# Patient Record
Sex: MALE | Race: WHITE | Employment: FULL TIME | URBAN - METROPOLITAN AREA
[De-identification: names, ages, dates, MRNs, and addresses within clinical notes are randomized per-mention and may not be internally consistent; named-entity substitution may affect disease eponyms.]

---

## 2017-05-12 PROBLEM — E11.628 DIABETIC FOOT INFECTION (HCC): Status: ACTIVE | Noted: 2017-05-12

## 2017-05-12 PROBLEM — L08.9 DIABETIC FOOT INFECTION (HCC): Status: ACTIVE | Noted: 2017-05-12

## 2018-08-21 DIAGNOSIS — N18.30 CKD (CHRONIC KIDNEY DISEASE) STAGE 3, GFR 30-59 ML/MIN (HCC): ICD-10-CM

## 2018-08-21 LAB
ALBUMIN SERPL-MCNC: 4 G/DL (ref 3.4–5)
ANION GAP SERPL CALCULATED.3IONS-SCNC: 18 MMOL/L (ref 3–16)
BUN BLDV-MCNC: 37 MG/DL (ref 7–20)
CALCIUM SERPL-MCNC: 9 MG/DL (ref 8.3–10.6)
CHLORIDE BLD-SCNC: 99 MMOL/L (ref 99–110)
CO2: 23 MMOL/L (ref 21–32)
CREAT SERPL-MCNC: 2.3 MG/DL (ref 0.9–1.3)
GFR AFRICAN AMERICAN: 36
GFR NON-AFRICAN AMERICAN: 29
GLUCOSE BLD-MCNC: 95 MG/DL (ref 70–99)
PHOSPHORUS: 3.8 MG/DL (ref 2.5–4.9)
POTASSIUM SERPL-SCNC: 4.6 MMOL/L (ref 3.5–5.1)
SODIUM BLD-SCNC: 140 MMOL/L (ref 136–145)

## 2018-08-22 LAB
CREATININE URINE: 76.8 MG/DL (ref 39–259)
MICROALBUMIN UR-MCNC: 2.3 MG/DL
MICROALBUMIN/CREAT UR-RTO: 29.9 MG/G (ref 0–30)

## 2018-11-19 ENCOUNTER — HOSPITAL ENCOUNTER (INPATIENT)
Age: 58
LOS: 2 days | Discharge: HOME | DRG: 854 | End: 2018-11-21
Attending: EMERGENCY MEDICINE | Admitting: INTERNAL MEDICINE
Payer: COMMERCIAL

## 2018-11-19 ENCOUNTER — APPOINTMENT (OUTPATIENT)
Dept: GENERAL RADIOLOGY | Age: 58
DRG: 854 | End: 2018-11-19
Payer: COMMERCIAL

## 2018-11-19 DIAGNOSIS — A41.9 SEPTICEMIA (HCC): Primary | ICD-10-CM

## 2018-11-19 PROBLEM — N18.30 CKD (CHRONIC KIDNEY DISEASE) STAGE 3, GFR 30-59 ML/MIN (HCC): Status: ACTIVE | Noted: 2018-11-19

## 2018-11-19 LAB
ALBUMIN SERPL-MCNC: 3.9 G/DL (ref 3.4–5)
ALP BLD-CCNC: 113 U/L (ref 40–129)
ALT SERPL-CCNC: 16 U/L (ref 10–40)
ANION GAP SERPL CALCULATED.3IONS-SCNC: 16 MMOL/L (ref 3–16)
AST SERPL-CCNC: 20 U/L (ref 15–37)
BASE EXCESS VENOUS: -3 (ref -3–3)
BASOPHILS ABSOLUTE: 0.1 K/UL (ref 0–0.2)
BASOPHILS RELATIVE PERCENT: 0.5 %
BILIRUB SERPL-MCNC: 0.4 MG/DL (ref 0–1)
BILIRUBIN DIRECT: <0.2 MG/DL (ref 0–0.3)
BILIRUBIN, INDIRECT: NORMAL MG/DL (ref 0–1)
BUN BLDV-MCNC: 43 MG/DL (ref 7–20)
CALCIUM SERPL-MCNC: 9.3 MG/DL (ref 8.3–10.6)
CHLORIDE BLD-SCNC: 102 MMOL/L (ref 99–110)
CO2: 21 MMOL/L (ref 21–32)
CREAT SERPL-MCNC: 2.1 MG/DL (ref 0.9–1.3)
EOSINOPHILS ABSOLUTE: 0.1 K/UL (ref 0–0.6)
EOSINOPHILS RELATIVE PERCENT: 0.4 %
GFR AFRICAN AMERICAN: 39
GFR NON-AFRICAN AMERICAN: 33
GLUCOSE BLD-MCNC: 214 MG/DL (ref 70–99)
HCO3 VENOUS: 20.7 MMOL/L (ref 23–29)
HCT VFR BLD CALC: 31.1 % (ref 40.5–52.5)
HEMOGLOBIN: 10.1 G/DL (ref 13.5–17.5)
LACTIC ACID, SEPSIS: 1.3 MMOL/L (ref 0.4–1.9)
LIPASE: 9 U/L (ref 13–60)
LYMPHOCYTES ABSOLUTE: 0.3 K/UL (ref 1–5.1)
LYMPHOCYTES RELATIVE PERCENT: 1.7 %
MCH RBC QN AUTO: 28.9 PG (ref 26–34)
MCHC RBC AUTO-ENTMCNC: 32.4 G/DL (ref 31–36)
MCV RBC AUTO: 89 FL (ref 80–100)
MONOCYTES ABSOLUTE: 0.7 K/UL (ref 0–1.3)
MONOCYTES RELATIVE PERCENT: 4.7 %
NEUTROPHILS ABSOLUTE: 14 K/UL (ref 1.7–7.7)
NEUTROPHILS RELATIVE PERCENT: 92.7 %
O2 SAT, VEN: 96 %
PCO2, VEN: 28.1 MM HG (ref 40–50)
PDW BLD-RTO: 14.3 % (ref 12.4–15.4)
PERFORMED ON: ABNORMAL
PH VENOUS: 7.48 (ref 7.35–7.45)
PLATELET # BLD: 225 K/UL (ref 135–450)
PMV BLD AUTO: 7.2 FL (ref 5–10.5)
PO2, VEN: 72 MM HG
POC SAMPLE TYPE: ABNORMAL
POTASSIUM SERPL-SCNC: 4.4 MMOL/L (ref 3.5–5.1)
RAPID INFLUENZA  B AGN: NEGATIVE
RAPID INFLUENZA A AGN: NEGATIVE
RBC # BLD: 3.49 M/UL (ref 4.2–5.9)
SODIUM BLD-SCNC: 139 MMOL/L (ref 136–145)
TCO2 CALC VENOUS: 22 MMOL/L
TOTAL PROTEIN: 7.7 G/DL (ref 6.4–8.2)
WBC # BLD: 15.1 K/UL (ref 4–11)

## 2018-11-19 PROCEDURE — 6360000002 HC RX W HCPCS: Performed by: EMERGENCY MEDICINE

## 2018-11-19 PROCEDURE — 2580000003 HC RX 258: Performed by: EMERGENCY MEDICINE

## 2018-11-19 PROCEDURE — 83690 ASSAY OF LIPASE: CPT

## 2018-11-19 PROCEDURE — 80048 BASIC METABOLIC PNL TOTAL CA: CPT

## 2018-11-19 PROCEDURE — 6370000000 HC RX 637 (ALT 250 FOR IP): Performed by: EMERGENCY MEDICINE

## 2018-11-19 PROCEDURE — 99285 EMERGENCY DEPT VISIT HI MDM: CPT

## 2018-11-19 PROCEDURE — 96365 THER/PROPH/DIAG IV INF INIT: CPT

## 2018-11-19 PROCEDURE — 36415 COLL VENOUS BLD VENIPUNCTURE: CPT

## 2018-11-19 PROCEDURE — 1200000000 HC SEMI PRIVATE

## 2018-11-19 PROCEDURE — 71046 X-RAY EXAM CHEST 2 VIEWS: CPT

## 2018-11-19 PROCEDURE — 96361 HYDRATE IV INFUSION ADD-ON: CPT

## 2018-11-19 PROCEDURE — 87804 INFLUENZA ASSAY W/OPTIC: CPT

## 2018-11-19 PROCEDURE — 80076 HEPATIC FUNCTION PANEL: CPT

## 2018-11-19 PROCEDURE — 83605 ASSAY OF LACTIC ACID: CPT

## 2018-11-19 PROCEDURE — 82803 BLOOD GASES ANY COMBINATION: CPT

## 2018-11-19 PROCEDURE — 87801 DETECT AGNT MULT DNA AMPLI: CPT

## 2018-11-19 PROCEDURE — 87040 BLOOD CULTURE FOR BACTERIA: CPT

## 2018-11-19 PROCEDURE — 85025 COMPLETE CBC W/AUTO DIFF WBC: CPT

## 2018-11-19 PROCEDURE — 96375 TX/PRO/DX INJ NEW DRUG ADDON: CPT

## 2018-11-19 RX ORDER — ACETAMINOPHEN 500 MG
1000 TABLET ORAL ONCE
Status: COMPLETED | OUTPATIENT
Start: 2018-11-19 | End: 2018-11-19

## 2018-11-19 RX ORDER — HYDROCODONE BITARTRATE AND ACETAMINOPHEN 5; 325 MG/1; MG/1
1 TABLET ORAL EVERY 6 HOURS PRN
Status: DISCONTINUED | OUTPATIENT
Start: 2018-11-19 | End: 2018-11-21 | Stop reason: HOSPADM

## 2018-11-19 RX ORDER — SODIUM CHLORIDE 0.9 % (FLUSH) 0.9 %
10 SYRINGE (ML) INJECTION PRN
Status: DISCONTINUED | OUTPATIENT
Start: 2018-11-19 | End: 2018-11-21 | Stop reason: HOSPADM

## 2018-11-19 RX ORDER — SODIUM CHLORIDE 9 MG/ML
INJECTION, SOLUTION INTRAVENOUS CONTINUOUS
Status: DISCONTINUED | OUTPATIENT
Start: 2018-11-19 | End: 2018-11-21 | Stop reason: HOSPADM

## 2018-11-19 RX ORDER — SODIUM CHLORIDE 0.9 % (FLUSH) 0.9 %
10 SYRINGE (ML) INJECTION EVERY 12 HOURS SCHEDULED
Status: DISCONTINUED | OUTPATIENT
Start: 2018-11-19 | End: 2018-11-21 | Stop reason: HOSPADM

## 2018-11-19 RX ORDER — PANTOPRAZOLE SODIUM 40 MG/1
40 TABLET, DELAYED RELEASE ORAL
Status: DISCONTINUED | OUTPATIENT
Start: 2018-11-20 | End: 2018-11-21 | Stop reason: HOSPADM

## 2018-11-19 RX ORDER — SIMVASTATIN 40 MG
40 TABLET ORAL NIGHTLY
Status: DISCONTINUED | OUTPATIENT
Start: 2018-11-19 | End: 2018-11-21 | Stop reason: HOSPADM

## 2018-11-19 RX ORDER — ONDANSETRON 2 MG/ML
4 INJECTION INTRAMUSCULAR; INTRAVENOUS ONCE
Status: COMPLETED | OUTPATIENT
Start: 2018-11-19 | End: 2018-11-19

## 2018-11-19 RX ORDER — AMITRIPTYLINE HYDROCHLORIDE 25 MG/1
25 TABLET, FILM COATED ORAL NIGHTLY
Status: DISCONTINUED | OUTPATIENT
Start: 2018-11-19 | End: 2018-11-21 | Stop reason: HOSPADM

## 2018-11-19 RX ORDER — LOSARTAN POTASSIUM AND HYDROCHLOROTHIAZIDE 12.5; 5 MG/1; MG/1
1 TABLET ORAL DAILY
Status: DISCONTINUED | OUTPATIENT
Start: 2018-11-20 | End: 2018-11-21 | Stop reason: HOSPADM

## 2018-11-19 RX ORDER — ACETAMINOPHEN 325 MG/1
650 TABLET ORAL EVERY 4 HOURS PRN
Status: DISCONTINUED | OUTPATIENT
Start: 2018-11-19 | End: 2018-11-21 | Stop reason: HOSPADM

## 2018-11-19 RX ORDER — ASPIRIN 81 MG/1
81 TABLET ORAL DAILY
Status: DISCONTINUED | OUTPATIENT
Start: 2018-11-20 | End: 2018-11-21 | Stop reason: HOSPADM

## 2018-11-19 RX ORDER — 0.9 % SODIUM CHLORIDE 0.9 %
1000 INTRAVENOUS SOLUTION INTRAVENOUS ONCE
Status: COMPLETED | OUTPATIENT
Start: 2018-11-19 | End: 2018-11-19

## 2018-11-19 RX ORDER — MORPHINE SULFATE 10 MG/ML
8 INJECTION, SOLUTION INTRAMUSCULAR; INTRAVENOUS ONCE
Status: COMPLETED | OUTPATIENT
Start: 2018-11-19 | End: 2018-11-19

## 2018-11-19 RX ADMIN — SODIUM CHLORIDE 1000 ML: 9 INJECTION, SOLUTION INTRAVENOUS at 19:51

## 2018-11-19 RX ADMIN — ACETAMINOPHEN 1000 MG: 500 TABLET ORAL at 19:51

## 2018-11-19 RX ADMIN — MORPHINE SULFATE 8 MG: 10 INJECTION, SOLUTION INTRAMUSCULAR; INTRAVENOUS at 19:53

## 2018-11-19 RX ADMIN — ONDANSETRON 4 MG: 2 INJECTION INTRAMUSCULAR; INTRAVENOUS at 19:53

## 2018-11-19 RX ADMIN — CEFEPIME HYDROCHLORIDE 1 G: 1 INJECTION, POWDER, FOR SOLUTION INTRAMUSCULAR; INTRAVENOUS at 22:42

## 2018-11-19 RX ADMIN — VANCOMYCIN HYDROCHLORIDE 2000 MG: 10 INJECTION, POWDER, LYOPHILIZED, FOR SOLUTION INTRAVENOUS at 20:35

## 2018-11-19 RX ADMIN — SODIUM CHLORIDE 1000 ML: 9 INJECTION, SOLUTION INTRAVENOUS at 22:06

## 2018-11-19 ASSESSMENT — ENCOUNTER SYMPTOMS
CONSTIPATION: 1
BACK PAIN: 1
RHINORRHEA: 0
CHOKING: 0
EYE DISCHARGE: 0
SHORTNESS OF BREATH: 0
EYE PAIN: 0
VOMITING: 0
NAUSEA: 1
CHEST TIGHTNESS: 0
FACIAL SWELLING: 0
ABDOMINAL PAIN: 1
DIARRHEA: 0

## 2018-11-19 ASSESSMENT — PAIN SCALES - GENERAL
PAINLEVEL_OUTOF10: 10
PAINLEVEL_OUTOF10: 10
PAINLEVEL_OUTOF10: 0

## 2018-11-19 ASSESSMENT — PAIN DESCRIPTION - PAIN TYPE: TYPE: ACUTE PAIN;CHRONIC PAIN

## 2018-11-20 ENCOUNTER — APPOINTMENT (OUTPATIENT)
Dept: VASCULAR LAB | Age: 58
DRG: 854 | End: 2018-11-20
Payer: COMMERCIAL

## 2018-11-20 PROBLEM — B95.0 BACTERIAL INFECTION DUE TO STREPTOCOCCUS, GROUP A: Status: ACTIVE | Noted: 2018-11-20

## 2018-11-20 PROBLEM — R50.9 FEVER: Status: ACTIVE | Noted: 2018-11-20

## 2018-11-20 PROBLEM — D72.829 LEUKOCYTOSIS: Status: ACTIVE | Noted: 2018-11-20

## 2018-11-20 LAB
ANION GAP SERPL CALCULATED.3IONS-SCNC: 13 MMOL/L (ref 3–16)
BASOPHILS ABSOLUTE: 0 K/UL (ref 0–0.2)
BASOPHILS RELATIVE PERCENT: 0.1 %
BUN BLDV-MCNC: 47 MG/DL (ref 7–20)
CALCIUM SERPL-MCNC: 8.6 MG/DL (ref 8.3–10.6)
CHLORIDE BLD-SCNC: 100 MMOL/L (ref 99–110)
CO2: 23 MMOL/L (ref 21–32)
CREAT SERPL-MCNC: 2.3 MG/DL (ref 0.9–1.3)
EOSINOPHILS ABSOLUTE: 0 K/UL (ref 0–0.6)
EOSINOPHILS RELATIVE PERCENT: 0 %
GFR AFRICAN AMERICAN: 36
GFR NON-AFRICAN AMERICAN: 29
GLUCOSE BLD-MCNC: 204 MG/DL (ref 70–99)
GLUCOSE BLD-MCNC: 205 MG/DL (ref 70–99)
GLUCOSE BLD-MCNC: 227 MG/DL (ref 70–99)
GLUCOSE BLD-MCNC: 259 MG/DL (ref 70–99)
GLUCOSE BLD-MCNC: 390 MG/DL (ref 70–99)
GLUCOSE BLD-MCNC: 440 MG/DL (ref 70–99)
HCT VFR BLD CALC: 30.1 % (ref 40.5–52.5)
HEMOGLOBIN: 9.7 G/DL (ref 13.5–17.5)
LACTIC ACID, SEPSIS: 1.1 MMOL/L (ref 0.4–1.9)
LACTIC ACID, SEPSIS: 1.4 MMOL/L (ref 0.4–1.9)
LYMPHOCYTES ABSOLUTE: 0.4 K/UL (ref 1–5.1)
LYMPHOCYTES RELATIVE PERCENT: 1.6 %
MCH RBC QN AUTO: 29 PG (ref 26–34)
MCHC RBC AUTO-ENTMCNC: 32.3 G/DL (ref 31–36)
MCV RBC AUTO: 89.7 FL (ref 80–100)
MONOCYTES ABSOLUTE: 0.7 K/UL (ref 0–1.3)
MONOCYTES RELATIVE PERCENT: 3.3 %
NEUTROPHILS ABSOLUTE: 21.4 K/UL (ref 1.7–7.7)
NEUTROPHILS RELATIVE PERCENT: 95 %
PDW BLD-RTO: 14.8 % (ref 12.4–15.4)
PERFORMED ON: ABNORMAL
PLATELET # BLD: 222 K/UL (ref 135–450)
PMV BLD AUTO: 7.7 FL (ref 5–10.5)
POTASSIUM REFLEX MAGNESIUM: 4.9 MMOL/L (ref 3.5–5.1)
RBC # BLD: 3.36 M/UL (ref 4.2–5.9)
REPORT: NORMAL
SODIUM BLD-SCNC: 136 MMOL/L (ref 136–145)
WBC # BLD: 22.5 K/UL (ref 4–11)

## 2018-11-20 PROCEDURE — 85025 COMPLETE CBC W/AUTO DIFF WBC: CPT

## 2018-11-20 PROCEDURE — 87040 BLOOD CULTURE FOR BACTERIA: CPT

## 2018-11-20 PROCEDURE — 6370000000 HC RX 637 (ALT 250 FOR IP): Performed by: FAMILY MEDICINE

## 2018-11-20 PROCEDURE — 36415 COLL VENOUS BLD VENIPUNCTURE: CPT

## 2018-11-20 PROCEDURE — 2580000003 HC RX 258: Performed by: FAMILY MEDICINE

## 2018-11-20 PROCEDURE — 93971 EXTREMITY STUDY: CPT

## 2018-11-20 PROCEDURE — 6360000002 HC RX W HCPCS: Performed by: FAMILY MEDICINE

## 2018-11-20 PROCEDURE — 80048 BASIC METABOLIC PNL TOTAL CA: CPT

## 2018-11-20 PROCEDURE — 99255 IP/OBS CONSLTJ NEW/EST HI 80: CPT | Performed by: INTERNAL MEDICINE

## 2018-11-20 PROCEDURE — 1200000000 HC SEMI PRIVATE

## 2018-11-20 PROCEDURE — 0JBQ0ZZ EXCISION OF RIGHT FOOT SUBCUTANEOUS TISSUE AND FASCIA, OPEN APPROACH: ICD-10-PCS | Performed by: PODIATRIST

## 2018-11-20 PROCEDURE — 83605 ASSAY OF LACTIC ACID: CPT

## 2018-11-20 RX ORDER — DEXTROSE MONOHYDRATE 50 MG/ML
100 INJECTION, SOLUTION INTRAVENOUS PRN
Status: DISCONTINUED | OUTPATIENT
Start: 2018-11-20 | End: 2018-11-21 | Stop reason: HOSPADM

## 2018-11-20 RX ORDER — NICOTINE POLACRILEX 4 MG
15 LOZENGE BUCCAL PRN
Status: DISCONTINUED | OUTPATIENT
Start: 2018-11-20 | End: 2018-11-21 | Stop reason: HOSPADM

## 2018-11-20 RX ORDER — DEXTROSE MONOHYDRATE 25 G/50ML
12.5 INJECTION, SOLUTION INTRAVENOUS PRN
Status: DISCONTINUED | OUTPATIENT
Start: 2018-11-20 | End: 2018-11-21 | Stop reason: HOSPADM

## 2018-11-20 RX ADMIN — INSULIN LISPRO 4 UNITS: 100 INJECTION, SOLUTION INTRAVENOUS; SUBCUTANEOUS at 08:50

## 2018-11-20 RX ADMIN — CEFEPIME HYDROCHLORIDE 2 G: 2 INJECTION, POWDER, FOR SOLUTION INTRAVENOUS at 02:28

## 2018-11-20 RX ADMIN — ASPIRIN 81 MG: 81 TABLET ORAL at 08:45

## 2018-11-20 RX ADMIN — SODIUM CHLORIDE: 9 INJECTION, SOLUTION INTRAVENOUS at 00:33

## 2018-11-20 RX ADMIN — INSULIN LISPRO 5 UNITS: 100 INJECTION, SOLUTION INTRAVENOUS; SUBCUTANEOUS at 08:46

## 2018-11-20 RX ADMIN — INSULIN LISPRO 5 UNITS: 100 INJECTION, SOLUTION INTRAVENOUS; SUBCUTANEOUS at 21:00

## 2018-11-20 RX ADMIN — AMITRIPTYLINE HYDROCHLORIDE 25 MG: 25 TABLET, FILM COATED ORAL at 00:33

## 2018-11-20 RX ADMIN — CEFEPIME HYDROCHLORIDE 2 G: 2 INJECTION, POWDER, FOR SOLUTION INTRAVENOUS at 15:26

## 2018-11-20 RX ADMIN — LOSARTAN POTASSIUM AND HYDROCHLOROTHIAZIDE 1 TABLET: 12.5; 5 TABLET ORAL at 08:45

## 2018-11-20 RX ADMIN — VANCOMYCIN HYDROCHLORIDE 1500 MG: 10 INJECTION, POWDER, LYOPHILIZED, FOR SOLUTION INTRAVENOUS at 08:46

## 2018-11-20 RX ADMIN — AMITRIPTYLINE HYDROCHLORIDE 25 MG: 25 TABLET, FILM COATED ORAL at 21:00

## 2018-11-20 RX ADMIN — ENOXAPARIN SODIUM 30 MG: 30 INJECTION SUBCUTANEOUS at 08:45

## 2018-11-20 RX ADMIN — HYDROCODONE BITARTRATE AND ACETAMINOPHEN 1 TABLET: 5; 325 TABLET ORAL at 21:00

## 2018-11-20 RX ADMIN — INSULIN LISPRO 6 UNITS: 100 INJECTION, SOLUTION INTRAVENOUS; SUBCUTANEOUS at 12:30

## 2018-11-20 RX ADMIN — HYDROCODONE BITARTRATE AND ACETAMINOPHEN 1 TABLET: 5; 325 TABLET ORAL at 01:13

## 2018-11-20 RX ADMIN — HYDROCODONE BITARTRATE AND ACETAMINOPHEN 1 TABLET: 5; 325 TABLET ORAL at 12:36

## 2018-11-20 RX ADMIN — INSULIN LISPRO 2 UNITS: 100 INJECTION, SOLUTION INTRAVENOUS; SUBCUTANEOUS at 00:49

## 2018-11-20 RX ADMIN — ACETAMINOPHEN 650 MG: 325 TABLET ORAL at 12:57

## 2018-11-20 RX ADMIN — SODIUM CHLORIDE: 9 INJECTION, SOLUTION INTRAVENOUS at 12:57

## 2018-11-20 RX ADMIN — INSULIN LISPRO 12 UNITS: 100 INJECTION, SOLUTION INTRAVENOUS; SUBCUTANEOUS at 16:52

## 2018-11-20 RX ADMIN — INSULIN GLARGINE 40 UNITS: 100 INJECTION, SOLUTION SUBCUTANEOUS at 00:50

## 2018-11-20 RX ADMIN — SIMVASTATIN 40 MG: 40 TABLET, FILM COATED ORAL at 00:33

## 2018-11-20 RX ADMIN — ACETAMINOPHEN 650 MG: 325 TABLET ORAL at 21:05

## 2018-11-20 RX ADMIN — INSULIN GLARGINE 40 UNITS: 100 INJECTION, SOLUTION SUBCUTANEOUS at 21:01

## 2018-11-20 RX ADMIN — SIMVASTATIN 40 MG: 40 TABLET, FILM COATED ORAL at 21:00

## 2018-11-20 RX ADMIN — PANTOPRAZOLE SODIUM 40 MG: 40 TABLET, DELAYED RELEASE ORAL at 07:02

## 2018-11-20 ASSESSMENT — PAIN DESCRIPTION - LOCATION
LOCATION: BACK

## 2018-11-20 ASSESSMENT — PAIN DESCRIPTION - PAIN TYPE
TYPE: ACUTE PAIN;CHRONIC PAIN
TYPE: CHRONIC PAIN

## 2018-11-20 ASSESSMENT — ACTIVITIES OF DAILY LIVING (ADL)
EFFECT OF PAIN ON DAILY ACTIVITIES: DISCOMFORT
EFFECT OF PAIN ON DAILY ACTIVITIES: DISCOMFORT

## 2018-11-20 ASSESSMENT — PAIN SCALES - GENERAL
PAINLEVEL_OUTOF10: 4
PAINLEVEL_OUTOF10: 6
PAINLEVEL_OUTOF10: 0
PAINLEVEL_OUTOF10: 4
PAINLEVEL_OUTOF10: 8
PAINLEVEL_OUTOF10: 8

## 2018-11-20 ASSESSMENT — PAIN DESCRIPTION - FREQUENCY
FREQUENCY: INTERMITTENT
FREQUENCY: INTERMITTENT

## 2018-11-20 ASSESSMENT — PAIN DESCRIPTION - ONSET
ONSET: GRADUAL
ONSET: ON-GOING

## 2018-11-20 ASSESSMENT — PAIN DESCRIPTION - PROGRESSION
CLINICAL_PROGRESSION: GRADUALLY WORSENING
CLINICAL_PROGRESSION: NOT CHANGED

## 2018-11-20 ASSESSMENT — PAIN DESCRIPTION - DESCRIPTORS
DESCRIPTORS: ACHING
DESCRIPTORS: ACHING

## 2018-11-20 NOTE — H&P
(methicillin resistant staph aureus) culture positive. Past Surgical History:   has a past surgical history that includes Leg amputation below knee; Foot surgery (Left, 05/11/2017); and other surgical history (Right, 05/14/2017). Medications:  No current facility-administered medications on file prior to encounter. Current Outpatient Prescriptions on File Prior to Encounter   Medication Sig Dispense Refill    losartan-hydrochlorothiazide (HYZAAR) 50-12.5 MG per tablet TAKE 1 TABLET BY MOUTH DAILY 30 tablet 3    spironolactone (ALDACTONE) 25 MG tablet Take 1 tablet by mouth daily 30 tablet 5    HYDROcodone-acetaminophen (NORCO) 5-325 MG per tablet Take 1 tablet by mouth every 6 hours as needed for Pain .  amitriptyline (ELAVIL) 25 MG tablet Take 25 mg by mouth nightly      aspirin 81 MG EC tablet Take 1 tablet by mouth daily 30 tablet 3    furosemide (LASIX) 40 MG tablet TAKE 1 TABLET(40 MG) BY MOUTH DAILY      insulin lispro (HUMALOG) 100 UNIT/ML injection vial Inject 5-15 Units into the skin 3 times daily (before meals) Sliding scale with meals and at HS      omeprazole (PRILOSEC) 20 MG capsule Take 20 mg by mouth Daily       simvastatin (ZOCOR) 40 MG tablet Take 40 mg by mouth nightly.  insulin glargine (LANTUS) 100 UNIT/ML injection Inject 40 Units into the skin nightly          Allergies: Allergies   Allergen Reactions    Codeine Nausea And Vomiting        Social History:   reports that he has never smoked. He has never used smokeless tobacco. He reports that he does not drink alcohol or use drugs. Family History:  family history includes Arthritis in his father and mother; Diabetes in his brother and father; Heart Disease in his mother; High Blood Pressure in his mother; Stroke in his mother.       Physical Exam:  /63   Pulse 110   Temp 99.1 °F (37.3 °C) (Oral)   Resp 22   Ht 6' 3\" (1.905 m)   Wt (!) 338 lb 13.6 oz (153.7 kg)   SpO2 95%   BMI 42.35 kg/m²

## 2018-11-20 NOTE — PROGRESS NOTES
last 72 hours. UA:No results for input(s): Caitie Latin, GLUCOSEU, BILIRUBINUR, Kassandra Sullivan, BLOODU, PHUR, PROTEINU, UROBILINOGEN, NITRU, LEUKOCYTESUR, LABMICR, URINETYPE in the last 72 hours. Urine Microscopic: No results for input(s): LABCAST, BACTERIA, COMU, HYALCAST, WBCUA, RBCUA, EPIU in the last 72 hours. Urine Culture: No results for input(s): LABURIN in the last 72 hours. Urine Chemistry: No results for input(s): Janann Leer, PROTEINUR, NAUR in the last 72 hours. IMAGING:  XR CHEST STANDARD (2 VW)   Final Result      Limited inspiration. Bibasilar increased density most consistent with subsegmental atelectasis. Assessment/Plan   1. HUNTER on CKD III  - Cr worse   - Continue IVF  - monitor renal panel  - monitor UO  - Urine Studies pending    2. Sepsis  - continue abx  - continue IVF  - UA pending  - podiatry consulted for RLE wound    3. HTN, controlled  - continue bp meds    4.  Anemia    5. Acid- base/ Electrolytes        Thank you for allowing us to participate in care of 100 Mukund Napier MS4 acted as my scribe       Feel free to contact me   [facility] [address]  Office : [phone]  Fax :[phone]

## 2018-11-20 NOTE — PROGRESS NOTES
Patient's blood sugar was 440 at 1630. Covered with 22 units of Humalog. Dr. Amber Garcia was notified via [employer]. No new orders at this time. Will continue to monitor.

## 2018-11-20 NOTE — ED NOTES
Blood culture #1 drawn from R AC at [facility]. 47  Blood culture #2 drawn from  R forearm at [facility] cleaned with chlorhexidine for 30 seconds and allowed to dry before cultures drawn.        Grace Kumar RN  11/19/18 9549

## 2018-11-20 NOTE — ED PROVIDER NOTES
flank pain and hematuria. Musculoskeletal: Positive for arthralgias, back pain and myalgias. Negative for joint swelling. Skin: Positive for rash (Localized erythema to right leg as noted in HPI). Negative for wound. Allergic/Immunologic: Negative for environmental allergies. Neurological: Positive for weakness (Diffuse generalized). Negative for dizziness, seizures, syncope and light-headedness. Hematological: Does not bruise/bleed easily. Psychiatric/Behavioral: Negative for confusion and hallucinations. Past Medical, Surgical, Family, and Social History     He has a past medical history of Back pain; CAD (coronary artery disease); CKD (chronic kidney disease) stage 4, GFR 15-29 ml/min ([facility] [address] Diabetes mellitus ([facility] [address] Hyperlipidemia; Hypertension; and MRSA (methicillin resistant staph aureus) culture positive. He has a past surgical history that includes Leg amputation below knee; Foot surgery (Left, 05/11/2017); and other surgical history (Right, 05/14/2017). His family history includes Arthritis in his father and mother; Diabetes in his brother and father; Heart Disease in his mother; High Blood Pressure in his mother; Stroke in his mother. He reports that he has never smoked. He has never used smokeless tobacco. He reports that he does not drink alcohol or use drugs. Medications     Previous Medications    AMITRIPTYLINE (ELAVIL) 25 MG TABLET    Take 25 mg by mouth nightly    ASPIRIN 81 MG EC TABLET    Take 1 tablet by mouth daily    FUROSEMIDE (LASIX) 40 MG TABLET    TAKE 1 TABLET(40 MG) BY MOUTH DAILY    HYDROCODONE-ACETAMINOPHEN (NORCO) 5-325 MG PER TABLET    Take 1 tablet by mouth every 6 hours as needed for Pain .     INSULIN GLARGINE (LANTUS) 100 UNIT/ML INJECTION    Inject 40 Units into the skin nightly     INSULIN LISPRO (HUMALOG) 100 UNIT/ML INJECTION VIAL    Inject 5-15 Units into the skin 3 times daily (before meals) Sliding scale with meals and at HS LOSARTAN-HYDROCHLOROTHIAZIDE (HYZAAR) 50-12.5 MG PER TABLET    TAKE 1 TABLET BY MOUTH DAILY    OMEPRAZOLE (PRILOSEC) 20 MG CAPSULE    Take 20 mg by mouth Daily     SIMVASTATIN (ZOCOR) 40 MG TABLET    Take 40 mg by mouth nightly. SPIRONOLACTONE (ALDACTONE) 25 MG TABLET    Take 1 tablet by mouth daily       Allergies     He is allergic to codeine. Physical Exam     INITIAL VITALS: BP: (!) 143/55, Temp: 102.4 °F (39.1 °C), Pulse: 125, Resp: 24, SpO2: 95 %   Physical Exam   Constitutional: He is oriented to person, place, and time. He appears well-developed and well-nourished. No distress. HENT:   Head: Normocephalic and atraumatic. Mouth/Throat: No oropharyngeal exudate. Oropharynx dry   Eyes: Pupils are equal, round, and reactive to light. Conjunctivae and EOM are normal. Right eye exhibits no discharge. Left eye exhibits no discharge. Neck: Normal range of motion. Neck supple. No JVD present. No tracheal deviation present. No thyromegaly present. Cardiovascular: Regular rhythm, normal heart sounds and intact distal pulses. Exam reveals no gallop and no friction rub. No murmur heard. Tachycardic to 120   Pulmonary/Chest: Effort normal and breath sounds normal. No stridor. No respiratory distress. He has no wheezes. He has no rales. He exhibits no tenderness. Abdominal: Soft. Bowel sounds are normal. He exhibits no distension. There is no tenderness. There is no rebound and no guarding. Musculoskeletal: Normal range of motion. He exhibits no edema, tenderness or deformity. Below knee amputation noted on the left leg. Normal range of motion of bilateral upper arms and right lower leg. Neurological: He is alert and oriented to person, place, and time. No cranial nerve deficit. He exhibits normal muscle tone. Coordination normal.   Skin: Skin is warm and dry. No rash noted. He is not diaphoretic. No erythema. Patient noted with blanching erythema and warmth to bilateral hands and fingers. 7.7 6.4 - 8.2 g/dL    Alb 3.9 3.4 - 5.0 g/dL    Alkaline Phosphatase 113 40 - 129 U/L    ALT 16 10 - 40 U/L    AST 20 15 - 37 U/L    Total Bilirubin 0.4 0.0 - 1.0 mg/dL    Bilirubin, Direct <0.2 0.0 - 0.3 mg/dL    Bilirubin, Indirect see below 0.0 - 1.0 mg/dL   Lipase   Result Value Ref Range    Lipase 9.0 (L) 13.0 - 60.0 U/L   POCT Venous   Result Value Ref Range    pH, Ken 7.476 (H) 7.350 - 7.450    pCO2, Ken 28.1 (L) 40.0 - 50.0 mm Hg    pO2, Ken 72 Not Established mm Hg    HCO3, Venous 20.7 (L) 23.0 - 29.0 mmol/L    Base Excess, Ken -3 -3 - 3    O2 Sat, Ken 96 Not Established %    TC02 (Calc), Ken 22 Not Established mmol/L    Sample Type KEN     Performed on SEE BELOW        ED BEDSIDE ULTRASOUND:  none    RECENT VITALS:  BP: (!) 161/62,Temp: 100.8 °F (38.2 °C), Pulse: 120, Resp: 24, SpO2: 92 %     Procedures     none    ED Course     Nursing Notes, Past Medical Hx, Past Surgical Hx, Social Hx,Allergies, and Family Hx were reviewed. The patient was given the following medications:  Orders Placed This Encounter   Medications    vancomycin (VANCOCIN) 2,000 mg in dextrose 5 % 500 mL IVPB    0.9 % sodium chloride bolus    morphine injection 8 mg    ondansetron (ZOFRAN) injection 4 mg    acetaminophen (TYLENOL) tablet 1,000 mg    cefepime (MAXIPIME) 1 g IVPB minibag    0.9 % sodium chloride bolus       CONSULTS:  IP CONSULT TO HOSPITALIST    MEDICAL DECISIONMAKING / ASSESSMENT / Rosa  is a 62 y.o. male who presents with a chief complaint of emesis, fatigue. Initial exam reveals an overall chronically ill-appearing male in no acute distress with fever and tachycardia as well as tachypnea with normal oxygenation. Physical exam with erythema to bilateral hands, erythema to right calf. Ulcer without signs of infection on the bottom of right foot. Benign abdominal exam and benign chest and lung exam.  Patient's presentation concerning for sepsis of unknown etiology.   Patient given

## 2018-11-20 NOTE — ED NOTES
After medicating, patient's Oxygen saturations dropped to 74% and patient's respirations slowed to 6/minute. Patient was placed on 3L of oxygen which raised Oxygen saturation levels to 92% before dropping again. Patient's oxygen flow increased to 4L which increased oxygen saturations to 95%. Patient began breathing at a normal respiration levels as he fell asleep and started snoring. Oxygen flow titrated down to 3L and saturations increased to 97%. Flow titrated to 2L and oxygen saturation levels dropped to 95%. Will continue to monitor.      Nayely Hernandez RN  11/19/18 2010

## 2018-11-20 NOTE — CONSULTS
(chronic kidney disease) stage 4, GFR 15-29 ml/min ([facility])     Dr. Fernando Connell    [facility]     Hyperlipidemia     Hypertension     MRSA (methicillin resistant staph aureus) culture positive 05/11/2017    foot       Past Surgical History:    Past Surgical History:   Procedure Laterality Date    FOOT SURGERY Left 05/11/2017    LEG AMPUTATION BELOW KNEE      OTHER SURGICAL HISTORY Right 05/14/2017    RIGHT FOOT DEBRIDEMENT INCISION AND DRAINAGE, DELAYED PRIMARY       Current Medications:     insulin lispro  0-12 Units Subcutaneous TID WC    insulin lispro  0-6 Units Subcutaneous Nightly    insulin lispro  5-15 Units Subcutaneous TID AC    [START ON 11/21/2018] influenza virus vaccine  0.5 mL Intramuscular Once    cefepime  2 g Intravenous Q12H    [START ON 11/21/2018] vancomycin  1,500 mg Intravenous Q24H    [START ON 11/21/2018] pneumococcal 13-valent conjugate  0.5 mL Intramuscular Once    amitriptyline  25 mg Oral Nightly    aspirin  81 mg Oral Daily    insulin glargine  40 Units Subcutaneous Nightly    losartan-hydrochlorothiazide  1 tablet Oral Daily    pantoprazole  40 mg Oral QAM AC    simvastatin  40 mg Oral Nightly    sodium chloride flush  10 mL Intravenous 2 times per day    enoxaparin  30 mg Subcutaneous Daily       Allergies:  Codeine    Social History:    TOBACCO:    none  ETOH:    none  DRUGS:   none  MARITAL STATUS:     OCCUPATION:       Patient lives lives with his wife  Family History:   No immunodeficiency    REVIEW OF SYSTEMS:    No fever / chills / sweats. No weight loss. No visual change, eye pain, eye discharge. No oral lesion, sore throat, dysphagia. Denies cough / sputum. Denies chest pain, palpitations. Denies n / v / abd pain. No diarrhea. Denies dysuria or change in urinary function. Denies joint swelling or pain. No myalgia, arthralgia.   Rt calf erythema  Denies focal weakness, sensory change or other neurologic symptom started on Vanc + cefepime. LA 1.4 today, WBC increased to 22.5 today (from 15.1). Last fever 100.8F yesterday    # RLE cellulitis - likely source of sepsis,   # Rt plantal wound - less likely source, does not appear infected. # DM, CAD    Patient Active Problem List   Diagnosis    D[facility]deep venous thrombosis) ([facility])    Osteomyelitis of left foot ([facility])    S/P unilateral BKA (below knee amputation) ([facility]    HUNTER (acute kidney injury) ([facility] [address] [facility]    HTN (hypertension)    DM (diabetes mellitus) ([facility] [address] [facility]    HLD (hyperlipidemia)    Back pain    Diabetic foot infection ([facility] [address]    Osteomyelitis of foot ([facility] [address] [facility]    Sepsis ([facility] [address] [facility]    CKD (chronic kidney disease) stage 3, GFR 30-59 ml/min ([facility])         RECOMMENDATIONS:    - Continue IV Vanc + Cefepime   - Await culture results and sensitivities  - podiatry following     Discussed with Dr Andres Hennessy MS4    Addendum to Medical Student Consult note:  Pt seen,examined and evaluated. I have independently performed history, physical exam, lab and data review. I have determined assessment and plan as documented by student (Juice Dover). 61 yo man with DM, neuropathy, remote L BKA (18 yr ago)  Hx R DM foot infection, MRSA bacteremia 5/2017    Admit with acute onset illness with fatigue, chills over 1 day. Came to hosp 11/19 - T102.4, WBC 15.1. BC sent.   Started on vanco / cefepime  Seen by Podiatry, R foot ulcer not felt to be infection  BC + GAS (gp A Streptococcus)    Today 11/20, afebrile, feels good    IMP/  Acute febrile illness - secondary to beta hemolytic Streptococcus (gp A Strep) related to R LE cellulitis  GAS bacteremia    REC/  Change to ceftriaxone  Monitor     If improved 11/21, ok for discharge on po augmentin 875 bid (or cephalexin 500 qid) x 7 days  Marlene Naranjo MD

## 2018-11-20 NOTE — CARE COORDINATION
Met with pt at the bedside this morning. He is sitting up at the bedside eating breakfast. He is alert and oriented x 4. Very pleasant and easy to engage in conversation. Stated he is feeling much better this morning compared to last night. He is no longer requiring oxygen. Stated he is looking forward to getting out of the hospital to return to work because it is \"bonus time\" and he has things he wants to turn in as soon as possible. He is independent with ADL's. He has a prosthetic leg he has used for over 18 years. Able to drive. Family is supportive. No needs anticipated.     Ortega Arredondo, ROSIBEL  Case Management  [phone]

## 2018-11-20 NOTE — CONSULTS
Department of Podiatry Consult Note  Resident       Reason for Consult:  RLE cellulitis with DFU  Requesting Physician:  Vickie De La Cruz MD    CHIEF COMPLAINT:  Painful red leg. HISTORY OF PRESENT ILLNESS:                The patient is a 62 y.o. male with significant past medical history as stated below who is consulted for evaluation of his right diabetic foot wound in the setting of cellulitis to the RLE. States he has been feeling nauseous and had a fever of 103 last night. States is feeling much better this am since beginning IV abx. States the wound to the right foot does not cause him any pain, and has been there for years, he sees his podiatrist weekly.      Past Medical History:        Diagnosis Date    Back pain     CAD (coronary artery disease)     CKD (chronic kidney disease) stage 4, GFR 15-29 ml/min ([facility])     Dr. Jw Tineo Diabetes mellitus ([facility] [address]     Hyperlipidemia     Hypertension     MRSA (methicillin resistant staph aureus) culture positive 05/11/2017    foot     Past Surgical History:        Procedure Laterality Date    FOOT SURGERY Left 05/11/2017    LEG AMPUTATION BELOW KNEE      OTHER SURGICAL HISTORY Right 05/14/2017    RIGHT FOOT DEBRIDEMENT INCISION AND DRAINAGE, DELAYED PRIMARY     Current Medications:    Current Facility-Administered Medications: insulin lispro (HUMALOG) injection pen 0-12 Units, 0-12 Units, Subcutaneous, TID WC  insulin lispro (HUMALOG) injection pen 0-6 Units, 0-6 Units, Subcutaneous, Nightly  glucose (GLUTOSE) 40 % oral gel 15 g, 15 g, Oral, PRN  dextrose 50 % solution 12.5 g, 12.5 g, Intravenous, PRN  glucagon (rDNA) injection 1 mg, 1 mg, Intramuscular, PRN  dextrose 5 % solution, 100 mL/hr, Intravenous, PRN  insulin lispro (HUMALOG) injection pen 5-15 Units, 5-15 Units, Subcutaneous, TID AC  amitriptyline (ELAVIL) tablet 25 mg, 25 mg, Oral, Nightly  aspirin EC tablet 81 mg, 81 mg, Oral, Daily  HYDROcodone-acetaminophen (NORCO) 5-325 MG per tablet 1 tablet, 1 tablet, Oral, Q6H PRN  insulin glargine (LANTUS) injection pen 40 Units, 40 Units, Subcutaneous, Nightly  losartan-hydrochlorothiazide (HYZAAR) 50-12.5 MG per tablet 1 tablet, 1 tablet, Oral, Daily  pantoprazole (PROTONIX) tablet 40 mg, 40 mg, Oral, QAM AC  simvastatin (ZOCOR) tablet 40 mg, 40 mg, Oral, Nightly  sodium chloride flush 0.9 % injection 10 mL, 10 mL, Intravenous, 2 times per day  sodium chloride flush 0.9 % injection 10 mL, 10 mL, Intravenous, PRN  enoxaparin (LOVENOX) injection 30 mg, 30 mg, Subcutaneous, Daily  0.9 % sodium chloride infusion, , Intravenous, Continuous  acetaminophen (TYLENOL) tablet 650 mg, 650 mg, Oral, Q4H PRN  cefepime (MAXIPIME) 2 g IVPB minibag, 2 g, Intravenous, Q8H  vancomycin (VANCOCIN) 1,500 mg in dextrose 5 % 250 mL IVPB, 1,500 mg, Intravenous, Q12H  Allergies:   Codeine  Social History:    Denies tobacco, etoh, drugs.    Family History:   Family History   Problem Relation Age of Onset    Heart Disease Mother     High Blood Pressure Mother     Stroke Mother     Arthritis Mother     Diabetes Father     Arthritis Father     Diabetes Brother      REVIEW OF SYSTEMS:    See HPI   PHYSICAL EXAM:      Vitals:    /71   Pulse 104   Temp 98.2 °F (36.8 °C) (Oral)   Resp 20   Ht 6' 3\" (1.905 m)   Wt (!) 338 lb 13.6 oz (153.7 kg)   SpO2 97%   BMI 42.35 kg/m²     LABS:   Recent Labs      11/19/18 1919 11/20/18   0828   WBC  15.1*  22.5*   HGB  10.1*  9.7*   HCT  31.1*  30.1*   PLT  225  222     Recent Labs      11/20/18   0829   NA  136   K  4.9   CL  100   CO2  23   BUN  47*   CREATININE  2.3*     Recent Labs      11/19/18 1919   PROT  7.7         SKIN:    RLE exam reveals erythema circumferentially to the calf, none to the foot, scar present from previous 5th ray resection and full thickness wound at the base of the 5th met with granular base and slight hyperkeratotic rim, excisionally debrided with #15 blade down to the level of and including sub cutaneous tissue with

## 2018-11-21 VITALS
HEART RATE: 89 BPM | SYSTOLIC BLOOD PRESSURE: 145 MMHG | WEIGHT: 315 LBS | BODY MASS INDEX: 39.17 KG/M2 | RESPIRATION RATE: 20 BRPM | OXYGEN SATURATION: 97 % | TEMPERATURE: 97.8 F | HEIGHT: 75 IN | DIASTOLIC BLOOD PRESSURE: 83 MMHG

## 2018-11-21 LAB
ANION GAP SERPL CALCULATED.3IONS-SCNC: 9 MMOL/L (ref 3–16)
BASOPHILS ABSOLUTE: 0 K/UL (ref 0–0.2)
BASOPHILS RELATIVE PERCENT: 0.3 %
BUN BLDV-MCNC: 57 MG/DL (ref 7–20)
CALCIUM SERPL-MCNC: 8.1 MG/DL (ref 8.3–10.6)
CHLORIDE BLD-SCNC: 100 MMOL/L (ref 99–110)
CO2: 21 MMOL/L (ref 21–32)
CREAT SERPL-MCNC: 2.5 MG/DL (ref 0.9–1.3)
EOSINOPHILS ABSOLUTE: 0.1 K/UL (ref 0–0.6)
EOSINOPHILS RELATIVE PERCENT: 1 %
GFR AFRICAN AMERICAN: 32
GFR NON-AFRICAN AMERICAN: 27
GLUCOSE BLD-MCNC: 208 MG/DL (ref 70–99)
GLUCOSE BLD-MCNC: 238 MG/DL (ref 70–99)
GLUCOSE BLD-MCNC: 266 MG/DL (ref 70–99)
GLUCOSE BLD-MCNC: 267 MG/DL (ref 70–99)
HCT VFR BLD CALC: 26.4 % (ref 40.5–52.5)
HEMOGLOBIN: 8.7 G/DL (ref 13.5–17.5)
LYMPHOCYTES ABSOLUTE: 0.5 K/UL (ref 1–5.1)
LYMPHOCYTES RELATIVE PERCENT: 4 %
MCH RBC QN AUTO: 29.6 PG (ref 26–34)
MCHC RBC AUTO-ENTMCNC: 32.9 G/DL (ref 31–36)
MCV RBC AUTO: 90.1 FL (ref 80–100)
MONOCYTES ABSOLUTE: 0.8 K/UL (ref 0–1.3)
MONOCYTES RELATIVE PERCENT: 6.1 %
NEUTROPHILS ABSOLUTE: 11.4 K/UL (ref 1.7–7.7)
NEUTROPHILS RELATIVE PERCENT: 88.6 %
PDW BLD-RTO: 14.3 % (ref 12.4–15.4)
PERFORMED ON: ABNORMAL
PLATELET # BLD: 154 K/UL (ref 135–450)
PMV BLD AUTO: 8.1 FL (ref 5–10.5)
POTASSIUM REFLEX MAGNESIUM: 4.3 MMOL/L (ref 3.5–5.1)
RBC # BLD: 2.93 M/UL (ref 4.2–5.9)
SEDIMENTATION RATE, ERYTHROCYTE: 75 MM/HR (ref 0–20)
SODIUM BLD-SCNC: 130 MMOL/L (ref 136–145)
VANCOMYCIN RANDOM: 19.4 UG/ML
WBC # BLD: 12.9 K/UL (ref 4–11)

## 2018-11-21 PROCEDURE — 80202 ASSAY OF VANCOMYCIN: CPT

## 2018-11-21 PROCEDURE — 90670 PCV13 VACCINE IM: CPT | Performed by: INTERNAL MEDICINE

## 2018-11-21 PROCEDURE — 85025 COMPLETE CBC W/AUTO DIFF WBC: CPT

## 2018-11-21 PROCEDURE — G0009 ADMIN PNEUMOCOCCAL VACCINE: HCPCS | Performed by: INTERNAL MEDICINE

## 2018-11-21 PROCEDURE — 85652 RBC SED RATE AUTOMATED: CPT

## 2018-11-21 PROCEDURE — 6360000002 HC RX W HCPCS: Performed by: INTERNAL MEDICINE

## 2018-11-21 PROCEDURE — 2580000003 HC RX 258: Performed by: INTERNAL MEDICINE

## 2018-11-21 PROCEDURE — 2580000003 HC RX 258: Performed by: FAMILY MEDICINE

## 2018-11-21 PROCEDURE — 6370000000 HC RX 637 (ALT 250 FOR IP): Performed by: FAMILY MEDICINE

## 2018-11-21 PROCEDURE — 36415 COLL VENOUS BLD VENIPUNCTURE: CPT

## 2018-11-21 PROCEDURE — 6360000002 HC RX W HCPCS: Performed by: FAMILY MEDICINE

## 2018-11-21 PROCEDURE — 80048 BASIC METABOLIC PNL TOTAL CA: CPT

## 2018-11-21 PROCEDURE — 99232 SBSQ HOSP IP/OBS MODERATE 35: CPT | Performed by: INTERNAL MEDICINE

## 2018-11-21 RX ORDER — AMOXICILLIN AND CLAVULANATE POTASSIUM 875; 125 MG/1; MG/1
1 TABLET, FILM COATED ORAL 2 TIMES DAILY
Qty: 14 TABLET | Refills: 0 | Status: SHIPPED | OUTPATIENT
Start: 2018-11-21 | End: 2018-11-28

## 2018-11-21 RX ADMIN — INSULIN LISPRO 4 UNITS: 100 INJECTION, SOLUTION INTRAVENOUS; SUBCUTANEOUS at 17:58

## 2018-11-21 RX ADMIN — INSULIN LISPRO 4 UNITS: 100 INJECTION, SOLUTION INTRAVENOUS; SUBCUTANEOUS at 12:19

## 2018-11-21 RX ADMIN — PNEUMOCOCCAL 13-VALENT CONJUGATE VACCINE 0.5 ML: 2.2; 2.2; 2.2; 2.2; 2.2; 4.4; 2.2; 2.2; 2.2; 2.2; 2.2; 2.2; 2.2 INJECTION, SUSPENSION INTRAMUSCULAR at 08:21

## 2018-11-21 RX ADMIN — CEFTRIAXONE SODIUM 2 G: 2 INJECTION, POWDER, FOR SOLUTION INTRAMUSCULAR; INTRAVENOUS at 15:28

## 2018-11-21 RX ADMIN — CEFEPIME HYDROCHLORIDE 2 G: 2 INJECTION, POWDER, FOR SOLUTION INTRAVENOUS at 03:42

## 2018-11-21 RX ADMIN — HYDROCODONE BITARTRATE AND ACETAMINOPHEN 1 TABLET: 5; 325 TABLET ORAL at 03:42

## 2018-11-21 RX ADMIN — ACETAMINOPHEN 650 MG: 325 TABLET ORAL at 03:49

## 2018-11-21 RX ADMIN — ASPIRIN 81 MG: 81 TABLET ORAL at 08:11

## 2018-11-21 RX ADMIN — ACETAMINOPHEN 650 MG: 325 TABLET ORAL at 08:25

## 2018-11-21 RX ADMIN — VANCOMYCIN HYDROCHLORIDE 1500 MG: 10 INJECTION, POWDER, LYOPHILIZED, FOR SOLUTION INTRAVENOUS at 08:12

## 2018-11-21 RX ADMIN — LOSARTAN POTASSIUM AND HYDROCHLOROTHIAZIDE 1 TABLET: 12.5; 5 TABLET ORAL at 08:38

## 2018-11-21 RX ADMIN — PANTOPRAZOLE SODIUM 40 MG: 40 TABLET, DELAYED RELEASE ORAL at 06:24

## 2018-11-21 RX ADMIN — INSULIN LISPRO 6 UNITS: 100 INJECTION, SOLUTION INTRAVENOUS; SUBCUTANEOUS at 08:14

## 2018-11-21 RX ADMIN — ENOXAPARIN SODIUM 30 MG: 30 INJECTION SUBCUTANEOUS at 08:11

## 2018-11-21 ASSESSMENT — PAIN SCALES - GENERAL
PAINLEVEL_OUTOF10: 6
PAINLEVEL_OUTOF10: 0
PAINLEVEL_OUTOF10: 0
PAINLEVEL_OUTOF10: 8
PAINLEVEL_OUTOF10: 0

## 2018-11-21 ASSESSMENT — PAIN DESCRIPTION - DESCRIPTORS: DESCRIPTORS: SHARP

## 2018-11-21 ASSESSMENT — PAIN DESCRIPTION - PROGRESSION: CLINICAL_PROGRESSION: GRADUALLY WORSENING

## 2018-11-21 ASSESSMENT — PAIN DESCRIPTION - ONSET: ONSET: ON-GOING

## 2018-11-21 ASSESSMENT — PAIN DESCRIPTION - PAIN TYPE: TYPE: CHRONIC PAIN

## 2018-11-21 ASSESSMENT — PAIN DESCRIPTION - LOCATION: LOCATION: BACK

## 2018-11-21 ASSESSMENT — PAIN DESCRIPTION - FREQUENCY: FREQUENCY: INTERMITTENT

## 2018-11-21 ASSESSMENT — ACTIVITIES OF DAILY LIVING (ADL): EFFECT OF PAIN ON DAILY ACTIVITIES: DISCOMFORT

## 2018-11-21 NOTE — PROGRESS NOTES
plan.   RN notified about todays plan  bed side. Dispo: will monitor, Discharge in 1  days to home . Needs to stay in hospital for iv abx . Jj Kent MD  [phone]

## 2018-11-21 NOTE — PROGRESS NOTES
Clinical Pharmacy Progress Note    Admit date: 11/19/2018     Subjective/Objective:  Pt is a 58yom with PMHx that includes HTN, HLD, DM, CKD (baseline SCr ~2 per nephro), CAD, left BKA, and MRSA bacteremia (2017) who is admitted with weakness and fever - found to have sepsis of unclear source. Pt also with Rt foot non-infected wound and RLE cellulitis 2/2 venous stasis. Pharmacy is consulted to dose Vancomycin per Dr. Bentley Panda    Current antibiotics:  Cefepime 2g IV q12h - day #3  Vancomycin - Pharmacy to dose - day #3   2g IV x1 11/19 20:35   1.5g IV q24h (11/20 - current)    Patient was on Vancomycin in May 2017 for MRSA bacteremia. Due to HUNTER upon admission, Vancomycin was dosed intermittently based on levels. Once HUNTER resolved, Vanc 1.5g IV q24h was started, but a trough was not obtained as patient was then switched to po Linezolid for discharge. Recent Labs      11/20/18   0829  11/21/18   0740   NA  136  130*   K  4.9  4.3   CL  100  100   CO2  23  21   BUN  47*  57*   CREATININE  2.3*  2.5*   GLUCOSE  227*  267*       Estimated Creatinine Clearance: 53 mL/min (A) (based on SCr of 2.5 mg/dL (H)).     Lab Results   Component Value Date    WBC 12.9 (H) 11/21/2018    HGB 8.7 (L) 11/21/2018    HCT 26.4 (L) 11/21/2018    MCV 90.1 11/21/2018     11/21/2018       Lab Results   Component Value Date    PROTIME 13.5 (H) 05/11/2017    INR 1.19 (H) 05/11/2017       Vancomycin Levels:  Random 11/21 @ 07:40 = 19.4 mcg/mL (prior to 3rd dose on 1.5g IV q24h)    Culture results:  Rapid flu (11/19) = negative  Blood (11/19) = NGTD x2  Blood (11/20) = NGTD    Prophylaxis:  VTE:  Enoxaparin  GI:  Pantoprazole    Vaccination screening:  Pneumonia:  given 11/21  Influenza: refused - pt states he already received    Assessment/Plan:  1)  Sepsis of unclear source and RLE cellulitis:  Cefepime + Vancomycin - day #3  · Cefepime -   · Based on current est CrCl, dose adjusted to 2g IV q12h per Elena 113 renal dose adjustment

## 2018-11-21 NOTE — PROGRESS NOTES
Scheduled Meds:   cefTRIAXone (ROCEPHIN) IV  1 g Intravenous Q24H    insulin lispro  0-12 Units Subcutaneous TID WC    insulin lispro  0-6 Units Subcutaneous Nightly    insulin lispro  5-15 Units Subcutaneous TID AC    influenza virus vaccine  0.5 mL Intramuscular Once    amitriptyline  25 mg Oral Nightly    aspirin  81 mg Oral Daily    insulin glargine  40 Units Subcutaneous Nightly    losartan-hydrochlorothiazide  1 tablet Oral Daily    pantoprazole  40 mg Oral QAM AC    simvastatin  40 mg Oral Nightly    sodium chloride flush  10 mL Intravenous 2 times per day    enoxaparin  30 mg Subcutaneous Daily       Continuous Infusions:   dextrose      sodium chloride 75 mL/hr at 11/21/18 1036       PRN Meds:  glucose, dextrose, glucagon (rDNA), dextrose, HYDROcodone-acetaminophen, sodium chloride flush, acetaminophen      Assessment:     Mr Anant Baird a 62 y. o. male with PMHx including CAD, CKD, DM, HLD,  Rt foot ulcer, LLE amputation, MRSA bacteremia (5/11/17) and HTN who presented with complaint of fever, emesis, nausea, and general malaise. CXR negative. Bld Cx +ve GAS.      WBC trending down, 12.9 today (from 22.5 yesterday). Afebrile > 24 hrs and hemodynamically stable    Plan:     Acute Febrile Illness  - 2/2 GAS cellulitis of Rt calf  - rapid onset, and currently asymptomatic   - Ceftriaxone IV while in hospital   - change to po Augmentin 875mg bid at discharge. Discussed with Dr Olinda Reed, MS4    Addendum to Medical Student Progress note:  Pt seen,examined and evaluated. I have independently performed history, physical exam, lab and data review. I have determined assessment and plan as documented by student (Maria C Mccarthy).    61 yo man with DM, neuropathy, remote L BKA (18 yr ago)  Hx R DM foot infection, MRSA bacteremia 5/2017     Admit with acute onset illness with fatigue, chills over 1 day. Came to hosp 11/19 - T102.4, WBC 15.1. BC sent.   Started on vanco / cefepime  Seen by Podiatry, R foot ulcer not felt to be infection  BC + GAS (gp A Streptococcus)     Today 11/21, afebrile, feels good.   WBC 12.9 (down from 22.5 on 11/20)     IMP/  Acute febrile illness - secondary to beta hemolytic Streptococcus (gp A Strep) related to R LE cellulitis  GAS bacteremia     REC/  Ok for discharge on po augmentin 875 bid x 7 days  Any problems, pt can call me, gave him my card / phone number    Johnny Sow MD

## 2018-11-22 LAB
CULTURE, BLOOD 2: ABNORMAL
ORGANISM: ABNORMAL
ORGANISM: ABNORMAL

## 2018-11-25 LAB
BLOOD CULTURE, ROUTINE: NORMAL
BLOOD CULTURE, ROUTINE: NORMAL
CULTURE, BLOOD 2: NORMAL

## 2019-06-20 DIAGNOSIS — N18.30 CKD (CHRONIC KIDNEY DISEASE), STAGE III (HCC): ICD-10-CM

## 2019-06-20 LAB
ALBUMIN SERPL-MCNC: 3.8 G/DL (ref 3.4–5)
ANION GAP SERPL CALCULATED.3IONS-SCNC: 14 MMOL/L (ref 3–16)
BUN BLDV-MCNC: 41 MG/DL (ref 7–20)
CALCIUM SERPL-MCNC: 8.9 MG/DL (ref 8.3–10.6)
CHLORIDE BLD-SCNC: 105 MMOL/L (ref 99–110)
CO2: 24 MMOL/L (ref 21–32)
CREAT SERPL-MCNC: 2.3 MG/DL (ref 0.9–1.3)
CREATININE URINE: 133 MG/DL (ref 39–259)
GFR AFRICAN AMERICAN: 35
GFR NON-AFRICAN AMERICAN: 29
GLUCOSE BLD-MCNC: 116 MG/DL (ref 70–99)
MICROALBUMIN UR-MCNC: <1.2 MG/DL
MICROALBUMIN/CREAT UR-RTO: NORMAL MG/G (ref 0–30)
PHOSPHORUS: 4.5 MG/DL (ref 2.5–4.9)
POTASSIUM SERPL-SCNC: 4.9 MMOL/L (ref 3.5–5.1)
SODIUM BLD-SCNC: 143 MMOL/L (ref 136–145)

## 2019-09-26 DIAGNOSIS — N18.30 CKD (CHRONIC KIDNEY DISEASE), STAGE III (HCC): ICD-10-CM

## 2019-09-26 LAB
ALBUMIN SERPL-MCNC: 3.9 G/DL (ref 3.4–5)
ANION GAP SERPL CALCULATED.3IONS-SCNC: 15 MMOL/L (ref 3–16)
BASOPHILS ABSOLUTE: 0 K/UL (ref 0–0.2)
BASOPHILS RELATIVE PERCENT: 0.8 %
BUN BLDV-MCNC: 42 MG/DL (ref 7–20)
CALCIUM SERPL-MCNC: 9 MG/DL (ref 8.3–10.6)
CHLORIDE BLD-SCNC: 99 MMOL/L (ref 99–110)
CO2: 22 MMOL/L (ref 21–32)
CREAT SERPL-MCNC: 2.2 MG/DL (ref 0.9–1.3)
EOSINOPHILS ABSOLUTE: 0.7 K/UL (ref 0–0.6)
EOSINOPHILS RELATIVE PERCENT: 11.9 %
GFR AFRICAN AMERICAN: 37
GFR NON-AFRICAN AMERICAN: 31
GLUCOSE BLD-MCNC: 195 MG/DL (ref 70–99)
HCT VFR BLD CALC: 28.8 % (ref 40.5–52.5)
HEMOGLOBIN: 9.4 G/DL (ref 13.5–17.5)
LYMPHOCYTES ABSOLUTE: 1.2 K/UL (ref 1–5.1)
LYMPHOCYTES RELATIVE PERCENT: 20.4 %
MCH RBC QN AUTO: 28.8 PG (ref 26–34)
MCHC RBC AUTO-ENTMCNC: 32.6 G/DL (ref 31–36)
MCV RBC AUTO: 88.3 FL (ref 80–100)
MONOCYTES ABSOLUTE: 0.5 K/UL (ref 0–1.3)
MONOCYTES RELATIVE PERCENT: 7.7 %
NEUTROPHILS ABSOLUTE: 3.6 K/UL (ref 1.7–7.7)
NEUTROPHILS RELATIVE PERCENT: 59.2 %
PDW BLD-RTO: 15.1 % (ref 12.4–15.4)
PHOSPHORUS: 3.9 MG/DL (ref 2.5–4.9)
PLATELET # BLD: 258 K/UL (ref 135–450)
PMV BLD AUTO: 7.6 FL (ref 5–10.5)
POTASSIUM SERPL-SCNC: 5.2 MMOL/L (ref 3.5–5.1)
RBC # BLD: 3.27 M/UL (ref 4.2–5.9)
SODIUM BLD-SCNC: 136 MMOL/L (ref 136–145)
WBC # BLD: 6.1 K/UL (ref 4–11)

## 2020-02-20 DIAGNOSIS — N18.30 CKD (CHRONIC KIDNEY DISEASE), STAGE III (HCC): ICD-10-CM

## 2020-02-20 LAB
ALBUMIN SERPL-MCNC: 4 G/DL (ref 3.4–5)
ANION GAP SERPL CALCULATED.3IONS-SCNC: 13 MMOL/L (ref 3–16)
BASOPHILS ABSOLUTE: 0 K/UL (ref 0–0.2)
BASOPHILS RELATIVE PERCENT: 0.6 %
BUN BLDV-MCNC: 38 MG/DL (ref 7–20)
CALCIUM SERPL-MCNC: 9.1 MG/DL (ref 8.3–10.6)
CHLORIDE BLD-SCNC: 101 MMOL/L (ref 99–110)
CO2: 24 MMOL/L (ref 21–32)
CREAT SERPL-MCNC: 2.1 MG/DL (ref 0.9–1.3)
EOSINOPHILS ABSOLUTE: 0.7 K/UL (ref 0–0.6)
EOSINOPHILS RELATIVE PERCENT: 11.6 %
GFR AFRICAN AMERICAN: 39
GFR NON-AFRICAN AMERICAN: 32
GLUCOSE BLD-MCNC: 170 MG/DL (ref 70–99)
HCT VFR BLD CALC: 30.5 % (ref 40.5–52.5)
HEMOGLOBIN: 10.1 G/DL (ref 13.5–17.5)
IRON SATURATION: 13 % (ref 20–50)
IRON: 38 UG/DL (ref 59–158)
LYMPHOCYTES ABSOLUTE: 1.1 K/UL (ref 1–5.1)
LYMPHOCYTES RELATIVE PERCENT: 18.7 %
MCH RBC QN AUTO: 29 PG (ref 26–34)
MCHC RBC AUTO-ENTMCNC: 33.1 G/DL (ref 31–36)
MCV RBC AUTO: 87.4 FL (ref 80–100)
MONOCYTES ABSOLUTE: 0.4 K/UL (ref 0–1.3)
MONOCYTES RELATIVE PERCENT: 7.2 %
NEUTROPHILS ABSOLUTE: 3.5 K/UL (ref 1.7–7.7)
NEUTROPHILS RELATIVE PERCENT: 61.9 %
PDW BLD-RTO: 15 % (ref 12.4–15.4)
PHOSPHORUS: 3.7 MG/DL (ref 2.5–4.9)
PLATELET # BLD: 238 K/UL (ref 135–450)
PMV BLD AUTO: 7.8 FL (ref 5–10.5)
POTASSIUM SERPL-SCNC: 4.7 MMOL/L (ref 3.5–5.1)
RBC # BLD: 3.48 M/UL (ref 4.2–5.9)
SODIUM BLD-SCNC: 138 MMOL/L (ref 136–145)
TOTAL IRON BINDING CAPACITY: 293 UG/DL (ref 260–445)
WBC # BLD: 5.7 K/UL (ref 4–11)

## 2020-03-10 ENCOUNTER — APPOINTMENT (OUTPATIENT)
Dept: GENERAL RADIOLOGY | Age: 60
End: 2020-03-10
Payer: COMMERCIAL

## 2020-03-10 ENCOUNTER — HOSPITAL ENCOUNTER (EMERGENCY)
Age: 60
Discharge: HOME HEALTH | End: 2020-03-10
Attending: EMERGENCY MEDICINE
Payer: COMMERCIAL

## 2020-03-10 VITALS
OXYGEN SATURATION: 96 % | TEMPERATURE: 98.3 F | SYSTOLIC BLOOD PRESSURE: 151 MMHG | WEIGHT: 315 LBS | RESPIRATION RATE: 19 BRPM | HEIGHT: 75 IN | DIASTOLIC BLOOD PRESSURE: 77 MMHG | HEART RATE: 95 BPM | BODY MASS INDEX: 39.17 KG/M2

## 2020-03-10 LAB
ANION GAP SERPL CALCULATED.3IONS-SCNC: 16 MMOL/L (ref 3–16)
BASE EXCESS VENOUS: -1.9 MMOL/L (ref -2–3)
BASOPHILS ABSOLUTE: 0 K/UL (ref 0–0.2)
BASOPHILS RELATIVE PERCENT: 0.4 %
BUN BLDV-MCNC: 46 MG/DL (ref 7–20)
C-REACTIVE PROTEIN: 36.4 MG/L (ref 0–5.1)
CALCIUM SERPL-MCNC: 8.5 MG/DL (ref 8.3–10.6)
CARBOXYHEMOGLOBIN: 2 % (ref 0–1.5)
CHLORIDE BLD-SCNC: 98 MMOL/L (ref 99–110)
CO2: 21 MMOL/L (ref 21–32)
CREAT SERPL-MCNC: 2.4 MG/DL (ref 0.9–1.3)
EOSINOPHILS ABSOLUTE: 0.6 K/UL (ref 0–0.6)
EOSINOPHILS RELATIVE PERCENT: 8.4 %
GFR AFRICAN AMERICAN: 34
GFR NON-AFRICAN AMERICAN: 28
GLUCOSE BLD-MCNC: 303 MG/DL (ref 70–99)
HCO3 VENOUS: 23.3 MMOL/L (ref 24–28)
HCT VFR BLD CALC: 27.4 % (ref 40.5–52.5)
HEMOGLOBIN, VEN, REDUCED: 18.8 %
HEMOGLOBIN: 9.1 G/DL (ref 13.5–17.5)
LACTIC ACID: 0.7 MMOL/L (ref 0.4–2)
LYMPHOCYTES ABSOLUTE: 1.3 K/UL (ref 1–5.1)
LYMPHOCYTES RELATIVE PERCENT: 18.1 %
MCH RBC QN AUTO: 28.9 PG (ref 26–34)
MCHC RBC AUTO-ENTMCNC: 33.3 G/DL (ref 31–36)
MCV RBC AUTO: 87 FL (ref 80–100)
METHEMOGLOBIN VENOUS: 0.7 % (ref 0–1.5)
MONOCYTES ABSOLUTE: 0.6 K/UL (ref 0–1.3)
MONOCYTES RELATIVE PERCENT: 8.4 %
NEUTROPHILS ABSOLUTE: 4.5 K/UL (ref 1.7–7.7)
NEUTROPHILS RELATIVE PERCENT: 64.7 %
O2 SAT, VEN: 81 %
PCO2, VEN: 44.5 MMHG (ref 41–51)
PDW BLD-RTO: 14.8 % (ref 12.4–15.4)
PH VENOUS: 7.34 (ref 7.35–7.45)
PLATELET # BLD: 239 K/UL (ref 135–450)
PMV BLD AUTO: 7.1 FL (ref 5–10.5)
PO2, VEN: 47 MMHG (ref 25–40)
POTASSIUM REFLEX MAGNESIUM: 4.4 MMOL/L (ref 3.5–5.1)
RBC # BLD: 3.15 M/UL (ref 4.2–5.9)
SEDIMENTATION RATE, ERYTHROCYTE: 80 MM/HR (ref 0–20)
SODIUM BLD-SCNC: 135 MMOL/L (ref 136–145)
TCO2 CALC VENOUS: 25 MMOL/L
WBC # BLD: 7 K/UL (ref 4–11)

## 2020-03-10 PROCEDURE — 83605 ASSAY OF LACTIC ACID: CPT

## 2020-03-10 PROCEDURE — 73120 X-RAY EXAM OF HAND: CPT

## 2020-03-10 PROCEDURE — 82803 BLOOD GASES ANY COMBINATION: CPT

## 2020-03-10 PROCEDURE — 73130 X-RAY EXAM OF HAND: CPT

## 2020-03-10 PROCEDURE — 85025 COMPLETE CBC W/AUTO DIFF WBC: CPT

## 2020-03-10 PROCEDURE — 85652 RBC SED RATE AUTOMATED: CPT

## 2020-03-10 PROCEDURE — 86140 C-REACTIVE PROTEIN: CPT

## 2020-03-10 PROCEDURE — 87040 BLOOD CULTURE FOR BACTERIA: CPT

## 2020-03-10 PROCEDURE — 80048 BASIC METABOLIC PNL TOTAL CA: CPT

## 2020-03-10 PROCEDURE — 99284 EMERGENCY DEPT VISIT MOD MDM: CPT

## 2020-03-10 RX ORDER — SODIUM CHLORIDE, SODIUM LACTATE, POTASSIUM CHLORIDE, CALCIUM CHLORIDE 600; 310; 30; 20 MG/100ML; MG/100ML; MG/100ML; MG/100ML
1000 INJECTION, SOLUTION INTRAVENOUS ONCE
Status: DISCONTINUED | OUTPATIENT
Start: 2020-03-10 | End: 2020-03-10 | Stop reason: HOSPADM

## 2020-03-10 RX ORDER — DOXYCYCLINE 100 MG/1
100 TABLET ORAL 2 TIMES DAILY
Qty: 20 TABLET | Refills: 0 | Status: SHIPPED | OUTPATIENT
Start: 2020-03-10 | End: 2020-03-20

## 2020-03-10 ASSESSMENT — ENCOUNTER SYMPTOMS
VOMITING: 0
NAUSEA: 0
RESPIRATORY NEGATIVE: 1
GASTROINTESTINAL NEGATIVE: 1

## 2020-03-10 ASSESSMENT — PAIN SCALES - GENERAL: PAINLEVEL_OUTOF10: 6

## 2020-03-10 ASSESSMENT — PAIN DESCRIPTION - PAIN TYPE: TYPE: ACUTE PAIN

## 2020-03-10 NOTE — ED PROVIDER NOTES
[address] ENCOUNTER          PHYSICIAN ASSISTANT NOTE       Date of evaluation: 3/10/2020    Chief Complaint     Hand Pain (swelling right hand, painful knot on back of right hand. )      History of Present Illness     Evy Denver is a 61 y.o. male who presents with hand pain. Patient reports pain and swelling to the right hand for the past 10 days. Patient reports most of his pain is to the right first carpometacarpal region and to the right fifth metacarpal region. Patient denies any known injury, though does have a healing cut to his index finger which he assumes if from work. Patient is diabetic, reports his average glucose is 150, today the highest was 270. Patient reports he is also on augmentin currently for a right foot infection. No known fever. Last tetanus was in 2011. Review of Systems     Review of Systems   Constitutional: Negative. Negative for fever. Respiratory: Negative. Cardiovascular: Negative. Gastrointestinal: Negative. Negative for nausea and vomiting. Musculoskeletal:        +hand pain   Neurological: Negative. Negative for weakness and numbness. All other systems reviewed and are negative. Past Medical, Surgical, Family, and Social History     He has a past medical history of Back pain, CAD (coronary artery disease), CKD (chronic kidney disease) stage 4, GFR 15-29 ml/min ([address] Diabetes mellitus ([address] Hyperlipidemia, Hypertension, and MRSA (methicillin resistant staph aureus) culture positive. He has a past surgical history that includes Leg amputation below knee; Foot surgery (Left, 05/11/2017); and other surgical history (Right, 05/14/2017). His family history includes Arthritis in his father and mother; Diabetes in his brother and father; Heart Disease in his mother; High Blood Pressure in his mother; Stroke in his mother. He reports that he has never smoked.  He has never used smokeless tobacco. He reports that he does not drink alcohol or use drugs. Medications     Previous Medications    AMITRIPTYLINE (ELAVIL) 25 MG TABLET    Take 25 mg by mouth nightly    ASPIRIN 81 MG EC TABLET    Take 1 tablet by mouth daily    FUROSEMIDE (LASIX) 40 MG TABLET    TAKE 1 TABLET(40 MG) BY MOUTH DAILY    HYDROCODONE-ACETAMINOPHEN (NORCO) 5-325 MG PER TABLET    Take 1 tablet by mouth every 6 hours as needed for Pain . INSULIN GLARGINE (LANTUS) 100 UNIT/ML INJECTION    Inject 40 Units into the skin nightly     INSULIN LISPRO (HUMALOG) 100 UNIT/ML INJECTION VIAL    Inject 5-15 Units into the skin 3 times daily (before meals) Sliding scale with meals and at HS    LOSARTAN-HYDROCHLOROTHIAZIDE (HYZAAR) 50-12.5 MG PER TABLET    TAKE 1 TABLET BY MOUTH DAILY    OMEPRAZOLE (PRILOSEC) 20 MG CAPSULE    Take 20 mg by mouth Daily     SIMVASTATIN (ZOCOR) 40 MG TABLET    Take 40 mg by mouth nightly. SPIRONOLACTONE (ALDACTONE) 25 MG TABLET    TAKE 1 TABLET BY MOUTH DAILY    SPIRONOLACTONE (ALDACTONE) 25 MG TABLET    TAKE 1 TABLET BY MOUTH DAILY       Allergies     He is allergic to codeine. Physical Exam     INITIAL VITALS: BP: (!) 158/82, Temp: 98.3 °F (36.8 °C), Pulse: 95, Resp: 19, SpO2: 98 %  Physical Exam  Vitals signs and nursing note reviewed. Constitutional:       General: He is not in acute distress. Appearance: He is obese. He is not ill-appearing. Neck:      Musculoskeletal: Neck supple. Cardiovascular:      Rate and Rhythm: Normal rate and regular rhythm. Pulmonary:      Effort: Pulmonary effort is normal.      Breath sounds: Normal breath sounds. Musculoskeletal:      Comments: Diffuse swelling to the right hand. Healing laceration to the radial aspect of the right index finger DIP. Patient with arthritic changes to the hand and unable to fully extend fingers at baseline, reports ROM slightly worsened due to swelling. No tenderness or fluctuance to flexor surfaces. FROM right wrist. No swelling proximal to wrist. SILT.  2/4

## 2020-03-10 NOTE — ED PROVIDER NOTES
ED Attending Attestation Note     Date of evaluation: 3/10/2020    This patient was seen by the MIKE. I have seen and examined the patient, agree with the workup, evaluation, management and diagnosis. The care plan has been discussed. I was present for any procedures performed in the MIKE's note and have made edits to the note where appropriate. My assessment reveals 61 y.o. male with history of diabetes, osteomyelitis, CKD, DVT presenting for right hand pain and swelling. Patient has incidentally been on Augmentin for a separate wound but has had worsening pain and swelling throughout his hand. His exam shows some mild edema and mild erythema at the tip of the right thumb. The pain is primarily on the dorsum of the metacarpals, however his x-ray is concerning for possible thumb osteomyelitis. He does not have significant tenderness there but has had a previous partial traumatic amputation and is also diabetic, suggesting his sensation may not be truly normal.  Given that he has been on Augmentin, along with his diabetes, would consider him a failure of outpatient antibiotics. Recommended IV antibiotics and hand surgery consultation. Patient refused admission or further treatments. He does not have a significant enough effusion in the risk for arthrocentesis and his pain is not particularly localized to this area at this time. Discussed risks, benefits, alternatives of admission. The patient ultimately elected to leave against medical advice. They had decision-making capacity and were free from intrinsic or extrinsic coercive factors when making their decision. They understood that the risks of leaving including but not limited to worsening infection, loss of hand or limb, pain and suffering, permanent disability, and death. They were able to discuss the risks of their decision with me in detail, and all questions were answered to their satisfaction.  They were informed that they were welcome to return to the emergency room at any time if they changed their mind or if symptoms worsened. Will prescribe empiric doxycycline and provide outpatient referral to hand surgery at this time. The patient was discharged in stable condition with written and verbal instructions for which to return to the ED and written plans for follow-up.          Nato Manriquez MD  03/10/20 9905

## 2020-03-10 NOTE — CONSULTS
Clinical Pharmacy Progress Note     Admit date: 3/10/20     Subjective/Objective:  Pt presented to ED with cut on right index finger and hand pain. Pharmacy is consulted to dose Vancomycin x1 dose in ED per FRANCIA Pardo.     Ht = 75\"  Wt = 144.2 kg        Assessment/Plan:  · Will order Vancomycin 1.75g IV x1 for administration in ED per ER pharmacy consult.    · If Vancomycin is to continue on admission and pharmacy is to manage dosing, please re-consult with admission orders.        Thanks--  Aissatou Yates RPh 3/10/2020, 6:47 PM

## 2020-03-14 LAB
BLOOD CULTURE, ROUTINE: NORMAL
CULTURE, BLOOD 2: NORMAL

## 2020-03-17 ENCOUNTER — OFFICE VISIT (OUTPATIENT)
Dept: ORTHOPEDIC SURGERY | Age: 60
End: 2020-03-17
Payer: COMMERCIAL

## 2020-03-17 VITALS — WEIGHT: 315 LBS | BODY MASS INDEX: 39.17 KG/M2 | HEIGHT: 75 IN

## 2020-03-17 LAB
REASON FOR REJECTION: NORMAL
REJECTED TEST: NORMAL

## 2020-03-17 PROCEDURE — 20605 DRAIN/INJ JOINT/BURSA W/O US: CPT | Performed by: ORTHOPAEDIC SURGERY

## 2020-03-17 PROCEDURE — L3908 WHO COCK-UP NONMOLDE PRE OTS: HCPCS | Performed by: ORTHOPAEDIC SURGERY

## 2020-03-17 PROCEDURE — 99203 OFFICE O/P NEW LOW 30 MIN: CPT | Performed by: ORTHOPAEDIC SURGERY

## 2020-03-24 ENCOUNTER — OFFICE VISIT (OUTPATIENT)
Dept: ORTHOPEDIC SURGERY | Age: 60
End: 2020-03-24
Payer: COMMERCIAL

## 2020-03-24 VITALS — WEIGHT: 315 LBS | HEIGHT: 75 IN | BODY MASS INDEX: 39.17 KG/M2

## 2020-03-24 PROCEDURE — 99213 OFFICE O/P EST LOW 20 MIN: CPT | Performed by: ORTHOPAEDIC SURGERY

## 2020-03-24 NOTE — PROGRESS NOTES
Assessment: 63-year-old male presenting with approximately 2-week history of left dorsal hand and wrist pain without inciting trauma or event  1. Left wrist effusion versus extensor tenosynovitis, infectious versus other inflammatory source    Treatment Plan: Based on the patient's current clinical presentation and results of cultures I have less suspicion that the patient has an infectious source of his symptoms today  He continues to demonstrate improvement with regards to his swelling as well as pain in the wrist.  We will continue to monitor his symptoms and I advised him to continue with use of the wrist brace. I am unsure of the exact source of his swelling but if persistent or worsening we would also consider obtaining an MRI for further evaluation. I discussed follow-up within the next 3 to 4 weeks and as we are trying to limit office visits this may be done via telemedicine or video appointment for repeat evaluation  The patient is understanding and agreement with the plan today. No follow-ups on file. History of Present Illness  Logan Dial is a 61 y.o. male here today for a follow-up for his right wrist pain and swelling  He was seen last week with differential diagnosis of infection versus other source of inflammation in his wrist and hand. We performed a diagnostic aspiration and cultures demonstrating no growth from the wrist joints  Over the last 2 weeks and including the last week since his visit he has been doing well. He reports improvement in his hand and wrist pain with no discomfort at rest and especially when wearing the brace. He has noticed with certain movements that he will have an occasional twinge of pain either radially or ulnarly. He has noticed improvement in his forearm swelling.   Otherwise, no other new complaints, no fever chills or other constitutional symptoms    Cultures from radiocarpal joint aspiration 3/17/2020 demonstrate no growth to date    Review of Systems  Pertinent items are noted in HPI  Review of systems reviewed from Patient History Form dated on 3/17/20 and available in the patient's chart under the Media tab. Vital Signs  There were no vitals filed for this visit. Physical Exam  Constitutional:  Patient is well-nourished and demonstrates normal hygiene. Mental Status:  Patient is alert and oriented to person, place and time. Skin:  Intact, no rashes or lesions.      Right wrist/right upper extremity examination:     Inspection: There is dorsal wrist swelling and what appears to be effusion versus extensor tenosynovitis swelling dorsally with minimal change compared with prior visit  No overlying erythema or warmth or additional skin changes throughout the dorsal aspect of the hand or wrist  No volar wrist or forearm changes including swelling or discoloration  Moderate swelling of the hand dorsal aspect with forearm and hand compartments soft and compressible     Palpation: Minimal tenderness at the radiocarpal joint and radial styloid  Globally nontender throughout the remainder of the forearm hand palm and fingers  Range of Motion: Limitations range of motion wrist approximately 40 degrees of flexion and extension with no increased pain at terminal flexion extension  Mild discomfort with radial and ulnar deviation  Full composite fist and near full extension of all fingers without mechanical symptoms and no increased pain     Strength: 5 out of 5 EPL FPL thumb abduction  5-5 FDS FDP EDC interossei     Special Tests: Sensation grossly intact light touch median ulnar and radial nerve without deficit    Capillary refill brisk all fingers, symmetric  Gross sensation intact to light touch median/ulnar/radial nerves  Sensation intact to radial/ulnar aspect of fingertip        Radiology:    X-rays obtained and reviewed in office:  No new x-rays obtained today    Additional Diagnostic Test Findings:    Office Procedures:  No orders of the defined types were placed in this encounter. Alex Tinoco MD  Orthopaedic Surgeon, Hand & Upper Extremity   [facility]    Contact Information:  Ayesha Orn: [phone] Clinical )    This dictation was performed with a verbal recognition program [facility]) and it was checked for errors. It is possible that there are still dictated errors within this office note. If so, please bring any errors to my attention for an addendum. All efforts were made to ensure that this office note is accurate.

## 2020-04-08 LAB
CULTURE, JOINT AEROBIC: ABNORMAL
CULTURE, JOINT ANAEROBIC: ABNORMAL
ORGANISM: ABNORMAL

## 2020-04-10 NOTE — PROGRESS NOTES
Results for the following test have been finalized and entered into the patients chart
weakness or sensory deficit. Right wrist/right upper extremity examination:    Inspection: There is dorsal wrist swelling and what appears to be effusion versus extensor tenosynovitis swelling  No obvious erythema or warmth or additional skin changes throughout the dorsal aspect of the hand  No volar wrist or forearm changes including swelling or discoloration  Moderate swelling of the hand dorsal aspect with forearm and hand compartments soft and compressible    Palpation: Mild tenderness to palpation at the dorsal aspect of the radiocarpal joint and base of hand  Globally nontender throughout the remainder of the forearm hand palm and fingers  Range of Motion: Limitations range of motion wrist approximately 30 degrees of flexion and extension with minimal increased pain at terminal flexion extension  No pain specifically with radial and ulnar deviation  Full composite fist and near full extension of all fingers without mechanical symptoms and no increased pain    Strength: 5 out of 5 EPL FPL thumb abduction  5-5 FDS FDP EDC interossei    Special Tests: Sensation grossly intact light touch median ulnar and radial nerve without deficit    Skin: There are no rashes, ulcerations or lesions. Gait: Lower extremity above-knee amputation prosthesis left lower extremity        Additional Comments:       Additional Examinations:         Left Upper Extremity: Examination of the left upper extremity does not show any tenderness, deformity or injury. Range of motion is unremarkable. There is no gross instability. There are no rashes, ulcerations or lesions. Strength and tone are normal.    Radiology:     X-rays obtained and reviewed in office:  No new images obtained today      EXAM: XR HAND RIGHT (MIN 3 VIEWS)       INDICATION:  Pain, infection       COMPARISON: None       FINDINGS:       There is osseous erosion of first distal phalanx tuft. Mild soft tissue swelling.  No evidence of soft tissue gas.

## 2020-07-21 ENCOUNTER — APPOINTMENT (OUTPATIENT)
Dept: GENERAL RADIOLOGY | Age: 60
End: 2020-07-21
Payer: COMMERCIAL

## 2020-07-21 ENCOUNTER — HOSPITAL ENCOUNTER (EMERGENCY)
Age: 60
Discharge: HOME | End: 2020-07-21
Attending: EMERGENCY MEDICINE
Payer: COMMERCIAL

## 2020-07-21 VITALS
BODY MASS INDEX: 39.17 KG/M2 | WEIGHT: 315 LBS | DIASTOLIC BLOOD PRESSURE: 103 MMHG | TEMPERATURE: 98 F | SYSTOLIC BLOOD PRESSURE: 138 MMHG | OXYGEN SATURATION: 100 % | HEART RATE: 88 BPM | HEIGHT: 75 IN | RESPIRATION RATE: 18 BRPM

## 2020-07-21 PROCEDURE — 6370000000 HC RX 637 (ALT 250 FOR IP): Performed by: EMERGENCY MEDICINE

## 2020-07-21 PROCEDURE — 99283 EMERGENCY DEPT VISIT LOW MDM: CPT

## 2020-07-21 PROCEDURE — 73610 X-RAY EXAM OF ANKLE: CPT

## 2020-07-21 PROCEDURE — 73630 X-RAY EXAM OF FOOT: CPT

## 2020-07-21 RX ORDER — HYDROCODONE BITARTRATE AND ACETAMINOPHEN 5; 325 MG/1; MG/1
1 TABLET ORAL ONCE
Status: COMPLETED | OUTPATIENT
Start: 2020-07-21 | End: 2020-07-21

## 2020-07-21 RX ORDER — OXYCODONE HYDROCHLORIDE 5 MG/1
5 TABLET ORAL EVERY 6 HOURS PRN
Qty: 12 TABLET | Refills: 0 | Status: SHIPPED | OUTPATIENT
Start: 2020-07-21 | End: 2020-07-24

## 2020-07-21 RX ADMIN — HYDROCODONE BITARTRATE AND ACETAMINOPHEN 1 TABLET: 5; 325 TABLET ORAL at 18:09

## 2020-07-21 ASSESSMENT — PAIN SCALES - GENERAL
PAINLEVEL_OUTOF10: 4
PAINLEVEL_OUTOF10: 6

## 2020-07-21 ASSESSMENT — PAIN DESCRIPTION - LOCATION: LOCATION: FOOT

## 2020-07-21 ASSESSMENT — PAIN DESCRIPTION - PAIN TYPE: TYPE: CHRONIC PAIN

## 2020-07-21 ASSESSMENT — PAIN DESCRIPTION - ORIENTATION: ORIENTATION: RIGHT

## 2020-07-21 NOTE — ED PROVIDER NOTES
[address] [facility]          ATTENDING PHYSICIAN NOTE       Date of evaluation: 7/21/2020    Chief Complaint     Foot Pain and Back Pain      History of Present Illness     Cici Woodruff is a 61 y.o. male who presents with complaints of right foot pain. Patient reports intermittent right foot pain for the past several months. He denies any trauma with this. Was seen in the past  With concern for cellulitis and possible DVT and no study showed no DVT, unremarkable x-rays of his tibia and fibula. He denies any specific trauma or injury that he knows of to his right foot. States the pain is roughly from the midfoot to the ankle and on top of the foot but not in the heel or the toes. Denies any fevers or chills, states his blood sugar was elevated this morning in the 200s but he got up late not take all his medication timely. He states the pain is worse when he walks a while and then gets off the foot. He is walking with a cane which he does all the time due to balance issues. He denies any redness of the foot or other signs of infection. Since his foot is been bothering him more the past few days he states his low back is been more sore in the area of his fusion surgery. Once again no falls or injuries acutely to the back. There is no radicular type pain going down the leg on the right. Review of Systems     Review of Systems--negative for fevers or chills. Negative for nausea or vomiting. Negative for redness or swelling of the foot. Positive for chronic swelling of the right lower extremity. Negative for abdominal pain. Negative for bowel or bladder changes.     Past Medical, Surgical, Family, and Social History     He has a past medical history of Back pain, CAD (coronary artery disease), CKD (chronic kidney disease) stage 4, GFR 15-29 ml/min ([address] Diabetes mellitus ([address] Hyperlipidemia, Hypertension, and MRSA (methicillin resistant staph aureus) culture positive. He has a past surgical history that includes Leg amputation below knee; Foot surgery (Left, 05/11/2017); and other surgical history (Right, 05/14/2017). His family history includes Arthritis in his father and mother; Diabetes in his brother and father; Heart Disease in his mother; High Blood Pressure in his mother; Stroke in his mother. He reports that he has never smoked. He has never used smokeless tobacco. He reports that he does not drink alcohol or use drugs. Medications     Previous Medications    AMITRIPTYLINE (ELAVIL) 25 MG TABLET    Take 25 mg by mouth nightly    ASPIRIN 81 MG EC TABLET    Take 1 tablet by mouth daily    FUROSEMIDE (LASIX) 40 MG TABLET    TAKE 1 TABLET(40 MG) BY MOUTH DAILY    HYDROCODONE-ACETAMINOPHEN (NORCO) 5-325 MG PER TABLET    Take 1 tablet by mouth every 6 hours as needed for Pain . INSULIN GLARGINE (LANTUS) 100 UNIT/ML INJECTION    Inject 40 Units into the skin nightly     INSULIN LISPRO (HUMALOG) 100 UNIT/ML INJECTION VIAL    Inject 5-15 Units into the skin 3 times daily (before meals) Sliding scale with meals and at HS    LOSARTAN-HYDROCHLOROTHIAZIDE (HYZAAR) 50-12.5 MG PER TABLET    TAKE 1 TABLET BY MOUTH DAILY    OMEPRAZOLE (PRILOSEC) 20 MG CAPSULE    Take 20 mg by mouth Daily     SIMVASTATIN (ZOCOR) 40 MG TABLET    Take 40 mg by mouth nightly. SPIRONOLACTONE (ALDACTONE) 25 MG TABLET    TAKE 1 TABLET BY MOUTH DAILY    SPIRONOLACTONE (ALDACTONE) 25 MG TABLET    TAKE 1 TABLET BY MOUTH DAILY       Allergies     He is allergic to codeine. Physical Exam     INITIAL VITALS: BP: (!) 146/82, Temp: 98 °F (36.7 °C), Pulse: 96, Resp: 18, SpO2: 98 %   Physical Exam  General--sitting up in bed, no obvious respiratory distress  HEENT--no signs of trauma, midface is symmetrical, pupils are equal round react to light  Abdomen--soft, obese, no focal pain  Back--some discomfort with movement,  Extremity--left BKA noted.   There is 1-2+ edema of the right lower extremity with compression stocking and sock in place. Upon removal examination the foot there is no focal redness or signs of infection in the top of the right foot or the ankle. There is some discomfort with palpation, no gross deformities. No bruising. There is no deformity of the ankle. Diagnostic Results     RADIOLOGY:  XR FOOT RIGHT (MIN 3 VIEWS)   Final Result   Impression:    No acute osseous abnormality. Prior fifth digit amputation. XR ANKLE RIGHT (MIN 3 VIEWS)   Final Result   Impression:    No acute osseous injury. LABS:   No results found for this visit on 07/21/20. RECENT VITALS:  BP: (!) 146/82,Temp: 98 °F (36.7 °C), Pulse: 96, Resp: 18, SpO2: 98 %     Procedures         ED Course     Nursing Notes, Past Medical Hx, Past Surgical Hx, Social Hx,Allergies, and Family Hx were reviewed. patient was given the following medications:  Orders Placed This Encounter   Medications    HYDROcodone-acetaminophen (NORCO) 5-325 MG per tablet 1 tablet       CONSULTS:  None    MEDICAL DECISIONMAKING / ASSESSMENT / PLAN     Laury Galvin is a 61 y.o. male who comes in with focal right foot pain that is nontraumatic. There is no external signs of redness, swelling or other signs of infection of the joints. X-ray shows arthritis but no acute fractures. The patient was given some oral hydrocodone for pain in the ER. I discussed with the patient, I do not see any further need for testing through the ER at this time, I did discuss the possibility of arthritis causing his discomfort. The patient cannot take anti-inflammatories due to other medical issues, I will offer a short-term supply of oxycodone for nighttime pain relief and suggest he follow-up with his podiatrist and possibly moderate location of his orthotic for his right foot. He does have moderate midfoot osteoarthritis on his x-ray in the right foot that could be responsible his discomfort.       Clinical Impression     Right foot and ankle pain    Right midfoot osteoarthritis    Disposition     PATIENT REFERRED TO:  No follow-up provider specified.     DISCHARGE MEDICATIONS:  New Prescriptions    No medications on file       Faisal Gracia MD  07/21/20 8888

## 2020-07-21 NOTE — ED NOTES
Bed: A03-03  Expected date:   Expected time:   Means of arrival:   Comments:  176 Mary Jane Morton RN  07/21/20 [id number]

## 2021-01-26 ENCOUNTER — APPOINTMENT (OUTPATIENT)
Dept: VASCULAR LAB | Age: 61
End: 2021-01-26
Payer: COMMERCIAL

## 2021-01-26 ENCOUNTER — HOSPITAL ENCOUNTER (EMERGENCY)
Age: 61
Discharge: HOME | End: 2021-01-26
Attending: EMERGENCY MEDICINE
Payer: COMMERCIAL

## 2021-01-26 VITALS
TEMPERATURE: 97.8 F | DIASTOLIC BLOOD PRESSURE: 97 MMHG | OXYGEN SATURATION: 98 % | WEIGHT: 315 LBS | BODY MASS INDEX: 39.6 KG/M2 | RESPIRATION RATE: 16 BRPM | SYSTOLIC BLOOD PRESSURE: 140 MMHG | HEART RATE: 86 BPM

## 2021-01-26 DIAGNOSIS — I82.411 ACUTE DEEP VEIN THROMBOSIS (DVT) OF FEMORAL VEIN OF RIGHT LOWER EXTREMITY (HCC): Primary | ICD-10-CM

## 2021-01-26 LAB
ANION GAP SERPL CALCULATED.3IONS-SCNC: 11 MMOL/L (ref 3–16)
BASOPHILS ABSOLUTE: 0 K/UL (ref 0–0.2)
BASOPHILS RELATIVE PERCENT: 0.6 %
BUN BLDV-MCNC: 41 MG/DL (ref 7–20)
CALCIUM SERPL-MCNC: 9 MG/DL (ref 8.3–10.6)
CHLORIDE BLD-SCNC: 101 MMOL/L (ref 99–110)
CO2: 23 MMOL/L (ref 21–32)
CREAT SERPL-MCNC: 2 MG/DL (ref 0.8–1.3)
EOSINOPHILS ABSOLUTE: 0.6 K/UL (ref 0–0.6)
EOSINOPHILS RELATIVE PERCENT: 9.1 %
GFR AFRICAN AMERICAN: 41
GFR NON-AFRICAN AMERICAN: 34
GLUCOSE BLD-MCNC: 98 MG/DL (ref 70–99)
HCT VFR BLD CALC: 30.8 % (ref 40.5–52.5)
HEMOGLOBIN: 9.9 G/DL (ref 13.5–17.5)
LYMPHOCYTES ABSOLUTE: 1.1 K/UL (ref 1–5.1)
LYMPHOCYTES RELATIVE PERCENT: 16.3 %
MCH RBC QN AUTO: 28.6 PG (ref 26–34)
MCHC RBC AUTO-ENTMCNC: 32.2 G/DL (ref 31–36)
MCV RBC AUTO: 88.7 FL (ref 80–100)
MONOCYTES ABSOLUTE: 0.5 K/UL (ref 0–1.3)
MONOCYTES RELATIVE PERCENT: 7.2 %
NEUTROPHILS ABSOLUTE: 4.5 K/UL (ref 1.7–7.7)
NEUTROPHILS RELATIVE PERCENT: 66.8 %
PDW BLD-RTO: 17.3 % (ref 12.4–15.4)
PLATELET # BLD: 248 K/UL (ref 135–450)
PMV BLD AUTO: 7.2 FL (ref 5–10.5)
POTASSIUM REFLEX MAGNESIUM: 4.7 MMOL/L (ref 3.5–5.1)
RBC # BLD: 3.47 M/UL (ref 4.2–5.9)
SODIUM BLD-SCNC: 135 MMOL/L (ref 136–145)
WBC # BLD: 6.7 K/UL (ref 4–11)

## 2021-01-26 PROCEDURE — 96372 THER/PROPH/DIAG INJ SC/IM: CPT

## 2021-01-26 PROCEDURE — 6360000002 HC RX W HCPCS: Performed by: NURSE PRACTITIONER

## 2021-01-26 PROCEDURE — 85025 COMPLETE CBC W/AUTO DIFF WBC: CPT

## 2021-01-26 PROCEDURE — 80048 BASIC METABOLIC PNL TOTAL CA: CPT

## 2021-01-26 PROCEDURE — 93971 EXTREMITY STUDY: CPT

## 2021-01-26 PROCEDURE — 99283 EMERGENCY DEPT VISIT LOW MDM: CPT

## 2021-01-26 RX ORDER — WARFARIN SODIUM 5 MG/1
5 TABLET ORAL DAILY
Qty: 7 TABLET | Refills: 0 | Status: ON HOLD | OUTPATIENT
Start: 2021-01-26 | End: 2022-04-21 | Stop reason: CLARIF

## 2021-01-26 RX ADMIN — ENOXAPARIN SODIUM 150 MG: 150 INJECTION SUBCUTANEOUS at 18:13

## 2021-01-26 ASSESSMENT — PAIN SCALES - GENERAL: PAINLEVEL_OUTOF10: 7

## 2021-01-26 ASSESSMENT — PAIN DESCRIPTION - PAIN TYPE: TYPE: ACUTE PAIN

## 2021-01-26 ASSESSMENT — PAIN DESCRIPTION - FREQUENCY: FREQUENCY: CONTINUOUS

## 2021-01-26 NOTE — ED PROVIDER NOTES
1 [facility]  EMERGENCY DEPARTMENT ENCOUNTER          NURSE PRACTITIONER NOTE     Date of evaluation: 1/26/2021    Chief Complaint     Leg Pain    History of Present Illness     Kelly Martinez is a 61 y.o. male with a history of CKD stage IV, CAD, diabetes, hypertension, hyperlipidemia, left BKA who presents to the emergency department with right calf pain. Started last night or early this morning. Feels constant, aching/throbbing moderate in intensity, nonradiating. Has a history of remote DVT but is no longer on blood thinners. No recent surgeries, periods of immobilization, travel, or other DVT risk factors. No chest pain or shortness of breath. Of note, the patient had a mechanical fall on Saturday onto both onto his knee. Has not bothered him. Had a little bit of hip pain on Sunday but that also resolved. Also of note, has a chronic right plantar diabetic foot ulcer for which she follows with podiatry. Has been there for 10+ years. Thinks it is looking well. No fevers or chills. No redness, warmth or purulent drainage. With the exception of the above, there are no aggravating or alleviating factors.     Review of Systems     Pertinent positive and negative findings as documented above in HPI, otherwise all other systems were reviewed and negative     Past Medical, Surgical, Family, and Social History     Medical History:   Past Medical History:   Diagnosis Date    Back pain     CAD (coronary artery disease)     CKD (chronic kidney disease) stage 4, GFR 15-29 ml/min ([facility])     Dr. Soren Smith    Diabetes mellitus ([facility] [address]     Hyperlipidemia     Hypertension     MRSA (methicillin resistant staph aureus) culture positive 05/11/2017    foot       Surgical History:   Past Surgical History:   Procedure Laterality Date    FOOT SURGERY Left 05/11/2017    LEG AMPUTATION BELOW KNEE      OTHER SURGICAL HISTORY Right 05/14/2017    RIGHT FOOT DEBRIDEMENT INCISION AND DRAINAGE, DELAYED PRIMARY Social History: He reports that he has never smoked. He has never used smokeless tobacco. He reports that he does not drink alcohol or use drugs. Family History: His family history includes Arthritis in his father and mother; Diabetes in his brother and father; Heart Disease in his mother; High Blood Pressure in his mother; Stroke in his mother. Medications     Previous Medications    AMITRIPTYLINE (ELAVIL) 25 MG TABLET    Take 25 mg by mouth nightly    ASPIRIN 81 MG EC TABLET    Take 1 tablet by mouth daily    FUROSEMIDE (LASIX) 40 MG TABLET    TAKE 1 TABLET(40 MG) BY MOUTH DAILY    HYDROCODONE-ACETAMINOPHEN (NORCO) 5-325 MG PER TABLET    Take 1 tablet by mouth every 6 hours as needed for Pain . INSULIN GLARGINE (LANTUS) 100 UNIT/ML INJECTION    Inject 40 Units into the skin nightly     INSULIN LISPRO (HUMALOG) 100 UNIT/ML INJECTION VIAL    Inject 5-15 Units into the skin 3 times daily (before meals) Sliding scale with meals and at HS    LOSARTAN-HYDROCHLOROTHIAZIDE (HYZAAR) 50-12.5 MG PER TABLET    TAKE 1 TABLET BY MOUTH DAILY    OMEPRAZOLE (PRILOSEC) 20 MG CAPSULE    Take 20 mg by mouth Daily     SIMVASTATIN (ZOCOR) 40 MG TABLET    Take 40 mg by mouth nightly. SPIRONOLACTONE (ALDACTONE) 25 MG TABLET    TAKE 1 TABLET BY MOUTH DAILY    SPIRONOLACTONE (ALDACTONE) 25 MG TABLET    TAKE 1 TABLET BY MOUTH DAILY       Allergies     Allergies: Allergies as of 01/26/2021 - Review Complete 01/26/2021   Allergen Reaction Noted    Codeine Nausea And Vomiting 07/02/2015        Physical Exam     INITIAL VITALS: BP: (!) 140/97, Temp: 97.8 °F (36.6 °C), Pulse: 86, Resp: 16, SpO2: 98 %     General: [age] [facility] y.o. male in no apparent distress, well developed, well nourished, non-toxic appearance. HEENT: Atraumatic, normocephalic. EOMs intact. Neck:  Full range of motion. Chest/pulm: Respiratory rate normal. Speaks in complete sentences, no respiratory distress, lungs CTA bilaterally, no wheezes, rales, rhonchi. Cardiovascular: Heart rate normal. RRR, no murmurs, rubs, gallops     Abdomen: No gross distension. Soft, non-tender, non-peritoneal.     : Deferred. Musculoskeletal: No obvious joint deformity. Ambulates without difficulty. Left BKA, right calf with mild tenderness to palpation, unable to compare to left lower extremity to tell if significantly asymmetrically enlarged. No erythema or warmth. There is a well-appearing chronic diabetic foot ulcer to the plantar aspect of the right foot as seen below without induration, crepitance, fluctuance, purulence, or surrounding erythema    Neuro: A&O x 4. Normal speech without dysarthria or aphasia. Moves all extremities spontaneously and symmetrically. Gait normal without ataxia. Skin: Warm, dry. No obvious rashes, petechiae, or purpura. Psych: Appropriate mood and affect, normal interaction.              Diagnostic Results     RADIOLOGY:  VL Extremity Venous Right    (Results Pending)       LABS:   Results for orders placed or performed during the hospital encounter of 01/26/21   CBC Auto Differential   Result Value Ref Range    WBC 6.7 4.0 - 11.0 K/uL    RBC 3.47 (L) 4.20 - 5.90 M/uL    Hemoglobin 9.9 (L) 13.5 - 17.5 g/dL    Hematocrit 30.8 (L) 40.5 - 52.5 %    MCV 88.7 80.0 - 100.0 fL    MCH 28.6 26.0 - 34.0 pg    MCHC 32.2 31.0 - 36.0 g/dL    RDW 17.3 (H) 12.4 - 15.4 %    Platelets 272 765 - 891 K/uL    MPV 7.2 5.0 - 10.5 fL    Neutrophils % 66.8 %    Lymphocytes % 16.3 %    Monocytes % 7.2 %    Eosinophils % 9.1 %    Basophils % 0.6 %    Neutrophils Absolute 4.5 1.7 - 7.7 K/uL    Lymphocytes Absolute 1.1 1.0 - 5.1 K/uL    Monocytes Absolute 0.5 0.0 - 1.3 K/uL    Eosinophils Absolute 0.6 0.0 - 0.6 K/uL    Basophils Absolute 0.0 0.0 - 0.2 K/uL   Basic Metabolic Panel w/ Reflex to MG   Result Value Ref Range Sodium 135 (L) 136 - 145 mmol/L    Potassium reflex Magnesium 4.7 3.5 - 5.1 mmol/L    Chloride 101 99 - 110 mmol/L    CO2 23 21 - 32 mmol/L    Anion Gap 11 3 - 16    Glucose 98 70 - 99 mg/dL    BUN 41 (H) 7 - 20 mg/dL    CREATININE 2.0 (H) 0.8 - 1.3 mg/dL    GFR Non- 34 (A) >60    GFR  41 (A) >60    Calcium 9.0 8.3 - 10.6 mg/dL       RECENT VITALS:  BP: (!) 140/97, Temp: 97.8 °F (36.6 °C), Pulse: 86, Resp: 16, SpO2: 98 %     Procedures     None     ED Course     Nursing Notes, Past Medical Hx, Past Surgical Hx, Social Hx, Allergies, and Family Hx were reviewed. The patient was given the following medications:  No orders of the defined types were placed in this encounter. CONSULTS:  None    MEDICAL DECISION MAKING / ASSESSMENT / PLAN     Briefly, Ruth Siddiqui is a 61 y.o. male who presented to the emergency department with right lower extremity pain and swelling. On presentation, is well-appearing, no acute distress. Is afebrile with stable vital signs. On exam, has some tenderness to the right calf and a well-appearing chronic foot ulceration to the plantar aspect of the right foot without signs of secondary infection. Concern for possible DVT, especially given relative immobility, and history of same. Venous duplex scan was ordered. Laboratory studies were ordered to assess renal function and blood cell counts prior to initiation of potential anticoagulation. Creatinine 2 with GFR 34 consistent with his known CKD. CBC with chronic stable anemia, no leukocytosis or thrombocytopenia. Duplex showed: There is evidence of subacute on chronic DVT involving the right femoral    vein and popliteal vein, as described above.    There is evidence of chronic phlebitic changes involving the right small    saphenous vein. Given his weight of of 143.7 kg and BMI of 39.6, he is not an ideal candidate for the last.  We will start him on renally dosed Lovenox as well as 5 mg of Coumadin daily. He will follow up with his primary care provider for INR check and continued discussion on appropriate anticoagulation      The patient was evaluated by myself and the ED Attending Physician, Dr. Macho Dasilva. All management and disposition plans were discussed and agreed upon.        Jacinta Villarreal CNP  Department of Emergency Medicine     ANALILIA Amin CNP  01/26/21 [address] [place] [address]  01/27/21 [id number]

## 2021-01-26 NOTE — ED NOTES
Patient prepared for and ready to be discharged. Patient discharged at this time in no acute distress after verbalizing understanding of discharge instructions. Patient left after receiving After Visit Summary instructions.         Fredrick Schaffer RN  01/26/21 [id number]

## 2021-01-26 NOTE — ED TRIAGE NOTES
Pt arrives to ED with co R lower leg pain and concerned for blood clot. VSS, will continue to monitor. Pt instructed to remove leg brace and compression stocking so can assess. Pt verb understanding.

## 2021-01-26 NOTE — PROGRESS NOTES
Clinical Pharmacy Note:    Patient educated on the following medications:  Warfarin 5mg tablets  Lovenox (enoxaparin) 150 mg SQ injection    Discussed indication, dose/route/frequency/duration, side effects and methods to prevent/minimize side effects. Lexicomp handout provided for each medication. Lovenox education kit provided to patient (includes sharps container, alcohol swabs, and Lovenox education booklet)  The patient verbalized/repeated back an understanding and no further education on these medications is needed at this time. There were no barriers to the education process.       Manual Kelly Henry, [facility]  Mobile: [phone]  Main Pharmacy: [phone]

## 2021-02-09 DIAGNOSIS — N18.4 CKD (CHRONIC KIDNEY DISEASE) STAGE 4, GFR 15-29 ML/MIN (HCC): ICD-10-CM

## 2021-02-10 LAB
ALBUMIN SERPL-MCNC: 3.9 G/DL (ref 3.4–5)
ANION GAP SERPL CALCULATED.3IONS-SCNC: 12 MMOL/L (ref 3–16)
BUN BLDV-MCNC: 33 MG/DL (ref 7–20)
CALCIUM SERPL-MCNC: 9.2 MG/DL (ref 8.3–10.6)
CHLORIDE BLD-SCNC: 102 MMOL/L (ref 99–110)
CO2: 22 MMOL/L (ref 21–32)
CREAT SERPL-MCNC: 2.1 MG/DL (ref 0.8–1.3)
GFR AFRICAN AMERICAN: 39
GFR NON-AFRICAN AMERICAN: 32
GLUCOSE BLD-MCNC: 157 MG/DL (ref 70–99)
PHOSPHORUS: 3.6 MG/DL (ref 2.5–4.9)
POTASSIUM SERPL-SCNC: 4.9 MMOL/L (ref 3.5–5.1)
SODIUM BLD-SCNC: 136 MMOL/L (ref 136–145)

## 2021-08-02 ENCOUNTER — APPOINTMENT (OUTPATIENT)
Dept: VASCULAR LAB | Age: 61
End: 2021-08-02
Payer: COMMERCIAL

## 2021-08-02 ENCOUNTER — HOSPITAL ENCOUNTER (EMERGENCY)
Age: 61
Discharge: HOME | End: 2021-08-02
Attending: EMERGENCY MEDICINE
Payer: COMMERCIAL

## 2021-08-02 ENCOUNTER — APPOINTMENT (OUTPATIENT)
Dept: GENERAL RADIOLOGY | Age: 61
End: 2021-08-02
Payer: COMMERCIAL

## 2021-08-02 VITALS
TEMPERATURE: 97.8 F | RESPIRATION RATE: 15 BRPM | HEART RATE: 86 BPM | DIASTOLIC BLOOD PRESSURE: 84 MMHG | OXYGEN SATURATION: 100 % | SYSTOLIC BLOOD PRESSURE: 145 MMHG

## 2021-08-02 DIAGNOSIS — M54.41 CHRONIC BILATERAL LOW BACK PAIN WITH BILATERAL SCIATICA: Primary | ICD-10-CM

## 2021-08-02 DIAGNOSIS — G89.29 CHRONIC BILATERAL LOW BACK PAIN WITH BILATERAL SCIATICA: Primary | ICD-10-CM

## 2021-08-02 DIAGNOSIS — M54.42 CHRONIC BILATERAL LOW BACK PAIN WITH BILATERAL SCIATICA: Primary | ICD-10-CM

## 2021-08-02 LAB
ANION GAP SERPL CALCULATED.3IONS-SCNC: 12 MMOL/L (ref 3–16)
BASOPHILS ABSOLUTE: 0.1 K/UL (ref 0–0.2)
BASOPHILS RELATIVE PERCENT: 0.7 %
BUN BLDV-MCNC: 42 MG/DL (ref 7–20)
CALCIUM SERPL-MCNC: 9.3 MG/DL (ref 8.3–10.6)
CHLORIDE BLD-SCNC: 100 MMOL/L (ref 99–110)
CO2: 23 MMOL/L (ref 21–32)
CREAT SERPL-MCNC: 2.1 MG/DL (ref 0.8–1.3)
D DIMER: 921 NG/ML DDU (ref 0–229)
EOSINOPHILS ABSOLUTE: 0.4 K/UL (ref 0–0.6)
EOSINOPHILS RELATIVE PERCENT: 5 %
GFR AFRICAN AMERICAN: 39
GFR NON-AFRICAN AMERICAN: 32
GLUCOSE BLD-MCNC: 214 MG/DL (ref 70–99)
HCT VFR BLD CALC: 29.1 % (ref 40.5–52.5)
HEMOGLOBIN: 9.4 G/DL (ref 13.5–17.5)
LYMPHOCYTES ABSOLUTE: 1 K/UL (ref 1–5.1)
LYMPHOCYTES RELATIVE PERCENT: 10.9 %
MCH RBC QN AUTO: 27.9 PG (ref 26–34)
MCHC RBC AUTO-ENTMCNC: 32.3 G/DL (ref 31–36)
MCV RBC AUTO: 86.2 FL (ref 80–100)
MONOCYTES ABSOLUTE: 0.4 K/UL (ref 0–1.3)
MONOCYTES RELATIVE PERCENT: 4.9 %
NEUTROPHILS ABSOLUTE: 7 K/UL (ref 1.7–7.7)
NEUTROPHILS RELATIVE PERCENT: 78.5 %
PDW BLD-RTO: 15.9 % (ref 12.4–15.4)
PLATELET # BLD: 346 K/UL (ref 135–450)
PMV BLD AUTO: 6.9 FL (ref 5–10.5)
POTASSIUM REFLEX MAGNESIUM: 4.4 MMOL/L (ref 3.5–5.1)
RBC # BLD: 3.38 M/UL (ref 4.2–5.9)
SODIUM BLD-SCNC: 135 MMOL/L (ref 136–145)
WBC # BLD: 8.9 K/UL (ref 4–11)

## 2021-08-02 PROCEDURE — 6370000000 HC RX 637 (ALT 250 FOR IP): Performed by: EMERGENCY MEDICINE

## 2021-08-02 PROCEDURE — 85025 COMPLETE CBC W/AUTO DIFF WBC: CPT

## 2021-08-02 PROCEDURE — 72100 X-RAY EXAM L-S SPINE 2/3 VWS: CPT

## 2021-08-02 PROCEDURE — 80048 BASIC METABOLIC PNL TOTAL CA: CPT

## 2021-08-02 PROCEDURE — 36415 COLL VENOUS BLD VENIPUNCTURE: CPT

## 2021-08-02 PROCEDURE — 93971 EXTREMITY STUDY: CPT

## 2021-08-02 PROCEDURE — 99284 EMERGENCY DEPT VISIT MOD MDM: CPT

## 2021-08-02 PROCEDURE — 85379 FIBRIN DEGRADATION QUANT: CPT

## 2021-08-02 RX ORDER — OXYCODONE HYDROCHLORIDE 5 MG/1
10 TABLET ORAL ONCE
Status: COMPLETED | OUTPATIENT
Start: 2021-08-02 | End: 2021-08-02

## 2021-08-02 RX ORDER — LIDOCAINE 50 MG/G
1 PATCH TOPICAL EVERY 24 HOURS
Qty: 30 PATCH | Refills: 0 | Status: SHIPPED | OUTPATIENT
Start: 2021-08-02 | End: 2021-09-01

## 2021-08-02 RX ORDER — METHOCARBAMOL 500 MG/1
500 TABLET, FILM COATED ORAL 4 TIMES DAILY PRN
Qty: 20 TABLET | Refills: 0 | Status: SHIPPED | OUTPATIENT
Start: 2021-08-02 | End: 2021-08-07

## 2021-08-02 RX ADMIN — OXYCODONE HYDROCHLORIDE 10 MG: 5 TABLET ORAL at 11:39

## 2021-08-02 ASSESSMENT — PAIN DESCRIPTION - LOCATION
LOCATION: LEG
LOCATION: BACK;LEG

## 2021-08-02 ASSESSMENT — PAIN DESCRIPTION - DESCRIPTORS: DESCRIPTORS: BURNING

## 2021-08-02 ASSESSMENT — PAIN SCALES - GENERAL
PAINLEVEL_OUTOF10: 10
PAINLEVEL_OUTOF10: 10
PAINLEVEL_OUTOF10: 7

## 2021-08-02 ASSESSMENT — PAIN DESCRIPTION - PAIN TYPE
TYPE: ACUTE PAIN
TYPE: CHRONIC PAIN

## 2021-08-02 ASSESSMENT — PAIN DESCRIPTION - ORIENTATION: ORIENTATION: RIGHT;LEFT

## 2021-08-02 NOTE — ED TRIAGE NOTES
Pt comes to ED with complaints of pain to back as well as bilateral legs. Pt denies any trauma/injury, but states he has been working on his car a lot lately. Pt states it is a burning sensation to legs (rates 10/10 pain).

## 2021-08-02 NOTE — ED PROVIDER NOTES
father and mother; Diabetes in his brother and father; Heart Disease in his mother; High Blood Pressure in his mother; Stroke in his mother. He reports that he has never smoked. He has never used smokeless tobacco. He reports that he does not drink alcohol and does not use drugs. Medications     Previous Medications    AMITRIPTYLINE (ELAVIL) 25 MG TABLET    Take 25 mg by mouth nightly    ASPIRIN 81 MG EC TABLET    Take 1 tablet by mouth daily    FUROSEMIDE (LASIX) 40 MG TABLET    TAKE 1 TABLET(40 MG) BY MOUTH DAILY    HYDROCODONE-ACETAMINOPHEN (NORCO) 5-325 MG PER TABLET    Take 1 tablet by mouth every 6 hours as needed for Pain . INSULIN GLARGINE (LANTUS) 100 UNIT/ML INJECTION    Inject 40 Units into the skin nightly     INSULIN LISPRO (HUMALOG) 100 UNIT/ML INJECTION VIAL    Inject 5-15 Units into the skin 3 times daily (before meals) Sliding scale with meals and at HS    LOSARTAN-HYDROCHLOROTHIAZIDE (HYZAAR) 50-12.5 MG PER TABLET    TAKE 1 TABLET BY MOUTH DAILY    OMEPRAZOLE (PRILOSEC) 20 MG CAPSULE    Take 20 mg by mouth Daily     SIMVASTATIN (ZOCOR) 40 MG TABLET    Take 40 mg by mouth nightly. SPIRONOLACTONE (ALDACTONE) 25 MG TABLET    TAKE 1 TABLET BY MOUTH DAILY    SPIRONOLACTONE (ALDACTONE) 25 MG TABLET    Take 1 tablet by mouth daily    WARFARIN (COUMADIN) 5 MG TABLET    Take 1 tablet by mouth daily       Allergies     He is allergic to codeine.     Physical Exam     INITIAL VITALS: BP: (!) 144/86, Temp: 97.8 °F (36.6 °C), Pulse: 87, Resp: 15, SpO2: 99 %   Physical Exam    General: Chronically ill adult male, speaking in complete sentences, no acute distress  HEENT:  Normocephalic, atraumatic, PERRL, extra-ocular movements intact, oropharynx benign  Neck:  Supple, no lymphadenopathy, trachea midline, no masses  Pulmonary:   Clear to auscultation bilaterally, good air movement, no wheezes  Cardiac:  Regular rate and rhythm, no M/R/G  Abdomen:  Soft,non-tender, non-distended, no rebounding or guarding, normoactive borborygmus  Musculoskeletal: Spastic paravertebral musculature in his lumbar region that are tender to palpation, status post BKA in the left lower extremity, mild swelling and tenderness of the right thigh that is circumferential without overlying skin changes such as erythema  Skin:  No concerning rashes or lesions, no cyanosis  Neuro: Alert and oriented X 4,able to move all four extremities with equal strength bilaterally, sensory exam grossly intact, cranial nerves II-XII intact  Psych:  Appropriate mood and affect    Diagnostic Results       RADIOLOGY:  VL Extremity Venous Right         XR LUMBAR SPINE (2-3 VIEWS)   Final Result      1. No fracture or evidence of acute abnormality   2. L4-L5 grade I retrolisthesis with advanced disc degeneration; laminectomies of L4-L5   3. Partially lumbarized S1          LABS:   No results found for this visit on 08/02/21. ED BEDSIDE ULTRASOUND:      RECENT VITALS:  BP: (!) 144/86, Temp: 97.8 °F (36.6 °C), Pulse: 87, Resp: 15, SpO2: 99 %     Procedures         ED Course     Nursing Notes, Past Medical Hx, Past Surgical Hx, Social Hx, Allergies, and Family Hx were reviewed. The patient was given the following medications:  No orders of the defined types were placed in this encounter. CONSULTS:  None    MEDICAL DECISION MAKING / ASSESSMENT / PLAN     Landon Castellano is a 61 y.o. male with a history andpresentation as described above in HPI. The patient was evaluated by myself, the ED Attending Physician. On initial encounter the patient was in no acute distress with reassuring vitals and no signs of impending hemodynamic or respiratory collapse. Patient presents to the emergency department with low back pain and leg pain. His back pain is most consistent with exacerbation of chronic low back pain.   He has no red flag concerning findings such as changes in bladder or bowel control, loss of sensation in his lower extremities or groin or

## 2022-04-20 ENCOUNTER — APPOINTMENT (OUTPATIENT)
Dept: CT IMAGING | Age: 62
DRG: 100 | End: 2022-04-20
Payer: COMMERCIAL

## 2022-04-20 ENCOUNTER — APPOINTMENT (OUTPATIENT)
Dept: GENERAL RADIOLOGY | Age: 62
DRG: 100 | End: 2022-04-20
Payer: COMMERCIAL

## 2022-04-20 ENCOUNTER — HOSPITAL ENCOUNTER (INPATIENT)
Age: 62
LOS: 6 days | Discharge: NURSING, REHAB OR LONG-TERM CARE | DRG: 100 | End: 2022-04-26
Attending: EMERGENCY MEDICINE | Admitting: INTERNAL MEDICINE
Payer: COMMERCIAL

## 2022-04-20 DIAGNOSIS — R41.82 ALTERED MENTAL STATUS, UNSPECIFIED ALTERED MENTAL STATUS TYPE: Primary | ICD-10-CM

## 2022-04-20 DIAGNOSIS — M54.9 CHRONIC BACK PAIN, UNSPECIFIED BACK LOCATION, UNSPECIFIED BACK PAIN LATERALITY: ICD-10-CM

## 2022-04-20 DIAGNOSIS — G89.29 CHRONIC BACK PAIN, UNSPECIFIED BACK LOCATION, UNSPECIFIED BACK PAIN LATERALITY: ICD-10-CM

## 2022-04-20 PROBLEM — G93.40 ACUTE ENCEPHALOPATHY: Status: ACTIVE | Noted: 2022-04-20

## 2022-04-20 LAB
ALBUMIN SERPL-MCNC: 3.5 G/DL (ref 3.4–5)
ALP BLD-CCNC: 194 U/L (ref 40–129)
ALT SERPL-CCNC: 16 U/L (ref 10–40)
AMMONIA: 15 UMOL/L (ref 16–60)
ANION GAP SERPL CALCULATED.3IONS-SCNC: 19 MMOL/L (ref 3–16)
AST SERPL-CCNC: 15 U/L (ref 15–37)
BASE EXCESS ARTERIAL: -0.5 MMOL/L (ref -3–3)
BASE EXCESS VENOUS: -4.3 MMOL/L (ref -2–3)
BASOPHILS ABSOLUTE: 0.1 K/UL (ref 0–0.2)
BASOPHILS RELATIVE PERCENT: 1.6 %
BILIRUB SERPL-MCNC: 0.3 MG/DL (ref 0–1)
BILIRUBIN DIRECT: <0.2 MG/DL (ref 0–0.3)
BILIRUBIN, INDIRECT: ABNORMAL MG/DL (ref 0–1)
BUN BLDV-MCNC: 28 MG/DL (ref 7–20)
CALCIUM SERPL-MCNC: 9.4 MG/DL (ref 8.3–10.6)
CARBOXYHEMOGLOBIN ARTERIAL: 0.9 % (ref 0–1.5)
CARBOXYHEMOGLOBIN: 1.4 % (ref 0–1.5)
CHLORIDE BLD-SCNC: 97 MMOL/L (ref 99–110)
CO2: 21 MMOL/L (ref 21–32)
CREAT SERPL-MCNC: 1.6 MG/DL (ref 0.8–1.3)
EOSINOPHILS ABSOLUTE: 0.3 K/UL (ref 0–0.6)
EOSINOPHILS RELATIVE PERCENT: 4.1 %
GFR AFRICAN AMERICAN: 53
GFR NON-AFRICAN AMERICAN: 44
GLUCOSE BLD-MCNC: 332 MG/DL (ref 70–99)
GLUCOSE BLD-MCNC: 353 MG/DL (ref 70–99)
GLUCOSE BLD-MCNC: 380 MG/DL (ref 70–99)
HCO3 ARTERIAL: 24 MMOL/L (ref 21–29)
HCO3 VENOUS: 22.4 MMOL/L (ref 24–28)
HCT VFR BLD CALC: 34.7 % (ref 40.5–52.5)
HEMOGLOBIN, ART, EXTENDED: 11.4 G/DL
HEMOGLOBIN, VEN, REDUCED: 4 %
HEMOGLOBIN: 11.1 G/DL (ref 13.5–17.5)
INR BLD: 1.08 (ref 0.88–1.12)
LACTIC ACID, SEPSIS: 7 MMOL/L (ref 0.4–1.9)
LACTIC ACID: 1.1 MMOL/L (ref 0.4–2)
LACTIC ACID: 2.3 MMOL/L (ref 0.4–2)
LIPASE: 7 U/L (ref 13–60)
LYMPHOCYTES ABSOLUTE: 2.2 K/UL (ref 1–5.1)
LYMPHOCYTES RELATIVE PERCENT: 28.4 %
MCH RBC QN AUTO: 30.2 PG (ref 26–34)
MCHC RBC AUTO-ENTMCNC: 32.1 G/DL (ref 31–36)
MCV RBC AUTO: 94 FL (ref 80–100)
METHEMOGLOBIN ARTERIAL: 0.2 % (ref 0–1.4)
METHEMOGLOBIN VENOUS: 0.2 % (ref 0–1.5)
MONOCYTES ABSOLUTE: 0.6 K/UL (ref 0–1.3)
MONOCYTES RELATIVE PERCENT: 7.9 %
NEUTROPHILS ABSOLUTE: 4.4 K/UL (ref 1.7–7.7)
NEUTROPHILS RELATIVE PERCENT: 58 %
O2 SAT, ARTERIAL: >100 % (ref 93–100)
O2 SAT, VEN: 96 %
PCO2 ARTERIAL: 37.2 MMHG (ref 35–45)
PCO2, VEN: 46.8 MMHG (ref 41–51)
PDW BLD-RTO: 17.3 % (ref 12.4–15.4)
PERFORMED ON: ABNORMAL
PERFORMED ON: ABNORMAL
PH ARTERIAL: 7.42 (ref 7.35–7.45)
PH VENOUS: 7.29 (ref 7.35–7.45)
PLATELET # BLD: 263 K/UL (ref 135–450)
PMV BLD AUTO: 7.8 FL (ref 5–10.5)
PO2 ARTERIAL: 279 MMHG (ref 75–108)
PO2, VEN: 82.1 MMHG (ref 25–40)
POTASSIUM REFLEX MAGNESIUM: 4.2 MMOL/L (ref 3.5–5.1)
PRO-BNP: 3072 PG/ML (ref 0–124)
PROTHROMBIN TIME: 12.2 SEC (ref 9.9–12.7)
RBC # BLD: 3.69 M/UL (ref 4.2–5.9)
SODIUM BLD-SCNC: 137 MMOL/L (ref 136–145)
TCO2 ARTERIAL: 25 MMOL/L
TCO2 CALC VENOUS: 24 MMOL/L
TOTAL CK: 101 U/L (ref 39–308)
TOTAL PROTEIN: 7.4 G/DL (ref 6.4–8.2)
TROPONIN: 0.27 NG/ML
WBC # BLD: 7.6 K/UL (ref 4–11)

## 2022-04-20 PROCEDURE — 80048 BASIC METABOLIC PNL TOTAL CA: CPT

## 2022-04-20 PROCEDURE — 99285 EMERGENCY DEPT VISIT HI MDM: CPT

## 2022-04-20 PROCEDURE — 80076 HEPATIC FUNCTION PANEL: CPT

## 2022-04-20 PROCEDURE — 2500000003 HC RX 250 WO HCPCS: Performed by: EMERGENCY MEDICINE

## 2022-04-20 PROCEDURE — 94003 VENT MGMT INPAT SUBQ DAY: CPT

## 2022-04-20 PROCEDURE — 87040 BLOOD CULTURE FOR BACTERIA: CPT

## 2022-04-20 PROCEDURE — 5A1935Z RESPIRATORY VENTILATION, LESS THAN 24 CONSECUTIVE HOURS: ICD-10-PCS | Performed by: EMERGENCY MEDICINE

## 2022-04-20 PROCEDURE — 82607 VITAMIN B-12: CPT

## 2022-04-20 PROCEDURE — 0BH17EZ INSERTION OF ENDOTRACHEAL AIRWAY INTO TRACHEA, VIA NATURAL OR ARTIFICIAL OPENING: ICD-10-PCS | Performed by: EMERGENCY MEDICINE

## 2022-04-20 PROCEDURE — 6360000004 HC RX CONTRAST MEDICATION: Performed by: EMERGENCY MEDICINE

## 2022-04-20 PROCEDURE — 2580000003 HC RX 258

## 2022-04-20 PROCEDURE — 71260 CT THORAX DX C+: CPT

## 2022-04-20 PROCEDURE — 82550 ASSAY OF CK (CPK): CPT

## 2022-04-20 PROCEDURE — 93005 ELECTROCARDIOGRAM TRACING: CPT

## 2022-04-20 PROCEDURE — 94002 VENT MGMT INPAT INIT DAY: CPT

## 2022-04-20 PROCEDURE — 6370000000 HC RX 637 (ALT 250 FOR IP)

## 2022-04-20 PROCEDURE — 6360000002 HC RX W HCPCS: Performed by: EMERGENCY MEDICINE

## 2022-04-20 PROCEDURE — 82140 ASSAY OF AMMONIA: CPT

## 2022-04-20 PROCEDURE — 6360000002 HC RX W HCPCS

## 2022-04-20 PROCEDURE — 96375 TX/PRO/DX INJ NEW DRUG ADDON: CPT

## 2022-04-20 PROCEDURE — 83690 ASSAY OF LIPASE: CPT

## 2022-04-20 PROCEDURE — 84484 ASSAY OF TROPONIN QUANT: CPT

## 2022-04-20 PROCEDURE — 96376 TX/PRO/DX INJ SAME DRUG ADON: CPT

## 2022-04-20 PROCEDURE — 72131 CT LUMBAR SPINE W/O DYE: CPT

## 2022-04-20 PROCEDURE — 84443 ASSAY THYROID STIM HORMONE: CPT

## 2022-04-20 PROCEDURE — 83880 ASSAY OF NATRIURETIC PEPTIDE: CPT

## 2022-04-20 PROCEDURE — 2580000003 HC RX 258: Performed by: EMERGENCY MEDICINE

## 2022-04-20 PROCEDURE — 85610 PROTHROMBIN TIME: CPT

## 2022-04-20 PROCEDURE — 51702 INSERT TEMP BLADDER CATH: CPT

## 2022-04-20 PROCEDURE — 85025 COMPLETE CBC W/AUTO DIFF WBC: CPT

## 2022-04-20 PROCEDURE — 2000000000 HC ICU R&B

## 2022-04-20 PROCEDURE — 83605 ASSAY OF LACTIC ACID: CPT

## 2022-04-20 PROCEDURE — 36415 COLL VENOUS BLD VENIPUNCTURE: CPT

## 2022-04-20 PROCEDURE — 31500 INSERT EMERGENCY AIRWAY: CPT

## 2022-04-20 PROCEDURE — 96365 THER/PROPH/DIAG IV INF INIT: CPT

## 2022-04-20 PROCEDURE — 71045 X-RAY EXAM CHEST 1 VIEW: CPT

## 2022-04-20 PROCEDURE — 94761 N-INVAS EAR/PLS OXIMETRY MLT: CPT

## 2022-04-20 PROCEDURE — 70450 CT HEAD/BRAIN W/O DYE: CPT

## 2022-04-20 PROCEDURE — 83036 HEMOGLOBIN GLYCOSYLATED A1C: CPT

## 2022-04-20 PROCEDURE — 82803 BLOOD GASES ANY COMBINATION: CPT

## 2022-04-20 RX ORDER — ATORVASTATIN CALCIUM 10 MG/1
10 TABLET, FILM COATED ORAL DAILY
Status: DISCONTINUED | OUTPATIENT
Start: 2022-04-21 | End: 2022-04-27 | Stop reason: HOSPADM

## 2022-04-20 RX ORDER — SODIUM CHLORIDE 9 MG/ML
INJECTION, SOLUTION INTRAVENOUS CONTINUOUS
Status: ACTIVE | OUTPATIENT
Start: 2022-04-20 | End: 2022-04-21

## 2022-04-20 RX ORDER — INSULIN LISPRO 100 [IU]/ML
0-6 INJECTION, SOLUTION INTRAVENOUS; SUBCUTANEOUS
Status: DISCONTINUED | OUTPATIENT
Start: 2022-04-21 | End: 2022-04-21

## 2022-04-20 RX ORDER — ACETAMINOPHEN 325 MG/1
650 TABLET ORAL EVERY 6 HOURS PRN
Status: DISCONTINUED | OUTPATIENT
Start: 2022-04-20 | End: 2022-04-27 | Stop reason: HOSPADM

## 2022-04-20 RX ORDER — POLYETHYLENE GLYCOL 3350 17 G/17G
17 POWDER, FOR SOLUTION ORAL DAILY PRN
Status: DISCONTINUED | OUTPATIENT
Start: 2022-04-20 | End: 2022-04-27 | Stop reason: HOSPADM

## 2022-04-20 RX ORDER — FENTANYL CITRATE-0.9 % NACL/PF 10 MCG/ML
25-200 PLASTIC BAG, INJECTION (ML) INTRAVENOUS CONTINUOUS
Status: DISCONTINUED | OUTPATIENT
Start: 2022-04-20 | End: 2022-04-22

## 2022-04-20 RX ORDER — 0.9 % SODIUM CHLORIDE 0.9 %
1000 INTRAVENOUS SOLUTION INTRAVENOUS ONCE
Status: COMPLETED | OUTPATIENT
Start: 2022-04-20 | End: 2022-04-20

## 2022-04-20 RX ORDER — LABETALOL HYDROCHLORIDE 5 MG/ML
10 INJECTION, SOLUTION INTRAVENOUS EVERY 6 HOURS
Status: DISCONTINUED | OUTPATIENT
Start: 2022-04-20 | End: 2022-04-20

## 2022-04-20 RX ORDER — DEXTROSE MONOHYDRATE 50 MG/ML
100 INJECTION, SOLUTION INTRAVENOUS PRN
Status: DISCONTINUED | OUTPATIENT
Start: 2022-04-20 | End: 2022-04-27 | Stop reason: HOSPADM

## 2022-04-20 RX ORDER — SODIUM CHLORIDE 9 MG/ML
INJECTION, SOLUTION INTRAVENOUS PRN
Status: DISCONTINUED | OUTPATIENT
Start: 2022-04-20 | End: 2022-04-27 | Stop reason: HOSPADM

## 2022-04-20 RX ORDER — DEXTROSE MONOHYDRATE 25 G/50ML
12.5 INJECTION, SOLUTION INTRAVENOUS PRN
Status: DISCONTINUED | OUTPATIENT
Start: 2022-04-20 | End: 2022-04-20 | Stop reason: SDUPTHER

## 2022-04-20 RX ORDER — FENTANYL CITRATE 50 UG/ML
INJECTION, SOLUTION INTRAMUSCULAR; INTRAVENOUS
Status: COMPLETED
Start: 2022-04-20 | End: 2022-04-20

## 2022-04-20 RX ORDER — PROPOFOL 10 MG/ML
INJECTION, EMULSION INTRAVENOUS
Status: COMPLETED
Start: 2022-04-20 | End: 2022-04-20

## 2022-04-20 RX ORDER — LOSARTAN POTASSIUM AND HYDROCHLOROTHIAZIDE 12.5; 5 MG/1; MG/1
1 TABLET ORAL DAILY
Status: DISCONTINUED | OUTPATIENT
Start: 2022-04-21 | End: 2022-04-27 | Stop reason: HOSPADM

## 2022-04-20 RX ORDER — NICOTINE POLACRILEX 4 MG
15 LOZENGE BUCCAL PRN
Status: DISCONTINUED | OUTPATIENT
Start: 2022-04-20 | End: 2022-04-20 | Stop reason: ALTCHOICE

## 2022-04-20 RX ORDER — LEVETIRACETAM 5 MG/ML
500 INJECTION INTRAVASCULAR EVERY 12 HOURS
Status: CANCELLED | OUTPATIENT
Start: 2022-04-21

## 2022-04-20 RX ORDER — LABETALOL HYDROCHLORIDE 5 MG/ML
10 INJECTION, SOLUTION INTRAVENOUS EVERY 6 HOURS PRN
Status: DISCONTINUED | OUTPATIENT
Start: 2022-04-20 | End: 2022-04-27 | Stop reason: HOSPADM

## 2022-04-20 RX ORDER — PROPOFOL 10 MG/ML
40 INJECTION, EMULSION INTRAVENOUS ONCE
Status: COMPLETED | OUTPATIENT
Start: 2022-04-20 | End: 2022-04-20

## 2022-04-20 RX ORDER — ASPIRIN 81 MG/1
81 TABLET ORAL DAILY
Status: DISCONTINUED | OUTPATIENT
Start: 2022-04-21 | End: 2022-04-27 | Stop reason: HOSPADM

## 2022-04-20 RX ORDER — AMITRIPTYLINE HYDROCHLORIDE 50 MG/1
25 TABLET, FILM COATED ORAL NIGHTLY
Status: DISCONTINUED | OUTPATIENT
Start: 2022-04-20 | End: 2022-04-27 | Stop reason: HOSPADM

## 2022-04-20 RX ORDER — ACETAMINOPHEN 650 MG/1
650 SUPPOSITORY RECTAL EVERY 6 HOURS PRN
Status: DISCONTINUED | OUTPATIENT
Start: 2022-04-20 | End: 2022-04-27 | Stop reason: HOSPADM

## 2022-04-20 RX ORDER — ONDANSETRON 2 MG/ML
4 INJECTION INTRAMUSCULAR; INTRAVENOUS EVERY 6 HOURS PRN
Status: DISCONTINUED | OUTPATIENT
Start: 2022-04-20 | End: 2022-04-27 | Stop reason: HOSPADM

## 2022-04-20 RX ORDER — ONDANSETRON 4 MG/1
4 TABLET, ORALLY DISINTEGRATING ORAL EVERY 8 HOURS PRN
Status: DISCONTINUED | OUTPATIENT
Start: 2022-04-20 | End: 2022-04-27 | Stop reason: HOSPADM

## 2022-04-20 RX ORDER — SODIUM CHLORIDE 0.9 % (FLUSH) 0.9 %
5-40 SYRINGE (ML) INJECTION PRN
Status: DISCONTINUED | OUTPATIENT
Start: 2022-04-20 | End: 2022-04-27 | Stop reason: HOSPADM

## 2022-04-20 RX ORDER — SODIUM CHLORIDE 0.9 % (FLUSH) 0.9 %
5-40 SYRINGE (ML) INJECTION EVERY 12 HOURS SCHEDULED
Status: DISCONTINUED | OUTPATIENT
Start: 2022-04-20 | End: 2022-04-27 | Stop reason: HOSPADM

## 2022-04-20 RX ORDER — PROPOFOL 10 MG/ML
5-50 INJECTION, EMULSION INTRAVENOUS CONTINUOUS
Status: DISCONTINUED | OUTPATIENT
Start: 2022-04-20 | End: 2022-04-27 | Stop reason: HOSPADM

## 2022-04-20 RX ORDER — INSULIN LISPRO 100 [IU]/ML
0-3 INJECTION, SOLUTION INTRAVENOUS; SUBCUTANEOUS NIGHTLY
Status: DISCONTINUED | OUTPATIENT
Start: 2022-04-20 | End: 2022-04-21

## 2022-04-20 RX ORDER — PROPOFOL 10 MG/ML
5-50 INJECTION, EMULSION INTRAVENOUS ONCE
Status: COMPLETED | OUTPATIENT
Start: 2022-04-20 | End: 2022-04-20

## 2022-04-20 RX ORDER — ENOXAPARIN SODIUM 100 MG/ML
30 INJECTION SUBCUTANEOUS 2 TIMES DAILY
Status: DISCONTINUED | OUTPATIENT
Start: 2022-04-20 | End: 2022-04-21

## 2022-04-20 RX ORDER — SPIRONOLACTONE 25 MG/1
25 TABLET ORAL DAILY
Status: DISCONTINUED | OUTPATIENT
Start: 2022-04-21 | End: 2022-04-27 | Stop reason: HOSPADM

## 2022-04-20 RX ADMIN — LEVETIRACETAM 1000 MG: 100 INJECTION, SOLUTION INTRAVENOUS at 22:36

## 2022-04-20 RX ADMIN — FENTANYL CITRATE 100 MCG: 50 INJECTION INTRAMUSCULAR; INTRAVENOUS at 16:20

## 2022-04-20 RX ADMIN — VANCOMYCIN HYDROCHLORIDE 2500 MG: 10 INJECTION, POWDER, LYOPHILIZED, FOR SOLUTION INTRAVENOUS at 20:02

## 2022-04-20 RX ADMIN — Medication 25 MCG/HR: at 16:22

## 2022-04-20 RX ADMIN — PROPOFOL 20 MCG/KG/MIN: 10 INJECTION, EMULSION INTRAVENOUS at 18:48

## 2022-04-20 RX ADMIN — PROPOFOL 30 MCG/KG/MIN: 10 INJECTION, EMULSION INTRAVENOUS at 22:13

## 2022-04-20 RX ADMIN — PROPOFOL 40 MG: 10 INJECTION, EMULSION INTRAVENOUS at 16:40

## 2022-04-20 RX ADMIN — IOPAMIDOL 80 ML: 755 INJECTION, SOLUTION INTRAVENOUS at 17:38

## 2022-04-20 RX ADMIN — SODIUM CHLORIDE 1000 ML: 9 INJECTION, SOLUTION INTRAVENOUS at 19:30

## 2022-04-20 RX ADMIN — SODIUM CHLORIDE, PRESERVATIVE FREE 10 ML: 5 INJECTION INTRAVENOUS at 22:16

## 2022-04-20 RX ADMIN — INSULIN GLARGINE 20 UNITS: 100 INJECTION, SOLUTION SUBCUTANEOUS at 22:41

## 2022-04-20 RX ADMIN — INSULIN LISPRO 3 UNITS: 100 INJECTION, SOLUTION INTRAVENOUS; SUBCUTANEOUS at 22:42

## 2022-04-20 RX ADMIN — PROPOFOL 20 MCG/KG/MIN: 10 INJECTION, EMULSION INTRAVENOUS at 16:46

## 2022-04-20 RX ADMIN — ENOXAPARIN SODIUM 30 MG: 100 INJECTION SUBCUTANEOUS at 22:19

## 2022-04-20 RX ADMIN — SODIUM CHLORIDE: 9 INJECTION, SOLUTION INTRAVENOUS at 22:24

## 2022-04-20 RX ADMIN — CEFEPIME HYDROCHLORIDE 2000 MG: 2 INJECTION, POWDER, FOR SOLUTION INTRAVENOUS at 19:29

## 2022-04-20 ASSESSMENT — PULMONARY FUNCTION TESTS
PIF_VALUE: 16
PIF_VALUE: 16
PIF_VALUE: 21
PIF_VALUE: 16
PIF_VALUE: 17
PIF_VALUE: 16

## 2022-04-20 ASSESSMENT — LIFESTYLE VARIABLES
HOW MANY STANDARD DRINKS CONTAINING ALCOHOL DO YOU HAVE ON A TYPICAL DAY: PATIENT DECLINED
HOW OFTEN DO YOU HAVE A DRINK CONTAINING ALCOHOL: PATIENT DECLINED

## 2022-04-20 NOTE — ED PROVIDER NOTES
[address] [facility] 71 ENCOUNTER          ATTENDING PHYSICIAN NOTE       Date of evaluation: 4/20/2022    Chief Complaint     Altered Mental Status  Altered mental status    History of Present Illness     Miguel Lomas is a 64 y.o. male who presents with a chief complaint of altered mental status. Patient is unable to provide history, he presents altered, on a nonrebreather. Per EMS, the patient got up to go to the bathroom and then collapsed. He did have a recent back surgery. On their arrival he had agonal respirations and they were bagging him. His blood sugar was noted to be greater than 300. After the patient has been here for some time I was able to speak with his wife. She states that he had lumbar spine fusion surgery back in February, and has been home from rehab for the last month and doing well. Intermittently has issues with mild confusion, but is usually very with it. Wife states that today she was helping him to the bathroom when he all of a sudden states \"I do not think I am doing this\", and then his eyes rolled back in his head and he fell to the ground. She did not see any obvious seizure activity. He does not have a history of seizures. Review of Systems     Review of Systems - unable to obtain due to altered mental status    Past Medical, Surgical, Family, and Social History     He has a past medical history of Back pain, CAD (coronary artery disease), CKD (chronic kidney disease) stage 4, GFR 15-29 ml/min ([facility] [address] Diabetes mellitus ([facility] [address] Hyperlipidemia, Hypertension, and MRSA (methicillin resistant staph aureus) culture positive. He has a past surgical history that includes Leg amputation below knee; Foot surgery (Left, 05/11/2017); and other surgical history (Right, 05/14/2017).   His family history includes Arthritis in his father and mother; Diabetes in his brother and father; Heart Disease in his mother; High Blood Pressure in his mother; Stroke in his mother. He reports that he has never smoked. He has never used smokeless tobacco. He reports that he does not drink alcohol and does not use drugs. Medications     Previous Medications    AMITRIPTYLINE (ELAVIL) 25 MG TABLET    Take 25 mg by mouth nightly    ASPIRIN 81 MG EC TABLET    Take 1 tablet by mouth daily    FUROSEMIDE (LASIX) 40 MG TABLET    TAKE 1 TABLET(40 MG) BY MOUTH DAILY    HYDROCODONE-ACETAMINOPHEN (NORCO) 5-325 MG PER TABLET    Take 1 tablet by mouth every 6 hours as needed for Pain . INSULIN GLARGINE (LANTUS) 100 UNIT/ML INJECTION    Inject 40 Units into the skin nightly     INSULIN LISPRO (HUMALOG) 100 UNIT/ML INJECTION VIAL    Inject 5-15 Units into the skin 3 times daily (before meals) Sliding scale with meals and at HS    LOSARTAN-HYDROCHLOROTHIAZIDE (HYZAAR) 50-12.5 MG PER TABLET    TAKE 1 TABLET BY MOUTH DAILY    OMEPRAZOLE (PRILOSEC) 20 MG CAPSULE    Take 20 mg by mouth Daily     SIMVASTATIN (ZOCOR) 40 MG TABLET    Take 40 mg by mouth nightly. SPIRONOLACTONE (ALDACTONE) 25 MG TABLET    TAKE 1 TABLET BY MOUTH DAILY    SPIRONOLACTONE (ALDACTONE) 25 MG TABLET    TAKE 1 TABLET BY MOUTH DAILY    WARFARIN (COUMADIN) 5 MG TABLET    Take 1 tablet by mouth daily       Allergies     He is allergic to codeine. Physical Exam     INITIAL VITALS: BP: (!) 159/82, Temp: 97.5 °F (36.4 °C), Pulse: 121, Resp: 21, SpO2: 98 % temp 97.5  Physical Exam  Constitutional:       Comments: Lethargic, unresponsive except to sternal rub   HENT:      Head: Normocephalic and atraumatic. Mouth/Throat:      Mouth: Mucous membranes are moist.   Eyes:      Extraocular Movements: Extraocular movements intact. Conjunctiva/sclera: Conjunctivae normal.      Pupils: Pupils are equal, round, and reactive to light. Cardiovascular:      Rate and Rhythm: Regular rhythm. Tachycardia present. Pulses: Normal pulses. Heart sounds: No murmur heard. No friction rub. No gallop.     Pulmonary:      Effort: Pulmonary effort is normal. No respiratory distress. Breath sounds: No wheezing or rales. Abdominal:      General: Abdomen is flat. There is no distension. Tenderness: There is no abdominal tenderness. There is no guarding. Musculoskeletal:         General: No swelling or tenderness. Normal range of motion. Cervical back: Normal range of motion and neck supple. No rigidity. Comments: Left below knee amputation, incision to lower lumbar spine, well healing, no erythema   Skin:     General: Skin is warm. Capillary Refill: Capillary refill takes less than 2 seconds. Neurological:      General: No focal deficit present. Diagnostic Results     EKG   Indication: Altered mental status. Heart rate 114, right bundle branch block appreciated otherwise intervals normal, axis normal.  No signs of ST elevation or depression, no T wave inversions. Comparison to prior EKG feels that this is a new right bundle branch block. Impression: Sinus tachycardia with no active ischemia. RADIOLOGY:  CT CHEST PULMONARY EMBOLISM W CONTRAST   Final Result      1. No evidence of acute or chronic pulmonary embolus   2. Bilateral lower lobe atelectasis or pneumonia   3. Coronary artery calcification   4. Hiatal hernia in addition to a dilated esophagus, presbyesophagus with no retained fluid or fluid levels         CT HEAD WO CONTRAST   Final Result      1. No evidence of acute intracranial hemorrhage or mass effect. 2.  Mild chronic small vessel ischemic disease. XR CHEST PORTABLE   Final Result      1. Endotracheal tube at the thoracic inlet. Recommend repositioning/advancement and repeat radiograph. 2.  Bilateral perihilar predominant opacities may reflect edema and/or pneumonia.          CT LUMBAR SPINE WO CONTRAST    (Results Pending)       LABS:   Results for orders placed or performed during the hospital encounter of 04/20/22   CBC with Auto Differential   Result Value Ref Range WBC 7.6 4.0 - 11.0 K/uL    RBC 3.69 (L) 4.20 - 5.90 M/uL    Hemoglobin 11.1 (L) 13.5 - 17.5 g/dL    Hematocrit 34.7 (L) 40.5 - 52.5 %    MCV 94.0 80.0 - 100.0 fL    MCH 30.2 26.0 - 34.0 pg    MCHC 32.1 31.0 - 36.0 g/dL    RDW 17.3 (H) 12.4 - 15.4 %    Platelets 327 728 - 859 K/uL    MPV 7.8 5.0 - 10.5 fL    Neutrophils % 58.0 %    Lymphocytes % 28.4 %    Monocytes % 7.9 %    Eosinophils % 4.1 %    Basophils % 1.6 %    Neutrophils Absolute 4.4 1.7 - 7.7 K/uL    Lymphocytes Absolute 2.2 1.0 - 5.1 K/uL    Monocytes Absolute 0.6 0.0 - 1.3 K/uL    Eosinophils Absolute 0.3 0.0 - 0.6 K/uL    Basophils Absolute 0.1 0.0 - 0.2 K/uL   Basic Metabolic Panel w/ Reflex to MG   Result Value Ref Range    Sodium 137 136 - 145 mmol/L    Potassium reflex Magnesium 4.2 3.5 - 5.1 mmol/L    Chloride 97 (L) 99 - 110 mmol/L    CO2 21 21 - 32 mmol/L    Anion Gap 19 (H) 3 - 16    Glucose 353 (H) 70 - 99 mg/dL    BUN 28 (H) 7 - 20 mg/dL    CREATININE 1.6 (H) 0.8 - 1.3 mg/dL    GFR Non- 44 (A) >60    GFR  53 (A) >60    Calcium 9.4 8.3 - 10.6 mg/dL   Hepatic Function Panel   Result Value Ref Range    Total Protein 7.4 6.4 - 8.2 g/dL    Albumin 3.5 3.4 - 5.0 g/dL    Alkaline Phosphatase 194 (H) 40 - 129 U/L    ALT 16 10 - 40 U/L    AST 15 15 - 37 U/L    Total Bilirubin 0.3 0.0 - 1.0 mg/dL    Bilirubin, Direct <0.2 0.0 - 0.3 mg/dL    Bilirubin, Indirect see below 0.0 - 1.0 mg/dL   Lipase   Result Value Ref Range    Lipase 7.0 (L) 13.0 - 60.0 U/L   Troponin   Result Value Ref Range    Troponin 0.27 (H) <0.01 ng/mL   Brain Natriuretic Peptide   Result Value Ref Range    Pro-BNP 3,072 (H) 0 - 124 pg/mL   Blood Gas, Venous   Result Value Ref Range    pH, Arj 7.289 (L) 7.350 - 7.450    pCO2, Raj 46.8 41.0 - 51.0 mmHg    pO2, Raj 82.1 (H) 25.0 - 40.0 mmHg    HCO3, Venous 22.4 (L) 24.0 - 28.0 mmol/L    Base Excess, Raj -4.3 (L) -2.0 - 3.0 mmol/L    O2 Sat, Raj 96 Not established %    Carboxyhemoglobin 1.4 0.0 - 1.5 % MetHgb, Raj 0.2 0.0 - 1.5 %    TC02 (Calc), Raj 24 mmol/L    Hemoglobin, Raj, Reduced 4.00 %   Lactate, Sepsis   Result Value Ref Range    Lactic Acid, Sepsis 7.0 (HH) 0.4 - 1.9 mmol/L   Protime-INR   Result Value Ref Range    Protime 12.2 9.9 - 12.7 sec    INR 1.08 0.88 - 1.12   POCT Glucose   Result Value Ref Range    POC Glucose 332 (H) 70 - 99 mg/dl    Performed on ACCU-CHEK        ED BEDSIDE ULTRASOUND:  none    RECENT VITALS:  BP: 122/78,Temp: 97.5 °F (36.4 °C), Pulse: 87, Resp: 18, SpO2: 100 %     Procedures     Intubation    Date/Time: 4/20/2022 7:05 PM  Performed by: Esvin Villarreal MD  Authorized by: Xin Metcalf MD     Consent:     Consent obtained:  Emergent situation  Pre-procedure details:     Patient status:  Unresponsive    Paralytics:  Succinylcholine  Procedure details:     Preoxygenation:  Nonrebreather mask    Intubation method:  Oral    Oral intubation technique:  Video-assisted    Laryngoscope blade: Mac 4    Tube size (mm):  8.0    Tube type:  Cuffed    Number of attempts:  1    Ventilation between attempts: no      Cricoid pressure: no      Tube visualized through cords: yes    Placement assessment:     ETT to teeth:  24    Tube secured with:  ETT owens    Breath sounds:  Equal and absent over the epigastrium    Placement verification: chest rise, CXR verification and ETCO2 detector      Chest x-ray findings: ET tube needed advancement, RT advance. Post-procedure details:     Patient tolerance of procedure: Tolerated well, no immediate complications          ED Course     Nursing Notes, Past Medical Hx, Past Surgical Hx, Social Hx,Allergies, and Family Hx were reviewed. patient was given the following medications:  Orders Placed This Encounter   Medications    fentaNYL (SUBLIMAZE) 1,000 mcg in sodium chloride 0.9% 100 mL infusion     Order Specific Question:   Titrate Infusion? Answer:   Yes     Order Specific Question:   Initial Infusion Dose:      Answer:   48 mcg/hr     Order Specific Question:   Goal of Therapy is: Answer:   RASS of -1 to 1     Order Specific Question:   Contact Provider if:     Answer:   Patient is receiving the maximum dose and is not achieving the goal of therapy    fentaNYL (SUBLIMAZE) 100 MCG/2ML injection     Katie Kerr: cabinet override    propofol injection     Order Specific Question:   Titrate Infusion? Answer:   Yes     Order Specific Question:   Initial Infusion Dose: Answer:   20 mcg/kg/min     Order Specific Question:   Goal of Therapy:     Answer:   RASS of -1 to 0     Order Specific Question:   Contact Provider if:     Answer:   New onset HR less than 50 bpm     Order Specific Question:   Contact Provider if:     Answer:   New onset SBP less than 90 mmHg     Order Specific Question:   Contact Provider if:     Answer:   Patient is receiving maximum dose and is not achieving the goal of therapy     Order Specific Question:   Contact Provider if:     Answer:   Triglycerides greater than 500 mg/dL    propofol injection 40 mg     Order Specific Question:   Titrate Infusion? Answer:   No     Order Specific Question:   Infusion Dose: Answer:   20 mcg/kg/min    propofol 1000 MG/100ML injection     Katie Kerr: cabinet override    iopamidol (ISOVUE-370) 76 % injection 80 mL    cefepime (MAXIPIME) 2000 mg IVPB minibag     Order Specific Question:   Antimicrobial Indications     Answer: Other     Order Specific Question:   Other Abx Indication     Answer: Suspected Sepsis of Skin or Soft Tissue Origin    vancomycin (VANCOCIN) 2,500 mg in dextrose 5 % 500 mL IVPB     Order Specific Question:   Antimicrobial Indications     Answer: Other     Order Specific Question:   Other Abx Indication     Answer:    Suspected Sepsis of Skin or Soft Tissue Origin    0.9 % sodium chloride bolus       CONSULTS:  IP CONSULT TO CRITICAL CARE  IP CONSULT TO HOSPITALIST    MEDICAL DECISIONMAKING / ASSESSMENT / PLAN     Joshua Fishman is a 64 y.o. male who presents with a chief complaint of altered mental status. Initial exam reveals an unwell appearing male who is unresponsive, tachycardic, on a nonrebreather. Afebrile. Physical exam remarkable for grossly altered neurologic exam, nonfocal but patient is not following commands, difficult to obtain full neuro exam.  Otherwise no obvious findings on physical exam.  Broad work-up initiated. Due to concern for grossly altered mental status I did intubate him for airway protection. This was performed without difficulty. Work-up revealed a lactate of 7, concerning for infection versus seizure. Broad-spectrum antibiotics initiated in case this is infection although clinical story does not suggest infection. Patient's creatinine is essentially at baseline at 1.6, troponin slightly elevated at 0.27, EKG nonischemic with no signs of STEMI. LFTs and lipase normal.  BNP elevated at 3000. White count normal.  Hemoglobin normal.  VBG with metabolic acidosis. Imaging revealed normal head CT, no signs of PE on CTPA but signs of bilateral possible pneumonia versus atelectasis. CT L-spine pending. Patient admitted to ICU on fentanyl and propofol for sedation. Propofol did briefly drop his blood pressure, being titrated for sedation at a very small dose. Patient's family updated. Admitted to ICU in stable but critical condition. Still with unclear etiology of patient's syncope. CRITICAL CARE TIME    Upon my evaluation, this patient had a critical illness or injury that acutely impaired one or more vital organ systems such that there was a high probability of imminent or life threatening deterioration without intervention. In this patient that illness with a high probability of imminent or life threatening deterioration was altered mental status, unresponsive, which required my direct attention, intervention, and personal management.      I have personally provided 55 minutes of critical care time exclusive of separately billed procedures and treating other patients. Critical care was necessary to treat or prevent imminent or life threatening deterioration of the following condition(s): Altered mental status and unresponsive requiring intubation. Critical care time was spent personally by me on the following activities: This critical care time included obtaining a history; examining the patient; pulse oximetry; ordering and review of studies including CXR, blood gases; arranging urgent treatment with development of a management plan; evaluation of patient's response to treatment; frequent reassessment; and, discussions with other providers including consultants. Clinical Impression     1. Altered mental status, unspecified altered mental status type        Disposition     PATIENT REFERRED TO:  No follow-up provider specified.     DISCHARGE MEDICATIONS:  New Prescriptions    No medications on file       DISPOSITION admit to ICU     Monet Gupta MD  04/20/22 [id number]

## 2022-04-20 NOTE — ED NOTES
Patient presents to the ED after \"passing out\" while using the toilet.  Patient awake but not oriented at this time     Rosmery Oliveira RN  04/20/22 [id number]

## 2022-04-20 NOTE — ED NOTES
Propofol restarted and Fentanyl gtt increased for pt comfort.  Pt sitting up in bed looking around     Angela Morgan RN  04/20/22 [id number]

## 2022-04-20 NOTE — ED NOTES
Patient intubated with 8.0 tube, 24 at the lips. +breath sounds and positive color change.      Augustine Hoffman RN  04/20/22 [id number]

## 2022-04-21 PROBLEM — E11.65 POORLY CONTROLLED DIABETES MELLITUS (HCC): Status: ACTIVE | Noted: 2022-04-21

## 2022-04-21 PROBLEM — Z79.899 POLYPHARMACY: Status: ACTIVE | Noted: 2022-04-21

## 2022-04-21 LAB
ANION GAP SERPL CALCULATED.3IONS-SCNC: 13 MMOL/L (ref 3–16)
BASE EXCESS ARTERIAL: 1.9 MMOL/L (ref -3–3)
BASE EXCESS ARTERIAL: 2.1 MMOL/L (ref -3–3)
BASOPHILS ABSOLUTE: 0 K/UL (ref 0–0.2)
BASOPHILS RELATIVE PERCENT: 0.4 %
BUN BLDV-MCNC: 30 MG/DL (ref 7–20)
CALCIUM SERPL-MCNC: 9 MG/DL (ref 8.3–10.6)
CARBOXYHEMOGLOBIN ARTERIAL: 0.7 % (ref 0–1.5)
CARBOXYHEMOGLOBIN ARTERIAL: 1.2 % (ref 0–1.5)
CHLORIDE BLD-SCNC: 99 MMOL/L (ref 99–110)
CO2: 21 MMOL/L (ref 21–32)
CREAT SERPL-MCNC: 1.5 MG/DL (ref 0.8–1.3)
EOSINOPHILS ABSOLUTE: 0.2 K/UL (ref 0–0.6)
EOSINOPHILS RELATIVE PERCENT: 3.9 %
ESTIMATED AVERAGE GLUCOSE: 165.7 MG/DL
GFR AFRICAN AMERICAN: 57
GFR NON-AFRICAN AMERICAN: 48
GLUCOSE BLD-MCNC: 171 MG/DL (ref 70–99)
GLUCOSE BLD-MCNC: 200 MG/DL (ref 70–99)
GLUCOSE BLD-MCNC: 207 MG/DL (ref 70–99)
GLUCOSE BLD-MCNC: 318 MG/DL (ref 70–99)
GLUCOSE BLD-MCNC: 343 MG/DL (ref 70–99)
HBA1C MFR BLD: 7.4 %
HCO3 ARTERIAL: 26 MMOL/L (ref 21–29)
HCO3 ARTERIAL: 27 MMOL/L (ref 21–29)
HCT VFR BLD CALC: 29.4 % (ref 40.5–52.5)
HEMOGLOBIN, ART, EXTENDED: 10.7 G/DL
HEMOGLOBIN, ART, EXTENDED: 9.5 G/DL
HEMOGLOBIN: 9.6 G/DL (ref 13.5–17.5)
LV EF: 53 %
LVEF MODALITY: NORMAL
LYMPHOCYTES ABSOLUTE: 0.3 K/UL (ref 1–5.1)
LYMPHOCYTES RELATIVE PERCENT: 6.3 %
MAGNESIUM: 1.4 MG/DL (ref 1.8–2.4)
MCH RBC QN AUTO: 31 PG (ref 26–34)
MCHC RBC AUTO-ENTMCNC: 32.6 G/DL (ref 31–36)
MCV RBC AUTO: 95 FL (ref 80–100)
METHEMOGLOBIN ARTERIAL: 0.1 % (ref 0–1.4)
METHEMOGLOBIN ARTERIAL: 0.2 % (ref 0–1.4)
MONOCYTES ABSOLUTE: 0.3 K/UL (ref 0–1.3)
MONOCYTES RELATIVE PERCENT: 6.9 %
NEUTROPHILS ABSOLUTE: 4.2 K/UL (ref 1.7–7.7)
NEUTROPHILS RELATIVE PERCENT: 82.5 %
O2 SAT, ARTERIAL: 99 % (ref 93–100)
O2 SAT, ARTERIAL: >100 % (ref 93–100)
PCO2 ARTERIAL: 37.6 MMHG (ref 35–45)
PCO2 ARTERIAL: 42.2 MMHG (ref 35–45)
PDW BLD-RTO: 16.9 % (ref 12.4–15.4)
PERFORMED ON: ABNORMAL
PH ARTERIAL: 7.41 (ref 7.35–7.45)
PH ARTERIAL: 7.45 (ref 7.35–7.45)
PLATELET # BLD: 179 K/UL (ref 135–450)
PMV BLD AUTO: 8 FL (ref 5–10.5)
PO2 ARTERIAL: 126 MMHG (ref 75–108)
PO2 ARTERIAL: 194 MMHG (ref 75–108)
POTASSIUM REFLEX MAGNESIUM: 4.2 MMOL/L (ref 3.5–5.1)
RBC # BLD: 3.1 M/UL (ref 4.2–5.9)
SODIUM BLD-SCNC: 133 MMOL/L (ref 136–145)
TCO2 ARTERIAL: 27 MMOL/L
TCO2 ARTERIAL: 28 MMOL/L
TROPONIN: 0.23 NG/ML
TROPONIN: 0.24 NG/ML
TROPONIN: 0.3 NG/ML
TSH REFLEX: 0.3 UIU/ML (ref 0.27–4.2)
VITAMIN B-12: 541 PG/ML (ref 211–911)
WBC # BLD: 5.1 K/UL (ref 4–11)

## 2022-04-21 PROCEDURE — 93306 TTE W/DOPPLER COMPLETE: CPT

## 2022-04-21 PROCEDURE — 99291 CRITICAL CARE FIRST HOUR: CPT | Performed by: INTERNAL MEDICINE

## 2022-04-21 PROCEDURE — 80048 BASIC METABOLIC PNL TOTAL CA: CPT

## 2022-04-21 PROCEDURE — 2580000003 HC RX 258

## 2022-04-21 PROCEDURE — 95819 EEG AWAKE AND ASLEEP: CPT

## 2022-04-21 PROCEDURE — 2000000000 HC ICU R&B

## 2022-04-21 PROCEDURE — 51798 US URINE CAPACITY MEASURE: CPT

## 2022-04-21 PROCEDURE — 6360000002 HC RX W HCPCS

## 2022-04-21 PROCEDURE — 6370000000 HC RX 637 (ALT 250 FOR IP): Performed by: STUDENT IN AN ORGANIZED HEALTH CARE EDUCATION/TRAINING PROGRAM

## 2022-04-21 PROCEDURE — 6360000002 HC RX W HCPCS: Performed by: STUDENT IN AN ORGANIZED HEALTH CARE EDUCATION/TRAINING PROGRAM

## 2022-04-21 PROCEDURE — 82803 BLOOD GASES ANY COMBINATION: CPT

## 2022-04-21 PROCEDURE — 84484 ASSAY OF TROPONIN QUANT: CPT

## 2022-04-21 PROCEDURE — 85025 COMPLETE CBC W/AUTO DIFF WBC: CPT

## 2022-04-21 PROCEDURE — 94761 N-INVAS EAR/PLS OXIMETRY MLT: CPT

## 2022-04-21 PROCEDURE — 94003 VENT MGMT INPAT SUBQ DAY: CPT

## 2022-04-21 PROCEDURE — 36415 COLL VENOUS BLD VENIPUNCTURE: CPT

## 2022-04-21 PROCEDURE — 6370000000 HC RX 637 (ALT 250 FOR IP)

## 2022-04-21 PROCEDURE — 2580000003 HC RX 258: Performed by: STUDENT IN AN ORGANIZED HEALTH CARE EDUCATION/TRAINING PROGRAM

## 2022-04-21 PROCEDURE — 51701 INSERT BLADDER CATHETER: CPT

## 2022-04-21 PROCEDURE — 83735 ASSAY OF MAGNESIUM: CPT

## 2022-04-21 PROCEDURE — 99223 1ST HOSP IP/OBS HIGH 75: CPT | Performed by: PSYCHIATRY & NEUROLOGY

## 2022-04-21 PROCEDURE — 2700000000 HC OXYGEN THERAPY PER DAY

## 2022-04-21 RX ORDER — FOLIC ACID 1 MG/1
1 TABLET ORAL DAILY
COMMUNITY

## 2022-04-21 RX ORDER — TAMSULOSIN HYDROCHLORIDE 0.4 MG/1
0.4 CAPSULE ORAL DAILY
COMMUNITY

## 2022-04-21 RX ORDER — SPIRONOLACTONE 25 MG/1
25 TABLET ORAL DAILY
Status: ON HOLD | COMMUNITY
End: 2022-04-25 | Stop reason: SDUPTHER

## 2022-04-21 RX ORDER — GABAPENTIN 100 MG/1
200 CAPSULE ORAL 2 TIMES DAILY
Status: ON HOLD | COMMUNITY
End: 2022-04-26 | Stop reason: HOSPADM

## 2022-04-21 RX ORDER — INSULIN LISPRO 100 [IU]/ML
0-6 INJECTION, SOLUTION INTRAVENOUS; SUBCUTANEOUS NIGHTLY
Status: DISCONTINUED | OUTPATIENT
Start: 2022-04-21 | End: 2022-04-27 | Stop reason: HOSPADM

## 2022-04-21 RX ORDER — FINASTERIDE 5 MG/1
5 TABLET, FILM COATED ORAL DAILY
COMMUNITY

## 2022-04-21 RX ORDER — HEPARIN SODIUM 5000 [USP'U]/ML
5000 INJECTION, SOLUTION INTRAVENOUS; SUBCUTANEOUS EVERY 8 HOURS SCHEDULED
Status: DISCONTINUED | OUTPATIENT
Start: 2022-04-21 | End: 2022-04-27 | Stop reason: HOSPADM

## 2022-04-21 RX ORDER — ATORVASTATIN CALCIUM 20 MG/1
20 TABLET, FILM COATED ORAL DAILY
COMMUNITY

## 2022-04-21 RX ORDER — ACETAMINOPHEN 500 MG
500 TABLET ORAL EVERY 6 HOURS PRN
COMMUNITY

## 2022-04-21 RX ORDER — LANOLIN ALCOHOL/MO/W.PET/CERES
3 CREAM (GRAM) TOPICAL NIGHTLY PRN
COMMUNITY

## 2022-04-21 RX ORDER — MAGNESIUM SULFATE IN WATER 40 MG/ML
2000 INJECTION, SOLUTION INTRAVENOUS ONCE
Status: COMPLETED | OUTPATIENT
Start: 2022-04-21 | End: 2022-04-21

## 2022-04-21 RX ORDER — AMOXICILLIN 250 MG
1 CAPSULE ORAL 2 TIMES DAILY
COMMUNITY

## 2022-04-21 RX ORDER — FERROUS SULFATE 325(65) MG
325 TABLET ORAL
COMMUNITY

## 2022-04-21 RX ORDER — GABAPENTIN 300 MG/1
300 CAPSULE ORAL 3 TIMES DAILY
COMMUNITY

## 2022-04-21 RX ORDER — INSULIN LISPRO 100 [IU]/ML
0-12 INJECTION, SOLUTION INTRAVENOUS; SUBCUTANEOUS
Status: DISCONTINUED | OUTPATIENT
Start: 2022-04-21 | End: 2022-04-27 | Stop reason: HOSPADM

## 2022-04-21 RX ORDER — AMITRIPTYLINE HYDROCHLORIDE 25 MG/1
25 TABLET, FILM COATED ORAL NIGHTLY
COMMUNITY

## 2022-04-21 RX ORDER — FUROSEMIDE 40 MG/1
40 TABLET ORAL DAILY
Status: ON HOLD | COMMUNITY
End: 2022-04-25 | Stop reason: SDUPTHER

## 2022-04-21 RX ORDER — PANTOPRAZOLE SODIUM 40 MG/1
40 TABLET, DELAYED RELEASE ORAL
COMMUNITY

## 2022-04-21 RX ADMIN — INSULIN LISPRO 4 UNITS: 100 INJECTION, SOLUTION INTRAVENOUS; SUBCUTANEOUS at 11:15

## 2022-04-21 RX ADMIN — HEPARIN SODIUM 5000 UNITS: 5000 INJECTION INTRAVENOUS; SUBCUTANEOUS at 16:12

## 2022-04-21 RX ADMIN — MAGNESIUM SULFATE HEPTAHYDRATE 2000 MG: 2 INJECTION, SOLUTION INTRAVENOUS at 11:59

## 2022-04-21 RX ADMIN — INSULIN LISPRO 2 UNITS: 100 INJECTION, SOLUTION INTRAVENOUS; SUBCUTANEOUS at 18:07

## 2022-04-21 RX ADMIN — Medication 100 MCG/HR: at 04:01

## 2022-04-21 RX ADMIN — LEVETIRACETAM 500 MG: 100 INJECTION, SOLUTION, CONCENTRATE INTRAVENOUS at 11:09

## 2022-04-21 RX ADMIN — ACETAMINOPHEN 650 MG: 325 TABLET ORAL at 21:35

## 2022-04-21 RX ADMIN — HEPARIN SODIUM 5000 UNITS: 5000 INJECTION INTRAVENOUS; SUBCUTANEOUS at 21:37

## 2022-04-21 RX ADMIN — INSULIN LISPRO 2 UNITS: 100 INJECTION, SOLUTION INTRAVENOUS; SUBCUTANEOUS at 21:43

## 2022-04-21 RX ADMIN — INSULIN LISPRO 8 UNITS: 100 INJECTION, SOLUTION INTRAVENOUS; SUBCUTANEOUS at 08:37

## 2022-04-21 RX ADMIN — SODIUM CHLORIDE, PRESERVATIVE FREE 10 ML: 5 INJECTION INTRAVENOUS at 08:54

## 2022-04-21 RX ADMIN — PROPOFOL 20 MCG/KG/MIN: 10 INJECTION, EMULSION INTRAVENOUS at 03:56

## 2022-04-21 RX ADMIN — LEVETIRACETAM 500 MG: 100 INJECTION, SOLUTION, CONCENTRATE INTRAVENOUS at 21:48

## 2022-04-21 RX ADMIN — PROPOFOL 20 MCG/KG/MIN: 10 INJECTION, EMULSION INTRAVENOUS at 11:10

## 2022-04-21 RX ADMIN — MAGNESIUM SULFATE HEPTAHYDRATE 2000 MG: 2 INJECTION, SOLUTION INTRAVENOUS at 08:55

## 2022-04-21 RX ADMIN — HEPARIN SODIUM 5000 UNITS: 5000 INJECTION INTRAVENOUS; SUBCUTANEOUS at 08:54

## 2022-04-21 RX ADMIN — INSULIN GLARGINE 20 UNITS: 100 INJECTION, SOLUTION SUBCUTANEOUS at 21:43

## 2022-04-21 RX ADMIN — SODIUM CHLORIDE, PRESERVATIVE FREE 10 ML: 5 INJECTION INTRAVENOUS at 21:38

## 2022-04-21 ASSESSMENT — PULMONARY FUNCTION TESTS
PIF_VALUE: 18
PIF_VALUE: 16
PIF_VALUE: 13
PIF_VALUE: 15
PIF_VALUE: 17
PIF_VALUE: 16
PIF_VALUE: 16
PIF_VALUE: 17
PIF_VALUE: 17
PIF_VALUE: 16
PIF_VALUE: 17
PIF_VALUE: 16
PIF_VALUE: 13
PIF_VALUE: 16
PIF_VALUE: 18
PIF_VALUE: 16
PIF_VALUE: 18
PIF_VALUE: 17
PIF_VALUE: 16
PIF_VALUE: 18
PIF_VALUE: 17
PIF_VALUE: 16
PIF_VALUE: 16
PIF_VALUE: 18
PIF_VALUE: 13
PIF_VALUE: 17
PIF_VALUE: 16

## 2022-04-21 ASSESSMENT — PAIN DESCRIPTION - ORIENTATION: ORIENTATION: LOWER

## 2022-04-21 ASSESSMENT — PAIN DESCRIPTION - DESCRIPTORS: DESCRIPTORS: SHARP

## 2022-04-21 ASSESSMENT — PAIN SCALES - GENERAL: PAINLEVEL_OUTOF10: 9

## 2022-04-21 ASSESSMENT — PAIN DESCRIPTION - LOCATION: LOCATION: BACK

## 2022-04-21 NOTE — CONSULTS
Neurology Consult Note  Reason for Consult: AMS, Loss of consciousness  Chief complaint: Loss of consciousness    History of Present Illness:  Patient is intubated and history was obtained from chart review and his wife. Yeni Gonzalez is a 64 y.o. male with PMH of T2DM c/b polyneuropathy and osteomyelitis s/p left BKA, CKD, CAD, HLD, HTN, and recent lumbar surgery who presents after loss of consciousness. Patient is s/p lumbar spine fusion surgery in February. He was initially in rehab, but has been home for the last several weeks. She states that he has been having intermittent bouts of confusion since the surgery. While in rehab, it was attributed to too much pain medication; she believes he is still on pain medicine. Patient's wife reports that postoperatively he has been unable to walk effectively and has been using a wheelchair rather than his prosthetic leg. He gets very shaky when he is on his feet and the wife reports that he is very cautious and anxious about falling. Last night, the patient needed to use restroom. He has a portable toilet located in the bedroom. His wife wheeled him alongside the portable toilet. She was helping him transfer to the toilet when he voiced that he thought he was going to fall in the gap between them. She let him back into the wheelchair and he said \"I don't feel right\" and then his eyes rolled back and he went limp. Family called EMS and brought their neighbor, who is a retired nurse, as they were not sure if he was breathing. They did not notice any rhythmic movements, tongue biting, fecal or urinary incontinence. Neighbor found that the patient still had a pulse and that he was breathing although not very well. He reportedly responded minimally tosternal rub. Per EMS, patient had agonal respirations and was placed on nonrebreather. In the emergency department, the patient was unresponsive except to sternal rub.   He was intubated for airway protection due to his mental status. Patient's wife reports that after intubation he did wake up at some point (she is unable to tell when this happened or about how long after the start of the episode this occurred). She states that he was able to recognize her and was gesturing that he felt like he was choking. The patient was put on propofol and fentanyl for sedation and was admitted to the ICU. There were no acute events overnight. Per nursing, patient was following commands when sedation was weaned. Patient has no prior for family history of seizures. Patient was recently seen at [facility] 4/10/22 for left hand and face numbness and tingling. Per report, MRI at that time showed some evidence of mild small vessel disease, but otherwise was negative. Of note, the patient has chronic contractures in his right upper extremity. 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 Per the patient's wife, he does not move the right arm very much. There was a plan for a surgical intervention after he had recovered from the back surgery. Update:  Discussed with pharmacy and wife confusion about his home meds. He appears to have been double dosing his gabapentin with both his home supply and the prescription from the rehab facility. He has also continued to be on norco for his back pain.     Medical History:  Past Medical History:   Diagnosis Date    Back pain     CAD (coronary artery disease)     CKD (chronic kidney disease) stage 4, GFR 15-29 ml/min ([facility])     Dr. Leyda Simeon    [facility]     Hyperlipidemia     Hypertension     MRSA (methicillin resistant staph aureus) culture positive 05/11/2017    foot     Past Surgical History:   Procedure Laterality Date    FOOT SURGERY Left 05/11/2017    LEG AMPUTATION BELOW KNEE      OTHER SURGICAL HISTORY Right 05/14/2017    RIGHT FOOT DEBRIDEMENT INCISION AND DRAINAGE, DELAYED PRIMARY     Medications Prior to Admission: spironolactone (ALDACTONE) 25 MG tablet, TAKE 1 TABLET BY MOUTH DAILY  warfarin (COUMADIN) 5 MG tablet, Take 1 tablet by mouth daily  spironolactone (ALDACTONE) 25 MG tablet, TAKE 1 TABLET BY MOUTH DAILY  losartan-hydrochlorothiazide (HYZAAR) 50-12.5 MG per tablet, TAKE 1 TABLET BY MOUTH DAILY  HYDROcodone-acetaminophen (NORCO) 5-325 MG per tablet, Take 1 tablet by mouth every 6 hours as needed for Pain . amitriptyline (ELAVIL) 25 MG tablet, Take 25 mg by mouth nightly  aspirin 81 MG EC tablet, Take 1 tablet by mouth daily  furosemide (LASIX) 40 MG tablet, TAKE 1 TABLET(40 MG) BY MOUTH DAILY  insulin lispro (HUMALOG) 100 UNIT/ML injection vial, Inject 5-15 Units into the skin 3 times daily (before meals) Sliding scale with meals and at HS  omeprazole (PRILOSEC) 20 MG capsule, Take 20 mg by mouth Daily   simvastatin (ZOCOR) 40 MG tablet, Take 40 mg by mouth nightly. insulin glargine (LANTUS) 100 UNIT/ML injection, Inject 40 Units into the skin nightly   Allergies   Allergen Reactions    Codeine Nausea And Vomiting     Family History   Problem Relation Age of Onset    Heart Disease Mother     High Blood Pressure Mother     Stroke Mother     Arthritis Mother     Diabetes Father     Arthritis Father     Diabetes Brother      Social History     Tobacco Use   Smoking Status Never Smoker   Smokeless Tobacco Never Used     Social History     Substance and Sexual Activity   Drug Use No     Social History     Substance and Sexual Activity   Alcohol Use No       ROS:  Constitutional- No weight loss or fevers  Eyes- No diplopia. No photophobia. Ears/nose/throat- No dysphagia. No Dysarthria  Cardiovascular- No palpitations. No chest pain  Respiratory- No dyspnea. No Cough  Gastrointestinal- No Abdominal pain. No Vomiting. Genitourinary- No incontinence. No urinary retention  Musculoskeletal- No myalgia. No arthralgia  Skin- No rash.  No easy bruising. Psychiatric- No depression. No anxiety  Endocrine- No diabetes. No thyroid issues. Hematologic- No bleeding difficulty. No fatigue  Neurologic- No weakness. No Headache. Exam:  Blood pressure 104/61, pulse 77, temperature 98.4 °F (36.9 °C), temperature source Bladder, resp. rate 20, height 6' (1.829 m), weight 286 lb 9.6 oz (130 kg), SpO2 100 %. Constitutional    Vital signs: BP, HR, and RR reviewed   General Intubated and sedated, obese; Left BKA; Eyes: unable to visualize fundi   Cardiovascular: pulses symmetric in all extremities. No peripheral edema. Psychiatric: cooperative with examination, no  psychotic behavior noted. Neurologic  Mental status:    Orientation sedated unable to assess   General fund of knowledge sedated unable to assess   Memory sedated unable to assess   Attention sedated unable to assess   Language sedated unable to assess   Comprehension sedated unable to assess; Per patient's nurse, was able to follow commands when sedation was weaned  Cranial nerves: sedated unable to assess  Strength: sedated unable to assess  Deep tendon reflexes: normal in all 4 extremities  Sensory: sedated unable to assess  Cerebellar/coordination: sedated unable to assess  Tone: Bilateral fingers contracted, Rt worse than left;  Gait: sedated unable to assess    Labs  Lactate: 7.0 ->2.3->1.1  pH: 4/20/22 7.289 @1632, [email or web address]  Troponin 0.27 -> 0.24    Studies  EKG: sinus tachycardia with RBBB, new from ECG in 2017; however appears consistent with ECG reports at [facility] in 12/2021    CT CHEST PULMONARY EMBOLISM W CONTRAST   Final Result      1. No evidence of acute or chronic pulmonary embolus   2. Bilateral lower lobe atelectasis or pneumonia   3. Coronary artery calcification   4. Hiatal hernia in addition to a dilated esophagus, presbyesophagus with no retained fluid or fluid levels         CT LUMBAR SPINE WO CONTRAST   Final Result      1. Intact hardware in satisfactory alignment     2. Posterior soft tissue. Correlation with MRI or ultrasound recommended to assess the presence or absence of fluid versus postoperative granulation tissue   3. Transitional vertebra, intervertebral spacer at L3-L4 with evidence of subarticular narrowing associated with facet arthropathy         CT HEAD WO CONTRAST   Final Result      1. No evidence of acute intracranial hemorrhage or mass effect. 2.  Mild chronic small vessel ischemic disease. XR CHEST PORTABLE   Final Result      1. Endotracheal tube at the thoracic inlet. Recommend repositioning/advancement and repeat radiograph. 2.  Bilateral perihilar predominant opacities may reflect edema and/or pneumonia. Scheduled Medications   heparin (porcine)  5,000 Units SubCUTAneous 3 times per day    magnesium sulfate  2,000 mg IntraVENous Once    insulin lispro  0-12 Units SubCUTAneous TID WC    insulin lispro  0-6 Units SubCUTAneous Nightly    sodium chloride flush  5-40 mL IntraVENous 2 times per day    [Held by provider] amitriptyline  25 mg Oral Nightly    [Held by provider] aspirin  81 mg Oral Daily    insulin glargine  20 Units SubCUTAneous Nightly    [Held by provider] losartan-hydroCHLOROthiazide  1 tablet Oral Daily    [Held by provider] atorvastatin  10 mg Oral Daily    [Held by provider] spironolactone  25 mg Oral Daily    levetiracetam  500 mg IntraVENous Q12H       Impression:  Nessa Victoria is a 64 y.o. male who presented with a loss of consciousness. This does not appear entirely consistent with a seizure; however, the duration of the loss of consciousness and the effect on his mental status to the point where he required mechanical ventilation are not indicative of a transient syncopal episode (vasovagal). . Patient did have a lactic acidosis: he has not been hypotensive (except with initiation of propofol), but CK is negative. EKG unremarkable and troponin not impressive for cardiac cause.  Stroke/TIA less likely given negative CT (would expect to see injury given the presentation). Possible that this is all polypharmacy, but need to rule out underlying neurologic cause. Recommendations:  - MRI Brain  - Q2H neuro checks  - Possible extubation later today. If it occurs, we will re-evaluate  - Continue keppra for now  - Routine EEG;       A copy of this note was provided for Dr Lianne Chairez MD. Assessment and planning including emergent interventions necessary were discussed with ROSIBEL Montesinos at 08:45. Keila Valentin MD  PGY-2 Internal medicine  04/21/22     Patient will be discussed with attending, Dr. Azul Thomas MD       Neurology and Neurocritical care  [facility] Neurology line: [phone]  [employer]: [facility] Neurology & Neurocritical Care NPs    I spent 45 minutes in the care of this patient. Over 50% of that time was in face-to-face counseling regarding disease process, diagnostic testing, preventative measures, and answering patient and family questions.

## 2022-04-21 NOTE — CONSULTS
ICU Consult       Hospital Day: 1  ICU Day: 1                                                         Code:Full Code  Admit Date: 4/20/2022  PCP: Reza Chaparro                                  CC: AMS    HISTORY OF PRESENT ILLNESS:   Patient is a 63 yo male  PMHx of lumbar pain, T2DM, CAD, T2DM, HLD, HTN, lumbar spine fusion surgery back in February     presents with AMS. He has been home for 1 month with brief moments of intermittent confusion. Today, he told his wife, \"I don't think I'm doing this right\" and his eyes rolled back and he fell to the ground and has been unresponsive since. He did not have any noted seizure activity. He had agonal respirations per EMS. Patient was placed on NRB when arrived to the ED and patient was mumbling what had happened, but was not having purposeful movement or following commands. He was intubated for airway protection.      Upon arrival to the ED, he was tachycardic and he became hypotensive when propofol was given. CT head indicated no acute infarct. CTPA shows no PE with bilateral atelectasis. CT of L spine pending. Lumbar spine did not appear to be abnormal on exam. Lactate elevated at 7. Troponin elevated at 0.27. Cr elevated at 1.6 with baseline of 2.0. EKG nonischemic with no signs of STEMI. BNP elevated at 1551. RMT with metabolic acidosis.          Patient will be admitted to the ICU for vent management and neurological work up.      PAST HISTORY:     Past Medical History:   Diagnosis Date    Back pain     CAD (coronary artery disease)     CKD (chronic kidney disease) stage 4, GFR 15-29 ml/min ([facility])     Dr. Freida Buenrostro    [facility]     Hyperlipidemia     Hypertension     MRSA (methicillin resistant staph aureus) culture positive 05/11/2017    foot       Past Surgical History:   Procedure Laterality Date    FOOT SURGERY Left 05/11/2017    LEG AMPUTATION BELOW KNEE      OTHER SURGICAL HISTORY Right 05/14/2017    RIGHT FOOT DEBRIDEMENT INCISION AND DRAINAGE, DELAYED PRIMARY       SocialHistory:   The patient lives at home     Alcohol:  Illicit drugs: no use  Tobacco:  Per wife, patient smokes    Family History:  Family History   Problem Relation Age of Onset    Heart Disease Mother     High Blood Pressure Mother     Stroke Mother     Arthritis Mother     Diabetes Father     Arthritis Father     Diabetes Brother        MEDICATIONS:     No current facility-administered medications on file prior to encounter. Current Outpatient Medications on File Prior to Encounter   Medication Sig Dispense Refill    spironolactone (ALDACTONE) 25 MG tablet TAKE 1 TABLET BY MOUTH DAILY 90 tablet 1    warfarin (COUMADIN) 5 MG tablet Take 1 tablet by mouth daily 7 tablet 0    spironolactone (ALDACTONE) 25 MG tablet TAKE 1 TABLET BY MOUTH DAILY 90 tablet 1    losartan-hydrochlorothiazide (HYZAAR) 50-12.5 MG per tablet TAKE 1 TABLET BY MOUTH DAILY 30 tablet 0    HYDROcodone-acetaminophen (NORCO) 5-325 MG per tablet Take 1 tablet by mouth every 6 hours as needed for Pain .  amitriptyline (ELAVIL) 25 MG tablet Take 25 mg by mouth nightly      aspirin 81 MG EC tablet Take 1 tablet by mouth daily 30 tablet 3    furosemide (LASIX) 40 MG tablet TAKE 1 TABLET(40 MG) BY MOUTH DAILY      insulin lispro (HUMALOG) 100 UNIT/ML injection vial Inject 5-15 Units into the skin 3 times daily (before meals) Sliding scale with meals and at HS      omeprazole (PRILOSEC) 20 MG capsule Take 20 mg by mouth Daily       simvastatin (ZOCOR) 40 MG tablet Take 40 mg by mouth nightly.         insulin glargine (LANTUS) 100 UNIT/ML injection Inject 40 Units into the skin nightly            Scheduled Meds:   sodium chloride flush  5-40 mL IntraVENous 2 times per day    enoxaparin  30 mg SubCUTAneous BID    insulin lispro  0-6 Units SubCUTAneous TID WC    insulin lispro  0-3 Units SubCUTAneous Nightly    [Held by provider] amitriptyline  25 mg Oral Nightly    [Held by provider] aspirin  81 mg Oral Daily    insulin glargine  20 Units SubCUTAneous Nightly    [Held by provider] losartan-hydroCHLOROthiazide  1 tablet Oral Daily    [Held by provider] atorvastatin  10 mg Oral Daily    [Held by provider] spironolactone  25 mg Oral Daily    levetiracetam  500 mg IntraVENous Q12H      Continuous Infusions:   fentaNYL 100 mcg/hr (04/21/22 0401)    sodium chloride      dextrose      sodium chloride 100 mL/hr at 04/20/22 2224    propofol 20 mcg/kg/min (04/21/22 0356)     PRN Meds:sodium chloride flush, sodium chloride, ondansetron **OR** ondansetron, polyethylene glycol, acetaminophen **OR** acetaminophen, glucagon (rDNA), dextrose, glucose, dextrose bolus (hypoglycemia) **OR** dextrose bolus (hypoglycemia), labetalol    Allergies: Allergies   Allergen Reactions    Codeine Nausea And Vomiting       REVIEW OF SYSTEMS:       History obtained from chart review    Review of Systems   Unable to perform ROS: Acuity of condition       PHYSICAL EXAM:       Vitals: BP (!) 94/54   Pulse 73   Temp 98.1 °F (36.7 °C) (Bladder)   Resp 20   Ht 6' (1.829 m)   Wt 286 lb 9.6 oz (130 kg)   SpO2 100%   BMI 38.87 kg/m²     I/O:      Intake/Output Summary (Last 24 hours) at 4/21/2022 0656  Last data filed at 4/21/2022 0600  Gross per 24 hour   Intake 1049.97 ml   Output 700 ml   Net 349.97 ml     I/O this shift:  In: 1050 [IV Piggyback:1050]  Out: 700 [Urine:700]  No intake/output data recorded. Physical Examination:     Physical Exam  Constitutional:       Comments: mechanically ventilated    HENT:      Head: Normocephalic and atraumatic. Eyes:      Pupils: Pupils are equal, round, and reactive to light. Cardiovascular:      Rate and Rhythm: Normal rate and regular rhythm. Heart sounds: Normal heart sounds. Pulmonary:      Breath sounds: Wheezing present. Abdominal:      Palpations: Abdomen is soft. Musculoskeletal:      Right lower leg: Edema present.       Left lower leg: Edema present. Neurological:      Comments: Alert to voice             Access:                                           Vent Settings: Vent Mode: PRVC Resp Rate (Set): 20 bmp/Vt (Set, mL): 500 mL/ /FiO2 : 55 %    Recent Labs     04/20/22  2312   PHART 7.415   [id number] 37.2   PO2ART 279.0*           DATA:       Labs:  CBC:   Recent Labs     04/20/22 1627   WBC 7.6   HGB 11.1*   HCT 34.7*          BMP:   Recent Labs     04/20/22 1627      K 4.2   CL 97*   CO2 21   BUN 28*   CREATININE 1.6*   GLUCOSE 353*     LFT's:   Recent Labs     04/20/22 1627   AST 15   ALT 16   BILITOT 0.3   ALKPHOS 194*     Troponin:   Recent Labs     04/20/22 1627 04/20/22 2312   TROPONINI 0.27* 0.24*     BNP:No results for input(s): BNP in the last 72 hours. ABGs:   Recent Labs     04/20/22 2312   PHART 7.415   [id number] 37.2   PO2ART 279.0*     INR:   Recent Labs     04/20/22 1627   INR 1.08       U/A:No results for input(s): NITRITE, COLORU, PHUR, LABCAST, WBCUA, RBCUA, MUCUS, TRICHOMONAS, YEAST, BACTERIA, CLARITYU, SPECGRAV, LEUKOCYTESUR, UROBILINOGEN, BILIRUBINUR, BLOODU, GLUCOSEU, AMORPHOUS in the last 72 hours. Invalid input(s): KETONESU    CT CHEST PULMONARY EMBOLISM W CONTRAST   Final Result      1. No evidence of acute or chronic pulmonary embolus   2. Bilateral lower lobe atelectasis or pneumonia   3. Coronary artery calcification   4. Hiatal hernia in addition to a dilated esophagus, presbyesophagus with no retained fluid or fluid levels         CT LUMBAR SPINE WO CONTRAST   Final Result      1. Intact hardware in satisfactory alignment     2. Posterior soft tissue. Correlation with MRI or ultrasound recommended to assess the presence or absence of fluid versus postoperative granulation tissue   3. Transitional vertebra, intervertebral spacer at L3-L4 with evidence of subarticular narrowing associated with facet arthropathy         CT HEAD WO CONTRAST   Final Result      1.   No evidence of acute intracranial hemorrhage or mass effect. 2.  Mild chronic small vessel ischemic disease. XR CHEST PORTABLE   Final Result      1. Endotracheal tube at the thoracic inlet. Recommend repositioning/advancement and repeat radiograph. 2.  Bilateral perihilar predominant opacities may reflect edema and/or pneumonia. EKG:   Echo:  Micro:     ASSESSMENT AND PLAN:   AMS 2/2 Possible Seizure  CT head neg for any acute abnormalities. Patient had sudden onset of AMS after a fall witnessed by his wife. No seizure like movement was seen by his partner. MRI on 4/10 at [facility] showed Mild small vessel ischemia and small remote lacunar infarcts. Lactic acid 7 on admission. Patient has had poor PO intake recently. Patient also had shaking since his back surgery. After his fall he did not show any post ictal symptoms.      -Neuro consulted - recs appreciated   - consider MRI head no contrast    - Intubated for airway protection. - Currently on Fentanyl/Propofol gtt   - TSH/Ammonia/CK - normal    - /h - stopped this AM   - Keppra loaded 1000mg => maintenance 500 BID      CKD 3B/4  - Avoid nephrotoxins   - Cr 1.6 on admission  - Cr stable near baseline of 2.      Anion Gap Metabolic Acidosis   - AG on admission 21.   - Lactic acid on admission was 7.   - VBG 7.289/46 on admission    - Normal saline 100/h   -Trend Q6 lactic acid - trend is now d/c'ed as lactic acid has normalized      CAD  Elevated Troponin  ProBNP 3k - Echo 6 months ago showed EF 55-60%. Stress test neg for ischemia. EKG showed no STEMI.    Troponin trend: .27 - .24 - .23  Likely demand ischemia from poor PO intake.       HTN  -Currently held home medications since patient intubated  -Add PRN labetalol      HLD  -Held statin as patient is intubated           Code Status:Full Code  FEN: NPO  PPX:  Heparin SQ  DISPO: ICU     This patient has been staffed and discussed with Dr. Tori Todd MD, PGY-1  4/21/2022  6:56 AM    Pulm/CC    Patient seen and examined. I agree with Dr. Romero Beltran history, physical, lab findings, assessment and plan. Patient presented to ER after an episode where his eyes rolled back in his head and he lost consciousness. No seizure activity was noted. He had a prolonged phase of unconsciousness and was intubated in the ED for airway protection. Lactate was noted to be elevated at 7 but quickly cleared. Head CT unremarkable. Etiology is unclear and while seizure is in the differential this is not definitively what happened. Polypharmacy also in the differential diagnosis. Syncope much less likely given prolonged period of unconsciousness    Javi Ortega is currently off sedation and awake and follows command  No seizures here  He passed his SBT and was extubated  Echo shows no significant abnormality  Continue Keppra  MRI brain  Routine EEG  Appreciate neurology assistance    Pt has a high probability of imminent or life-threatening deterioration requiring close monitoring, and highly complex decision-making and/or interventions of high intensity to assess, manipulate, and support his critical organ systems to prevent a likely inevitable decline which could occur if left untreated. A total critical care time 33 minutes was used. This includes but not limited to examining patient, collaborating with other physicians, monitoring vital signs, telemetry, continuous pulse oximetry, and clinical response to IV medications, documentation time, review and interpretation of laboratory and radiological data, review of nursing notes and old record review. This time excludes any time that may have been spent performing procedures for life threatening organ failure.     Jason Barney MD

## 2022-04-21 NOTE — PROGRESS NOTES
Clinical Pharmacy Progress Note  Medication History     Admit Date: 4/20/2022    List of of current medications patient is taking is complete. Home Medication list in EPIC updated to reflect changes noted below. Source of information: Chart Review, Dispense History, Conversation with Wife and Daughter (via phone)    Patient's home pharmacy: [id number] ([phone])    Changes made to medication list:   Medications removed: (include reason, ex: therapy completed, inactive medication)   Amitriptyline 25mg (wife states that she does not believe pt is taking)   Aspirin 81mg QDAY   Lasix 40mg QDAY   Spirolactone (see below)   Warfarin (see below)  Medications added:    Pantoprazole   Finesteride    Tamsulosin   Atorvastain    Tylenol   Multivitte   Melatonin   Senna- Plus   Omeprazole   Ferrous Sulfate   Gabapentin (2 different RXs)  Medication doses adjusted:    Lantus (see below)  Other notes:    Pts wife had photos of medications on her phone but after discussion I discovered that this was not a complete list of what he was taking at home.  Patients wife is unaware of what dose of insulin patient is taking at home but believes that had most recently been taking 21 units of Lantus QHS. She is unaware of Humalog dose.  Patient is taking duplicates of several medications/ drug classes due to taking medications from NH and home bottles, these include:  o Gabapentin (200mg BID and 300mg TID)  o Omeprazole and Pantoprazole  o Simvastatin and Atorvastain   Patient has recent fills for spirolactone, Lasix and amitriptyline but family is unsure if he is taking these.  Patient is on Norco 10mg QID at home. 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  Office visit on 3/29/22 lists Eliquis 2.5mg BID and this was filled at outpatient pharmacy. Daughter states that the patient has a history of DVTs and she believes that he is to be on blood thinners but is unsure if he is taking them at home. I cannot confirm if patient is taking any anticoagulation at home. Please call with any questions. Lázaro Yeung, PharmD  PGY-1 Pharmacy Resident  [id number]  4/21/2022 11:19 AM    Addendum 4/21/2022 2:57 PM   Patient's wife brought in pictures of several more medications. Changes to the medication list are documented below:  Medications that were added:  · Amitriptyline 25 mg nightly  · Folic acid 1 mg daily  · Furosemide 40 mg daily  · Spironolactone 25 mg daily  Medications that were changed  · Norco 5/325 mg q6h PRN - changed to Norco 10/325 mg q6h PRN    No current facility-administered medications on file prior to encounter. Current Outpatient Medications on File Prior to Encounter   Medication Sig Dispense Refill    pantoprazole sodium (PROTONIX) 40 MG PACK packet Take 40 mg by mouth every morning (before breakfast)      finasteride (PROSCAR) 5 MG tablet Take 5 mg by mouth daily      atorvastatin (LIPITOR) 20 MG tablet Take 20 mg by mouth daily      tamsulosin (FLOMAX) 0.4 MG capsule Take 0.4 mg by mouth daily      acetaminophen (TYLENOL) 500 MG tablet Take 500 mg by mouth every 6 hours as needed for Pain      Multiple Vitamins-Minerals (MULTI COMPLETE PO) Take 1 tablet by mouth daily      melatonin 3 MG TABS tablet Take 3 mg by mouth nightly as needed      senna-docusate (PERICOLACE) 8.6-50 MG per tablet Take 1 tablet by mouth 2 times daily      ferrous sulfate (IRON 325) 325 (65 Fe) MG tablet Take 325 mg by mouth daily (with breakfast)      gabapentin (NEURONTIN) 300 MG capsule Take 300 mg by mouth 3 times daily.  gabapentin (NEURONTIN) 100 MG capsule Take 200 mg by mouth 2 times daily.       losartan-hydrochlorothiazide (HYZAAR) 50-12.5 MG per tablet TAKE 1 TABLET BY MOUTH DAILY 30 tablet 0    HYDROcodone-acetaminophen (NORCO) 5-325 MG per tablet Take 1 tablet by mouth every 6 hours as needed for Pain .  insulin lispro (HUMALOG) 100 UNIT/ML injection vial Inject 5-15 Units into the skin 3 times daily (before meals) Sliding scale with meals and at HS      omeprazole (PRILOSEC) 20 MG capsule Take 40 mg by mouth Daily       simvastatin (ZOCOR) 40 MG tablet Take 40 mg by mouth nightly.         insulin glargine (LANTUS) 100 UNIT/ML injection Inject 21 Units into the skin nightly

## 2022-04-21 NOTE — PROGRESS NOTES
Patients wife showed this nurse pictures of additional meds the patient is prescribed that she found in a bag. Notified pharmacy to review. Spouse states she sets up the patients meds but did not yesterday. Unsure if patient had any medications yesterday prior to admit. Also states that she does not go off of med list but just pulls meds out of the boxes from the rehab he had been in. Unsure about the meds in pill bottles.

## 2022-04-21 NOTE — CONSULTS
[address] [id number] Continence Nurse  Consult Note       NAME:  Marco A Manrique  MEDICAL RECORD NUMBER:  [id number]  AGE: 64 y.o. GENDER: male  : 1960  TODAY'S DATE:  2022    Subjective   Reason for WOCN Evaluation and Assessment: R Lateral Foot - Diabetic      Marco A Manrique is a 64 y.o. male referred by:   [] Physician  [x] Nursing  [] Other:     Wound Identification:  Wound Type: diabetic  Contributing Factors: diabetes, chronic pressure, decreased mobility and shear force    Wound History: Patient is a 65 yo male with a PMHx of lumbar pain, T2DM, CAD, T2DM, HLD, HTN, lumbar spine fusion surgery back in February, who presents with AMS. He has been home for 1 month with brief moments of intermittent confusion. Today, he told his wife, \"I don't think I'm doing this right\" and his eyes rolled back and he fell to the ground and has been unresponsive since. He did not have any noted seizure activity. He had agonal respirations per EMS. Patient was placed on NRB when arrived to the ED and patient was mumbling what had happened, but was not having purposeful movement or following commands. He was intubated for airway protection. Upon arrival to the ED, he was tachycardic and he became hypotensive when propofol was given. CT head indicated no acute infarct. CTPA shows no PE with bilateral atelectasis. CT of L spine pending. Lumbar spine did not appear to be abnormal on exam. Lactate elevated at 7. Troponin elevated at 0.27. Cr elevated at 1.6 with baseline of 2.0. EKG nonischemic with no signs of STEMI. BNP elevated at 0693. WHJ with metabolic acidosis.   Patient will be admitted to the ICU for vent management and neurological work up    Current Wound Care Treatment:  R Lateral Foot - betadine, silicone border    Patient Goal of Care:  [x] Wound Healing  [] Odor Control  [] Palliative Care  [] Pain Control   [] Other:         PAST MEDICAL HISTORY        Diagnosis Date    Back pain     CAD (coronary artery disease)     CKD (chronic kidney disease) stage 4, GFR 15-29 ml/min [facility]     Dr. Sheila Espinosa    Diabetes mellitus ([facility]     Hyperlipidemia     Hypertension     MRSA (methicillin resistant staph aureus) culture positive 05/11/2017    foot       PAST SURGICAL HISTORY    Past Surgical History:   Procedure Laterality Date    FOOT SURGERY Left 05/11/2017    LEG AMPUTATION BELOW KNEE      OTHER SURGICAL HISTORY Right 05/14/2017    RIGHT FOOT DEBRIDEMENT INCISION AND DRAINAGE, DELAYED PRIMARY       FAMILY HISTORY    Family History   Problem Relation Age of Onset    Heart Disease Mother     High Blood Pressure Mother     Stroke Mother     Arthritis Mother     Diabetes Father     Arthritis Father     Diabetes Brother        SOCIAL HISTORY    Social History     Tobacco Use    Smoking status: Never Smoker    Smokeless tobacco: Never Used   Vaping Use    Vaping Use: Never used   Substance Use Topics    Alcohol use: No    Drug use: No       ALLERGIES    Allergies   Allergen Reactions    Codeine Nausea And Vomiting       MEDICATIONS    No current facility-administered medications on file prior to encounter. Current Outpatient Medications on File Prior to Encounter   Medication Sig Dispense Refill    losartan-hydrochlorothiazide (HYZAAR) 50-12.5 MG per tablet TAKE 1 TABLET BY MOUTH DAILY 30 tablet 0    HYDROcodone-acetaminophen (NORCO) 5-325 MG per tablet Take 1 tablet by mouth every 6 hours as needed for Pain .  insulin lispro (HUMALOG) 100 UNIT/ML injection vial Inject 5-15 Units into the skin 3 times daily (before meals) Sliding scale with meals and at HS      omeprazole (PRILOSEC) 20 MG capsule Take 40 mg by mouth Daily       simvastatin (ZOCOR) 40 MG tablet Take 40 mg by mouth nightly.         insulin glargine (LANTUS) 100 UNIT/ML injection Inject 21 Units into the skin nightly          Objective    BP (!) 102/56   Pulse 79   Temp 98.4 °F (36.9 °C) (Bladder)   Resp 13   Ht 6' (1.829 m) Wt 286 lb 9.6 oz (130 kg)   SpO2 100%   BMI 38.87 kg/m²     LABS:  WBC:    Lab Results   Component Value Date    WBC 5.1 04/21/2022     H/H:    Lab Results   Component Value Date    HGB 9.6 04/21/2022    HCT 29.4 04/21/2022     PTT:    Lab Results   Component Value Date    APTT 28.7 10/07/2011   [APTT}  PT/INR:    Lab Results   Component Value Date    PROTIME 12.2 04/20/2022    INR 1.08 04/20/2022     HgBA1c:    Lab Results   Component Value Date    LABA1C 7.4 04/20/2022       Assessment: R Lateral Foot - small ulceration with pink wound bed, evidence wound healing and previously larger. Periwound dry and intact, scarring   Stephen Risk Score: Stephen Scale Score: 9    Patient Active Problem List   Diagnosis Code    DVT (deep venous thrombosis) ([facility]) I82.409    Osteomyelitis of left foot ([facility]) M86.9    S/P unilateral BKA (below knee amputation) ([facility] Z89.519    HUNTER (acute kidney injury) ([facility] N17.9    HTN (hypertension) I10    DM (diabetes mellitus) ([facility] E11.9    HLD (hyperlipidemia) E78.5    Back pain M54.9    Diabetic foot infection ([facility] E11.628, L08.9    Osteomyelitis of foot ([facility] M86.9    Sepsis ([facility] A41.9    CKD (chronic kidney disease) stage 3, GFR 30-59 ml/min ([facility]) N18.30    Bacterial infection due to streptococcus, group A B95.0    Fever R50.9    Leukocytosis D72.829    Streptococcal bacteremia R78.81, B95.5    Acute encephalopathy G93.40       Measurements:  Wound 04/21/22 Foot Right;Lateral (Active)   Wound Image   04/21/22 1156   Wound Etiology Diabetic 04/21/22 1156   Dressing Status Dry; Intact; New dressing applied 04/21/22 1156   Wound Cleansed Not Cleansed 04/21/22 1156   Dressing/Treatment Betadine swabs/povidone iodine;Silicone border 94/10/29 1156   Offloading for Diabetic Foot Ulcers Offloading boot 04/21/22 1156   Dressing Change Due 04/22/22 04/21/22 1156   Wound Length (cm) 1 cm 04/21/22 1156   Wound Width (cm) 1.5 cm 04/21/22 1156   Wound Depth (cm) 0.1 cm 04/21/22 1156 Wound Surface Area (cm^2) 1.5 cm^2 04/21/22 1156   Wound Volume (cm^3) 0.15 cm^3 04/21/22 1156   Wound Assessment Dry;Pink/red 04/21/22 1156   Drainage Amount None 04/21/22 1156   Odor None 04/21/22 1156   Nelida-wound Assessment Blanchable erythema;Dry/flaky 04/21/22 1156   Margins Attached edges; Defined edges 04/21/22 1156   Wound Thickness Description not for Pressure Injury Partial thickness 04/21/22 1156   Number of days: 0   R Lateral Foot:      Response to treatment:  Well tolerated by patient.      Pain Assessment:  Severity:  0 / 10  Quality of pain: N/A  Wound Pain Timing/Severity: none  Premedicated: No    Plan   Plan of Care: Wound 04/21/22 Foot Right;Lateral-Dressing/Treatment: Betadine swabs/povidone iodine,Silicone border   Recommendation: R Lateral Foot - clean with NS, dry, paint with betadine, silicone border dressing, change daily  Offloading boot while in bed  Reposition pt every 2 hours  Call Wound Care for deterioration [phone]    Specialty Bed Required : No   [] Low Air Loss   [] Pressure Redistribution  [] Fluid Immersion  [] Bariatric  [] Total Pressure Relief  [] Other:     Current Diet: Diet NPO  Dietician consult:  No    Discharge Plan:  Placement for patient upon discharge: skilled nursing    Patient appropriate for Outpatient [address]: Yes    Referrals:  [x]   [] [facility]  [] Supplies  [] Other    Patient/Caregiver Teaching:  Level of patient/caregiver understanding able to:   [] Indicates understanding       [] Needs reinforcement  [] Unsuccessful      [] Verbal Understanding  [] Demonstrated understanding       [] No evidence of learning  [] Refused teaching         [] N/A       Electronically signed by Micheline Real RN, CWOCN on 4/21/2022 at 11:57 AM

## 2022-04-21 NOTE — CARE COORDINATION
Case Management Assessment           Initial Evaluation                Date / Time of Evaluation: 4/21/2022 6:13 PM                 Assessment Completed by: Mary Carmen Krause RN    Patient Name: Renard Homans     YOB: 1960  Diagnosis: Acute encephalopathy [G93.40]  Altered mental status, unspecified altered mental status type [R41.82]     Date / Time: 4/20/2022  3:59 PM    Patient Admission Status: Inpatient    If patient is discharged prior to next notation, then this note serves as note for discharge by case management. Current PCP: [address] Patient: No    Chart Reviewed: Yes  Patient/ Family Interviewed: Yes    Initial assessment completed at bedside with: wife, daughter by side    Hospitalization in the last 30 days: No    Emergency Contacts:  Extended Emergency Contact Information  Primary Emergency Contact: Victoria Meyer  Address:  [address] Phone: [phone]  Work Phone: [phone]  Relation: Spouse  Secondary Emergency Contact: [address] Phone: [phone]  Relation: Child    Advance Directives:   Code Status: Full [address]: Yes  Agent: Chayo mcneal agent [phone]  2nd agent -wife- Delta See [phone]       Copy present: Yes     In paper Chart: No    Scanned into EMR No    Financial  Payor: Callum Camacho / Plan: BCBS - [place] PPO / Product Type: *No Product type* /     Pre-cert required for SNF: Yes    Pharmacy    [place] [address] [address], [address] - F [phone]  [facility] [address] [facility] [address]  Phone: [phone] Fax: [phone]      Potential assistance Purchasing Medications:    Does Patient want to participate in local refill/ meds to beds program?:      Meds To Beds General Rules:  1. Can ONLY be done Monday- Friday between 8:30am-5pm  2.  Prescription(s) must be in pharmacy by 3pm to be filled same day  3. Copy of patient's insurance/ prescription drug card and patient face sheet must be sent along with the prescription(s)  4. Cost of Rx cannot be added to hospital bill. If financial assistance is needed, please contact unit  or ;  or  CANNOT provide pharmacy voucher for patients co-pays  5. Patients can then  the prescription on their way out of the hospital at discharge, or pharmacy can deliver to the bedside if staff is available. (payment due at time of pick-up or delivery - cash, check, or card accepted)     Able to afford home medications/ co-pay costs: Yes    ADLS  Support Systems:      PT AM-PAC:   /24  OT AM-PAC:   /24    [place]: lives with wife  Steps: 2    Plans to RETURN to current housing: Yes  Barriers to RETURNING to current housing: back pain-post back surgery, medical complications    Alber Alvarez 78  Currently ACTIVE with [address]: Yes  [address]: unknown- wife says that patient was at [facility], then SURGERY [facility] for therapy post back surgery and they set up [address] [place] [address]:  Disposition: Home with [address]: TBD- cannot remember [address] for discharge: family     Factors facilitating achievement of predicted outcomes: Family support, Friend support and Cooperative    Barriers to discharge: Medical complications    Additional Case Management Notes: Patient was at home recuperating post back suregery. He had been discharged from [facility] and starting home care. Patient was brought directly from home to [facility] ED. Wife and daughter work and cannot be home with him. Patient is on ST disability from work for back surgery.      The Plan for Transition of Care is related to the following treatment goals of Acute encephalopathy [G93.40]  Altered mental status, unspecified altered mental status type [R41.82]    The Patient and/or patient representative Liane Catalan and his family were provided with a choice of provider and agrees with the discharge plan Yes    Freedom of choice list was provided with basic dialogue that supports the patient's individualized plan of care/goals and shares the quality data associated with the providers.  Yes    Care Transition patient: No    Angie Plummer RN  [facility]  Case Management Department  Ph: [phone]

## 2022-04-21 NOTE — PLAN OF CARE
Problem: Nutrition Deficit:  Goal: Optimize nutritional status  Outcome: Progressing  Note: Nutrition Problem #1: Inadequate oral intake  Intervention: Food and/or Nutrient Delivery: Continue NPO  Nutritional   pt will tolerate the most appropriate form of nutrition to meet >75% energy needs throughout adm

## 2022-04-21 NOTE — PROGRESS NOTES
Patient has been successfully weaned from Mechanical Ventilation. RSBI before extubation was 27 with EtCO2 of 27 and SpO2 of 100 on 35% FiO2. Patient extubated and placed on 4% O2 via nasal cannula. Post extubation SpO2 is 100% with HR  99 bpm and RR 18 breaths/min. Patient had strong cough that was productive of yellow sputum. Extubation Well tolerated by patient. . No Stridor present post extubation

## 2022-04-21 NOTE — PROGRESS NOTES
4 Eyes Admission Assessment     I agree as the admission nurse that 2 RN's have performed a thorough Head to Toe Skin Assessment on the patient. ALL assessment sites listed below have been assessed on admission. Areas assessed by both nurses: yes  [x]   Head, Face, and Ears   [x]   Shoulders, Back, and Chest  [x]   Arms, Elbows, and Hands   [x]   Coccyx, Sacrum, and Ischium  [x]   Legs, Feet, and Heels        Does the Patient have Skin Breakdown? Yes a wound was noted on the Admission Assessment and an LDA was Initiated documentation include the Nelida-wound, Wound Assessment, Measurements, Dressing Treatment, Drainage, and Color\", Patient noted with scattered bruising and DTI to sacrum. Also has healing scar to mid lower back from previous surgery.         Stephen Prevention initiated:  Yes   Wound Care Orders initiated:  Yes      [address]  nurse consulted for Pressure Injury (Stage 3,4, Unstageable, DTI, NWPT, and Complex wounds) or Stephen score 18 or lower:  Yes      Nurse 1 eSignature: Electronically signed by Shakila Echeverria RN on 4/21/22 at 5:57 AM EDT    **SHARE this note so that the co-signing nurse is able to place an eSignature**    Nurse 2 eSignature: Electronically signed by Eddie Desir RN on 4/21/22 at 6:10 AM EDT

## 2022-04-21 NOTE — PROGRESS NOTES
Patient came with right lateral foot dressing on admit. Removed dressing. Noted small, open  area with indurated borders. chuck wound not blanchable. Foot is warm and edematous with doppler pulses present. No drainage noted on dressing. No odor. Cleansed area with soap and water, dried, applied mepilex border dressing. Notified resident and placed wound consult.

## 2022-04-21 NOTE — PLAN OF CARE
Problem: Discharge Planning  Goal: Discharge to home or other facility with appropriate resources  Outcome: Not Progressing     Problem: Pain  Goal: Verbalizes/displays adequate comfort level or baseline comfort level  Outcome: Not Progressing     Problem: Safety - Medical Restraint  Goal: Remains free of injury from restraints (Restraint for Interference with Medical Device)  Description: INTERVENTIONS:  1. Determine that other, less restrictive measures have been tried or would not be effective before applying the restraint  2. Evaluate the patient's condition at the time of restraint application  3. Inform patient/family regarding the reason for restraint  4. Q2H: Monitor safety, psychosocial status, comfort, nutrition and hydration  Outcome: Not Progressing     Problem: Skin/Tissue Integrity  Goal: Absence of new skin breakdown  Description: 1. Monitor for areas of redness and/or skin breakdown  2. Assess vascular access sites hourly  3. Every 4-6 hours minimum:  Change oxygen saturation probe site  4. Every 4-6 hours:  If on nasal continuous positive airway pressure, respiratory therapy assess nares and determine need for appliance change or resting period.   Outcome: Not Progressing     Problem: Safety - Adult  Goal: Free from fall injury  Outcome: Not Progressing     Problem: ABCDS Injury Assessment  Goal: Absence of physical injury  Outcome: Not Progressing

## 2022-04-21 NOTE — PROGRESS NOTES
Comprehensive Nutrition Assessment    Per ASPEN guidelines, suggest start of alternative nutrition within 24-48 hours intubation, as appropriate. RECOMMENDATIONS:  1. PO Diet: Continue NPO while intubated  Nutrition Support: EN recs provided below as needed  · Recommend EN formula Peptide-Based  Vital AF 1.2 @ goal rate 55 ml/hr   · Initiate EN @ 20mL/hr and as tolerated, increase by 20 mL/hr q 4 hours until goal of 55 mL/hr is met. · Recommend water bolus 90 ml every 4 hours    NUTRITION ASSESSMENT:   Nutritional summary & status: Nutrition eval for new vent. Pt intubated and sedated on propofol & fentanyl. No pressors noted per EMR. Per chart review, pt has had poor intakes recently. If pt unable to extubate & diet unable to advance within the next few days, recommend initiating alternative nutrition. EN recs provided above as needed. Will monitor throughout adm.  Admission/PMH: Admit acute encephalopathy, possible seizure; PMH lumbar pain, T2DM, CAD, T2DM, HLD, HTN, lumbar spine fusion surgery     MALNUTRITION ASSESSMENT  Context of Malnutrition: Acute Illness   Malnutrition Status:  At risk for malnutrition (Comment)  Findings of the 6 clinical characteristics of malnutrition (Minimum of 2 out of 6 clinical characteristics is required to make the diagnosis of moderate or severe Protein Calorie Malnutrition based on AND/ASPEN Guidelines):  Energy Intake: Less than/equal to 50% of estimated energy requirements    Energy Intake Time: x 1 day   Weight Loss %: 10% loss or greater    Weight loss Time: Greater than or equal to 6 months     NUTRITION DIAGNOSIS   Inadequate oral intake related to impaired respiratory function as evidenced by NPO or clear liquid status due to medical condition,intubation    [address] and/or Nutrient Delivery:  Continue NPO  Nutrition Education/Counseling:  No recommendation at this time   Goals:    pt will tolerate the most appropriate form of nutrition to meet >75% energy needs throughout adm     Nutrition Monitoring and Evaluation:   Food/Nutrient Intake Outcomes:  Diet Advancement/Tolerance  Physical Signs/Symptoms Outcomes:  Biochemical Data,Weight,Nutrition Focused Physical Findings,Hemodynamic Status     OBJECTIVE DATA: Significant to nutrition assessment  · Nutrition-Focused Physical Findings: lbm pta, +3 RLE edema  · Labs: Reviewed; Na 133,   · Meds: Reviewed; propofol, fentanyl  · Wounds: Diabetic Ulcer       CURRENT NUTRITION THERAPIES  Diet NPO     PO Intake: NPO   PO Supplement Intake:NPO  Additional Sources of Calories/IVF:propofol @16ml/hd=501cumg     ANTHROPOMETRICS  Current Height: 6' (182.9 cm)  Current Weight: 286 lb 9.6 oz (130 kg)    Admission weight: 293 lb 6.4 oz (133.1 kg)  Ideal Body Weight (IBW): 178 lbs  (81 kg)    Usual Bodyweight   JOSÉ- limited wt hx  Weight Changes: -40lb x 10 mo (14%)       BMI: 39    Wt Readings from Last 50 Encounters:   04/20/22 286 lb 9.6 oz (130 kg)   06/17/21 (!) 326 lb (147.9 kg)     COMPARATIVE STANDARDS  Energy (kcal):  4708-1140 (11-14)     Protein (g):   (1.2-1.8)       Fluid (ml/day):  0899-4924    The patient will still be monitored per nutrition standards of care. Consult dietitian if nutrition interventions essential to patient care is needed.      Chalo Velasquez, [address] [place] [address]  Santos:  [phone]  Office:  [phone]

## 2022-04-21 NOTE — PROGRESS NOTES
MECHANICAL VENTILATION WEANING PROTOCOL    PRE-TRIAL PATIENT ASSESSMENT - COMPLETED [id number]   Ventilatory Assessment:    PARAMETER CRITERIA FOR WEANING   Spontaneous Cough:  Yes    Sputum Characteristics:  ·    ·    ·   SPONTANEOUS COUGH With small to moderate  Amount of secretions   FiO2 : 35 % FIO2 less than or equal to 50%     PEEP less than or equal to 8   Progressive Mobility Protocol  No     ABG:  Lab Results   Component Value Date    PHART 7.411 04/21/2022    ETZ2ZAH 42.2 04/21/2022    PO2ART 126.0 04/21/2022    B9ZHNTLH 99 04/21/2022    NFA0CYD 27 04/21/2022    BEART 1.9 04/21/2022     HGB/WBC:  Lab Results   Component Value Date    HGB 9.6 04/21/2022    WBC 5.1 04/21/2022        Vital Signs:    PARAMETER CRITERIA FOR WEANING Meets Criteria   Pulse: 92 Within patient's normal limits / stable Yes   Resp: 18 Less than or equal to 30 Yes   BP: 125/71 Within patient's normal limits / minimal pressors (Hemodynamically Stable) Yes   SpO2: 100 % Greater than or equal to 90% Yes   End Tidal CO2: 27 (%) Within patient's normal limits Yes   Temp: 97.4 °F (36.3 °C) Less than 38. 5oC / 101. 3oF Yes     [x]    Based on this assessment and the [facility] Ventilator Weaning Protocol, this patient  IS being placed on a Spontaneous Breathing Trial (SBT) at this time.  []    Based on this assessment and the [facility] Ventilator Weaning Protocol, this patient  IS NOT being placed on a Spontaneous Breathing Trial (SBT) at this time. []    Patient  IS NOT being placed on a Spontaneous Breathing Trial (SBT) at this time because of factors not previously addressed.   Those factors include     Vital Capacity (VC) =     Maximum Inspiratory Force (MIF) = -20  SBT - Initiated at  1230    Ventilator Settings:  CPAP - 5 cmH2O, PS - 8 cmH2O(if using settings other than CPAP 5/PS 8, please explain reasons for settings here):       1 Minute SBT Respiratory Parameters:   VE: 9 L   RR: 18 b/m   VT: 464 mL (average VT = VE/RR)   RSBI: 31 (RR/VT in liters)   ETCO2: 27 cmH2O   SPO2: 100 %   If on sedation, amount and type     [x]   RR is less than 35, RSBI is less than 100, patient's vitals signs are stable, and patient is in no apparent distress; therefore, patient is being left on SBT for up to 1 hour. []   RR is greater than 35, RSBI is greater than 100, VS's are unstable, or patient is in distress; therefore, patient is being placed back on previous settings. SBT - Concluded at  9147 0904. Weaning Parameters/VS's at conclusion of SBT:   VE: 9.3 L   RR: 17 b/m   VT: 429 mL (average VT = VE/RR)   RSBI: 20 (RR/VT in liters)   ETCO2: 27 cmH2O   SPO2: 100 %    If on sedation, amount and type     [x]   Patient tolerated SBT for full 60 minutes with acceptable weaning parameters and vital signs and showed no signs or distress. []   Patient tolerated SBT for full 60 minutes, but had unacceptable weaning parameters or vital signs, and/or signs of distress. []   Patient was unable to tolerate SBT for 60 minutes and was placed back on previous settings.             COMMENTS:

## 2022-04-21 NOTE — PLAN OF CARE
Problem: Discharge Planning  Goal: Discharge to home or other facility with appropriate resources  Outcome: Progressing     Problem: Pain  Goal: Verbalizes/displays adequate comfort level or baseline comfort level  Outcome: Progressing     Problem: Safety - Medical Restraint  Goal: Remains free of injury from restraints (Restraint for Interference with Medical Device)  Description: INTERVENTIONS:  1. Determine that other, less restrictive measures have been tried or would not be effective before applying the restraint  2. Evaluate the patient's condition at the time of restraint application  3. Inform patient/family regarding the reason for restraint  4. Q2H: Monitor safety, psychosocial status, comfort, nutrition and hydration  Outcome: Progressing     Problem: Skin/Tissue Integrity  Goal: Absence of new skin breakdown  Description: 1. Monitor for areas of redness and/or skin breakdown  2. Assess vascular access sites hourly  3. Every 4-6 hours minimum:  Change oxygen saturation probe site  4. Every 4-6 hours:  If on nasal continuous positive airway pressure, respiratory therapy assess nares and determine need for appliance change or resting period.   Outcome: Progressing     Problem: Safety - Adult  Goal: Free from fall injury  Outcome: Progressing     Problem: ABCDS Injury Assessment  Goal: Absence of physical injury  Outcome: Progressing     Problem: Chronic Conditions and Co-morbidities  Goal: Patient's chronic conditions and co-morbidity symptoms are monitored and maintained or improved  Outcome: Progressing     Problem: Nutrition Deficit:  Goal: Optimize nutritional status  4/21/2022 1903 by Evan Stubbs RN  Outcome: Progressing  4/21/2022 1545 by Harish Torrez MS, RD, LD  Outcome: Progressing  Note: Nutrition Problem #1: Inadequate oral intake  Intervention: Food and/or Nutrient Delivery: Continue NPO  Nutritional   pt will tolerate the most appropriate form of nutrition to meet >75% energy needs throughout adm

## 2022-04-22 ENCOUNTER — APPOINTMENT (OUTPATIENT)
Dept: MRI IMAGING | Age: 62
DRG: 100 | End: 2022-04-22
Payer: COMMERCIAL

## 2022-04-22 LAB
ANION GAP SERPL CALCULATED.3IONS-SCNC: 12 MMOL/L (ref 3–16)
BASOPHILS ABSOLUTE: 0 K/UL (ref 0–0.2)
BASOPHILS RELATIVE PERCENT: 0.3 %
BUN BLDV-MCNC: 29 MG/DL (ref 7–20)
CALCIUM SERPL-MCNC: 8.9 MG/DL (ref 8.3–10.6)
CHLORIDE BLD-SCNC: 102 MMOL/L (ref 99–110)
CO2: 24 MMOL/L (ref 21–32)
CREAT SERPL-MCNC: 1.8 MG/DL (ref 0.8–1.3)
EOSINOPHILS ABSOLUTE: 0.2 K/UL (ref 0–0.6)
EOSINOPHILS RELATIVE PERCENT: 3.2 %
GFR AFRICAN AMERICAN: 47
GFR NON-AFRICAN AMERICAN: 39
GLUCOSE BLD-MCNC: 221 MG/DL (ref 70–99)
GLUCOSE BLD-MCNC: 222 MG/DL (ref 70–99)
GLUCOSE BLD-MCNC: 226 MG/DL (ref 70–99)
GLUCOSE BLD-MCNC: 243 MG/DL (ref 70–99)
GLUCOSE BLD-MCNC: 280 MG/DL (ref 70–99)
HCT VFR BLD CALC: 30.3 % (ref 40.5–52.5)
HEMOGLOBIN: 9.8 G/DL (ref 13.5–17.5)
LYMPHOCYTES ABSOLUTE: 0.5 K/UL (ref 1–5.1)
LYMPHOCYTES RELATIVE PERCENT: 9.1 %
MAGNESIUM: 2 MG/DL (ref 1.8–2.4)
MCH RBC QN AUTO: 30.1 PG (ref 26–34)
MCHC RBC AUTO-ENTMCNC: 32.4 G/DL (ref 31–36)
MCV RBC AUTO: 92.9 FL (ref 80–100)
MONOCYTES ABSOLUTE: 0.7 K/UL (ref 0–1.3)
MONOCYTES RELATIVE PERCENT: 11.3 %
NEUTROPHILS ABSOLUTE: 4.4 K/UL (ref 1.7–7.7)
NEUTROPHILS RELATIVE PERCENT: 76.1 %
PDW BLD-RTO: 16.8 % (ref 12.4–15.4)
PERFORMED ON: ABNORMAL
PLATELET # BLD: 177 K/UL (ref 135–450)
PMV BLD AUTO: 7.7 FL (ref 5–10.5)
POTASSIUM REFLEX MAGNESIUM: 4.3 MMOL/L (ref 3.5–5.1)
RBC # BLD: 3.27 M/UL (ref 4.2–5.9)
SODIUM BLD-SCNC: 138 MMOL/L (ref 136–145)
WBC # BLD: 5.8 K/UL (ref 4–11)

## 2022-04-22 PROCEDURE — 36415 COLL VENOUS BLD VENIPUNCTURE: CPT

## 2022-04-22 PROCEDURE — 80048 BASIC METABOLIC PNL TOTAL CA: CPT

## 2022-04-22 PROCEDURE — 97530 THERAPEUTIC ACTIVITIES: CPT

## 2022-04-22 PROCEDURE — 2580000003 HC RX 258

## 2022-04-22 PROCEDURE — 6360000002 HC RX W HCPCS: Performed by: STUDENT IN AN ORGANIZED HEALTH CARE EDUCATION/TRAINING PROGRAM

## 2022-04-22 PROCEDURE — 99223 1ST HOSP IP/OBS HIGH 75: CPT | Performed by: INTERNAL MEDICINE

## 2022-04-22 PROCEDURE — 97535 SELF CARE MNGMENT TRAINING: CPT

## 2022-04-22 PROCEDURE — 83735 ASSAY OF MAGNESIUM: CPT

## 2022-04-22 PROCEDURE — 85025 COMPLETE CBC W/AUTO DIFF WBC: CPT

## 2022-04-22 PROCEDURE — 97166 OT EVAL MOD COMPLEX 45 MIN: CPT

## 2022-04-22 PROCEDURE — APPNB30 APP NON BILLABLE TIME 0-30 MINS: Performed by: NURSE PRACTITIONER

## 2022-04-22 PROCEDURE — 6360000002 HC RX W HCPCS

## 2022-04-22 PROCEDURE — 95819 EEG AWAKE AND ASLEEP: CPT | Performed by: STUDENT IN AN ORGANIZED HEALTH CARE EDUCATION/TRAINING PROGRAM

## 2022-04-22 PROCEDURE — 99233 SBSQ HOSP IP/OBS HIGH 50: CPT | Performed by: INTERNAL MEDICINE

## 2022-04-22 PROCEDURE — 1200000000 HC SEMI PRIVATE

## 2022-04-22 PROCEDURE — 97162 PT EVAL MOD COMPLEX 30 MIN: CPT

## 2022-04-22 PROCEDURE — 2580000003 HC RX 258: Performed by: STUDENT IN AN ORGANIZED HEALTH CARE EDUCATION/TRAINING PROGRAM

## 2022-04-22 RX ORDER — LORAZEPAM 2 MG/ML
1 INJECTION INTRAMUSCULAR ONCE
Status: COMPLETED | OUTPATIENT
Start: 2022-04-22 | End: 2022-04-22

## 2022-04-22 RX ORDER — METOCLOPRAMIDE HYDROCHLORIDE 5 MG/ML
10 INJECTION INTRAMUSCULAR; INTRAVENOUS EVERY 6 HOURS PRN
Status: DISCONTINUED | OUTPATIENT
Start: 2022-04-22 | End: 2022-04-27 | Stop reason: HOSPADM

## 2022-04-22 RX ADMIN — HYDROMORPHONE HYDROCHLORIDE 0.25 MG: 1 INJECTION, SOLUTION INTRAMUSCULAR; INTRAVENOUS; SUBCUTANEOUS at 04:20

## 2022-04-22 RX ADMIN — HEPARIN SODIUM 5000 UNITS: 5000 INJECTION INTRAVENOUS; SUBCUTANEOUS at 21:16

## 2022-04-22 RX ADMIN — LEVETIRACETAM 500 MG: 100 INJECTION, SOLUTION, CONCENTRATE INTRAVENOUS at 09:47

## 2022-04-22 RX ADMIN — HEPARIN SODIUM 5000 UNITS: 5000 INJECTION INTRAVENOUS; SUBCUTANEOUS at 16:19

## 2022-04-22 RX ADMIN — METOCLOPRAMIDE HYDROCHLORIDE 10 MG: 5 INJECTION INTRAMUSCULAR; INTRAVENOUS at 23:21

## 2022-04-22 RX ADMIN — LEVETIRACETAM 500 MG: 100 INJECTION, SOLUTION, CONCENTRATE INTRAVENOUS at 21:18

## 2022-04-22 RX ADMIN — ONDANSETRON 4 MG: 2 INJECTION INTRAMUSCULAR; INTRAVENOUS at 11:28

## 2022-04-22 RX ADMIN — INSULIN LISPRO 4 UNITS: 100 INJECTION, SOLUTION INTRAVENOUS; SUBCUTANEOUS at 12:37

## 2022-04-22 RX ADMIN — INSULIN LISPRO 6 UNITS: 100 INJECTION, SOLUTION INTRAVENOUS; SUBCUTANEOUS at 18:40

## 2022-04-22 RX ADMIN — LORAZEPAM 1 MG: 2 INJECTION INTRAMUSCULAR; INTRAVENOUS at 00:40

## 2022-04-22 RX ADMIN — INSULIN LISPRO 4 UNITS: 100 INJECTION, SOLUTION INTRAVENOUS; SUBCUTANEOUS at 09:43

## 2022-04-22 RX ADMIN — SODIUM CHLORIDE, PRESERVATIVE FREE 10 ML: 5 INJECTION INTRAVENOUS at 21:17

## 2022-04-22 RX ADMIN — INSULIN LISPRO 2 UNITS: 100 INJECTION, SOLUTION INTRAVENOUS; SUBCUTANEOUS at 21:20

## 2022-04-22 RX ADMIN — HEPARIN SODIUM 5000 UNITS: 5000 INJECTION INTRAVENOUS; SUBCUTANEOUS at 05:59

## 2022-04-22 ASSESSMENT — PAIN SCALES - GENERAL
PAINLEVEL_OUTOF10: 0
PAINLEVEL_OUTOF10: 7
PAINLEVEL_OUTOF10: 7

## 2022-04-22 NOTE — PROGRESS NOTES
Attempted to feed patient breakfast.  Patient states he is not hungry at the moment. Educated patient to press call light if he becomes hungry.

## 2022-04-22 NOTE — CONSULTS
Nephrology Consult Note                                                                                                                                                                                                                                                                                                                                                               Office : [phone]     Fax :[phone]              Patient's Name: Tayler Denise  4/22/2022    Reason for Consult: HUNTER   Requesting Physician:  Raimundo Christian      Chief Complaint:  Seizure      History of Present Ilness:    Patient is a 63 yo male  PMHx of lumbar pain, T2DM, CAD, T2DM, HLD, HTN, lumbar spine fusion surgery back in February    presents with AMS. Nena Mijares has been home for 1 month with brief moments of intermittent confusion. had seizure activity. He had agonal respirations per EMS. Patient was placed on NRB when arrived to the ED and patient was mumbling what had happened, but was not having purposeful movement or following commands. He was intubated for airway protection  Extubated now   UO decent   . Past Medical History:   Diagnosis Date    Back pain     CAD (coronary artery disease)     CKD (chronic kidney disease) stage 4, GFR 15-29 ml/min ([facility])     Dr. Mccoy Knows    Diabetes mellitus ([facility] [address]     Hyperlipidemia     Hypertension     MRSA (methicillin resistant staph aureus) culture positive 05/11/2017    foot       Past Surgical History:   Procedure Laterality Date    FOOT SURGERY Left 05/11/2017    LEG AMPUTATION BELOW KNEE      OTHER SURGICAL HISTORY Right 05/14/2017    RIGHT FOOT DEBRIDEMENT INCISION AND DRAINAGE, DELAYED PRIMARY       Family History   Problem Relation Age of Onset    Heart Disease Mother     High Blood Pressure Mother     Stroke Mother     Arthritis Mother     Diabetes Father     Arthritis Father     Diabetes Brother         reports that he has never smoked.  He has never used smokeless tobacco. He reports that he does not drink alcohol and does not use drugs.     Allergies:  Codeine    Current Medications:    HYDROmorphone (DILAUDID) injection 0.25 mg, Q12H PRN  insulin glargine (LANTUS;BASAGLAR) injection pen 23 Units, Nightly  metoclopramide (REGLAN) injection 10 mg, Q6H PRN  heparin (porcine) injection 5,000 Units, 3 times per day  insulin lispro (1 Unit Dial) 0-12 Units, TID WC  insulin lispro (1 Unit Dial) 0-6 Units, Nightly  perflutren lipid microspheres (DEFINITY) injection 1.65 mg, ONCE PRN  sodium chloride flush 0.9 % injection 5-40 mL, 2 times per day  sodium chloride flush 0.9 % injection 5-40 mL, PRN  0.9 % sodium chloride infusion, PRN  ondansetron (ZOFRAN-ODT) disintegrating tablet 4 mg, Q8H PRN   Or  ondansetron (ZOFRAN) injection 4 mg, Q6H PRN  polyethylene glycol (GLYCOLAX) packet 17 g, Daily PRN  acetaminophen (TYLENOL) tablet 650 mg, Q6H PRN   Or  acetaminophen (TYLENOL) suppository 650 mg, Q6H PRN  glucagon (rDNA) injection 1 mg, PRN  dextrose 5 % solution, PRN  [Held by provider] amitriptyline (ELAVIL) tablet 25 mg, Nightly  [Held by provider] aspirin EC tablet 81 mg, Daily  [Held by provider] losartan-hydroCHLOROthiazide (HYZAAR) 50-12.5 MG per tablet 1 tablet, Daily  [Held by provider] atorvastatin (LIPITOR) tablet 10 mg, Daily  [Held by provider] spironolactone (ALDACTONE) tablet 25 mg, Daily  propofol injection, Continuous  levETIRAcetam (KEPPRA) 500 mg in sodium chloride 0.9 % 100 mL IVPB, Q12H  glucose chewable tablet 16 g, PRN  dextrose bolus (hypoglycemia) 10% 125 mL, PRN   Or  dextrose bolus (hypoglycemia) 10% 250 mL, PRN  labetalol (NORMODYNE;TRANDATE) injection 10 mg, Q6H PRN        Review of Systems:   14 point ROS obtained but were negative except mentioned in HPI      Physical exam:     Vitals:  /73   Pulse 95   Temp 98.4 °F (36.9 °C) (Oral)   Resp 17   Ht 6' (1.829 m)   Wt 286 lb 9.6 oz (130 kg)   SpO2 96%   BMI 38.87 kg/m²   Constitutional: AA  Skin: no rash, turgor wnl  Heent:  eomi, mmm  Neck: no bruits or jvd noted  Cardiovascular:  S1, S2 without m/r/g  Respiratory: CTA B without w/r/r  Abdomen:  +bs, soft, nt, nd  Ext: + lower extremity edema  Psychiatric: mood and affect appropriate  Musculoskeletal:  Rom, muscular strength dec     Data:   Labs:  CBC:   Recent Labs     04/20/22  1627 04/21/22  0523 04/22/22  0619   WBC 7.6 5.1 5.8   HGB 11.1* 9.6* 9.8*    179 177     BMP:    Recent Labs     04/20/22  1627 04/21/22  0523 04/22/22  0619    133* 138   K 4.2 4.2 4.3   CL 97* 99 102   CO2 21 21 24   BUN 28* 30* 29*   CREATININE 1.6* 1.5* 1.8*   GLUCOSE 353* 343* 222*     Ca/Mg/Phos:   Recent Labs     04/20/22  1627 04/21/22  0523 04/22/22  0619   CALCIUM 9.4 9.0 8.9   MG  --  1.40* 2.00     Hepatic:   Recent Labs     04/20/22  1627   AST 15   ALT 16   BILITOT 0.3   ALKPHOS 194*     Troponin:   Recent Labs     04/20/22  2312 04/21/22  0523 04/21/22  1200   TROPONINI 0.24* 0.23* 0.30*     BNP: No results for input(s): BNP in the last 72 hours. Lipids: No results for input(s): CHOL, TRIG, HDL, LDLCALC, LABVLDL in the last 72 hours. ABGs:   Recent Labs     04/21/22  1320   PHART 7.411   PO2ART 126.0*   MQE0ZQU 42.2     INR:   Recent Labs     04/20/22  1627   INR 1.08     UA:No results for input(s): Yahaira Spatz, GLUCOSEU, BILIRUBINUR, Ladene Birkenhead, BLOODU, PHUR, PROTEINU, UROBILINOGEN, NITRU, LEUKOCYTESUR, Othelia Leana in the last 72 hours. Urine Microscopic: No results for input(s): LABCAST, BACTERIA, COMU, HYALCAST, WBCUA, RBCUA, EPIU in the last 72 hours. Urine Culture: No results for input(s): LABURIN in the last 72 hours. Urine Chemistry: No results for input(s): Ej Vikas, PROTEINUR, NAUR in the last 72 hours. IMAGING:  CT CHEST PULMONARY EMBOLISM W CONTRAST   Final Result      1. No evidence of acute or chronic pulmonary embolus   2. Bilateral lower lobe atelectasis or pneumonia   3.  Coronary artery calcification   4. Hiatal hernia in addition to a dilated esophagus, presbyesophagus with no retained fluid or fluid levels         CT LUMBAR SPINE WO CONTRAST   Final Result      1. Intact hardware in satisfactory alignment     2. Posterior soft tissue. Correlation with MRI or ultrasound recommended to assess the presence or absence of fluid versus postoperative granulation tissue   3. Transitional vertebra, intervertebral spacer at L3-L4 with evidence of subarticular narrowing associated with facet arthropathy         CT HEAD WO CONTRAST   Final Result      1. No evidence of acute intracranial hemorrhage or mass effect. 2.  Mild chronic small vessel ischemic disease. XR CHEST PORTABLE   Final Result      1. Endotracheal tube at the thoracic inlet. Recommend repositioning/advancement and repeat radiograph. 2.  Bilateral perihilar predominant opacities may reflect edema and/or pneumonia. MRI BRAIN WO CONTRAST    (Results Pending)       Assessment/Plan   1. HUNTER On CKD     2. HTN    3. Anemia    4. Acid- base/ Electrolyte imbalance     5.  Seizure     Plan   - off IVF   - Ur studies   - Cr stable   - Seizure control   - labs in am   - Monitor lytes       Recommend to dose adjust all medications  based on renal functions  Maintain SBP> 90 mmHg   Daily weights   AVOID NSAIDs  Avoid Nephrotoxins  Monitor Intake/Output  Call if significant decrease in urine output               Thank you for allowing us to participate in care of Maddy Cheng MD  Feel free to contact me   [facility] [address]  Office : [phone]  Fax :[phone]

## 2022-04-22 NOTE — PROGRESS NOTES
Occupational Therapy  Facility/Department: [address] [place] [address] N ICU  Occupational Therapy Initial Assessment/Treatment    Name: Jacob Zavaleta  : 1960  MRN: [id number]  Date of Service: 2022    Discharge Recommendations:    Jacob Zavaleta scored a  on the AM-PAC ADL Inpatient form. Current research shows that an AM-PAC score of 17 or less is typically not associated with a discharge to the patient's home setting. Based on the patient's AM-PAC score and their current ADL deficits, it is recommended that the patient have 3-5 sessions per week of Occupational Therapy at d/c to increase the patient's independence. Please see assessment section for further patient specific details. If patient discharges prior to next session this note will serve as a discharge summary. Please see below for the latest assessment towards goals. OT Equipment Recommendations  Equipment Needed: No       Patient Diagnosis(es): The encounter diagnosis was Altered mental status, unspecified altered mental status type. Past Medical History:  has a past medical history of Back pain, CAD (coronary artery disease), CKD (chronic kidney disease) stage 4, GFR 15-29 ml/min ([address] Diabetes mellitus ([address] Hyperlipidemia, Hypertension, and MRSA (methicillin resistant staph aureus) culture positive. Past Surgical History:  has a past surgical history that includes Leg amputation below knee; Foot surgery (Left, 2017); and other surgical history (Right, 2017). Treatment Diagnosis: Impaired ADL, functional mobility, UE function      Assessment   Performance deficits / Impairments: Decreased functional mobility ; Decreased ADL status; Decreased endurance;Decreased balance;Decreased strength;Decreased ROM; Decreased safe awareness;Decreased cognition;Decreased coordination  Assessment: Pt presents with a decline in functional independence. Pt is from home with wife. Pt is groggy and having difficulty with all activities.   Per nurse, pt had Ativan last night. Feel pt would benefit from [facility] services. Treatment Diagnosis: Impaired ADL, functional mobility, UE function  Prognosis: Fair  Decision Making: Medium Complexity  REQUIRES OT FOLLOW-UP: Yes  Activity Tolerance  Activity Tolerance: Treatment limited secondary to decreased cognition;Patient limited by fatigue        Plan   Plan  Times per Week: 2-5  Current Treatment Recommendations: Strengthening,ROM,Balance training,Functional mobility training,Endurance training,Self-Care / ADL,Cognitive reorientation,Equipment evaluation, education, & procurement     Restrictions  Position Activity Restriction  Other position/activity restrictions: Up with assist    Subjective   General  Chart Reviewed: Yes  Additional Pertinent Hx: Pt admitted 4/20/22 with altered mental status and passing out going to the bathroom. 4/20 Pt intubated  4/21 extubated    CT head (-) acute     CT L-spine= 1. Intact hardware in satisfactory alignment   2. Posterior soft tissue. Correlation with MRI or ultrasound recommended to assess the presence or absence of fluid versus postoperative granulation tissue 3. Transitional vertebra, intervertebral spacer at L3-L4 with evidence of subarticular narrowing associated with facet arthropathy  Family / Caregiver Present: No  Referring Practitioner: Dr. July Dolan  Diagnosis: Acute encephalopathy  Subjective  Subjective: Pt in bed upon entry.    \"I shouldn't be here\"  Pre Treatment Pain Screening  Pain at present: 0  Social/Functional History  Social/Functional History  Lives With: Spouse  Type of Home: House  Home Layout: Two level,Laundry in basement (Bedroom/bathroom on 2nd floor)  Home Access: Stairs to enter with rails  Entrance Stairs - Number of Steps: 2 DON  Bathroom Shower/Tub: Tub/Shower unit  Bathroom Toilet: Standard  Bathroom Equipment: Shower chair,Grab bars in shower (Sink next to toilet for leverage)  Bathroom Accessibility: Accessible  Home Equipment: Aissatou Rios rolling,Cane,Wheelchair-manual,Reacher  Has the patient had two or more falls in the past year or any fall with injury in the past year?: Yes (Reports \"a few\" falls in the last 4 months d/t losing balance)  ADL Assistance: Independent  Homemaking Assistance: Needs assistance (Wife primarily performs cooking, cleaning, and laundry however pt helps)  Homemaking Responsibilities: Yes  Ambulation Assistance: Independent (Recently using a w/c , uses walker ocassionally)  Transfer Assistance: Independent  Active : Yes  Additional Comments: Pt is a questionable historian       Objective   Vision Exceptions: Wears glasses for reading  Hearing: Within functional limits          Safety Devices  Type of Devices: Call light within reach; Left in bed;Nurse notified; Bed alarm in place  Balance  Sitting Balance: Minimal assistance (to min assist of 2.   Tendency to suddently lean forward)  Standing Balance: Unable to assess(comment)     ADL  Grooming: Maximum assistance (to wipe hands, min assist for face)  LE Dressing: Dependent/Total  Toileting: Maximum assistance (to use urinal)     Activity Tolerance  Activity Tolerance: Patient limited by fatigue;Patient limited by endurance;Treatment limited secondary to decreased cognition  Bed mobility  Rolling to Left: Maximum assistance (VC's for use of rail)  Rolling to Right: Maximum assistance (VC's for use of rail)  Supine to Sit: Maximum assistance;2 Person assistance (Via log roll, increased time and effort, assist required for trunk and B LE support)  Sit to Supine: Maximum assistance;2 Person assistance (Via reverse log roll, assist required for trunk and B LE support)  Scooting: Maximal assistance (To EOB)  Transfers  Stand Pivot Transfers: Unable to assess  Sit to stand: Unable to assess     Cognition  Overall Cognitive Status: Exceptions  Arousal/Alertness: Inconsistent responses to stimuli  Following Commands: Inconsistently follows commands  Attention Span: Attends with cues to redirect  Memory: Decreased recall of biographical Information;Decreased recall of recent events  Safety Judgement: Decreased awareness of need for assistance;Decreased awareness of need for safety  Insights: Not aware of deficits                  Education Given To: Patient  Education Provided: Role of Therapy;Plan of Care  Education Outcome: Unable to verbalize;Continued education needed  LUE AROM (degrees)  LUE AROM : WFL  Left Hand AROM (degrees)  Left Hand General AROM: finger contractures  RUE AROM (degrees)  RUE AROM : WFL  Right Hand AROM (degrees)  Right Hand General AROM: finger and wrist contractures        Hand Dominance  Hand Dominance: Right         Treatment included ADL and sitting balance/tolerance.     AM-PAC Score        AM-PAC Inpatient Daily Activity Raw Score: 11 (04/22/22 1548)  AM-PAC Inpatient ADL T-Scale Score : 29.04 (04/22/22 1548)  ADL Inpatient CMS 0-100% Score: 70.42 (04/22/22 1548)  ADL Inpatient CMS G-Code Modifier : CL (04/22/22 1548)    Goals                               No goals met  Short Term Goals  Time Frame for Short term goals: Discharge  Short Term Goal 1: Sit edge of bed with SBA x5 min while engaging in activity  Short Term Goal 2: Set up and min assist for simple grooming/hygiene  Short Term Goal 3: Don prosthesis with min assist  Patient Goals   Patient goals : Not stated       Therapy Time   Individual Concurrent Group Co-treatment   Time In 3335         Time Out 1448         Minutes 42         Timed Code Treatment Minutes: [address] [facility] [address], OTR/L [address]

## 2022-04-22 NOTE — PROGRESS NOTES
Took pt down to MRI, pt c/o back pain while on MRI table and demands to be taken off. Called resident and asked for something to help calm pt to complete scan. Med given and but pt still unable to complete scan past two minutes before yelling and demanding to end scan.  Will return to floor and notify provider

## 2022-04-22 NOTE — PROGRESS NOTES
Chart reviewed, events noted  Unable to assess patient as he is being cleaned up at the time of my visit. Discussed with nursing, ICU team, and attending neurologist- patient to transfer out of ICU today. Would not tolerate MRI    Nothing new to add from neurologic perspective.   Please call if any questions, concerns, or change in exam.    Katelynn Cherry NP  Neurology

## 2022-04-22 NOTE — PROGRESS NOTES
Physical Therapy  Facility/Department: [facility] ICU  Physical Therapy Initial Assessment and Treatment     Name: Kaleigh Machuca  : 1960  MRN: [id number]  Date of Service: 2022    Discharge Recommendations: Kaleigh Machuca scored a  on the AM-PAC short mobility form. Current research shows that an AM-PAC score of 17 or less is typically not associated with a discharge to the patient's home setting. Based on the patient's AM-PAC score and their current functional mobility deficits, it is recommended that the patient have 3-5 sessions per week of Physical Therapy at d/c to increase the patient's independence. Please see assessment section for further patient specific details. If patient discharges prior to next session this note will serve as a discharge summary. Please see below for the latest assessment towards goals. PT Equipment Recommendations  Equipment Needed:  (Defer)      Patient Diagnosis(es): The encounter diagnosis was Altered mental status, unspecified altered mental status type. Past Medical History:  has a past medical history of Back pain, CAD (coronary artery disease), CKD (chronic kidney disease) stage 4, GFR 15-29 ml/min ([address] Diabetes mellitus ([address] Hyperlipidemia, Hypertension, and MRSA (methicillin resistant staph aureus) culture positive. Past Surgical History:  has a past surgical history that includes Leg amputation below knee; Foot surgery (Left, 2017); and other surgical history (Right, 2017). Assessment   Body Structures, Functions, Activity Limitations Requiring Skilled Therapeutic Intervention: Decreased functional mobility ; Decreased ROM; Decreased strength;Decreased safe awareness;Decreased cognition;Decreased endurance;Decreased balance;Decreased coordination;Decreased posture  Assessment: Pt from home presenting with AMS after collapsing with LOC in bathroom. Pt had a lumbar fusion in Feb and was in rehab then recieving home therapy.  Pt is a questionable historian, currently lethargic, oriented x2, and able to follow simple commands with VC's. Pt has a L BKA and has been recently mobilizing in a w/c and normally uses a walker with his prosthesis. Pt demonstrates decreased independent mobility from baseline. Pt currently requiring max assist x2 for bed mobility. Unable to assess OOB activity 2/2 anxious behaviors with mobility and safety concerns. Pt is a fall risk and not safe to stand alone. Safety concerns for pt returning home however would benefit from SNF placement to improve strength and mobility. Pt would benefit from skilled PT to improve functional mobility. Treatment Diagnosis: Decreased functional mobility associated with acute encephalopathy  Therapy Prognosis: Good  Decision Making: Medium Complexity  Requires PT Follow-Up: Yes  Activity Tolerance  Activity Tolerance: Patient limited by fatigue;Patient limited by endurance;Treatment limited secondary to decreased cognition     Plan   Plan  Plan: 2-5 times per week  Current Treatment Recommendations: Strengthening,ROM,Balance training,Functional mobility training,Transfer training,Endurance training,Gait training,Neuromuscular re-education,Cognitive reorientation,Safety education & training,Patient/Caregiver education & training,Therapeutic activities  Plan Comment: Assess OOB activity as able  Safety Devices  Type of Devices: Call light within reach,Left in bed,Nurse notified,Bed alarm in place     Restrictions  Position Activity Restriction  Other position/activity restrictions: Up with assist     Subjective   General  Chart Reviewed: Yes  Additional Pertinent Hx: Mr. Zion Burroughs is a 63 y/o male presenting with AMS after collapsing in bathroom. Pt has recent lumbar fusion in Feb and returned home form rehab. Pt was dx with acute encephalopathy. Pt was intubated on 4/20 and extubated on 4/21. 4/22CT-head: (-) acute.  XR-chest: Endotracheal tube at the thoracic inlet, Bilateral perihilar predominant opacities may reflect edema and/or pneumonia. CT-L spine: Intact hardware in satisfactory alignment. CT-chest: Bilateral lower lobe atelectasis or pneumonia, (-) PE. MRI-brain ordered. PMH: CAD, CKD IV, DM, HLD, HTN, L BKA. Family / Caregiver Present: No  Referring Practitioner: Mason Verdugo MD  Referral Date : 04/20/22  Diagnosis: Acute encephalopathy  General Comment  Comments: Pt presents with telemetry and IV  Subjective  Subjective: Pt presents lying supine in bed. Pt agreeable to PT. Pt states \"I shouldn't be here\". Pain: Denies          Social/Functional History  Social/Functional History  Lives With: Spouse  Type of Home: House  Home Layout: Two level,Laundry in basement (Bedroom/bathroom on 2nd floor)  Home Access: Stairs to enter with rails  Entrance Stairs - Number of Steps: 2 DON  Bathroom Shower/Tub: Tub/Shower unit  Bathroom Toilet: Standard  Bathroom Equipment: Shower chair,Grab bars in shower (Sink next to toilet for leverage)  Bathroom Accessibility: Accessible  Home Equipment: Momo Sample, rolling,Cane,Wheelchair-manual,Reacher; LLE prosthesis  Has the patient had two or more falls in the past year or any fall with injury in the past year?: Yes (Reports \"a few\" falls in the last 4 months d/t losing balance)  ADL Assistance: Independent  Homemaking Assistance: Needs assistance (Wife primarily performs cooking, cleaning, and laundry however pt helps)  Homemaking Responsibilities: Yes  Ambulation Assistance: Independent (Recently using a w/c , uses walker ocassionally)  Transfer Assistance: Independent  Active :  Yes  Additional Comments: Pt is a questionable historian  Vision/Hearing  Vision Exceptions: Wears glasses for reading  Hearing: Within functional limits    Cognition   Orientation  Overall Orientation Status: Impaired  Orientation Level: Oriented to place;Oriented to person     Objective         Gross Assessment  AROM: Generally decreased, functional (R ankle DF lacking 10 degrees (fused), R knee ext WFL, R hip flex grossly decreased - AAROM WFL; L knee ext and hip flex WFL)  Strength: Generally decreased, functional  Sensation: Intact                    Bed mobility  Rolling to Left: Maximum assistance (VC's for use of rail)  Rolling to Right: Maximum assistance (VC's for use of rail)  Supine to Sit: Maximum assistance;2 Person assistance (Via log roll, increased time and effort, assist required for trunk and B LE support)  Sit to Supine: Maximum assistance;2 Person assistance (Via reverse log roll, assist required for trunk and B LE support)  Scooting: Maximal assistance (To EOB)  Transfers  Sit to Stand: Unable to assess  Stand to sit: Unable to assess  Bed to Chair: Unable to assess        Balance  Posture: Fair (Guarded)  Sitting - Static: Fair (Min assist x2 x 10 min sitting EOB, blocking B knees and trunk support)  Sitting - Dynamic: Poor (Max assist)           OutComes Score                                                  AM-PAC Score  AM-PAC Inpatient Mobility Raw Score : 8 (04/22/22 1450)  AM-PAC Inpatient T-Scale Score : 28.52 (04/22/22 1450)  Mobility Inpatient CMS 0-100% Score: 86.62 (04/22/22 1450)  Mobility Inpatient CMS G-Code Modifier : CM (04/22/22 1450)          Goals  Short Term Goals  Time Frame for Short term goals: D/C  Short term goal 1: Perform B rolling with min assist  Short term goal 2: Perform supine to sit transfer with mod assist x1 via log roll  Short term goal 3: Improve static stitting balance to good x10 min with CGA  Patient Goals   Patient goals : Not stated       Therapy Time   Individual Concurrent Group Co-treatment   Time In 1405         Time Out 1447         Minutes 42              Timed Code Treatment Minutes:   27    Total Treatment Minutes:  VICKEY Davis       Therapist was present, directed the patient's care, made skilled judgement, and was responsible for assessment and treatment of the patient.   Alissa Gibbs, [address] [place] [address]

## 2022-04-22 NOTE — PROGRESS NOTES
ICU Progress Note    Admit Date: 4/20/2022  Day: 2  Vent Day: Extubated 4/21   IV Access:Peripheral  IV Fluids:None  Vasopressors:None                Antibiotics: None  Diet: ADULT DIET; Regular; Low Fat/Low Chol/High Fiber/2 gm Na    CC: AMS    Interval history:   - extubated on 4/21, given diet   - ECHO: Overall left ventricular systolic function appears normal with and ejection fraction of 50-55%. No regional wall motion abnormalities are noted. Diastolic filling parameters suggests normal diastolic function  - MRI Brain: unable to complete as pt c/o back pain and unable to stay still despite ativan and dilaudid   - pharm med rec: indicates polypharmacy - patient has been taking 200 jorge BID as well as 300 jorge TID  - groggy this AM - responded with yes and no with eyes closed - was not interested in participating in exam   - ONE: 1 episode of projectile vomiting; Emesis was large in amount thin in consistency and dark brown in color    HPI:   Patient is a 63 yo male  PMHx of lumbar pain, T2DM, CAD, T2DM, HLD, HTN, lumbar spine fusion surgery back in February      presents with AMS.    He has been home for 1 month with brief moments of intermittent confusion. Today, he told his wife, \"I don't think I'm doing this right\" and his eyes rolled back and he fell to the ground and has been unresponsive since. He did not have any noted seizure activity. He had agonal respirations per EMS. Patient was placed on NRB when arrived to the ED and patient was mumbling what had happened, but was not having purposeful movement or following commands. He was intubated for airway protection.      Upon arrival to the ED, he was tachycardic and he became hypotensive when propofol was given. CT head indicated no acute infarct. CTPA shows no PE with bilateral atelectasis. CT of L spine pending. Lumbar spine did not appear to be abnormal on exam. Lactate elevated at 7.  Troponin elevated at 0.27. Cr elevated at 1.6 with baseline of 2.0. EKG nonischemic with no signs of STEMI. BNP elevated at 6122. KKY with metabolic acidosis.          Patient will be admitted to the ICU for vent management and neurological work up.     Medications:     Scheduled Meds:   insulin glargine  23 Units SubCUTAneous Nightly    heparin (porcine)  5,000 Units SubCUTAneous 3 times per day    insulin lispro  0-12 Units SubCUTAneous TID WC    insulin lispro  0-6 Units SubCUTAneous Nightly    sodium chloride flush  5-40 mL IntraVENous 2 times per day    [Held by provider] amitriptyline  25 mg Oral Nightly    [Held by provider] aspirin  81 mg Oral Daily    [Held by provider] losartan-hydroCHLOROthiazide  1 tablet Oral Daily    [Held by provider] atorvastatin  10 mg Oral Daily    [Held by provider] spironolactone  25 mg Oral Daily    levetiracetam  500 mg IntraVENous Q12H     Continuous Infusions:   fentaNYL 25 mcg/hr (04/21/22 1154)    sodium chloride      dextrose      propofol Stopped (04/21/22 1154)     PRN Meds:HYDROmorphone, perflutren lipid microspheres, sodium chloride flush, sodium chloride, ondansetron **OR** ondansetron, polyethylene glycol, acetaminophen **OR** acetaminophen, glucagon (rDNA), dextrose, glucose, dextrose bolus (hypoglycemia) **OR** dextrose bolus (hypoglycemia), labetalol    Objective:   Vitals:   T-max:  Patient Vitals for the past 8 hrs:   BP Temp Temp src Pulse Resp SpO2   04/22/22 0800 130/63 98.6 °F (37 °C) Oral 82 13 95 %   04/22/22 0600 129/63 -- -- 80 21 94 %   04/22/22 0500 129/66 -- -- 80 18 94 %   04/22/22 0410 138/69 98.3 °F (36.8 °C) Oral 88 21 96 %   04/22/22 0400 -- -- -- 92 -- --   04/22/22 0300 139/63 -- -- 92 -- --   04/22/22 0216 (!) 122/57 -- -- 85 -- (!) 85 %   04/22/22 0200 -- -- -- 89 -- 93 %       Intake/Output Summary (Last 24 hours) at 4/22/2022 0930  Last data filed at 4/22/2022 0700  Gross per 24 hour   Intake 1255.85 ml   Output 1350 ml   Net -94.15 ml       Review of Systems   All other systems reviewed and are negative. Physical Exam  Constitutional:       Appearance: He is obese. Comments: Lethargic, groggy   HENT:      Head: Normocephalic and atraumatic. Eyes:      Pupils: Pupils are equal, round, and reactive to light. Cardiovascular:      Rate and Rhythm: Normal rate and regular rhythm. Heart sounds: Normal heart sounds. Pulmonary:      Breath sounds: Normal breath sounds. Abdominal:      Palpations: Abdomen is soft. Musculoskeletal:      Right lower leg: Edema present. Comments: Amputated below knee L leg    Neurological:      Comments: Unable to fully assess as patient was not interested in participating in exam            LABS:    CBC:   Recent Labs     04/20/22 1627 04/21/22  0523 04/22/22  0619   WBC 7.6 5.1 5.8   HGB 11.1* 9.6* 9.8*   HCT 34.7* 29.4* 30.3*    179 177   MCV 94.0 95.0 92.9     Renal:    Recent Labs     04/20/22 1627 04/21/22  0523 04/22/22  0619    133* 138   K 4.2 4.2 4.3   CL 97* 99 102   CO2 21 21 24   BUN 28* 30* 29*   CREATININE 1.6* 1.5* 1.8*   GLUCOSE 353* 343* 222*   CALCIUM 9.4 9.0 8.9   MG  --  1.40* 2.00   ANIONGAP 19* 13 12     Hepatic:   Recent Labs     04/20/22  1627   AST 15   ALT 16   BILITOT 0.3   BILIDIR <0.2   PROT 7.4   LABALBU 3.5   ALKPHOS 194*     Troponin:   Recent Labs     04/20/22 2312 04/21/22  0523 04/21/22  1200   TROPONINI 0.24* 0.23* 0.30*     BNP: No results for input(s): BNP in the last 72 hours. Lipids: No results for input(s): CHOL, HDL in the last 72 hours. Invalid input(s): LDLCALCU, TRIGLYCERIDE  ABGs:    Recent Labs     04/20/22 2312 04/21/22  0623 04/21/22  1320   PHART 7.415 7.450 7. 411   GCN0MEZ 37.2 37.6 42.2   PO2ART 279.0* 194.0* 126.0*   GTP8JPD 24 26 27   BEART -0.5 2.1 1.9   D8TCYYFF >100 >100 99   ASS2MWU 25 27 28       INR:   Recent Labs     04/20/22  1627   INR 1.08     Lactate: No results for input(s): LACTATE in the last 72 hours.  Cultures:  -----------------------------------------------------------------  RAD:   CT CHEST PULMONARY EMBOLISM W CONTRAST   Final Result      1. No evidence of acute or chronic pulmonary embolus   2. Bilateral lower lobe atelectasis or pneumonia   3. Coronary artery calcification   4. Hiatal hernia in addition to a dilated esophagus, presbyesophagus with no retained fluid or fluid levels         CT LUMBAR SPINE WO CONTRAST   Final Result      1. Intact hardware in satisfactory alignment     2. Posterior soft tissue. Correlation with MRI or ultrasound recommended to assess the presence or absence of fluid versus postoperative granulation tissue   3. Transitional vertebra, intervertebral spacer at L3-L4 with evidence of subarticular narrowing associated with facet arthropathy         CT HEAD WO CONTRAST   Final Result      1. No evidence of acute intracranial hemorrhage or mass effect. 2.  Mild chronic small vessel ischemic disease. XR CHEST PORTABLE   Final Result      1. Endotracheal tube at the thoracic inlet. Recommend repositioning/advancement and repeat radiograph. 2.  Bilateral perihilar predominant opacities may reflect edema and/or pneumonia. MRI BRAIN WO CONTRAST    (Results Pending)         Assessment/Plan:   AMS 2/2 Possible Seizure vs Polypharmacy   CT head neg for any acute abnormalities. Patient had sudden onset of AMS after a fall witnessed by his wife. No seizure like movement was seen by his partner. MRI on 4/10 at [facility] showed Mild small vessel ischemia and small remote lacunar infarcts. Lactic acid 7 on admission. Patient has had poor PO intake recently. Patient also had shaking since his back surgery.  After his fall he did not show any post ictal symptoms.   - Keppra loaded 1000mg => maintenance 500 BID  - TSH/Ammonia/CK ordered on admit - normal  - intubated and sedated on admit, extubated 4/21, sedation off  - neuro c/s - ordered MRI - patient unable to complete b/c c/o of severe back pain  - 4/21: not entirely convinced this was a seizure based off his presentation, no elevation in CK, etc; however, he did have an increase in lactic acid and he was unconscious for a prolonged period of time. Neuro will order MRI and spot EEG and we will order an ECHO to r/o structural causes  4/22: pharmacy med rec indicates polypharmacy with 2 different jorge Rx (200 BID and 300 TID)      CKD 3B/4  - Avoid nephrotoxins   - Cr 1.6 on admission  - Cr stable near baseline of 2.      Anion Gap Metabolic Acidosis - RESOLVED  - AG on admission 21.   - Lactic acid on admission was 7.   - VBG 7.289/46 on admission    - Normal saline 100/h   -Trend Q6 lactic acid - trend d/c'ed on 4/21 as lactic acid normalized   - AG normal as of 4/21      CAD  Elevated Troponin  - ProBNP 3k - Echo 6 months ago showed EF 55-60%. - Stress test neg for ischemia.   - EKG showed no STEMI. -Troponin trend: .27 - .24 - .23 - dc'ed trend on 4/21 as trops consistently coming down  - Likely demand ischemia from poor PO intake.       HTN  -Currently held home medications since patient intubated  - propofol caused hypotension for patient   - Add PRN labetalol if patient becomes hypertensive while intubated  - 4/21: patient extubated, can add home BP meds if required      HLD  - Held statin as patient is intubated   - 4/21:restart patient's statin since extubated on 4/21     T2DM  - lantus 23 units QHS  - MDSSI   - POC glucose checks  - hypoglycemia protocol        Code Status: Full Code  FEN: Adult - regular  PPX: Heparin Sub q  DISPO: ICU    Liagn Remy MD, PGY-1  04/22/22  9:30 AM    This patient has been staffed and discussed with Dr. Katya Alvarado    [facility] [place]     Patient seen and examined. I agree with Dr. Mayo Wells history, physical, lab findings, assessment and plan.     Saniya Guillermo was extubated yesterday and currently is on room air with an oxygen saturation of 99%  He has had no seizure-like episode  He had backpain and agitation when MRI of brain was attempted. Ultimately scan was unable to be performed    For me today he was a little bit lethargic but would wake up in answer questions for me for the most part was oriented.   Patient is on a low-sodium regular diet now    Patient appears to have CKD and creatinine is 1.8 which appears to be close to baseline    Etiology of loss of consciousness is unclear -seizure versus polypharmacy    Plan    -Continue Keppra 500 every 12 hours  -Resume home [place] and Neurontin with care     Okay to transfer from ICU    Will sign off    Nirali Rodriguez MD

## 2022-04-22 NOTE — PROGRESS NOTES
Hospitalist Progress Note      PCP: Daniela Huerta    Chief Complaint. Landon Castellano is a 64 y.o. male with a reported history of LOSS OF CONSCIOUSNESS    Date of Admission: 4/20/2022    Subjective: intubated and sedated    Medications:  Reviewed    Infusion Medications    fentaNYL 25 mcg/hr (04/21/22 1154)    sodium chloride      dextrose      propofol Stopped (04/21/22 1154)     Scheduled Medications    insulin glargine  23 Units SubCUTAneous Nightly    heparin (porcine)  5,000 Units SubCUTAneous 3 times per day    insulin lispro  0-12 Units SubCUTAneous TID WC    insulin lispro  0-6 Units SubCUTAneous Nightly    sodium chloride flush  5-40 mL IntraVENous 2 times per day    [Held by provider] amitriptyline  25 mg Oral Nightly    [Held by provider] aspirin  81 mg Oral Daily    [Held by provider] losartan-hydroCHLOROthiazide  1 tablet Oral Daily    [Held by provider] atorvastatin  10 mg Oral Daily    [Held by provider] spironolactone  25 mg Oral Daily    levetiracetam  500 mg IntraVENous Q12H     PRN Meds: HYDROmorphone, perflutren lipid microspheres, sodium chloride flush, sodium chloride, ondansetron **OR** ondansetron, polyethylene glycol, acetaminophen **OR** acetaminophen, glucagon (rDNA), dextrose, glucose, dextrose bolus (hypoglycemia) **OR** dextrose bolus (hypoglycemia), labetalol      Intake/Output Summary (Last 24 hours) at 4/22/2022 1343  Last data filed at 4/22/2022 1038  Gross per 24 hour   Intake --   Output 1550 ml   Net -1550 ml       Physical Exam Performed:    /61   Pulse 92   Temp 98.6 °F (37 °C) (Oral)   Resp 23   Ht 6' (1.829 m)   Wt 286 lb 9.6 oz (130 kg)   SpO2 99%   BMI 38.87 kg/m²     General appearance: intubated and sedated  HEENT:  Conjunctivae/corneas clear. Neck: Supple, with full range of motion. Respiratory:  Normal respiratory effort. Clear to auscultation, bilaterally without Rales/Wheezes/Rhonchi.   Cardiovascular: Regular rate and rhythm with normal S1/S2 without murmurs or rubs  Abdomen: Soft, non-tender, non-distended, normal bowel sounds. Musculoskeletal: No cyanosis or edema bilaterally  Neurologic: intubated and sedated  Psychiatric: intubated and sedated  Peripheral Pulses: +2 palpable, equal bilaterally       Labs:   Recent Labs     04/20/22  1627 04/21/22  0523 04/22/22  0619   WBC 7.6 5.1 5.8   HGB 11.1* 9.6* 9.8*   HCT 34.7* 29.4* 30.3*    179 177     Recent Labs     04/20/22  1627 04/21/22  0523 04/22/22  0619    133* 138   K 4.2 4.2 4.3   CL 97* 99 102   CO2 21 21 24   BUN 28* 30* 29*   CREATININE 1.6* 1.5* 1.8*   CALCIUM 9.4 9.0 8.9     Recent Labs     04/20/22  1627   AST 15   ALT 16   BILIDIR <0.2   BILITOT 0.3   ALKPHOS 194*     Recent Labs     04/20/22  1627   INR 1.08     Recent Labs     04/20/22  2312 04/20/22  2357 04/21/22  0523 04/21/22  1200   CKTOTAL  --  101  --   --    TROPONINI 0.24*  --  0.23* 0.30*       Urinalysis:      Lab Results   Component Value Date    NITRU Negative 05/11/2017    BACTERIA Rare 10/08/2011    RBCUA 0-3 10/08/2011    BLOODU Negative 05/11/2017    SPECGRAV 1.025 05/11/2017    GLUCOSEU Negative 05/11/2017    GLUCOSEU Negative 10/08/2011       Radiology:  CT CHEST PULMONARY EMBOLISM W CONTRAST   Final Result      1. No evidence of acute or chronic pulmonary embolus   2. Bilateral lower lobe atelectasis or pneumonia   3. Coronary artery calcification   4. Hiatal hernia in addition to a dilated esophagus, presbyesophagus with no retained fluid or fluid levels         CT LUMBAR SPINE WO CONTRAST   Final Result      1. Intact hardware in satisfactory alignment     2. Posterior soft tissue. Correlation with MRI or ultrasound recommended to assess the presence or absence of fluid versus postoperative granulation tissue   3.  Transitional vertebra, intervertebral spacer at L3-L4 with evidence of subarticular narrowing associated with facet arthropathy         CT HEAD WO CONTRAST   Final Result 1.  No evidence of acute intracranial hemorrhage or mass effect. 2.  Mild chronic small vessel ischemic disease. XR CHEST PORTABLE   Final Result      1. Endotracheal tube at the thoracic inlet. Recommend repositioning/advancement and repeat radiograph. 2.  Bilateral perihilar predominant opacities may reflect edema and/or pneumonia. MRI BRAIN WO CONTRAST    (Results Pending)         Assessment/Plan:    Active Hospital Problems    Diagnosis     Polypharmacy [Z79.899]      Priority: Medium    Poorly controlled diabetes mellitus ([facility] [address] [E11.65]      Priority: Medium    BMI 38.0-38.9,adult [Z68.38]      Priority: Medium    Acute encephalopathy [G93.40]      Priority: Medium       Acute encephalopathy  MRI Brain  Q2H neuro checks  Possible extubation  Continue keppra for now  Routine EEG      HUNTER  Improving  Monitor. Consult nephrology     Seizure  Keppra for seizure     SSI     Dispo: Pending improvement    Diet: ADULT DIET;  Regular; Low Fat/Low Chol/High Fiber/2 gm Na  Code Status: Full Code    PT/OT Eval Status: ordered    Dispo -  Continue ICU care     Denita Daly MD

## 2022-04-22 NOTE — PLAN OF CARE
Problem: Pain  Goal: Verbalizes/displays adequate comfort level or baseline comfort level  Outcome: Progressing  Flowsheets (Taken 4/22/2022 1811)  Verbalizes/displays adequate comfort level or baseline comfort level:   Encourage patient to monitor pain and request assistance   Assess pain using appropriate pain scale  Note: Pain assessed q4 hrs and PRN using the 0-10 pain scale. Pain goal reviewed with patient. PRN pain medications given as needed and comfort provided non-pharmacologically (i.e. Repositioning). Patient instructed to report pain to RN. Problem: Safety - Adult  Goal: Free from fall injury  Flowsheets (Taken 4/22/2022 1026)  Free From Fall Injury: Based on caregiver fall risk screen, instruct family/caregiver to ask for assistance with transferring infant if caregiver noted to have fall risk factors  Note: Patient is at an increased risk for falls, see Amber Lui Fall Score. Fall precautions in place per protocol. Fall cloth at foot of bed, fall band around wrist and nonskid footwear in place. Patient instructed to call for assistance by using nurse call light before attempting to get out of bed. Bed is locked and in lowest position with side rails up 3/4. Bed alarm engaged. Room door left open. Belongings and call light within reach. Hourly visual safety checks in place.

## 2022-04-22 NOTE — PLAN OF CARE
Problem: Pain  Goal: Verbalizes/displays adequate comfort level or baseline comfort level  4/22/2022 1932 by Mel Kincaid RN  Outcome: Progressing  4/22/2022 1811 by Amelia Enriquez RN  Outcome: Progressing  Flowsheets (Taken 4/22/2022 1811)  Verbalizes/displays adequate comfort level or baseline comfort level:   Encourage patient to monitor pain and request assistance   Assess pain using appropriate pain scale  Note: Pain assessed q4 hrs and PRN using the 0-10 pain scale. Pain goal reviewed with patient. PRN pain medications given as needed and comfort provided non-pharmacologically (i.e. Repositioning). Patient instructed to report pain to RN. Problem: Discharge Planning  Goal: Discharge to home or other facility with appropriate resources  Outcome: Progressing     Problem: Safety - Medical Restraint  Goal: Remains free of injury from restraints (Restraint for Interference with Medical Device)  Description: INTERVENTIONS:  1. Determine that other, less restrictive measures have been tried or would not be effective before applying the restraint  2. Evaluate the patient's condition at the time of restraint application  3. Inform patient/family regarding the reason for restraint  4. Q2H: Monitor safety, psychosocial status, comfort, nutrition and hydration  Outcome: Progressing     Problem: Skin/Tissue Integrity  Goal: Absence of new skin breakdown  Description: 1. Monitor for areas of redness and/or skin breakdown  2. Assess vascular access sites hourly  3. Every 4-6 hours minimum:  Change oxygen saturation probe site  4. Every 4-6 hours:  If on nasal continuous positive airway pressure, respiratory therapy assess nares and determine need for appliance change or resting period.   Outcome: Progressing     Problem: Safety - Adult  Goal: Free from fall injury  4/22/2022 1932 by Mel Kincaid RN  Outcome: Progressing  4/22/2022 1811 by Amelia Enriquez RN  Flowsheets (Taken 4/22/2022 1026)  Free From Fall Injury: Based on caregiver fall risk screen, instruct family/caregiver to ask for assistance with transferring infant if caregiver noted to have fall risk factors  Note: Patient is at an increased risk for falls, see Jt Bar Fall Score. Fall precautions in place per protocol. Fall cloth at foot of bed, fall band around wrist and nonskid footwear in place. Patient instructed to call for assistance by using nurse call light before attempting to get out of bed. Bed is locked and in lowest position with side rails up 3/4. Bed alarm engaged. Room door left open. Belongings and call light within reach. Hourly visual safety checks in place.        Problem: ABCDS Injury Assessment  Goal: Absence of physical injury  Outcome: Progressing     Problem: Chronic Conditions and Co-morbidities  Goal: Patient's chronic conditions and co-morbidity symptoms are monitored and maintained or improved  Outcome: Progressing     Problem: Nutrition Deficit:  Goal: Optimize nutritional status  Outcome: Progressing

## 2022-04-22 NOTE — PROGRESS NOTES
During shift report patient experienced episode of projectile vomiting. Emesis was large in amount thin in consistency and dark brown in color. Head of bed was raised to high fowlers with no further episodes noted. Vitals are WDL and patient denies any pain. Bowel sounds are active in x4 quadrants. Lung sounds are clear / diminished and SpO2% reads 95% on monitor. Patient was provided with new gown and linens. Will continue to monitor closely.

## 2022-04-22 NOTE — PLAN OF CARE
Problem: Discharge Planning  Goal: Discharge to home or other facility with appropriate resources  4/21/2022 2255 by Cat Cameron RN  Outcome: Progressing  4/21/2022 2000 by Cat Cameron RN  Outcome: Progressing  4/21/2022 1903 by Garcia Acosta RN  Outcome: Progressing     Problem: Pain  Goal: Verbalizes/displays adequate comfort level or baseline comfort level  4/21/2022 2255 by Cat Cameron RN  Outcome: Progressing  4/21/2022 2000 by Cat Cameron RN  Outcome: Progressing  4/21/2022 1903 by Garcia Acosta RN  Outcome: Progressing     Problem: Safety - Medical Restraint  Goal: Remains free of injury from restraints (Restraint for Interference with Medical Device)  Description: INTERVENTIONS:  1. Determine that other, less restrictive measures have been tried or would not be effective before applying the restraint  2. Evaluate the patient's condition at the time of restraint application  3. Inform patient/family regarding the reason for restraint  4. Q2H: Monitor safety, psychosocial status, comfort, nutrition and hydration  4/21/2022 2255 by Cat Cameron RN  Outcome: Progressing  4/21/2022 2000 by aCt Cameron RN  Outcome: Progressing  4/21/2022 1903 by Garcia Acosta RN  Outcome: Progressing     Problem: Skin/Tissue Integrity  Goal: Absence of new skin breakdown  Description: 1. Monitor for areas of redness and/or skin breakdown  2. Assess vascular access sites hourly  3. Every 4-6 hours minimum:  Change oxygen saturation probe site  4. Every 4-6 hours:  If on nasal continuous positive airway pressure, respiratory therapy assess nares and determine need for appliance change or resting period.   4/21/2022 2255 by Cat Cameron RN  Outcome: Progressing  4/21/2022 2000 by Cat Cameron RN  Outcome: Progressing  4/21/2022 1903 by Garcia Acosta RN  Outcome: Progressing     Problem: Safety - Adult  Goal: Free from fall injury  4/21/2022 2255 by Cat Cameron RN  Outcome: Progressing  2022 by Paulie Quiroz RN  Outcome: Progressing  2022 1903 by Gianni Jones RN  Outcome: Progressing     Problem: ABCDS Injury Assessment  Goal: Absence of physical injury  2022 by Paulie Quiroz RN  Outcome: Progressing  2022 by Paulie Quiroz RN  Outcome: Progressing  2022 1903 by Gianni Jones RN  Outcome: Progressing     Problem: Chronic Conditions and Co-morbidities  Goal: Patient's chronic conditions and co-morbidity symptoms are monitored and maintained or improved  2022 by Paulie Quiroz RN  Outcome: Progressing  2022 by Paulie Quiroz RN  Outcome: Progressing  2022 by Gianni Jones RN  Outcome: Progressing     Problem: Nutrition Deficit:  Goal: Optimize nutritional status  2022 by Paulie Quiroz RN  Outcome: Progressing  2022 by Paulie Quiroz RN  Outcome: Progressing  2022 by Gianni Jones RN  Outcome: Progressing  2022 1545 by Arlan Apgar, MS, RD, LD  Outcome: Progressing  Note: Nutrition Problem #1: Inadequate oral intake  Intervention: Food and/or Nutrient Delivery: Continue NPO  Nutritional   pt will tolerate the most appropriate form of nutrition to meet >75% energy needs throughout adm

## 2022-04-22 NOTE — CARE COORDINATION
Wife and daughter work and cannot be home with him. Patient is on ST disability from work for back surgery. Will be having repeat EEG. Also, would not tolerate MRI. Patient still trying to tolerate PO meals. OT/PT will be done to see what kind of placement is needed. CM will continue to follow patient until discharge.  Electronically signed by Chesley Ahumada, RN on 4/22/2022 at 3:09 PM

## 2022-04-22 NOTE — PROCEDURES
Name: Iggy Alvarado  MRN: [id number]  : 1960  Interpreting Physician: Haleigh Soler MD   Referring Physician: Adri Jones MD  Date of EE2022    Clinical History:  Iggy Alvarado is a 64 y.o. male with a reported history of LOSS OF CONSCIOUSNESS    Current Antiepileptic Medications:    insulin glargine  23 Units SubCUTAneous Nightly    heparin (porcine)  5,000 Units SubCUTAneous 3 times per day    insulin lispro  0-12 Units SubCUTAneous TID WC    insulin lispro  0-6 Units SubCUTAneous Nightly    sodium chloride flush  5-40 mL IntraVENous 2 times per day    [Held by provider] amitriptyline  25 mg Oral Nightly    [Held by provider] aspirin  81 mg Oral Daily    [Held by provider] losartan-hydroCHLOROthiazide  1 tablet Oral Daily    [Held by provider] atorvastatin  10 mg Oral Daily    [Held by provider] spironolactone  25 mg Oral Daily    levetiracetam  500 mg IntraVENous Q12H       Indication:Altered mental status and SYNCOPE      Technical Summary:  20 channels of EEG were recorded in a digital format on a patient who was reported to be awake, RESTLESS and drowsy state during the recording. The patient was not sleep deprived prior to the EEG. RECORDING was cut short due to the patient being uncooperative throughout. Only 8 minutes and 30 seconds were captured. The recording revealed abnormal background with in the Beta frequency seen throughout. Myogenic artifact severe at times noted throughout. No reactivity to eye opening and closure. Photic stimulation and hyperventilation were not performed due to medical condition and agitation. During the recording stage II sleep was not seen. The EKG lead revealed rhythm abnormalities. Video was reviewed and no clinical signs of epileptiform activity. EEG Interpretation:    This EEG was truncated and the portion that was available for reviewed showed no epileptiform activity but presence of Beta activity  However, study is too short to make any definitive diagnosis. Study should be repeated when patient is able to lay still for the recording. Clinical correlation is recommended.       Electronically signed by Haleigh Soler MD on 4/22/2022 at 12:59 PM

## 2022-04-22 NOTE — PLAN OF CARE
Problem: Pain  Goal: Verbalizes/displays adequate comfort level or baseline comfort level  4/21/2022 2000 by Cat Cameron RN  Outcome: Progressing  4/21/2022 1903 by Garcia Acosta RN  Outcome: Progressing     Problem: Safety - Medical Restraint  Goal: Remains free of injury from restraints (Restraint for Interference with Medical Device)  Description: INTERVENTIONS:  1. Determine that other, less restrictive measures have been tried or would not be effective before applying the restraint  2. Evaluate the patient's condition at the time of restraint application  3. Inform patient/family regarding the reason for restraint  4. Q2H: Monitor safety, psychosocial status, comfort, nutrition and hydration  4/21/2022 2000 by Cat Cameron RN  Outcome: Progressing  4/21/2022 1903 by Garcia Acosta RN  Outcome: Progressing     Problem: Skin/Tissue Integrity  Goal: Absence of new skin breakdown  Description: 1. Monitor for areas of redness and/or skin breakdown  2. Assess vascular access sites hourly  3. Every 4-6 hours minimum:  Change oxygen saturation probe site  4. Every 4-6 hours:  If on nasal continuous positive airway pressure, respiratory therapy assess nares and determine need for appliance change or resting period.   4/21/2022 2000 by Cat Cameron RN  Outcome: Progressing  4/21/2022 1903 by Garcia Acosta RN  Outcome: Progressing     Problem: Safety - Adult  Goal: Free from fall injury  4/21/2022 2000 by Cat Cameron RN  Outcome: Progressing  4/21/2022 1903 by Garcia Acosta RN  Outcome: Progressing     Problem: ABCDS Injury Assessment  Goal: Absence of physical injury  4/21/2022 2000 by Cat Cameron RN  Outcome: Progressing  4/21/2022 1903 by Garcia Acosta RN  Outcome: Progressing     Problem: Chronic Conditions and Co-morbidities  Goal: Patient's chronic conditions and co-morbidity symptoms are monitored and maintained or improved  4/21/2022 2000 by Cat Cameron RN  Outcome: Progressing  4/21/2022 1903 by Isma Ulloa RN  Outcome: Progressing

## 2022-04-23 ENCOUNTER — APPOINTMENT (OUTPATIENT)
Dept: MRI IMAGING | Age: 62
DRG: 100 | End: 2022-04-23
Payer: COMMERCIAL

## 2022-04-23 LAB
ANION GAP SERPL CALCULATED.3IONS-SCNC: 12 MMOL/L (ref 3–16)
BASOPHILS ABSOLUTE: 0 K/UL (ref 0–0.2)
BASOPHILS RELATIVE PERCENT: 0.2 %
BUN BLDV-MCNC: 27 MG/DL (ref 7–20)
CALCIUM SERPL-MCNC: 9.2 MG/DL (ref 8.3–10.6)
CHLORIDE BLD-SCNC: 100 MMOL/L (ref 99–110)
CO2: 25 MMOL/L (ref 21–32)
CREAT SERPL-MCNC: 1.7 MG/DL (ref 0.8–1.3)
EOSINOPHILS ABSOLUTE: 0.1 K/UL (ref 0–0.6)
EOSINOPHILS RELATIVE PERCENT: 0.8 %
GFR AFRICAN AMERICAN: 50
GFR NON-AFRICAN AMERICAN: 41
GLUCOSE BLD-MCNC: 212 MG/DL (ref 70–99)
GLUCOSE BLD-MCNC: 285 MG/DL (ref 70–99)
GLUCOSE BLD-MCNC: 287 MG/DL (ref 70–99)
GLUCOSE BLD-MCNC: 337 MG/DL (ref 70–99)
GLUCOSE BLD-MCNC: 346 MG/DL (ref 70–99)
HCT VFR BLD CALC: 30.9 % (ref 40.5–52.5)
HEMOGLOBIN: 10.3 G/DL (ref 13.5–17.5)
LYMPHOCYTES ABSOLUTE: 0.7 K/UL (ref 1–5.1)
LYMPHOCYTES RELATIVE PERCENT: 8.2 %
MAGNESIUM: 1.8 MG/DL (ref 1.8–2.4)
MCH RBC QN AUTO: 30.6 PG (ref 26–34)
MCHC RBC AUTO-ENTMCNC: 33.2 G/DL (ref 31–36)
MCV RBC AUTO: 92.3 FL (ref 80–100)
MONOCYTES ABSOLUTE: 0.7 K/UL (ref 0–1.3)
MONOCYTES RELATIVE PERCENT: 8.4 %
NEUTROPHILS ABSOLUTE: 6.6 K/UL (ref 1.7–7.7)
NEUTROPHILS RELATIVE PERCENT: 82.4 %
PDW BLD-RTO: 16.7 % (ref 12.4–15.4)
PERFORMED ON: ABNORMAL
PLATELET # BLD: 211 K/UL (ref 135–450)
PMV BLD AUTO: 7.8 FL (ref 5–10.5)
POTASSIUM REFLEX MAGNESIUM: 3.9 MMOL/L (ref 3.5–5.1)
RBC # BLD: 3.35 M/UL (ref 4.2–5.9)
SODIUM BLD-SCNC: 137 MMOL/L (ref 136–145)
WBC # BLD: 8 K/UL (ref 4–11)

## 2022-04-23 PROCEDURE — 1200000000 HC SEMI PRIVATE

## 2022-04-23 PROCEDURE — 99233 SBSQ HOSP IP/OBS HIGH 50: CPT | Performed by: INTERNAL MEDICINE

## 2022-04-23 PROCEDURE — 6360000002 HC RX W HCPCS

## 2022-04-23 PROCEDURE — 6370000000 HC RX 637 (ALT 250 FOR IP)

## 2022-04-23 PROCEDURE — 2580000003 HC RX 258

## 2022-04-23 PROCEDURE — 6370000000 HC RX 637 (ALT 250 FOR IP): Performed by: INTERNAL MEDICINE

## 2022-04-23 PROCEDURE — 36415 COLL VENOUS BLD VENIPUNCTURE: CPT

## 2022-04-23 PROCEDURE — 6360000002 HC RX W HCPCS: Performed by: STUDENT IN AN ORGANIZED HEALTH CARE EDUCATION/TRAINING PROGRAM

## 2022-04-23 PROCEDURE — 80048 BASIC METABOLIC PNL TOTAL CA: CPT

## 2022-04-23 PROCEDURE — 85025 COMPLETE CBC W/AUTO DIFF WBC: CPT

## 2022-04-23 PROCEDURE — 83735 ASSAY OF MAGNESIUM: CPT

## 2022-04-23 RX ORDER — CALCIUM CARBONATE 200(500)MG
500 TABLET,CHEWABLE ORAL 3 TIMES DAILY PRN
Status: DISCONTINUED | OUTPATIENT
Start: 2022-04-23 | End: 2022-04-27 | Stop reason: HOSPADM

## 2022-04-23 RX ORDER — LEVETIRACETAM 500 MG/1
500 TABLET ORAL 2 TIMES DAILY
Status: DISCONTINUED | OUTPATIENT
Start: 2022-04-23 | End: 2022-04-27 | Stop reason: HOSPADM

## 2022-04-23 RX ORDER — LORAZEPAM 2 MG/ML
1 INJECTION INTRAMUSCULAR ONCE
Status: COMPLETED | OUTPATIENT
Start: 2022-04-23 | End: 2022-04-23

## 2022-04-23 RX ADMIN — INSULIN LISPRO 6 UNITS: 100 INJECTION, SOLUTION INTRAVENOUS; SUBCUTANEOUS at 18:44

## 2022-04-23 RX ADMIN — INSULIN LISPRO 4 UNITS: 100 INJECTION, SOLUTION INTRAVENOUS; SUBCUTANEOUS at 12:31

## 2022-04-23 RX ADMIN — SPIRONOLACTONE 25 MG: 25 TABLET, FILM COATED ORAL at 11:40

## 2022-04-23 RX ADMIN — ATORVASTATIN CALCIUM 10 MG: 10 TABLET, FILM COATED ORAL at 11:39

## 2022-04-23 RX ADMIN — METOCLOPRAMIDE HYDROCHLORIDE 10 MG: 5 INJECTION INTRAMUSCULAR; INTRAVENOUS at 13:54

## 2022-04-23 RX ADMIN — LEVETIRACETAM 500 MG: 500 TABLET, FILM COATED ORAL at 11:39

## 2022-04-23 RX ADMIN — HEPARIN SODIUM 5000 UNITS: 5000 INJECTION INTRAVENOUS; SUBCUTANEOUS at 06:06

## 2022-04-23 RX ADMIN — INSULIN LISPRO 8 UNITS: 100 INJECTION, SOLUTION INTRAVENOUS; SUBCUTANEOUS at 08:29

## 2022-04-23 RX ADMIN — AMITRIPTYLINE HYDROCHLORIDE 25 MG: 50 TABLET, FILM COATED ORAL at 20:52

## 2022-04-23 RX ADMIN — SODIUM CHLORIDE, PRESERVATIVE FREE 10 ML: 5 INJECTION INTRAVENOUS at 08:32

## 2022-04-23 RX ADMIN — HEPARIN SODIUM 5000 UNITS: 5000 INJECTION INTRAVENOUS; SUBCUTANEOUS at 22:13

## 2022-04-23 RX ADMIN — LOSARTAN POTASSIUM AND HYDROCHLOROTHIAZIDE 1 TABLET: 12.5; 5 TABLET ORAL at 11:39

## 2022-04-23 RX ADMIN — SODIUM CHLORIDE, PRESERVATIVE FREE 10 ML: 5 INJECTION INTRAVENOUS at 20:59

## 2022-04-23 RX ADMIN — ANTACID TABLETS 500 MG: 500 TABLET, CHEWABLE ORAL at 19:56

## 2022-04-23 RX ADMIN — ASPIRIN 81 MG: 81 TABLET, COATED ORAL at 11:38

## 2022-04-23 RX ADMIN — HYDROMORPHONE HYDROCHLORIDE 0.25 MG: 1 INJECTION, SOLUTION INTRAMUSCULAR; INTRAVENOUS; SUBCUTANEOUS at 22:59

## 2022-04-23 RX ADMIN — METOCLOPRAMIDE HYDROCHLORIDE 10 MG: 5 INJECTION INTRAMUSCULAR; INTRAVENOUS at 23:02

## 2022-04-23 RX ADMIN — INSULIN LISPRO 4 UNITS: 100 INJECTION, SOLUTION INTRAVENOUS; SUBCUTANEOUS at 20:53

## 2022-04-23 RX ADMIN — LEVETIRACETAM 500 MG: 500 TABLET, FILM COATED ORAL at 20:52

## 2022-04-23 RX ADMIN — LORAZEPAM 1 MG: 2 INJECTION INTRAMUSCULAR; INTRAVENOUS at 22:13

## 2022-04-23 RX ADMIN — HEPARIN SODIUM 5000 UNITS: 5000 INJECTION INTRAVENOUS; SUBCUTANEOUS at 13:54

## 2022-04-23 ASSESSMENT — PAIN SCALES - GENERAL
PAINLEVEL_OUTOF10: 0
PAINLEVEL_OUTOF10: 7

## 2022-04-23 NOTE — PROGRESS NOTES
Hospitalist Progress Note      PCP: Tamy Palafox    Chief Complaint. Joshua Fishman is a 64 y.o. male with a reported history of LOSS OF CONSCIOUSNESS    Date of Admission: 4/20/2022    Subjective: seizure     Medications:  Seen at bedside, no acute events overnight, denied CP, SOB, fever, chills    Infusion Medications    sodium chloride      dextrose      propofol Stopped (04/21/22 1154)     Scheduled Medications    levETIRAcetam  500 mg Oral BID    insulin glargine  23 Units SubCUTAneous Nightly    heparin (porcine)  5,000 Units SubCUTAneous 3 times per day    insulin lispro  0-12 Units SubCUTAneous TID WC    insulin lispro  0-6 Units SubCUTAneous Nightly    sodium chloride flush  5-40 mL IntraVENous 2 times per day    amitriptyline  25 mg Oral Nightly    aspirin  81 mg Oral Daily    [Held by provider] losartan-hydroCHLOROthiazide  1 tablet Oral Daily    atorvastatin  10 mg Oral Daily    [Held by provider] spironolactone  25 mg Oral Daily     PRN Meds: HYDROmorphone, metoclopramide, perflutren lipid microspheres, sodium chloride flush, sodium chloride, ondansetron **OR** ondansetron, polyethylene glycol, acetaminophen **OR** acetaminophen, glucagon (rDNA), dextrose, glucose, dextrose bolus (hypoglycemia) **OR** dextrose bolus (hypoglycemia), labetalol      Intake/Output Summary (Last 24 hours) at 4/23/2022 1553  Last data filed at 4/23/2022 0300  Gross per 24 hour   Intake --   Output 650 ml   Net -650 ml       Physical Exam Performed:    BP (!) 144/79   Pulse 95   Temp 98.2 °F (36.8 °C) (Oral)   Resp 21   Ht 6' (1.829 m)   Wt 286 lb 9.6 oz (130 kg)   SpO2 92%   BMI 38.87 kg/m²     General appearance: NAD, on RA  HEENT:  Conjunctivae/corneas clear. Neck: Supple, with full range of motion. Respiratory:  Normal respiratory effort. Clear to auscultation, bilaterally without Rales/Wheezes/Rhonchi.   Cardiovascular: Regular rate and rhythm with normal S1/S2 without murmurs or rubs  Abdomen: Soft, non-tender, non-distended, normal bowel sounds. Musculoskeletal: No cyanosis or edema bilaterally  Neurologic: moving all extremities spontaneously, gross neurologically intact  Psychiatric: normal mood  Peripheral Pulses: +2 palpable, equal bilaterally       Labs:   Recent Labs     04/21/22  0523 04/22/22  0619 04/23/22  0546   WBC 5.1 5.8 8.0   HGB 9.6* 9.8* 10.3*   HCT 29.4* 30.3* 30.9*    177 211     Recent Labs     04/21/22  0523 04/22/22  0619 04/23/22  0546   * 138 137   K 4.2 4.3 3.9   CL 99 102 100   CO2 21 24 25   BUN 30* 29* 27*   CREATININE 1.5* 1.8* 1.7*   CALCIUM 9.0 8.9 9.2     Recent Labs     04/20/22  1627   AST 15   ALT 16   BILIDIR <0.2   BILITOT 0.3   ALKPHOS 194*     Recent Labs     04/20/22  1627   INR 1.08     Recent Labs     04/20/22  2312 04/20/22  2357 04/21/22  0523 04/21/22  1200   CKTOTAL  --  101  --   --    TROPONINI 0.24*  --  0.23* 0.30*       Urinalysis:      Lab Results   Component Value Date    NITRU Negative 05/11/2017    BACTERIA Rare 10/08/2011    RBCUA 0-3 10/08/2011    BLOODU Negative 05/11/2017    SPECGRAV 1.025 05/11/2017    GLUCOSEU Negative 05/11/2017    GLUCOSEU Negative 10/08/2011       Radiology:  CT CHEST PULMONARY EMBOLISM W CONTRAST   Final Result      1. No evidence of acute or chronic pulmonary embolus   2. Bilateral lower lobe atelectasis or pneumonia   3. Coronary artery calcification   4. Hiatal hernia in addition to a dilated esophagus, presbyesophagus with no retained fluid or fluid levels         CT LUMBAR SPINE WO CONTRAST   Final Result      1. Intact hardware in satisfactory alignment     2. Posterior soft tissue. Correlation with MRI or ultrasound recommended to assess the presence or absence of fluid versus postoperative granulation tissue   3.  Transitional vertebra, intervertebral spacer at L3-L4 with evidence of subarticular narrowing associated with facet arthropathy         CT HEAD WO CONTRAST   Final Result 1.  No evidence of acute intracranial hemorrhage or mass effect. 2.  Mild chronic small vessel ischemic disease. XR CHEST PORTABLE   Final Result      1. Endotracheal tube at the thoracic inlet. Recommend repositioning/advancement and repeat radiograph. 2.  Bilateral perihilar predominant opacities may reflect edema and/or pneumonia. MRI BRAIN WO CONTRAST    (Results Pending)         Assessment/Plan:    Active Hospital Problems    Diagnosis     Altered mental status [R41.82]      Priority: Medium    Polypharmacy [Z79.899]      Priority: Medium    Poorly controlled diabetes mellitus ([facility] [address] [E11.65]      Priority: Medium    BMI 38.0-38.9,adult [Z68.38]      Priority: Medium    Acute encephalopathy [G93.40]      Priority: Medium       Acute encephalopathy  MRI Brain - canceled by neurology  Q2H neuro checks  Continue keppra for now  Routine EEG - no definitive seizure activity     HUNTER- Improving  Monitor. Consulted nephrology     Seizure  Keppra for seizure     SSI  Diet: ADULT DIET;  Regular; Low Fat/Low Chol/High Fiber/2 gm Na  Code Status: Full Code    PT/OT Eval Status: ordered    Dispo -  may need SNF, spoke to family and updated on the plan , Ok to transfer to the floor, dc 1-2 days    Nguyen Snowden MD

## 2022-04-23 NOTE — PROGRESS NOTES
Patient is alert and oriented x4. No complaints of nausea. Wife currently at bedside. All questions answered. Will continue to monitor.

## 2022-04-23 NOTE — SIGNIFICANT EVENT
Called and spoke to the son who is also POA - answered all questions, went over all the scans/radiology, patient family verbalized understanding and agreed with all the plans, had no further questions. Alyson Rachel [phone] - POA.

## 2022-04-23 NOTE — PLAN OF CARE
Problem: Skin/Tissue Integrity  Goal: Absence of new skin breakdown  Description: 1. Monitor for areas of redness and/or skin breakdown  2. Assess vascular access sites hourly  3. Every 4-6 hours minimum:  Change oxygen saturation probe site  4. Every 4-6 hours:  If on nasal continuous positive airway pressure, respiratory therapy assess nares and determine need for appliance change or resting period. Outcome: Progressing  Note: Patient is at an increased risk for skin breakdown, see Stephen Score. Pressure ulcer prevention measures in place per protocol. Pt assisted to turn q2 hrs and PRN with pillow support. Complete head to toe skin assessment qshift. No new skin breakdown noted. Preventative sacral heart dressing in place over intact sacral skin. Skin kept clean/dry. Proper transferring/positioning in bed to prevent friction/shear. Heels floated off of bed and foot of bed elevated. Low air loss and alternating pressure mattress in use. Problem: Safety - Adult  Goal: Free from fall injury  Outcome: Progressing  Flowsheets (Taken 4/23/2022 1821)  Free From Fall Injury: Instruct family/caregiver on patient safety  Note: Patient is at an increased risk for falls, see Jt Quant Fall Score. Fall precautions in place per protocol. Fall cloth at foot of bed, fall band around wrist and nonskid footwear in place. Patient instructed to call for assistance by using nurse call light before attempting to get out of bed. Bed is locked and in lowest position with side rails up 3/4. Bed alarm engaged. Room door left open. Belongings and call light within reach. Hourly visual safety checks in place.

## 2022-04-23 NOTE — PROGRESS NOTES
Nephrology  Note                                                                                                                                                                                                                                                                                                                                                               Office : [phone]     Fax :[phone]              Patient's Name: Renard Homans  4/23/2022    Reason for Consult: HUNTER   Requesting Physician:  Heath Corona      Chief Complaint:  Seizure      Good UO  Cr stable   NO N/V/D   . Past Medical History:   Diagnosis Date    Back pain     CAD (coronary artery disease)     CKD (chronic kidney disease) stage 4, GFR 15-29 ml/min ([facility])     Dr. Yariel Mansfield    Diabetes mellitus ([facility]     Hyperlipidemia     Hypertension     MRSA (methicillin resistant staph aureus) culture positive 05/11/2017    foot       Past Surgical History:   Procedure Laterality Date    FOOT SURGERY Left 05/11/2017    LEG AMPUTATION BELOW KNEE      OTHER SURGICAL HISTORY Right 05/14/2017    RIGHT FOOT DEBRIDEMENT INCISION AND DRAINAGE, DELAYED PRIMARY       Family History   Problem Relation Age of Onset    Heart Disease Mother     High Blood Pressure Mother     Stroke Mother     Arthritis Mother     Diabetes Father     Arthritis Father     Diabetes Brother         reports that he has never smoked. He has never used smokeless tobacco. He reports that he does not drink alcohol and does not use drugs.     Allergies:  Codeine    Current Medications:    levETIRAcetam (KEPPRA) tablet 500 mg, BID  HYDROmorphone (DILAUDID) injection 0.25 mg, Q12H PRN  insulin glargine (LANTUS;BASAGLAR) injection pen 23 Units, Nightly  metoclopramide (REGLAN) injection 10 mg, Q6H PRN  heparin (porcine) injection 5,000 Units, 3 times per day  insulin lispro (1 Unit Dial) 0-12 Units, TID WC  insulin lispro (1 Unit Dial) 0-6 Units, Nightly  perflutren lipid microspheres (DEFINITY) injection 1.65 mg, ONCE PRN  sodium chloride flush 0.9 % injection 5-40 mL, 2 times per day  sodium chloride flush 0.9 % injection 5-40 mL, PRN  0.9 % sodium chloride infusion, PRN  ondansetron (ZOFRAN-ODT) disintegrating tablet 4 mg, Q8H PRN   Or  ondansetron (ZOFRAN) injection 4 mg, Q6H PRN  polyethylene glycol (GLYCOLAX) packet 17 g, Daily PRN  acetaminophen (TYLENOL) tablet 650 mg, Q6H PRN   Or  acetaminophen (TYLENOL) suppository 650 mg, Q6H PRN  glucagon (rDNA) injection 1 mg, PRN  dextrose 5 % solution, PRN  amitriptyline (ELAVIL) tablet 25 mg, Nightly  aspirin EC tablet 81 mg, Daily  losartan-hydroCHLOROthiazide (HYZAAR) 50-12.5 MG per tablet 1 tablet, Daily  atorvastatin (LIPITOR) tablet 10 mg, Daily  spironolactone (ALDACTONE) tablet 25 mg, Daily  propofol injection, Continuous  glucose chewable tablet 16 g, PRN  dextrose bolus (hypoglycemia) 10% 125 mL, PRN   Or  dextrose bolus (hypoglycemia) 10% 250 mL, PRN  labetalol (NORMODYNE;TRANDATE) injection 10 mg, Q6H PRN          Physical exam:     Vitals:  BP (!) 155/88   Pulse 94   Temp 98.2 °F (36.8 °C) (Oral)   Resp 21   Ht 6' (1.829 m)   Wt 286 lb 9.6 oz (130 kg)   SpO2 94%   BMI 38.87 kg/m²   Constitutional:  AA  Skin: no rash, turgor wnl  Heent:  eomi, mmm  Neck: no bruits or jvd noted  Cardiovascular:  S1, S2 without m/r/g  Respiratory: CTA B without w/r/r  Abdomen:  +bs, soft, nt, nd  Ext: + lower extremity edema  Psychiatric: mood and affect appropriate  Musculoskeletal:  Rom, muscular strength dec     Data:   Labs:  CBC:   Recent Labs     04/21/22 0523 04/22/22  0619 04/23/22  0546   WBC 5.1 5.8 8.0   HGB 9.6* 9.8* 10.3*    177 211     BMP:    Recent Labs     04/21/22 0523 04/22/22  0619 04/23/22  0546   * 138 137   K 4.2 4.3 3.9   CL 99 102 100   CO2 21 24 25   BUN 30* 29* 27*   CREATININE 1.5* 1.8* 1.7*   GLUCOSE 343* 222* 285*     Ca/Mg/Phos:   Recent Labs     04/21/22  0523 04/22/22  9182 04/23/22  0546   CALCIUM 9.0 8.9 9.2   MG 1.40* 2.00 1.80     Hepatic:   Recent Labs     04/20/22  1627   AST 15   ALT 16   BILITOT 0.3   ALKPHOS 194*     Troponin:   Recent Labs     04/20/22  2312 04/21/22  0523 04/21/22  1200   TROPONINI 0.24* 0.23* 0.30*     BNP: No results for input(s): BNP in the last 72 hours. Lipids: No results for input(s): CHOL, TRIG, HDL, LDLCALC, LABVLDL in the last 72 hours. ABGs:   Recent Labs     04/21/22  1320   PHART 7.411   PO2ART 126.0*   GMQ5IKN 42.2     INR:   Recent Labs     04/20/22  1627   INR 1.08     UA:No results for input(s): [place]tha [place], GLUCOSEU, BILIRUBINUR, [place]ora [place], BLOODU, PHUR, PROTEINU, UROBILINOGEN, NITRU, LEUKOCYTESUR, Lalo Fruits in the last 72 hours. Urine Microscopic: No results for input(s): LABCAST, BACTERIA, COMU, HYALCAST, WBCUA, RBCUA, EPIU in the last 72 hours. Urine Culture: No results for input(s): LABURIN in the last 72 hours. Urine Chemistry: No results for input(s): Henri Dom, PROTEINUR, NAUR in the last 72 hours. IMAGING:  CT CHEST PULMONARY EMBOLISM W CONTRAST   Final Result      1. No evidence of acute or chronic pulmonary embolus   2. Bilateral lower lobe atelectasis or pneumonia   3. Coronary artery calcification   4. Hiatal hernia in addition to a dilated esophagus, presbyesophagus with no retained fluid or fluid levels         CT LUMBAR SPINE WO CONTRAST   Final Result      1. Intact hardware in satisfactory alignment     2. Posterior soft tissue. Correlation with MRI or ultrasound recommended to assess the presence or absence of fluid versus postoperative granulation tissue   3. Transitional vertebra, intervertebral spacer at L3-L4 with evidence of subarticular narrowing associated with facet arthropathy         CT HEAD WO CONTRAST   Final Result      1. No evidence of acute intracranial hemorrhage or mass effect. 2.  Mild chronic small vessel ischemic disease.          XR CHEST PORTABLE   Final Result 1.  Endotracheal tube at the thoracic inlet. Recommend repositioning/advancement and repeat radiograph. 2.  Bilateral perihilar predominant opacities may reflect edema and/or pneumonia. MRI BRAIN WO CONTRAST    (Results Pending)       Assessment/Plan   1. HUNTER On CKD     2. HTN    3. Anemia    4. Acid- base/ Electrolyte imbalance     5.  Seizure     Plan   - off IVF   - Ur studies - reviewed   - Cr stable   - Seizure control   - labs in am   - Monitor lytes       Recommend to dose adjust all medications  based on renal functions  Maintain SBP> 90 mmHg   Daily weights   AVOID NSAIDs  Avoid Nephrotoxins  Monitor Intake/Output  Call if significant decrease in urine output               Thank you for allowing us to participate in care of Regina Becker MD  Feel free to contact me   [facility] [address]  Office : [phone]  Fax :[phone]

## 2022-04-24 LAB
ANION GAP SERPL CALCULATED.3IONS-SCNC: 16 MMOL/L (ref 3–16)
BASOPHILS ABSOLUTE: 0.1 K/UL (ref 0–0.2)
BASOPHILS RELATIVE PERCENT: 0.7 %
BLOOD CULTURE, ROUTINE: NORMAL
BUN BLDV-MCNC: 23 MG/DL (ref 7–20)
CALCIUM SERPL-MCNC: 9.5 MG/DL (ref 8.3–10.6)
CHLORIDE BLD-SCNC: 101 MMOL/L (ref 99–110)
CO2: 22 MMOL/L (ref 21–32)
CREAT SERPL-MCNC: 1.6 MG/DL (ref 0.8–1.3)
CULTURE, BLOOD 2: NORMAL
EOSINOPHILS ABSOLUTE: 0.1 K/UL (ref 0–0.6)
EOSINOPHILS RELATIVE PERCENT: 1.3 %
GFR AFRICAN AMERICAN: 53
GFR NON-AFRICAN AMERICAN: 44
GLUCOSE BLD-MCNC: 252 MG/DL (ref 70–99)
GLUCOSE BLD-MCNC: 294 MG/DL (ref 70–99)
GLUCOSE BLD-MCNC: 303 MG/DL (ref 70–99)
GLUCOSE BLD-MCNC: 318 MG/DL (ref 70–99)
GLUCOSE BLD-MCNC: 355 MG/DL (ref 70–99)
HCT VFR BLD CALC: 33 % (ref 40.5–52.5)
HEMOGLOBIN: 10.9 G/DL (ref 13.5–17.5)
LYMPHOCYTES ABSOLUTE: 0.8 K/UL (ref 1–5.1)
LYMPHOCYTES RELATIVE PERCENT: 9.2 %
MAGNESIUM: 1.8 MG/DL (ref 1.8–2.4)
MCH RBC QN AUTO: 30.6 PG (ref 26–34)
MCHC RBC AUTO-ENTMCNC: 33 G/DL (ref 31–36)
MCV RBC AUTO: 92.7 FL (ref 80–100)
MONOCYTES ABSOLUTE: 0.8 K/UL (ref 0–1.3)
MONOCYTES RELATIVE PERCENT: 9.7 %
NEUTROPHILS ABSOLUTE: 6.6 K/UL (ref 1.7–7.7)
NEUTROPHILS RELATIVE PERCENT: 79.1 %
PDW BLD-RTO: 16.6 % (ref 12.4–15.4)
PERFORMED ON: ABNORMAL
PLATELET # BLD: 233 K/UL (ref 135–450)
PMV BLD AUTO: 7.9 FL (ref 5–10.5)
POTASSIUM REFLEX MAGNESIUM: 3.8 MMOL/L (ref 3.5–5.1)
RBC # BLD: 3.56 M/UL (ref 4.2–5.9)
SODIUM BLD-SCNC: 139 MMOL/L (ref 136–145)
WBC # BLD: 8.3 K/UL (ref 4–11)

## 2022-04-24 PROCEDURE — 85025 COMPLETE CBC W/AUTO DIFF WBC: CPT

## 2022-04-24 PROCEDURE — 6370000000 HC RX 637 (ALT 250 FOR IP): Performed by: INTERNAL MEDICINE

## 2022-04-24 PROCEDURE — 2500000003 HC RX 250 WO HCPCS: Performed by: INTERNAL MEDICINE

## 2022-04-24 PROCEDURE — 83735 ASSAY OF MAGNESIUM: CPT

## 2022-04-24 PROCEDURE — A4216 STERILE WATER/SALINE, 10 ML: HCPCS | Performed by: INTERNAL MEDICINE

## 2022-04-24 PROCEDURE — 80048 BASIC METABOLIC PNL TOTAL CA: CPT

## 2022-04-24 PROCEDURE — 2580000003 HC RX 258: Performed by: INTERNAL MEDICINE

## 2022-04-24 PROCEDURE — 2580000003 HC RX 258

## 2022-04-24 PROCEDURE — 2500000003 HC RX 250 WO HCPCS: Performed by: STUDENT IN AN ORGANIZED HEALTH CARE EDUCATION/TRAINING PROGRAM

## 2022-04-24 PROCEDURE — 6360000002 HC RX W HCPCS

## 2022-04-24 PROCEDURE — 99233 SBSQ HOSP IP/OBS HIGH 50: CPT | Performed by: INTERNAL MEDICINE

## 2022-04-24 PROCEDURE — 6370000000 HC RX 637 (ALT 250 FOR IP)

## 2022-04-24 PROCEDURE — 36415 COLL VENOUS BLD VENIPUNCTURE: CPT

## 2022-04-24 PROCEDURE — 6360000002 HC RX W HCPCS: Performed by: STUDENT IN AN ORGANIZED HEALTH CARE EDUCATION/TRAINING PROGRAM

## 2022-04-24 PROCEDURE — 1200000000 HC SEMI PRIVATE

## 2022-04-24 RX ORDER — CASTOR OIL AND BALSAM, PERU 788; 87 MG/G; MG/G
OINTMENT TOPICAL 2 TIMES DAILY
Status: DISCONTINUED | OUTPATIENT
Start: 2022-04-24 | End: 2022-04-27 | Stop reason: HOSPADM

## 2022-04-24 RX ORDER — PROMETHAZINE HYDROCHLORIDE 25 MG/ML
6.25 INJECTION, SOLUTION INTRAMUSCULAR; INTRAVENOUS ONCE
Status: DISCONTINUED | OUTPATIENT
Start: 2022-04-24 | End: 2022-04-27 | Stop reason: HOSPADM

## 2022-04-24 RX ADMIN — HEPARIN SODIUM 5000 UNITS: 5000 INJECTION INTRAVENOUS; SUBCUTANEOUS at 14:55

## 2022-04-24 RX ADMIN — INSULIN LISPRO 6 UNITS: 100 INJECTION, SOLUTION INTRAVENOUS; SUBCUTANEOUS at 11:48

## 2022-04-24 RX ADMIN — ANTACID TABLETS 500 MG: 500 TABLET, CHEWABLE ORAL at 14:59

## 2022-04-24 RX ADMIN — ATORVASTATIN CALCIUM 10 MG: 10 TABLET, FILM COATED ORAL at 08:40

## 2022-04-24 RX ADMIN — ASPIRIN 81 MG: 81 TABLET, COATED ORAL at 08:40

## 2022-04-24 RX ADMIN — ANTACID TABLETS 500 MG: 500 TABLET, CHEWABLE ORAL at 01:19

## 2022-04-24 RX ADMIN — METOCLOPRAMIDE HYDROCHLORIDE 10 MG: 5 INJECTION INTRAMUSCULAR; INTRAVENOUS at 05:21

## 2022-04-24 RX ADMIN — INSULIN LISPRO 3 UNITS: 100 INJECTION, SOLUTION INTRAVENOUS; SUBCUTANEOUS at 21:32

## 2022-04-24 RX ADMIN — SODIUM CHLORIDE, PRESERVATIVE FREE 10 ML: 5 INJECTION INTRAVENOUS at 08:40

## 2022-04-24 RX ADMIN — FAMOTIDINE 20 MG: 10 INJECTION, SOLUTION INTRAVENOUS at 17:34

## 2022-04-24 RX ADMIN — INSULIN LISPRO 10 UNITS: 100 INJECTION, SOLUTION INTRAVENOUS; SUBCUTANEOUS at 17:40

## 2022-04-24 RX ADMIN — LABETALOL HYDROCHLORIDE 10 MG: 5 INJECTION, SOLUTION INTRAVENOUS at 03:04

## 2022-04-24 RX ADMIN — LABETALOL HYDROCHLORIDE 10 MG: 5 INJECTION, SOLUTION INTRAVENOUS at 08:14

## 2022-04-24 RX ADMIN — SODIUM CHLORIDE, PRESERVATIVE FREE 10 ML: 5 INJECTION INTRAVENOUS at 21:22

## 2022-04-24 RX ADMIN — AMITRIPTYLINE HYDROCHLORIDE 25 MG: 50 TABLET, FILM COATED ORAL at 21:21

## 2022-04-24 RX ADMIN — HEPARIN SODIUM 5000 UNITS: 5000 INJECTION INTRAVENOUS; SUBCUTANEOUS at 21:22

## 2022-04-24 RX ADMIN — INSULIN LISPRO 8 UNITS: 100 INJECTION, SOLUTION INTRAVENOUS; SUBCUTANEOUS at 08:05

## 2022-04-24 RX ADMIN — HEPARIN SODIUM 5000 UNITS: 5000 INJECTION INTRAVENOUS; SUBCUTANEOUS at 05:21

## 2022-04-24 RX ADMIN — LEVETIRACETAM 500 MG: 500 TABLET, FILM COATED ORAL at 08:40

## 2022-04-24 RX ADMIN — LEVETIRACETAM 500 MG: 500 TABLET, FILM COATED ORAL at 21:21

## 2022-04-24 ASSESSMENT — PAIN SCALES - GENERAL
PAINLEVEL_OUTOF10: 0

## 2022-04-24 NOTE — PROGRESS NOTES
Hospitalist Progress Note      PCP: Gris Cronin    Chief Complaint. Kaleigh Machuca is a 64 y.o. male with a reported history of LOSS OF CONSCIOUSNESS    Date of Admission: 4/20/2022    Subjective: seizure     Medications:  Seen at bedside, lying in bed comfortably, he is on 2 L nasal cannula, no acute events overnight, denied CP, SOB, fever, chills    Infusion Medications    sodium chloride      dextrose      propofol Stopped (04/21/22 1154)     Scheduled Medications    famotidine (PEPCID) injection  20 mg IntraVENous Once    promethazine  6.25 mg IntraMUSCular Once    levETIRAcetam  500 mg Oral BID    insulin glargine  23 Units SubCUTAneous Nightly    heparin (porcine)  5,000 Units SubCUTAneous 3 times per day    insulin lispro  0-12 Units SubCUTAneous TID WC    insulin lispro  0-6 Units SubCUTAneous Nightly    sodium chloride flush  5-40 mL IntraVENous 2 times per day    amitriptyline  25 mg Oral Nightly    aspirin  81 mg Oral Daily    [Held by provider] losartan-hydroCHLOROthiazide  1 tablet Oral Daily    atorvastatin  10 mg Oral Daily    [Held by provider] spironolactone  25 mg Oral Daily     PRN Meds: calcium carbonate, HYDROmorphone, metoclopramide, perflutren lipid microspheres, sodium chloride flush, sodium chloride, ondansetron **OR** ondansetron, polyethylene glycol, acetaminophen **OR** acetaminophen, glucagon (rDNA), dextrose, glucose, dextrose bolus (hypoglycemia) **OR** dextrose bolus (hypoglycemia), labetalol      Intake/Output Summary (Last 24 hours) at 4/24/2022 1642  Last data filed at 4/24/2022 1352  Gross per 24 hour   Intake 210 ml   Output 420 ml   Net -210 ml       Physical Exam Performed:    BP (!) 152/76   Pulse 88   Temp 98.2 °F (36.8 °C) (Oral)   Resp 22   Ht 6' (1.829 m)   Wt 273 lb 13 oz (124.2 kg)   SpO2 95%   BMI 37.14 kg/m²     General appearance: NAD, lying in bed comfortably  HEENT:  Conjunctivae/corneas clear.   Neck: Supple, with full range of motion. Respiratory:  Normal respiratory effort. Clear to auscultation, bilaterally without Rales/Wheezes/Rhonchi. Cardiovascular: Regular rate and rhythm with normal S1/S2 without murmurs or rubs  Abdomen: Soft, non-tender, non-distended, normal bowel sounds. Musculoskeletal: No cyanosis or edema bilaterally  Neurologic: moving all extremities spontaneously, gross neurologically intact  Psychiatric: normal mood  Peripheral Pulses: +2 palpable, equal bilaterally       Labs:   Recent Labs     04/22/22  0619 04/23/22  0546 04/24/22  0422   WBC 5.8 8.0 8.3   HGB 9.8* 10.3* 10.9*   HCT 30.3* 30.9* 33.0*    211 233     Recent Labs     04/22/22  0619 04/23/22  0546 04/24/22  0422    137 139   K 4.3 3.9 3.8    100 101   CO2 24 25 22   BUN 29* 27* 23*   CREATININE 1.8* 1.7* 1.6*   CALCIUM 8.9 9.2 9.5     No results for input(s): AST, ALT, BILIDIR, BILITOT, ALKPHOS in the last 72 hours. No results for input(s): INR in the last 72 hours. No results for input(s): [place] Dec in the last 72 hours. Urinalysis:      Lab Results   Component Value Date    NITRU Negative 05/11/2017    BACTERIA Rare 10/08/2011    RBCUA 0-3 10/08/2011    BLOODU Negative 05/11/2017    SPECGRAV 1.025 05/11/2017    GLUCOSEU Negative 05/11/2017    GLUCOSEU Negative 10/08/2011       Radiology:  CT CHEST PULMONARY EMBOLISM W CONTRAST   Final Result      1. No evidence of acute or chronic pulmonary embolus   2. Bilateral lower lobe atelectasis or pneumonia   3. Coronary artery calcification   4. Hiatal hernia in addition to a dilated esophagus, presbyesophagus with no retained fluid or fluid levels         CT LUMBAR SPINE WO CONTRAST   Final Result      1. Intact hardware in satisfactory alignment     2. Posterior soft tissue. Correlation with MRI or ultrasound recommended to assess the presence or absence of fluid versus postoperative granulation tissue   3.  Transitional vertebra, intervertebral spacer at L3-L4 with evidence of subarticular narrowing associated with facet arthropathy         CT HEAD WO CONTRAST   Final Result      1. No evidence of acute intracranial hemorrhage or mass effect. 2.  Mild chronic small vessel ischemic disease. XR CHEST PORTABLE   Final Result      1. Endotracheal tube at the thoracic inlet. Recommend repositioning/advancement and repeat radiograph. 2.  Bilateral perihilar predominant opacities may reflect edema and/or pneumonia. MRI BRAIN WO CONTRAST    (Results Pending)         Assessment/Plan:    Active Hospital Problems    Diagnosis     Altered mental status [R41.82]      Priority: Medium    Polypharmacy [Z79.899]      Priority: Medium    Poorly controlled diabetes mellitus ([facility] [E11.65]      Priority: Medium    BMI 38.0-38.9,adult [Z68.38]      Priority: Medium    Acute encephalopathy [G93.40]      Priority: Medium       Acute encephalopathy  MRI Brain - canceled by neurology  Q2H neuro checks  Continue keppra for now  Routine EEG - no definitive seizure activity  MRI brain ordered by neurology     HUNTER- Improving  Monitor. Consulted nephrology     Seizure  Keppra for seizure     SSI  Diet: ADULT DIET;  Regular; Low Fat/Low Chol/High Fiber/2 gm Na  Code Status: Full Code    PT/OT Eval Status: ordered    Dispo -  may need SNF, spoke to family and updated on the plan , Ok to transfer to the floor-stable for discharge MRI brain  Jah Claudio MD

## 2022-04-24 NOTE — PLAN OF CARE
Problem: Pain  Goal: Verbalizes/displays adequate comfort level or baseline comfort level  Outcome: Progressing     Problem: Skin/Tissue Integrity  Goal: Absence of new skin breakdown  Description: 1. Monitor for areas of redness and/or skin breakdown  2. Assess vascular access sites hourly  3. Every 4-6 hours minimum:  Change oxygen saturation probe site  4. Every 4-6 hours:  If on nasal continuous positive airway pressure, respiratory therapy assess nares and determine need for appliance change or resting period. 4/24/2022 0648 by Walt Abrams RN  Outcome: Progressing  4/23/2022 1821 by Jennifer Sainz RN  Outcome: Progressing  Note: Patient is at an increased risk for skin breakdown, see Stephen Score. Pressure ulcer prevention measures in place per protocol. Pt assisted to turn q2 hrs and PRN with pillow support. Complete head to toe skin assessment qshift. No new skin breakdown noted. Preventative sacral heart dressing in place over intact sacral skin. Skin kept clean/dry. Proper transferring/positioning in bed to prevent friction/shear. Heels floated off of bed and foot of bed elevated. Low air loss and alternating pressure mattress in use. Problem: Safety - Adult  Goal: Free from fall injury  4/24/2022 0648 by Walt Abrams RN  Outcome: Progressing  4/23/2022 1821 by Jennifer Sainz RN  Outcome: Progressing  Flowsheets (Taken 4/23/2022 1821)  Free From Fall Injury: Instruct family/caregiver on patient safety  Note: Patient is at an increased risk for falls, see [place] Siemens Fall Score. Fall precautions in place per protocol. Fall cloth at foot of bed, fall band around wrist and nonskid footwear in place. Patient instructed to call for assistance by using nurse call light before attempting to get out of bed. Bed is locked and in lowest position with side rails up 3/4. Bed alarm engaged. Room door left open. Belongings and call light within reach. Hourly visual safety checks in place.        Problem: ABCDS Injury Assessment  Goal: Absence of physical injury  Outcome: Progressing

## 2022-04-24 NOTE — PLAN OF CARE
Problem: Discharge Planning  Goal: Discharge to home or other facility with appropriate resources  4/24/2022 1917 by Susan Myers RN  Outcome: Progressing  4/24/2022 0648 by Wale Kenney RN  Outcome: Progressing     Problem: Pain  Goal: Verbalizes/displays adequate comfort level or baseline comfort level  4/24/2022 1917 by Susan Myers RN  Outcome: Progressing  4/24/2022 1651 by Francisca Bang RN  Outcome: Progressing  Flowsheets (Taken 4/24/2022 1651)  Verbalizes/displays adequate comfort level or baseline comfort level: Encourage patient to monitor pain and request assistance  4/24/2022 0648 by Wale Kenney RN  Outcome: Progressing     Problem: Safety - Medical Restraint  Goal: Remains free of injury from restraints (Restraint for Interference with Medical Device)  Description: INTERVENTIONS:  1. Determine that other, less restrictive measures have been tried or would not be effective before applying the restraint  2. Evaluate the patient's condition at the time of restraint application  3. Inform patient/family regarding the reason for restraint  4. Q2H: Monitor safety, psychosocial status, comfort, nutrition and hydration  4/24/2022 1917 by Susan Myers RN  Outcome: Progressing  4/24/2022 0648 by Wale Kenney RN  Outcome: Progressing     Problem: Skin/Tissue Integrity  Goal: Absence of new skin breakdown  Description: 1. Monitor for areas of redness and/or skin breakdown  2. Assess vascular access sites hourly  3. Every 4-6 hours minimum:  Change oxygen saturation probe site  4. Every 4-6 hours:  If on nasal continuous positive airway pressure, respiratory therapy assess nares and determine need for appliance change or resting period.   4/24/2022 1917 by Susan Myers RN  Outcome: Progressing  4/24/2022 1651 by Francisca Bang RN  Outcome: Progressing  4/24/2022 0648 by Wale Kenney RN  Outcome: Progressing     Problem: Safety - Adult  Goal: Free from fall injury  4/24/2022 1917 by Paulie Quiroz RN  Outcome: Progressing  4/24/2022 2393 by Seth Hooks RN  Outcome: Progressing     Problem: ABCDS Injury Assessment  Goal: Absence of physical injury  4/24/2022 1917 by Paulie Quiroz RN  Outcome: Progressing  4/24/2022 0648 by Seth Hooks RN  Outcome: Progressing     Problem: Chronic Conditions and Co-morbidities  Goal: Patient's chronic conditions and co-morbidity symptoms are monitored and maintained or improved  4/24/2022 1917 by Paulie Quiroz RN  Outcome: Progressing  4/24/2022 0648 by Seth Hooks RN  Outcome: Progressing     Problem: Nutrition Deficit:  Goal: Optimize nutritional status  4/24/2022 1917 by Paulie Quiroz RN  Outcome: Progressing  4/24/2022 0648 by Seth Hooks RN  Outcome: Progressing

## 2022-04-24 NOTE — PROGRESS NOTES
Patient could not tolerate MRI due to back pain and discomfort. Ativan didn't help. Had difficulty getting into a proper position on the MRI board and overall made the choice to abort MRI.

## 2022-04-24 NOTE — PLAN OF CARE
Problem: Pain  Goal: Verbalizes/displays adequate comfort level or baseline comfort level  4/24/2022 1651 by Anna Villar RN  Outcome: Progressing  Flowsheets (Taken 4/24/2022 1651)  Verbalizes/displays adequate comfort level or baseline comfort level: Encourage patient to monitor pain and request assistance   Pain assessed q4 hrs and PRN using the 0-10 pain scale. Pain goal reviewed with patient. PRN pain medications given as needed and comfort provided non-pharmacologically (i.e. Repositioning). Patient instructed to report pain to RN. Problem: Skin/Tissue Integrity  Goal: Absence of new skin breakdown  4/24/2022 1651 by Anna Villar RN  Outcome: Progressing   Patient is at an increased risk for skin breakdown, see Stephen Score. Pressure ulcer prevention measures in place per protocol. Pt assisted to turn q2 hrs and PRN with pillow support. Complete head to toe skin assessment qshift. No new skin breakdown noted. Preventative sacral heart dressing in place over intact sacral skin. Skin kept clean/dry. Proper transferring/positioning in bed to prevent friction/shear. Heels floated off of bed and foot of bed elevated. Low air loss and alternating pressure mattress in use.

## 2022-04-24 NOTE — PROGRESS NOTES
Patient complaining of indigestion. Reglan given. Shortly after, RN heard patient dry heaving and walked in to patient vomiting. Cleaned up patient and patient said they were feeling better and went to sleep shortly after.

## 2022-04-24 NOTE — PROGRESS NOTES
Nephrology  Note                                                                                                                                                                                                                                                                                                                                                               Office : [phone]     Fax :[phone]              Patient's Name: Melita Salmon  4/24/2022    Reason for Consult: HUNTER   Requesting Physician:  Kathryn Man      Chief Complaint:  Seizure      Good UO  Cr stable   NO N/V/D   . Past Medical History:   Diagnosis Date    Back pain     CAD (coronary artery disease)     CKD (chronic kidney disease) stage 4, GFR 15-29 ml/min ([facility])     Dr. Miriam Eagle    Diabetes mellitus ([facility]     Hyperlipidemia     Hypertension     MRSA (methicillin resistant staph aureus) culture positive 05/11/2017    foot       Past Surgical History:   Procedure Laterality Date    FOOT SURGERY Left 05/11/2017    LEG AMPUTATION BELOW KNEE      OTHER SURGICAL HISTORY Right 05/14/2017    RIGHT FOOT DEBRIDEMENT INCISION AND DRAINAGE, DELAYED PRIMARY       Family History   Problem Relation Age of Onset    Heart Disease Mother     High Blood Pressure Mother     Stroke Mother     Arthritis Mother     Diabetes Father     Arthritis Father     Diabetes Brother         reports that he has never smoked. He has never used smokeless tobacco. He reports that he does not drink alcohol and does not use drugs.     Allergies:  Codeine    Current Medications:    levETIRAcetam (KEPPRA) tablet 500 mg, BID  calcium carbonate (TUMS) chewable tablet 500 mg, TID PRN  HYDROmorphone (DILAUDID) injection 0.25 mg, Q12H PRN  insulin glargine (LANTUS;BASAGLAR) injection pen 23 Units, Nightly  metoclopramide (REGLAN) injection 10 mg, Q6H PRN  heparin (porcine) injection 5,000 Units, 3 times per day  insulin lispro (1 Unit Dial) 0-12 Units, TID WC  insulin lispro (1 Unit Dial) 0-6 Units, Nightly  perflutren lipid microspheres (DEFINITY) injection 1.65 mg, ONCE PRN  sodium chloride flush 0.9 % injection 5-40 mL, 2 times per day  sodium chloride flush 0.9 % injection 5-40 mL, PRN  0.9 % sodium chloride infusion, PRN  ondansetron (ZOFRAN-ODT) disintegrating tablet 4 mg, Q8H PRN   Or  ondansetron (ZOFRAN) injection 4 mg, Q6H PRN  polyethylene glycol (GLYCOLAX) packet 17 g, Daily PRN  acetaminophen (TYLENOL) tablet 650 mg, Q6H PRN   Or  acetaminophen (TYLENOL) suppository 650 mg, Q6H PRN  glucagon (rDNA) injection 1 mg, PRN  dextrose 5 % solution, PRN  amitriptyline (ELAVIL) tablet 25 mg, Nightly  aspirin EC tablet 81 mg, Daily  [Held by provider] losartan-hydroCHLOROthiazide (HYZAAR) 50-12.5 MG per tablet 1 tablet, Daily  atorvastatin (LIPITOR) tablet 10 mg, Daily  [Held by provider] spironolactone (ALDACTONE) tablet 25 mg, Daily  propofol injection, Continuous  glucose chewable tablet 16 g, PRN  dextrose bolus (hypoglycemia) 10% 125 mL, PRN   Or  dextrose bolus (hypoglycemia) 10% 250 mL, PRN  labetalol (NORMODYNE;TRANDATE) injection 10 mg, Q6H PRN          Physical exam:     Vitals:  BP (!) 153/92   Pulse 92   Temp 98.5 °F (36.9 °C) (Oral)   Resp 18   Ht 6' (1.829 m)   Wt 273 lb 13 oz (124.2 kg)   SpO2 96%   BMI 37.14 kg/m²   Constitutional:  AA  Skin: no rash, turgor wnl  Heent:  eomi, mmm  Neck: no bruits or jvd noted  Cardiovascular:  S1, S2 without m/r/g  Respiratory: CTA B without w/r/r  Abdomen:  +bs, soft, nt, nd  Ext: + lower extremity edema  Psychiatric: mood and affect appropriate  Musculoskeletal:  Rom, muscular strength dec     Data:   Labs:  CBC:   Recent Labs     04/22/22  0619 04/23/22  0546 04/24/22  0422   WBC 5.8 8.0 8.3   HGB 9.8* 10.3* 10.9*    211 233     BMP:    Recent Labs     04/22/22  0619 04/23/22  0546 04/24/22  0422    137 139   K 4.3 3.9 3.8    100 101   CO2 24 25 22   BUN 29* 27* 23*   CREATININE 1.8* 1. 7* 1.6*   GLUCOSE 222* 285* 318*     Ca/Mg/Phos:   Recent Labs     04/22/22  0619 04/23/22  0546 04/24/22  0422   CALCIUM 8.9 9.2 9.5   MG 2.00 1.80 1.80     Hepatic:   No results for input(s): AST, ALT, ALB, BILITOT, ALKPHOS in the last 72 hours. Troponin:   Recent Labs     04/21/22  1200   TROPONINI 0.30*     BNP: No results for input(s): BNP in the last 72 hours. Lipids: No results for input(s): CHOL, TRIG, HDL, LDLCALC, LABVLDL in the last 72 hours. ABGs:   Recent Labs     04/21/22  1320   PHART 7.411   PO2ART 126.0*   QSC2GNR 42.2     INR:   No results for input(s): INR in the last 72 hours. UA:No results for input(s): Jaylene Ogren, GLUCOSEU, BILIRUBINUR, Jacky Gills, BLOODU, PHUR, PROTEINU, UROBILINOGEN, NITRU, LEUKOCYTESUR, LABMICR, URINETYPE in the last 72 hours. Urine Microscopic: No results for input(s): LABCAST, BACTERIA, COMU, HYALCAST, WBCUA, RBCUA, EPIU in the last 72 hours. Urine Culture: No results for input(s): LABURIN in the last 72 hours. Urine Chemistry: No results for input(s): Debbe Ao, PROTEINUR, NAUR in the last 72 hours. IMAGING:  CT CHEST PULMONARY EMBOLISM W CONTRAST   Final Result      1. No evidence of acute or chronic pulmonary embolus   2. Bilateral lower lobe atelectasis or pneumonia   3. Coronary artery calcification   4. Hiatal hernia in addition to a dilated esophagus, presbyesophagus with no retained fluid or fluid levels         CT LUMBAR SPINE WO CONTRAST   Final Result      1. Intact hardware in satisfactory alignment     2. Posterior soft tissue. Correlation with MRI or ultrasound recommended to assess the presence or absence of fluid versus postoperative granulation tissue   3. Transitional vertebra, intervertebral spacer at L3-L4 with evidence of subarticular narrowing associated with facet arthropathy         CT HEAD WO CONTRAST   Final Result      1. No evidence of acute intracranial hemorrhage or mass effect.    2.  Mild chronic small vessel ischemic disease. XR CHEST PORTABLE   Final Result      1. Endotracheal tube at the thoracic inlet. Recommend repositioning/advancement and repeat radiograph. 2.  Bilateral perihilar predominant opacities may reflect edema and/or pneumonia. MRI BRAIN WO CONTRAST    (Results Pending)       Assessment/Plan   1. HUNTER On CKD     2. HTN    3. Anemia    4. Acid- base/ Electrolyte imbalance     5.  Seizure     Plan   - off IVF   - Ur studies - reviewed   - Cr stable   - Seizure control   - labs in am   - Monitor lytes       Recommend to dose adjust all medications  based on renal functions  Maintain SBP> 90 mmHg   Daily weights   AVOID NSAIDs  Avoid Nephrotoxins  Monitor Intake/Output  Call if significant decrease in urine output               Thank you for allowing us to participate in care of Alison Black MD  Feel free to contact me   [facility] [address]  Office : [phone]  Fax :[phone]

## 2022-04-25 LAB
ANION GAP SERPL CALCULATED.3IONS-SCNC: 12 MMOL/L (ref 3–16)
BASOPHILS ABSOLUTE: 0 K/UL (ref 0–0.2)
BASOPHILS RELATIVE PERCENT: 0.6 %
BUN BLDV-MCNC: 20 MG/DL (ref 7–20)
CALCIUM SERPL-MCNC: 9 MG/DL (ref 8.3–10.6)
CHLORIDE BLD-SCNC: 100 MMOL/L (ref 99–110)
CO2: 25 MMOL/L (ref 21–32)
CREAT SERPL-MCNC: 1.4 MG/DL (ref 0.8–1.3)
EKG ATRIAL RATE: 114 BPM
EKG DIAGNOSIS: NORMAL
EKG P AXIS: 38 DEGREES
EKG P-R INTERVAL: 138 MS
EKG Q-T INTERVAL: 382 MS
EKG QRS DURATION: 150 MS
EKG QTC CALCULATION (BAZETT): 526 MS
EKG R AXIS: 126 DEGREES
EKG T AXIS: 23 DEGREES
EKG VENTRICULAR RATE: 114 BPM
EOSINOPHILS ABSOLUTE: 0.3 K/UL (ref 0–0.6)
EOSINOPHILS RELATIVE PERCENT: 4.2 %
GFR AFRICAN AMERICAN: >60
GFR NON-AFRICAN AMERICAN: 51
GLUCOSE BLD-MCNC: 233 MG/DL (ref 70–99)
GLUCOSE BLD-MCNC: 237 MG/DL (ref 70–99)
GLUCOSE BLD-MCNC: 243 MG/DL (ref 70–99)
GLUCOSE BLD-MCNC: 248 MG/DL (ref 70–99)
GLUCOSE BLD-MCNC: 305 MG/DL (ref 70–99)
HCT VFR BLD CALC: 30.8 % (ref 40.5–52.5)
HEMOGLOBIN: 10.4 G/DL (ref 13.5–17.5)
LYMPHOCYTES ABSOLUTE: 1.3 K/UL (ref 1–5.1)
LYMPHOCYTES RELATIVE PERCENT: 15.9 %
MAGNESIUM: 1.6 MG/DL (ref 1.8–2.4)
MCH RBC QN AUTO: 30.9 PG (ref 26–34)
MCHC RBC AUTO-ENTMCNC: 33.8 G/DL (ref 31–36)
MCV RBC AUTO: 91.5 FL (ref 80–100)
MONOCYTES ABSOLUTE: 0.9 K/UL (ref 0–1.3)
MONOCYTES RELATIVE PERCENT: 10.9 %
NEUTROPHILS ABSOLUTE: 5.4 K/UL (ref 1.7–7.7)
NEUTROPHILS RELATIVE PERCENT: 68.4 %
PDW BLD-RTO: 15.9 % (ref 12.4–15.4)
PERFORMED ON: ABNORMAL
PLATELET # BLD: 240 K/UL (ref 135–450)
PMV BLD AUTO: 7.6 FL (ref 5–10.5)
POTASSIUM REFLEX MAGNESIUM: 3.5 MMOL/L (ref 3.5–5.1)
RBC # BLD: 3.36 M/UL (ref 4.2–5.9)
SODIUM BLD-SCNC: 137 MMOL/L (ref 136–145)
WBC # BLD: 7.9 K/UL (ref 4–11)

## 2022-04-25 PROCEDURE — 6360000002 HC RX W HCPCS

## 2022-04-25 PROCEDURE — 97530 THERAPEUTIC ACTIVITIES: CPT

## 2022-04-25 PROCEDURE — 80048 BASIC METABOLIC PNL TOTAL CA: CPT

## 2022-04-25 PROCEDURE — 1200000000 HC SEMI PRIVATE

## 2022-04-25 PROCEDURE — 2580000003 HC RX 258

## 2022-04-25 PROCEDURE — 85025 COMPLETE CBC W/AUTO DIFF WBC: CPT

## 2022-04-25 PROCEDURE — 6360000002 HC RX W HCPCS: Performed by: STUDENT IN AN ORGANIZED HEALTH CARE EDUCATION/TRAINING PROGRAM

## 2022-04-25 PROCEDURE — 83735 ASSAY OF MAGNESIUM: CPT

## 2022-04-25 PROCEDURE — 36415 COLL VENOUS BLD VENIPUNCTURE: CPT

## 2022-04-25 PROCEDURE — 97535 SELF CARE MNGMENT TRAINING: CPT

## 2022-04-25 PROCEDURE — 99233 SBSQ HOSP IP/OBS HIGH 50: CPT | Performed by: INTERNAL MEDICINE

## 2022-04-25 PROCEDURE — 6370000000 HC RX 637 (ALT 250 FOR IP): Performed by: INTERNAL MEDICINE

## 2022-04-25 PROCEDURE — 6370000000 HC RX 637 (ALT 250 FOR IP)

## 2022-04-25 RX ORDER — LOSARTAN POTASSIUM AND HYDROCHLOROTHIAZIDE 12.5; 5 MG/1; MG/1
1 TABLET ORAL DAILY
Qty: 30 TABLET | Refills: 0 | Status: SHIPPED | OUTPATIENT
Start: 2022-04-28 | End: 2022-08-11 | Stop reason: ALTCHOICE

## 2022-04-25 RX ORDER — LEVETIRACETAM 500 MG/1
500 TABLET ORAL 2 TIMES DAILY
Qty: 60 TABLET | Refills: 3 | Status: SHIPPED | OUTPATIENT
Start: 2022-04-25

## 2022-04-25 RX ORDER — FUROSEMIDE 40 MG/1
40 TABLET ORAL DAILY
Qty: 60 TABLET | Refills: 3 | Status: SHIPPED | OUTPATIENT
Start: 2022-04-28 | End: 2022-08-11 | Stop reason: ALTCHOICE

## 2022-04-25 RX ORDER — LANOLIN ALCOHOL/MO/W.PET/CERES
6 CREAM (GRAM) TOPICAL NIGHTLY PRN
Status: DISCONTINUED | OUTPATIENT
Start: 2022-04-25 | End: 2022-04-27 | Stop reason: HOSPADM

## 2022-04-25 RX ORDER — ASPIRIN 81 MG/1
81 TABLET ORAL DAILY
Qty: 30 TABLET | Refills: 3 | Status: SHIPPED | OUTPATIENT
Start: 2022-04-26

## 2022-04-25 RX ORDER — CALCIUM CARBONATE 200(500)MG
500 TABLET,CHEWABLE ORAL DAILY
Qty: 14 TABLET | Refills: 0 | Status: SHIPPED | OUTPATIENT
Start: 2022-04-25 | End: 2022-05-09

## 2022-04-25 RX ORDER — SPIRONOLACTONE 25 MG/1
25 TABLET ORAL DAILY
Qty: 30 TABLET | Refills: 3 | Status: SHIPPED | OUTPATIENT
Start: 2022-04-28

## 2022-04-25 RX ADMIN — HYDROMORPHONE HYDROCHLORIDE 0.25 MG: 1 INJECTION, SOLUTION INTRAMUSCULAR; INTRAVENOUS; SUBCUTANEOUS at 02:08

## 2022-04-25 RX ADMIN — INSULIN LISPRO 3 UNITS: 100 INJECTION, SOLUTION INTRAVENOUS; SUBCUTANEOUS at 21:16

## 2022-04-25 RX ADMIN — Medication: at 00:00

## 2022-04-25 RX ADMIN — Medication 6 MG: at 21:33

## 2022-04-25 RX ADMIN — INSULIN LISPRO 4 UNITS: 100 INJECTION, SOLUTION INTRAVENOUS; SUBCUTANEOUS at 08:45

## 2022-04-25 RX ADMIN — HEPARIN SODIUM 5000 UNITS: 5000 INJECTION INTRAVENOUS; SUBCUTANEOUS at 06:48

## 2022-04-25 RX ADMIN — HEPARIN SODIUM 5000 UNITS: 5000 INJECTION INTRAVENOUS; SUBCUTANEOUS at 21:12

## 2022-04-25 RX ADMIN — Medication: at 08:48

## 2022-04-25 RX ADMIN — HYDROMORPHONE HYDROCHLORIDE 0.25 MG: 1 INJECTION, SOLUTION INTRAMUSCULAR; INTRAVENOUS; SUBCUTANEOUS at 23:26

## 2022-04-25 RX ADMIN — ASPIRIN 81 MG: 81 TABLET, COATED ORAL at 08:49

## 2022-04-25 RX ADMIN — ATORVASTATIN CALCIUM 10 MG: 10 TABLET, FILM COATED ORAL at 08:49

## 2022-04-25 RX ADMIN — AMITRIPTYLINE HYDROCHLORIDE 25 MG: 50 TABLET, FILM COATED ORAL at 21:12

## 2022-04-25 RX ADMIN — HEPARIN SODIUM 5000 UNITS: 5000 INJECTION INTRAVENOUS; SUBCUTANEOUS at 14:38

## 2022-04-25 RX ADMIN — INSULIN LISPRO 4 UNITS: 100 INJECTION, SOLUTION INTRAVENOUS; SUBCUTANEOUS at 12:54

## 2022-04-25 RX ADMIN — ANTACID TABLETS 500 MG: 500 TABLET, CHEWABLE ORAL at 08:52

## 2022-04-25 RX ADMIN — METOCLOPRAMIDE HYDROCHLORIDE 10 MG: 5 INJECTION INTRAMUSCULAR; INTRAVENOUS at 08:52

## 2022-04-25 RX ADMIN — SODIUM CHLORIDE, PRESERVATIVE FREE 10 ML: 5 INJECTION INTRAVENOUS at 08:48

## 2022-04-25 RX ADMIN — INSULIN LISPRO 4 UNITS: 100 INJECTION, SOLUTION INTRAVENOUS; SUBCUTANEOUS at 17:24

## 2022-04-25 RX ADMIN — LEVETIRACETAM 500 MG: 500 TABLET, FILM COATED ORAL at 21:12

## 2022-04-25 RX ADMIN — SODIUM CHLORIDE, PRESERVATIVE FREE 10 ML: 5 INJECTION INTRAVENOUS at 21:14

## 2022-04-25 RX ADMIN — Medication: at 21:13

## 2022-04-25 RX ADMIN — LEVETIRACETAM 500 MG: 500 TABLET, FILM COATED ORAL at 08:50

## 2022-04-25 ASSESSMENT — PAIN SCALES - GENERAL
PAINLEVEL_OUTOF10: 0
PAINLEVEL_OUTOF10: 8
PAINLEVEL_OUTOF10: 0

## 2022-04-25 ASSESSMENT — PAIN DESCRIPTION - LOCATION: LOCATION: BACK

## 2022-04-25 NOTE — PROGRESS NOTES
Occupational Therapy  Facility/Department: [facility]  Daily Treatment Note  NAME: Kaleigh Machuca  : 1960  MRN: [id number]    Date of Service: 2022    Discharge Recommendations:    Kaleigh Machuca scored a  on the AM-PAC ADL Inpatient form. Current research shows that an AM-PAC score of 17 or less is typically not associated with a discharge to the patient's home setting. Based on the patient's AM-PAC score and their current ADL deficits, it is recommended that the patient have 3-5 sessions per week of Occupational Therapy at d/c to increase the patient's independence. Please see assessment section for further patient specific details. If patient discharges prior to next session this note will serve as a discharge summary. Please see below for the latest assessment towards goals. OT Equipment Recommendations  Equipment Needed: No      Patient Diagnosis(es): The encounter diagnosis was Altered mental status, unspecified altered mental status type. Assessment    Assessment: Pt is more alert today. Pt c/o back pain. Pt req mod assist of 2 for mobility. Unable to transfer with walker, and Coral Caras was utilized. Pt's prior level of functiona is still not clear. Cont OT tx per plan of care. Activity Tolerance: Patient limited by pain; Patient limited by endurance  Equipment Needed: No      Plan   Plan  Times per Week: 2-5  Current Treatment Recommendations: Strengthening;ROM;Balance training;Functional mobility training; Endurance training;Self-Care / ADL;Cognitive reorientation;Equipment evaluation, education, & procurement     Subjective   Subjective  Subjective: Pt in bed upon entry. You have to help me. Objective    Vitals     Bed Mobility Training  Bed Mobility Training: Yes  Overall Level of Assistance: Assist X2 (mod assist of 2)  Supine to Sit: Assist X2 (mod assist of 2)  Scooting:  Moderate assistance  Transfer Training  Transfer Training: Yes  Overall Level of Assistance: Assist X2 (min-mod assist of 2)  Sit to Stand: Assist X2 (mod assist of 2)  Stand to Sit: Assist X2 (mod assist of 2)  Bed to Chair:  (via Ardelle Lopez and assisyt of 2)     ADL  LE Dressing: Maximum assistance (to don Lft LE prosthesis)  Toileting:  (denied need)           Patient Education  Education Given To: Patient  Education Provided: Role of Therapy;Plan of Care;Transfer Training  Education Outcome: Continued education needed    Goals                                No goals met  Short Term Goals  Time Frame for Short term goals: Discharge  Short Term Goal 1: Sit edge of bed with SBA x5 min while engaging in activity  Short Term Goal 2: Set up and min assist for simple grooming/hygiene  Short Term Goal 3: Don prosthesis with min assist  Patient Goals   Patient goals : Not stated       Therapy Time   Individual Concurrent Group Co-treatment   Time In 1200         Time Out 1238         Minutes 38         Timed Code Treatment Minutes: [address] [address], OTR/L [address]

## 2022-04-25 NOTE — PLAN OF CARE
Problem: Discharge Planning  Goal: Discharge to home or other facility with appropriate resources  Outcome: Progressing     Problem: Pain  Goal: Verbalizes/displays adequate comfort level or baseline comfort level  Outcome: Progressing     Problem: Safety - Medical Restraint  Goal: Remains free of injury from restraints (Restraint for Interference with Medical Device)  Description: INTERVENTIONS:  1. Determine that other, less restrictive measures have been tried or would not be effective before applying the restraint  2. Evaluate the patient's condition at the time of restraint application  3. Inform patient/family regarding the reason for restraint  4. Q2H: Monitor safety, psychosocial status, comfort, nutrition and hydration  Outcome: Progressing     Problem: Skin/Tissue Integrity  Goal: Absence of new skin breakdown  Description: 1. Monitor for areas of redness and/or skin breakdown  2. Assess vascular access sites hourly  3. Every 4-6 hours minimum:  Change oxygen saturation probe site  4. Every 4-6 hours:  If on nasal continuous positive airway pressure, respiratory therapy assess nares and determine need for appliance change or resting period.   Outcome: Progressing     Problem: Safety - Adult  Goal: Free from fall injury  Outcome: Progressing     Problem: ABCDS Injury Assessment  Goal: Absence of physical injury  Outcome: Progressing     Problem: Chronic Conditions and Co-morbidities  Goal: Patient's chronic conditions and co-morbidity symptoms are monitored and maintained or improved  Outcome: Progressing     Problem: Nutrition Deficit:  Goal: Optimize nutritional status  4/25/2022 1733 by Angel Zafar RN  Outcome: Progressing  4/25/2022 1542 by Wilma Shanks, MS, RD, LD  Outcome: Progressing  Note: Nutrition Problem: Inadequate oral intake  Intervention: Continue Current Diet,Start Oral Nutrition Supplement  Nutrition Goals: pt will tolerate >50% PO intake consistently throughout adm

## 2022-04-25 NOTE — PLAN OF CARE
Problem: Nutrition Deficit:  Goal: Optimize nutritional status  Outcome: Progressing  Note: Nutrition Problem: Inadequate oral intake  Intervention: Continue Current Diet,Start Oral Nutrition Supplement  Nutrition Goals: pt will tolerate >50% PO intake consistently throughout adm

## 2022-04-25 NOTE — CARE COORDINATION
Case Management Assessment           Daily Note                 Date/ Time of Note: 4/25/2022 1:16 PM         Note completed by: Sommer Tavares RN    Patient Name: Zana Hammond  YOB: 1960    Diagnosis:Acute encephalopathy [G93.40]  Altered mental status, unspecified altered mental status type [R41.82]  Patient Admission Status: Inpatient    Date of Admission:4/20/2022  3:59 PM Length of Stay: 5 GLOS: GMLOS: 2.6    Current Plan of Care: PT/OT     PT AM-PAC: 8 / 24 per last evaluation on: 4/22    OT AM-PAC: 11 / 24 per last evaluation on: 4/22    Discharge Plan: Pt needs to go to a SNF    Case Management Notes: Case management continues to follow for discharge planning needs. Chart reviewed. A discharge order is noted for today. Pt was [facility] at [facility] for a skilled nursing visit. He was discharged to home and set up with home care. The home care had not had time to start service. Mr. Libby Mcdaniels was admitted to [facility] for acute encephalopathy. Met with Mr. Libby Mcdaniels at the [facility]. He is alert and oriented x 4, sitting up in a chair eating lunch. Spoke with his wife, [facility], via phone. The couple agrees he cannot walk and cannot return home. He needs skilled nursing facility placement and wants a referral to go back to [facility]. Sent a refferal via [employer] and left a message for Nicolas Shock in admissions, [phone]. Delfino Metcalf and his family were provided with choice of provider; he and his family are in agreement with the discharge plan.     Sommer Tavares RN  [facility] [employer]  Case Management Department  [phone]

## 2022-04-25 NOTE — PROGRESS NOTES
Nephrology  Note                                                                                                                                                                                                                                                                                                                                                               Office : [phone]     Fax :[phone]              Patient's Name: Shara Campbell  4/25/2022    Reason for Consult: HUNTER   Requesting Physician:  Anisha Cunningham      Chief Complaint:  Seizure      Good UO  Cr stable   Nausea +    Past Medical History:   Diagnosis Date    Back pain     CAD (coronary artery disease)     CKD (chronic kidney disease) stage 4, GFR 15-29 ml/min ([facility])     Dr. Sheila Espinosa    Diabetes mellitus ([facility]     Hyperlipidemia     Hypertension     MRSA (methicillin resistant staph aureus) culture positive 05/11/2017    foot       Past Surgical History:   Procedure Laterality Date    FOOT SURGERY Left 05/11/2017    LEG AMPUTATION BELOW KNEE      OTHER SURGICAL HISTORY Right 05/14/2017    RIGHT FOOT DEBRIDEMENT INCISION AND DRAINAGE, DELAYED PRIMARY       Family History   Problem Relation Age of Onset    Heart Disease Mother     High Blood Pressure Mother     Stroke Mother     Arthritis Mother     Diabetes Father     Arthritis Father     Diabetes Brother         reports that he has never smoked. He has never used smokeless tobacco. He reports that he does not drink alcohol and does not use drugs.     Allergies:  Codeine    Current Medications:    promethazine (PHENERGAN) injection 6.25 mg, Once  Venelex ointment, BID  levETIRAcetam (KEPPRA) tablet 500 mg, BID  calcium carbonate (TUMS) chewable tablet 500 mg, TID PRN  HYDROmorphone (DILAUDID) injection 0.25 mg, Q12H PRN  insulin glargine (LANTUS;BASAGLAR) injection pen 23 Units, Nightly  metoclopramide (REGLAN) injection 10 mg, Q6H PRN  heparin (porcine) injection 5,000 Units, 3 times per day  insulin lispro (1 Unit Dial) 0-12 Units, TID WC  insulin lispro (1 Unit Dial) 0-6 Units, Nightly  perflutren lipid microspheres (DEFINITY) injection 1.65 mg, ONCE PRN  sodium chloride flush 0.9 % injection 5-40 mL, 2 times per day  sodium chloride flush 0.9 % injection 5-40 mL, PRN  0.9 % sodium chloride infusion, PRN  ondansetron (ZOFRAN-ODT) disintegrating tablet 4 mg, Q8H PRN   Or  ondansetron (ZOFRAN) injection 4 mg, Q6H PRN  polyethylene glycol (GLYCOLAX) packet 17 g, Daily PRN  acetaminophen (TYLENOL) tablet 650 mg, Q6H PRN   Or  acetaminophen (TYLENOL) suppository 650 mg, Q6H PRN  glucagon (rDNA) injection 1 mg, PRN  dextrose 5 % solution, PRN  amitriptyline (ELAVIL) tablet 25 mg, Nightly  aspirin EC tablet 81 mg, Daily  [Held by provider] losartan-hydroCHLOROthiazide (HYZAAR) 50-12.5 MG per tablet 1 tablet, Daily  atorvastatin (LIPITOR) tablet 10 mg, Daily  [Held by provider] spironolactone (ALDACTONE) tablet 25 mg, Daily  propofol injection, Continuous  glucose chewable tablet 16 g, PRN  dextrose bolus (hypoglycemia) 10% 125 mL, PRN   Or  dextrose bolus (hypoglycemia) 10% 250 mL, PRN  labetalol (NORMODYNE;TRANDATE) injection 10 mg, Q6H PRN          Physical exam:     Vitals:  BP (!) 150/79   Pulse 89   Temp 98.9 °F (37.2 °C) (Oral)   Resp 17   Ht 6' (1.829 m)   Wt 273 lb 13 oz (124.2 kg)   SpO2 94%   BMI 37.14 kg/m²   Constitutional:  AA  Skin: no rash, turgor wnl  Heent:  eomi, mmm  Neck: no bruits or jvd noted  Cardiovascular:  S1, S2 without m/r/g  Respiratory: CTA B without w/r/r  Abdomen:  +bs, soft, nt, nd  Ext: + lower extremity edema  Psychiatric: mood and affect appropriate  Musculoskeletal:  Rom, muscular strength dec     Data:   Labs:  CBC:   Recent Labs     04/23/22  0546 04/24/22  0422 04/25/22  0630   WBC 8.0 8.3 7.9   HGB 10.3* 10.9* 10.4*    233 240     BMP:    Recent Labs     04/23/22  0546 04/24/22 0422 04/25/22  0630    139 137   K 3.9 3.8 3.5   CL 100 101 100   CO2 25 22 25   BUN 27* 23* 20   CREATININE 1.7* 1.6* 1.4*   GLUCOSE 285* 318* 248*     Ca/Mg/Phos:   Recent Labs     04/23/22  0546 04/24/22  0422 04/25/22  0630   CALCIUM 9.2 9.5 9.0   MG 1.80 1.80 1.60*     Hepatic:   No results for input(s): AST, ALT, ALB, BILITOT, ALKPHOS in the last 72 hours. Troponin:   No results for input(s): TROPONINI in the last 72 hours. BNP: No results for input(s): BNP in the last 72 hours. Lipids: No results for input(s): CHOL, TRIG, HDL, LDLCALC, LABVLDL in the last 72 hours. ABGs:   No results for input(s): PHART, PO2ART, HOU6ONV in the last 72 hours. INR:   No results for input(s): INR in the last 72 hours. UA:No results for input(s): Kerney Dominion, GLUCOSEU, BILIRUBINUR, [place], BLOODU, PHUR, PROTEINU, UROBILINOGEN, NITRU, LEUKOCYTESUR, LABMICR, URINETYPE in the last 72 hours. Urine Microscopic: No results for input(s): LABCAST, BACTERIA, COMU, HYALCAST, WBCUA, RBCUA, EPIU in the last 72 hours. Urine Culture: No results for input(s): LABURIN in the last 72 hours. Urine Chemistry: No results for input(s): Mohan Juniper, PROTEINUR, NAUR in the last 72 hours. IMAGING:  CT CHEST PULMONARY EMBOLISM W CONTRAST   Final Result      1. No evidence of acute or chronic pulmonary embolus   2. Bilateral lower lobe atelectasis or pneumonia   3. Coronary artery calcification   4. Hiatal hernia in addition to a dilated esophagus, presbyesophagus with no retained fluid or fluid levels         CT LUMBAR SPINE WO CONTRAST   Final Result      1. Intact hardware in satisfactory alignment     2. Posterior soft tissue. Correlation with MRI or ultrasound recommended to assess the presence or absence of fluid versus postoperative granulation tissue   3. Transitional vertebra, intervertebral spacer at L3-L4 with evidence of subarticular narrowing associated with facet arthropathy         CT HEAD WO CONTRAST   Final Result      1.   No evidence of acute intracranial hemorrhage or mass effect. 2.  Mild chronic small vessel ischemic disease. XR CHEST PORTABLE   Final Result      1. Endotracheal tube at the thoracic inlet. Recommend repositioning/advancement and repeat radiograph. 2.  Bilateral perihilar predominant opacities may reflect edema and/or pneumonia. MRI BRAIN WO CONTRAST    (Results Pending)       Assessment/Plan   1. HUNTER On CKD     2. HTN    3. Anemia    4. Acid- base/ Electrolyte imbalance     5.  Seizure     Plan   - off IVF   - Ur studies - reviewed   - Cr stable   - Seizure control   - labs in am   - Monitor lytes       Recommend to dose adjust all medications  based on renal functions  Maintain SBP> 90 mmHg   Daily weights   AVOID NSAIDs  Avoid Nephrotoxins  Monitor Intake/Output  Call if significant decrease in urine output               Thank you for allowing us to participate in care of Florin Mcgowan MD  Feel free to contact me   [facility] [address]  Office : [phone]  Fax :[phone]

## 2022-04-25 NOTE — PROGRESS NOTES
Comprehensive Nutrition Assessment    RECOMMENDATIONS:  1. PO Diet: Continue regular, cardiac diet   · ONS:  Start Glucerna supplement QD    NUTRITION ASSESSMENT:   Nutritional summary & status:  Pt was successfully extubated on 4/21 in the afternoon. His diet was advanced on the same day to a regular cardiac diet. No PO intakes have been recorded yet per meal documentation. Noted pt c/o nausea and had 1 episode of emesis on 4/24~on zofran prn. Will send ONS to help promote better PO intakes. Noted pt with s/c order in with plans to go to a SNF per chart review.  Admission/PMH: Admit acute encephalopathy, possible seizure; PMH lumbar pain, T2DM, CAD, T2DM, HLD, HTN, lumbar spine fusion surgery    MALNUTRITION ASSESSMENT  Context of Malnutrition: Acute Illness   Malnutrition Status:  At risk for malnutrition (Comment)  Findings of the 6 clinical characteristics of malnutrition (Minimum of 2 out of 6 clinical characteristics is required to make the diagnosis of moderate or severe Protein Calorie Malnutrition based on AND/ASPEN Guidelines):  Energy Intake: Less than/equal to 50% of estimated energy requirements    Energy Intake Time: x 4 days   Weight Loss %: 10% loss or greater    Weight loss Time: Greater than or equal to 6 months     NUTRITION DIAGNOSIS   Inadequate oral intake related to  (decreased appetite) as evidenced by intake 0-25%    NUTRITION INTERVENTION  Food and/or Nutrient Delivery:  Continue Current Diet,Start Oral Nutrition Supplement  Nutrition Education/Counseling:  No recommendation at this time   Goals:  pt will tolerate >50% PO intake consistently throughout adm  pt will tolerate the most appropriate form of nutrition to meet >75% energy needs throughout adm     Nutrition Monitoring and Evaluation:   Food/Nutrient Intake Outcomes:  Food and Nutrient Intake,Supplement Intake  Physical Signs/Symptoms Outcomes:  Biochemical Data,Weight,Nutrition Focused Physical Findings     OBJECTIVE DATA: Significant to nutrition assessment  · Nutrition-Focused Physical Findings: lbm 4/23, emesis 4/24; +1 LLE edema   · Labs: Reviewed;   · Meds: Reviewed; insulin  · Wounds: Diabetic Ulcer       CURRENT NUTRITION THERAPIES  ADULT DIET; Regular; Low Fat/Low Chol/High Fiber/2 gm Na     PO Intake: Unable to assess   PO Supplement Intake:Unable to assess  Additional Sources of Calories/IVF:n/a     ANTHROPOMETRICS  Current Height: 6' (182.9 cm)  Current Weight: 273 lb 13 oz (124.2 kg)    Admission weight: 293 lb 6.4 oz (133.1 kg)  Ideal Body Weight (IBW): 178 lbs  (81 kg)    Usual Bodyweight   JOSÉ- limited wt hx  Weight Changes: -40lb x 10 mo (14%)       BMI: 37.2    Wt Readings from Last 50 Encounters:   04/20/22 286 lb 9.6 oz (130 kg)   06/17/21 (!) 326 lb (147.9 kg)     COMPARATIVE STANDARDS  Energy (kcal):  7284-7472 (11-14)     Protein (g):   (1.2-1.8)       Fluid (ml/day):  1500ml min    The patient will still be monitored per nutrition standards of care. Consult dietitian if nutrition interventions essential to patient care is needed.      Evelio Kent, [address] [place] [address], [facility]:  [phone]  Office:  [phone]

## 2022-04-25 NOTE — DISCHARGE SUMMARY
Hospital Medicine Discharge Summary    Patient ID: Ada Ma      Patient's PCP: Deepika Sierra Date: 4/20/2022     Discharge Date:     Admitting Physician: No admitting provider for patient encounter. Discharge Physician: Mary Yates MD     Discharge Diagnoses: Active Hospital Problems    Diagnosis Date Noted    Altered mental status [R41.82]      Priority: Medium    Polypharmacy [Z79.899] 04/21/2022     Priority: Medium    Poorly controlled diabetes mellitus ([facility] [address] [E11.65] 04/21/2022     Priority: Medium    BMI 38.0-38.9,adult [Z68.38] 04/21/2022     Priority: Medium    Acute encephalopathy [G93.40] 04/20/2022     Priority: Medium       The patient was seen and examined on day of discharge and this discharge summary is in conjunction with any daily progress note from day of discharge. Condition at discharge - stable    Hospital Course: patient seen and evaluated on the day of discharge. Patient informed about following up with appointments. Patient verbalized understanding for follow-up appointments. The patient and / or the family were informed of the results of tests, a time was given to answer questions, a plan was proposed and they agreed with plan. Medical reconciliation performed. Patient discharged stable condition.  Altered mental status [R41.82]         Priority: Medium    Polypharmacy [Z79.899]         Priority: Medium    Poorly controlled diabetes mellitus ([facility] [address] [E11.65]         Priority: Medium    BMI 38.0-38.9,adult [Z68.38]         Priority: Medium    Acute encephalopathy [G93.40]         Priority: Medium         Acute encephalopathy  MRI Brain - canceled by neurology  Continue keppra for now  Routine EEG - no definitive seizure activity  MRI brain ordered by neurology     HUNTER- continue to Improve  Monitor. Consulted nephrology     Seizure  Keppra for seizure     SSI  Diet: ADULT DIET;  Regular; Low Fat/Low Chol/High Fiber/2 gm Na  Code Status: Full Code     PT/OT Eval Status: ordered     Dispo -  may need SNF, stable for discharge, restart diuretics on 4/28,    Exam:     BP (!) 150/79   Pulse 89   Temp 98.9 °F (37.2 °C) (Oral)   Resp 17   Ht 6' (1.829 m)   Wt 273 lb 13 oz (124.2 kg)   SpO2 94%   BMI 37.14 kg/m²     General appearance: No apparent distress  HEENT:  Conjunctivae/corneas clear. Neck: Supple, No jugular venous distention. Respiratory:  Normal respiratory effort. Clear to auscultation, bilaterally without Rales/Wheezes/Rhonchi. Cardiovascular: Regular rate and rhythm with normal S1/S2 without murmurs, rubs or gallops. Abdomen: Soft, non-tender, non-distended, normal bowel sounds. Musculoskelatal: No clubbing, cyanosis or edema bilaterally. Skin: Skin color, texture, turgor normal.   Neurologic: no focal neurologic deficits. grossly non-focal.  Psychiatric: Alert and oriented, normal mood    Consults:     IP CONSULT TO CRITICAL CARE  IP CONSULT TO HOSPITALIST  IP CONSULT TO NEUROLOGY  IP CONSULT TO CRITICAL CARE  IP CONSULT TO CRITICAL CARE  IP CONSULT TO NEPHROLOGY      Code Status:  Full Code    Activity: activity as tolerated    Labs:  For convenience and continuity at follow-up the following most recent labs are provided:      CBC:    Lab Results   Component Value Date    WBC 7.9 04/25/2022    HGB 10.4 04/25/2022    HCT 30.8 04/25/2022     04/25/2022       Renal:    Lab Results   Component Value Date     04/25/2022    K 3.5 04/25/2022     04/25/2022    CO2 25 04/25/2022    BUN 20 04/25/2022    CREATININE 1.4 04/25/2022    CALCIUM 9.0 04/25/2022    PHOS 3.6 02/09/2021       Discharge Medications:     Current Discharge Medication List           Details   calcium carbonate (TUMS) 500 MG chewable tablet Take 1 tablet by mouth daily for 14 days  Qty: 14 tablet, Refills: 0      levETIRAcetam (KEPPRA) 500 MG tablet Take 1 tablet by mouth 2 times daily  Qty: 60 tablet, Refills: 3              Details aspirin 81 MG EC tablet Take 1 tablet by mouth daily  Qty: 30 tablet, Refills: 3      losartan-hydroCHLOROthiazide (HYZAAR) 50-12.5 MG per tablet Take 1 tablet by mouth daily  Qty: 30 tablet, Refills: 0      spironolactone (ALDACTONE) 25 MG tablet Take 1 tablet by mouth daily  Qty: 30 tablet, Refills: 3      furosemide (LASIX) 40 MG tablet Take 1 tablet by mouth daily  Qty: 60 tablet, Refills: 3              Details   pantoprazole (PROTONIX) 40 MG tablet Take 40 mg by mouth every morning (before breakfast)       finasteride (PROSCAR) 5 MG tablet Take 5 mg by mouth daily      atorvastatin (LIPITOR) 20 MG tablet Take 20 mg by mouth daily      tamsulosin (FLOMAX) 0.4 MG capsule Take 0.4 mg by mouth daily      acetaminophen (TYLENOL) 500 MG tablet Take 500 mg by mouth every 6 hours as needed for Pain      Multiple Vitamins-Minerals (MULTI COMPLETE PO) Take 1 tablet by mouth daily      melatonin 3 MG TABS tablet Take 3 mg by mouth nightly as needed      senna-docusate (PERICOLACE) 8.6-50 MG per tablet Take 1 tablet by mouth 2 times daily      ferrous sulfate (IRON 325) 325 (65 Fe) MG tablet Take 325 mg by mouth daily (with breakfast)      ! ! gabapentin (NEURONTIN) 300 MG capsule Take 300 mg by mouth 3 times daily. !! gabapentin (NEURONTIN) 100 MG capsule Take 200 mg by mouth 2 times daily. amitriptyline (ELAVIL) 25 MG tablet Take 25 mg by mouth nightly      folic acid (FOLVITE) 1 MG tablet Take 1 mg by mouth daily      HYDROcodone-acetaminophen (NORCO)  MG per tablet Take 1 tablet by mouth every 6 hours as needed for Pain. insulin lispro (HUMALOG) 100 UNIT/ML injection vial Inject 5-15 Units into the skin 3 times daily (before meals) Sliding scale with meals and at HS      omeprazole (PRILOSEC) 20 MG capsule Take 40 mg by mouth Daily       insulin glargine (LANTUS) 100 UNIT/ML injection Inject 21 Units into the skin nightly        !! - Potential duplicate medications found.  Please discuss with provider. Time Spent on discharge is more than 30 mints in the examination, evaluation, counseling and review of medications and discharge plan. Signed:    Jacqueline Alaniz MD   4/25/2022      Thank you Fayrene Bradley for the opportunity to be involved in this patient's care. If you have any questions or concerns please feel free to contact me at [phone].

## 2022-04-25 NOTE — PROGRESS NOTES
Physical Therapy  Facility/Department: [address] [place] [address] ICU  Daily Treatment Note    Name: Joshua Fishman  : 1960  MRN: [id number]  Date of Service: 2022    Discharge Recommendations: Joshua Fishman scored a 10/24 on the AM-PAC short mobility form. Current research shows that an AM-PAC score of 17 or less is typically not associated with a discharge to the patient's home setting. Based on the patient's AM-PAC score and their current functional mobility deficits, it is recommended that the patient have 3-5 sessions per week of Physical Therapy at d/c to increase the patient's independence. Please see assessment section for further patient specific details. If patient discharges prior to next session this note will serve as a discharge summary. Please see below for the latest assessment towards goals. PT Equipment Recommendations  Equipment Needed:  (Defer)      Patient Diagnosis(es): The encounter diagnosis was Altered mental status, unspecified altered mental status type. Past Medical History:  has a past medical history of Back pain, CAD (coronary artery disease), CKD (chronic kidney disease) stage 4, GFR 15-29 ml/min ([address] Diabetes mellitus ([address] Hyperlipidemia, Hypertension, and MRSA (methicillin resistant staph aureus) culture positive. Past Surgical History:  has a past surgical history that includes Leg amputation below knee; Foot surgery (Left, 2017); and other surgical history (Right, 2017). Assessment   Body Structures, Functions, Activity Limitations Requiring Skilled Therapeutic Intervention: Decreased functional mobility ; Decreased ROM; Decreased strength;Decreased safe awareness;Decreased cognition;Decreased endurance;Decreased balance;Decreased coordination;Decreased posture  Assessment: Pt demonstrates improved mobility and endurance, able to tolerate OOB activity this session. Pt reports increased low back pain with all mobility. Presents more alert this session.  Pt currently requiring mod assist x2 with bed mobility and STS transfer using a walker. Pt unable to advance either LE in standing with walker and requiring santa steady for bed to chair transfer. Pt demonstrates L lateral/anterior lean in santa steady with poor safety awareness t/o session. Pt is impulsive and requires constant VC's for safety. Safety concerns for pt returning home however would benefit from SNF placement to improve strength and mobility. Pt would benefit from continued skilled PT to improve functional mobility. Treatment Diagnosis: Decreased functional mobility associated with acute encephalopathy  Requires PT Follow-Up: Yes  Activity Tolerance  Activity Tolerance: Patient limited by fatigue;Patient limited by endurance; Patient limited by pain     Plan   Plan  Plan:  (2-5)  Current Treatment Recommendations: Strengthening,ROM,Balance training,Functional mobility training,Transfer training,Endurance training,Gait training,Neuromuscular re-education,Cognitive reorientation,Safety education & training,Patient/Caregiver education & training,Therapeutic activities  Plan Comment: Assess gait as able  Safety Devices  Type of Devices: Call light within reach,Nurse notified,Chair alarm in place,Left in chair     Restrictions  Position Activity Restriction  Other position/activity restrictions: Up with assist     Subjective   General  Chart Reviewed: Yes  Additional Pertinent Hx: Mr. Re Talavera is a 65 y/o male presenting with AMS after collapsing in bathroom. Pt has recent lumbar fusion in Feb and returned home form rehab. Pt was dx with acute encephalopathy. Pt was intubated on 4/20 and extubated on 4/21. 4/22CT-head: (-) acute. XR-chest: Endotracheal tube at the thoracic inlet, Bilateral perihilar predominant opacities may reflect edema and/or pneumonia. CT-L spine: Intact hardware in satisfactory alignment. CT-chest: Bilateral lower lobe atelectasis or pneumonia, (-) PE. MRI-brain ordered.  PMH: CAD, CKD IV, DM, HLD, HTN, L BKA. Family / Caregiver Present: No  Referring Practitioner: Zakiya Shetty MD  Referral Date : 04/20/22  Diagnosis: Acute encephalopathy  General Comment  Comments: Pt presents with telemetry, IV, SpO2 monitor, BP cuff, L LE prosthesis  Subjective  Subjective: Pt presents lying supine in bed. Pt agreeable to PT. Pt states \"My back hurts\". Social/Functional History  Social/Functional History  Lives With: Spouse  Type of Home: House  Home Layout: Two level,Laundry in basement (Bedroom/bathroom on 2nd floor)  Home Access: Stairs to enter with rails  Entrance Stairs - Number of Steps: 2 DON  Bathroom Shower/Tub: Tub/Shower unit  Bathroom Toilet: Standard  Bathroom Equipment: Shower chair,Grab bars in shower (Sink next to toilet for leverage)  Bathroom Accessibility: Accessible  Home Equipment: Jeananne Garre, rolling,Cane,Wheelchair-manual,Reacher  Has the patient had two or more falls in the past year or any fall with injury in the past year?: Yes (Reports \"a few\" falls in the last 4 months d/t losing balance)  ADL Assistance: [facility]: Needs assistance (Wife primarily performs cooking, cleaning, and laundry however pt helps)  Homemaking Responsibilities: Yes  Ambulation Assistance: Independent (Recently using a w/c , uses walker ocassionally)  Transfer Assistance: Independent  Active : Yes  Additional Comments: Pt is a questionable historian  Vision/Hearing       Cognition   Orientation  Overall Orientation Status: Within Functional Limits  Orientation Level: Oriented to place;Oriented to time;Oriented to person     Objective                              Bed mobility  Supine to Sit: Moderate assistance;2 Person assistance (Via log roll,  HOB elevated, use of rail)  Scooting: Contact guard assistance (To EOB)  Transfers  Sit to Stand:  Moderate Assistance;2 Person Assistance (x1 trial with walker, VC's for hand placement, increased time and effort, posterior lean; x1 trial with santa steady, VC's for hand placement; x1 trial in santa steady, L lateral/anterior lean, impulsive)  Stand to sit: 2 Person Assistance; Moderate Assistance (Required for controlled descent)  Bed to Chair: Dependent/Total (Via santa steady)        Balance  Posture: Poor (Increased thoracic kyphosis and poor cervical extension)  Sitting - Static: Good (SBA)  Sitting - Dynamic: Poor (Max assist to don L LE prosthesis)  Standing - Static: Poor (Min assist x2, L lateral lean, unsteady)  Standing - Dynamic: Poor (Unable to advance either LE in standing with walker)           OutComes Score                                                  AM-PAC Score  AM-PAC Inpatient Mobility Raw Score : 10 (04/25/22 1326)  AM-PAC Inpatient T-Scale Score : 32.29 (04/25/22 1326)  Mobility Inpatient CMS 0-100% Score: 76.75 (04/25/22 1326)  Mobility Inpatient CMS G-Code Modifier : CL (04/25/22 1326)          Goals  Short Term Goals  Time Frame for Short term goals: D/C  Short term goal 1: Perform B rolling with min assist - ongoing  Short term goal 2: Perform supine to sit transfer with mod assist x1 via log roll - ongoing  Short term goal 3: Improve static stitting balance to good x10 min with CGA - MET 4/25, revised goal: Improve static standing balance to good x5 min with SBA and walker  Short term goal 4: Perform STS transfer with CGA x1 and walker  Short term goal 5: Perform bed to chair transfer with mod assist x1 and walker  Patient Goals   Patient goals : Not stated       Therapy Time   Individual Concurrent Group Co-treatment   Time In 1159         Time Out 1237         Minutes 38               Timed Code Treatment Minutes:   38    Total Treatment Minutes:  [address], [facility]     Therapist was present, directed the patient's care, made skilled judgement, and was responsible for assessment and treatment of the patient.   Karen Baez, [address] [facility]

## 2022-04-26 VITALS
TEMPERATURE: 98.2 F | RESPIRATION RATE: 25 BRPM | BODY MASS INDEX: 37.09 KG/M2 | OXYGEN SATURATION: 93 % | HEIGHT: 72 IN | SYSTOLIC BLOOD PRESSURE: 144 MMHG | DIASTOLIC BLOOD PRESSURE: 72 MMHG | WEIGHT: 273.81 LBS | HEART RATE: 105 BPM

## 2022-04-26 LAB
ANION GAP SERPL CALCULATED.3IONS-SCNC: 10 MMOL/L (ref 3–16)
BASOPHILS ABSOLUTE: 0 K/UL (ref 0–0.2)
BASOPHILS RELATIVE PERCENT: 0.4 %
BUN BLDV-MCNC: 17 MG/DL (ref 7–20)
CALCIUM SERPL-MCNC: 9.1 MG/DL (ref 8.3–10.6)
CHLORIDE BLD-SCNC: 101 MMOL/L (ref 99–110)
CO2: 28 MMOL/L (ref 21–32)
CREAT SERPL-MCNC: 1.3 MG/DL (ref 0.8–1.3)
EOSINOPHILS ABSOLUTE: 0.3 K/UL (ref 0–0.6)
EOSINOPHILS RELATIVE PERCENT: 5 %
GFR AFRICAN AMERICAN: >60
GFR NON-AFRICAN AMERICAN: 56
GLUCOSE BLD-MCNC: 194 MG/DL (ref 70–99)
GLUCOSE BLD-MCNC: 197 MG/DL (ref 70–99)
GLUCOSE BLD-MCNC: 204 MG/DL (ref 70–99)
GLUCOSE BLD-MCNC: 318 MG/DL (ref 70–99)
HCT VFR BLD CALC: 32.7 % (ref 40.5–52.5)
HEMOGLOBIN: 10.7 G/DL (ref 13.5–17.5)
LYMPHOCYTES ABSOLUTE: 1.2 K/UL (ref 1–5.1)
LYMPHOCYTES RELATIVE PERCENT: 18.6 %
MAGNESIUM: 1.5 MG/DL (ref 1.8–2.4)
MCH RBC QN AUTO: 30.2 PG (ref 26–34)
MCHC RBC AUTO-ENTMCNC: 32.7 G/DL (ref 31–36)
MCV RBC AUTO: 92.2 FL (ref 80–100)
MONOCYTES ABSOLUTE: 0.8 K/UL (ref 0–1.3)
MONOCYTES RELATIVE PERCENT: 13.3 %
NEUTROPHILS ABSOLUTE: 4 K/UL (ref 1.7–7.7)
NEUTROPHILS RELATIVE PERCENT: 62.7 %
PDW BLD-RTO: 16.3 % (ref 12.4–15.4)
PERFORMED ON: ABNORMAL
PLATELET # BLD: 234 K/UL (ref 135–450)
PMV BLD AUTO: 7.6 FL (ref 5–10.5)
POTASSIUM REFLEX MAGNESIUM: 3.5 MMOL/L (ref 3.5–5.1)
RBC # BLD: 3.55 M/UL (ref 4.2–5.9)
SODIUM BLD-SCNC: 139 MMOL/L (ref 136–145)
WBC # BLD: 6.4 K/UL (ref 4–11)

## 2022-04-26 PROCEDURE — 1200000000 HC SEMI PRIVATE

## 2022-04-26 PROCEDURE — 97530 THERAPEUTIC ACTIVITIES: CPT

## 2022-04-26 PROCEDURE — 6370000000 HC RX 637 (ALT 250 FOR IP)

## 2022-04-26 PROCEDURE — 36415 COLL VENOUS BLD VENIPUNCTURE: CPT

## 2022-04-26 PROCEDURE — 99233 SBSQ HOSP IP/OBS HIGH 50: CPT | Performed by: INTERNAL MEDICINE

## 2022-04-26 PROCEDURE — 6370000000 HC RX 637 (ALT 250 FOR IP): Performed by: INTERNAL MEDICINE

## 2022-04-26 PROCEDURE — 85025 COMPLETE CBC W/AUTO DIFF WBC: CPT

## 2022-04-26 PROCEDURE — 6360000002 HC RX W HCPCS: Performed by: STUDENT IN AN ORGANIZED HEALTH CARE EDUCATION/TRAINING PROGRAM

## 2022-04-26 PROCEDURE — 2580000003 HC RX 258

## 2022-04-26 PROCEDURE — 97535 SELF CARE MNGMENT TRAINING: CPT

## 2022-04-26 PROCEDURE — 80048 BASIC METABOLIC PNL TOTAL CA: CPT

## 2022-04-26 PROCEDURE — 83735 ASSAY OF MAGNESIUM: CPT

## 2022-04-26 PROCEDURE — 6360000002 HC RX W HCPCS

## 2022-04-26 RX ORDER — MAGNESIUM SULFATE IN WATER 40 MG/ML
4000 INJECTION, SOLUTION INTRAVENOUS ONCE
Status: DISCONTINUED | OUTPATIENT
Start: 2022-04-26 | End: 2022-04-26

## 2022-04-26 RX ORDER — PANTOPRAZOLE SODIUM 40 MG/1
40 TABLET, DELAYED RELEASE ORAL ONCE
Status: COMPLETED | OUTPATIENT
Start: 2022-04-26 | End: 2022-04-26

## 2022-04-26 RX ORDER — HYDROCODONE BITARTRATE AND ACETAMINOPHEN 10; 325 MG/1; MG/1
1 TABLET ORAL EVERY 6 HOURS PRN
Qty: 2 TABLET | Refills: 0 | Status: SHIPPED | OUTPATIENT
Start: 2022-04-26 | End: 2022-04-28

## 2022-04-26 RX ORDER — LANOLIN ALCOHOL/MO/W.PET/CERES
800 CREAM (GRAM) TOPICAL ONCE
Status: COMPLETED | OUTPATIENT
Start: 2022-04-26 | End: 2022-04-26

## 2022-04-26 RX ADMIN — HEPARIN SODIUM 5000 UNITS: 5000 INJECTION INTRAVENOUS; SUBCUTANEOUS at 06:41

## 2022-04-26 RX ADMIN — ANTACID TABLETS 500 MG: 500 TABLET, CHEWABLE ORAL at 16:05

## 2022-04-26 RX ADMIN — ATORVASTATIN CALCIUM 10 MG: 10 TABLET, FILM COATED ORAL at 08:58

## 2022-04-26 RX ADMIN — PANTOPRAZOLE SODIUM 40 MG: 40 TABLET, DELAYED RELEASE ORAL at 16:12

## 2022-04-26 RX ADMIN — HEPARIN SODIUM 5000 UNITS: 5000 INJECTION INTRAVENOUS; SUBCUTANEOUS at 14:18

## 2022-04-26 RX ADMIN — Medication: at 08:58

## 2022-04-26 RX ADMIN — INSULIN LISPRO 8 UNITS: 100 INJECTION, SOLUTION INTRAVENOUS; SUBCUTANEOUS at 12:09

## 2022-04-26 RX ADMIN — Medication 800 MG: at 09:39

## 2022-04-26 RX ADMIN — METOCLOPRAMIDE HYDROCHLORIDE 10 MG: 5 INJECTION INTRAMUSCULAR; INTRAVENOUS at 08:58

## 2022-04-26 RX ADMIN — METOCLOPRAMIDE HYDROCHLORIDE 10 MG: 5 INJECTION INTRAMUSCULAR; INTRAVENOUS at 14:38

## 2022-04-26 RX ADMIN — LEVETIRACETAM 500 MG: 500 TABLET, FILM COATED ORAL at 08:58

## 2022-04-26 RX ADMIN — ASPIRIN 81 MG: 81 TABLET, COATED ORAL at 08:58

## 2022-04-26 RX ADMIN — INSULIN LISPRO 2 UNITS: 100 INJECTION, SOLUTION INTRAVENOUS; SUBCUTANEOUS at 08:24

## 2022-04-26 RX ADMIN — INSULIN LISPRO 2 UNITS: 100 INJECTION, SOLUTION INTRAVENOUS; SUBCUTANEOUS at 17:53

## 2022-04-26 RX ADMIN — SODIUM CHLORIDE, PRESERVATIVE FREE 10 ML: 5 INJECTION INTRAVENOUS at 08:58

## 2022-04-26 RX ADMIN — ANTACID TABLETS 500 MG: 500 TABLET, CHEWABLE ORAL at 08:58

## 2022-04-26 RX ADMIN — ONDANSETRON 4 MG: 2 INJECTION INTRAMUSCULAR; INTRAVENOUS at 11:55

## 2022-04-26 NOTE — ADT AUTH CERT
Neurology [address] Day 7 (4/26/2022) by Joann Caputo RN       Review Status Review Entered   Completed 4/26/2022 16:43      Criteria Review      Care Day: 7 Care Date: 4/26/2022 Level of Care: ICU    Guideline Day 3    Level Of Care    (X) * Activity level acceptable    4/26/2022 4:43 PM EDT by Vanessa Julien      as tolerated with assistance    ( ) Floor to discharge    4/26/2022 4:43 PM EDT by aVnessa Julien      ICU    ( ) * Complete discharge planning    Clinical Status    (X) * No infection, or status acceptable    (X) * Isolation not needed, or status acceptable    (X) * Respiratory status acceptable    4/26/2022 4:43 PM EDT by Vanessa Julien      93%  None (Room air)    (X) * Pain and nausea absent or adequately managed    (X) * Ventilatory status acceptable    4/26/2022 4:43 PM EDT by Vanessa Julien      93%  None (Room air)    (X) * Neurologic problems absent or stabilized    (X) * Muscle or nerve damage absent or stable    ( ) * General Discharge Criteria met    Interventions    (X) * Intake acceptable    4/26/2022 4:43 PM EDT by Vanessa Julien      Regular; Low Fat/Low Chol/High Fiber/2 gm Na    ( ) * No inpatient interventions needed    * Milestone   Additional Notes   DATE:    04/26         Vitals:   98.6 (37)  30  105  147/81  93%  None (Room air)          Abnl/Pertinent Labs/Radiology/Diagnostic Studies:   4/26/2022 06:08   GFR Non-: 56 (A)   Magnesium: 1.50 (L)   GLUCOSE, FASTING,GF: 204 (H)   RBC: 3.55 (L)   Hemoglobin Quant: 10.7 (L)   Hematocrit: 32.7 (L)   RDW: 16.3 (H)      4/26/2022 08:24   POC Glucose: 197 (H)      4/26/2022 12:09   POC Glucose: 318 (H)         Physical Exam:   General appearance:  No apparent distress, appears stated age and cooperative. HEENT:  Normal cephalic, atraumatic without obvious deformity. Pupils equal, round, and reactive to light.  Extra ocular muscles intact. Conjunctivae/corneas clear. Neck: Supple, with full range of motion.  No jugular venous distention. Trachea midline. Respiratory:  Normal respiratory effort. Clear to auscultation, bilaterally without Rales/Wheezes/Rhonchi. Cardiovascular:  Regular rate and rhythm with normal S1/S2 without murmurs, rubs or gallops. Abdomen: Soft, non-tender, non-distended with normal bowel sounds. Musculoskeletal:  Left BKA. Skin: Skin color, texture, turgor normal.     Neurologic:  Neurovascularly intact without any focal sensory/motor deficits.  Cranial nerves: II-XII intact, grossly non-focal.   Psychiatric:  Alert and oriented, thought content appropriate, normal insight   Capillary Refill: Brisk,< 3 seconds    Peripheral Pulses: +2 palpable, equal bilaterally          MD Consults/Assessments & Plans:   **DISCHARGE SUMMARY   Hospital Course: Patient is 80-year-old gentleman with history of diabetes mellitus with polyneuropathy, osteomyelitis s/p left BKA, CKD and, recent lumbar surgery on 2/22 was admitted for intermittent confusion from a nursing home.  Patient was intubated for airway protection.  Patient was admitted with neurology evaluation recommended MRI but patient was not able to tolerate MRI.  Neuro recommended encephalopathy Related to polypharmacy and for concern of seizure he was started on Keppra.          Medications:   aspirin EC tablet 81 mg   Dose: 81 mg   Freq: DAILY Route: PO      atorvastatin (LIPITOR) tablet 10 mg   Dose: 10 mg   Freq: DAILY Route: PO      heparin (porcine) injection 5,000 Units   Dose: 5,000 Units   Freq: 3 times per day Route: SC      insulin lispro (1 Unit Dial) 0-12 Units   Dose: 0-12 Units   Freq: 3 TIMES DAILY WITH MEALS Route: SC      levETIRAcetam (KEPPRA) tablet 500 mg   Dose: 500 mg   Freq: 2 TIMES DAILY Route: PO      magnesium oxide (MAG-OX) tablet 800 mg   Dose: 800 mg   Freq: ONCE Route: PO      pantoprazole (PROTONIX) tablet 40 mg   Dose: 40 mg   Freq: ONCE Route: PO      calcium carbonate (TUMS) chewable tablet 500 mg   Dose: 500 mg   Freq: 3 TIMES DAILY PRN Route: PO x2      metoclopramide (REGLAN) injection 10 mg   Dose: 10 mg   Freq: EVERY 6 HOURS PRN Route: IV x2       ondansetron (ZOFRAN) injection 4 mg   Dose: 4 mg   Freq: EVERY 6 HOURS PRN Route: IVx1        Clinical for 4/24 by Zahra Salcedo RN       Review Status Review Entered   In Primary 4/26/2022 16:43      Criteria Review   DATE:   04/24        Vitals:  98.5 (36.9)  26  94  165/84  94%  Nasal cannula         Abnl/Pertinent Labs/Radiology/Diagnostic Studies:  4/24/2022 04:22  BUN,BUNPL: 23 (H)  Creatinine: 1.6 (H)  GFR Non-: 44 (A)  GFR : 53 (A)  GLUCOSE, FASTING,GF: 318 (H)  RBC: 3.56 (L)  Hemoglobin Quant: 10.9 (L)  Hematocrit: 33.0 (L)  RDW: 16.6 (H)  Lymphocytes Absolute: 0.8 (L)     4/24/2022 07:56  POC Glucose: 303 (H)     4/24/2022 11:33  POC Glucose: 252 (H)     4/24/2022 16:57  POC Glucose: 355 (H)     4/24/2022 21:19  POC Glucose: 294 (H)        Physical Exam:  General appearance: NAD, lying in bed comfortably  HEENT:  Conjunctivae/corneas clear. Neck: Supple, with full range of motion. Respiratory:  Normal respiratory effort. Clear to auscultation, bilaterally without Rales/Wheezes/Rhonchi. Cardiovascular: Regular rate and rhythm with normal S1/S2 without murmurs or rubs  Abdomen: Soft, non-tender, non-distended, normal bowel sounds.   Musculoskeletal: No cyanosis or edema bilaterally  Neurologic: moving all extremities spontaneously, gross neurologically intact  Psychiatric: normal mood  Peripheral Pulses: +2 palpable, equal bilaterally         MD Consults/Assessments & Plans:  **NEPHROLOGY  - off IVF   - Ur studies - reviewed   - Cr stable   - Seizure control   - labs in am   - Monitor lytes       Recommend to dose adjust all medications  based on renal functions  Maintain SBP> 90 mmHg   Daily weights   AVOID NSAIDs  Avoid Nephrotoxins  Monitor Intake/Output  Call if significant decrease in urine output         **IM  Acute encephalopathy  MRI Brain - canceled by neurology  Q2H neuro checks  Continue keppra for now  Routine EEG - no definitive seizure activity  MRI brain ordered by neurology     HUNTER- Improving  Monitor. Consulted nephrology     Seizure  Keppra for seizure     SSI  Diet: ADULT DIET;  Regular; Low Fat/Low Chol/High Fiber/2 gm Na  Code Status: Full Code     PT/OT Eval Status: ordered        Medications:  amitriptyline (ELAVIL) tablet 25 mg  Dose: 25 mg  Freq: NIGHTLY Route: PO     aspirin EC tablet 81 mg  Dose: 81 mg  Freq: DAILY Route: PO     atorvastatin (LIPITOR) tablet 10 mg  Dose: 10 mg  Freq: DAILY Route: PO     famotidine (PEPCID) 20 mg in sodium chloride (PF) 10 mL injection  Dose: 20 mg  Freq: ONCE Route: IV     heparin (porcine) injection 5,000 Units  Dose: 5,000 Units  Freq: 3 times per day Route: SC     insulin glargine (LANTUS;BASAGLAR) injection pen 23 Units  Dose: 23 Units  Freq: NIGHTLY Route: SC     insulin lispro (1 Unit Dial) 0-12 Units  Dose: 0-12 Units  Freq: 3 TIMES DAILY WITH MEALS Route: SC     insulin lispro (1 Unit Dial) 0-6 Units  Dose: 0-6 Units  Freq: NIGHTLY Route: SC     levETIRAcetam (KEPPRA) tablet 500 mg  Dose: 500 mg  Freq: 2 TIMES DAILY Route: PO     calcium carbonate (TUMS) chewable tablet 500 mg  Dose: 500 mg  Freq: 3 TIMES DAILY PRN Route: PO x2     labetalol (NORMODYNE;TRANDATE) injection 10 mg  Dose: 10 mg  Freq: EVERY 6 HOURS PRN Route: IV x2     metoclopramide (REGLAN) injection 10 mg  Dose: 10 mg  Freq: EVERY 6 HOURS PRN Route: IV x1        Neurology [facility] - Care Day 6 (4/25/2022) by Radha Smith RN       Review Status Review Entered   Completed 4/26/2022 11:22      Criteria Review      Care Day: 6 Care Date: 4/25/2022 Level of Care: ICU    Guideline Day 3    Level Of Care    (X) * Activity level acceptable    4/26/2022 11:22 AM EDT by Cam Perales      up with assistance    ( ) * Complete discharge planning    Clinical Status    (X) * No infection, or status acceptable    4/26/2022 11:22 AM EDT by Jason Jiménez, Danna      afebrile, wbc 7.9    (X) * Isolation not needed, or status acceptable    4/26/2022 11:22 AM EDT by Amaury Lau      no isolation needed    ( ) * Respiratory status acceptable    4/26/2022 11:22 AM EDT by Amaury Lau      RR 24, 24, 24 (+) tachypnea    (X) * Pain and nausea absent or adequately managed    4/26/2022 11:22 AM EDT by Amaury Lau      PS 8/10 down to 0/10 with dilaudid iv    (X) * Ventilatory status acceptable    4/26/2022 11:22 AM EDT by Amaury Lau      adequate, in room air    (X) * Neurologic problems absent or stabilized    4/26/2022 11:22 AM EDT by Amaury Lau      Neurologic: no focal neurologic deficits. grossly non-focal.  Psychiatric: Alert and oriented, normal mood  daily wound care    (X) * Muscle or nerve damage absent or stable    4/26/2022 11:22 AM EDT by Amaury Lau      Musculoskelatal: No clubbing, cyanosis or edema bilaterally. ( ) * General Discharge Criteria met    Interventions    (X) * Intake acceptable    4/26/2022 11:22 AM EDT by Amaury Lau      Regular; Low Fat/Low Chol/High Fiber/2 gm Na    ( ) * No inpatient interventions needed    4/26/2022 11:22 AM EDT by Amaury Lau      continuous pulse oxymetry and tele monitoring, glucose monitoring 4x daily    * Milestone   Additional Notes   DATE:    4/25       Pertinent Updates:   Still having back pain       Vitals:   TEMP 98.9   RR 24, 24, 24   TX 88   /90, 150/79, 155/84, 146/75   SPO2 93 RA   PS 8/10       Abnl/Pertinent Labs/Radiology/Diagnostic Studies:   Creatinine 1.4 (H)   GFR Non-African American 51 (A)   Magnesium 1.60 (L)   GLUCOSE, FASTING, (H)   RBC 3.36 (L)   Hemoglobin Quant 10.4 (L)   Hematocrit 30.8 (L)   RDW 15.9 (H)       POC Glucose 237 (H) 233 (H) 243 (H) 305 (H)           Physical Exam:   Respiratory:  Normal respiratory effort. Clear to auscultation, bilaterally without Rales/Wheezes/Rhonchi.    Cardiovascular: Regular rate and rhythm with normal S1/S2 without murmurs, rubs or gallops. Neurologic: no focal neurologic deficits. grossly non-focal.   Psychiatric: Alert and oriented, normal mood       MD Consults/Assessments & Plans:   **NEPHROLOGY NOTE   Plan    - off IVF    - Ur studies - reviewed    - Cr stable    - Seizure control    - labs in am    - Monitor lytes     Recommend to dose adjust all medications  based on renal functions   Maintain SBP> 90 mmHg    Daily weights    AVOID NSAIDs   Avoid Nephrotoxins   Monitor Intake/Output       **IM NOTE   -Acute encephalopathy   MRI Brain - canceled by neurology   Continue keppra for now   Routine EEG - no definitive seizure activity   MRI brain ordered by neurology   -HUNTER- continue to Improve   Monitor. Consulted nephrology   -Seizure   Keppra for seizure   -SSI   Diet: ADULT DIET;  Regular; Low Fat/Low Chol/High Fiber/2 gm Na   Code Status: Full Code   -PT/OT Eval Status: ordered   -Dispo -  may need SNF, stable for discharge, restart diuretics on 4/28           Medications:   amitriptyline (ELAVIL) tablet 25 mg   Dose: 25 mg   Freq: NIGHTLY Route: PO   aspirin EC tablet 81 mg   Dose: 81 mg   Freq: DAILY Route: PO   atorvastatin (LIPITOR) tablet 10 mg   Dose: 10 mg   Freq: DAILY Route: PO   heparin (porcine) injection 5,000 Units   Dose: 5,000 Units   Freq: 3 times per day Route: SC   insulin glargine (LANTUS;BASAGLAR) injection pen 23 Units   Dose: 23 Units   Freq: NIGHTLY Route: SC   insulin lispro (1 Unit Dial) 0-12 Units   Dose: 0-12 Units   Freq: 3 TIMES DAILY WITH MEALS Route: SC   insulin lispro (1 Unit Dial) 0-6 Units   Dose: 0-6 Units   Freq: NIGHTLY Route: SC   levETIRAcetam (KEPPRA) tablet 500 mg   Dose: 500 mg   Freq: 2 TIMES DAILY Route: PO   Venelex ointment   Freq: 2 TIMES DAILY Route: TOP x3       PRN MEDS   calcium carbonate (TUMS) chewable tablet 500 mg   Dose: 500 mg   Freq: 3 TIMES DAILY PRN Route: PO x1   HYDROmorphone (DILAUDID) injection 0.25 mg   Dose: 0.25 mg   Freq: EVERY 12 HOURS PRN Route: IV x2   melatonin tablet 6 mg   Dose: 6 mg   Freq: NIGHTLY PRN Route: PO x1   metoclopramide (REGLAN) injection 10 mg   Dose: 10 mg   Freq: EVERY 6 HOURS PRN Route: IV x1       Orders:   Glucose monitoring, daily weights, I&O q8, VS, Hypoglycemia tx, daily wound care, maintain heels off of bed, use lift equipments for lifting patient, maintain HOB at lowest, pad/offload medical devices, assess skin per guidelines, assist turning q2 if needed, intermittent pneumatic compression device, tele monitoring, up with assistance, Regular; Low Fat/Low Chol/High Fiber/2 gm Na            PT/OT/SLP/CM Assessments or Notes:    **CM NOTE   Discharge Plan: Pt needs to go to a SNF   Case Management Notes: Case management continues to follow for discharge planning needs. Chart reviewed. A discharge order is noted for today. Pt was recently at [facility] for a skilled nursing visit. He was discharged to home and set up with home care. The home care had not had time to start service. Patient was admitted to [facility] for acute encephalopathy. Met with patient at the [facility]. He is alert and oriented x 4, sitting up in a chair eating lunch. Spoke with his wife, Farzana Carranza, via phone. The couple agrees he cannot walk and cannot return home. He needs skilled nursing facility placement and wants a referral to go back to [facility]. Sent a refferal via [facility]       **PT NOTE   Assessment: Pt demonstrates improved mobility and endurance, able to tolerate OOB activity this session. Pt reports increased low back pain with all mobility. Presents more alert this session. Pt currently requiring mod assist x2 with bed mobility and STS transfer using a walker. Pt unable to advance either LE in standing with walker and requiring santa steady for bed to chair transfer. Pt demonstrates L lateral/anterior lean in santa steady with poor safety awareness t/o session. Pt is impulsive and requires constant VC's for safety.  Safety concerns for pt returning home however would benefit from SNF placement to improve strength and mobility. Pt would benefit from continued skilled PT to improve functional mobility. Treatment Diagnosis: Decreased functional mobility associated with acute encephalopathy   Activity Tolerance: Patient limited by fatigue;Patient limited by endurance; Patient limited by pain    Comments: Pt presents with telemetry, IV, SpO2 monitor, BP cuff, L LE prosthesis   Subjective: Pt presents lying supine in bed. Pt agreeable to PT. Pt states \"My back hurts\". Sitting - Dynamic: Poor (Max assist to don L LE prosthesis)   Standing - Static: Poor (Min assist x2, L lateral lean, unsteady)   Standing - Dynamic: Poor (Unable to advance either LE in standing with walker)       **OT NOTE   Assessment     Assessment: Pt is more alert today.  Pt c/o back pain.  Pt req mod assist of 2 for mobility.  Unable to transfer with walker, and Nicholas Trejo was utilized.  Pt's prior level of functiona is still not clear.  Cont OT tx per plan of care. Activity Tolerance: Patient limited by pain; Patient limited by endurance

## 2022-04-26 NOTE — PROGRESS NOTES
Occupational Therapy  Facility/Department: [facility] ICU  Daily Treatment Note  NAME: Bao Arora  : 1960  MRN: [id number]    Date of Service: 2022    Discharge Recommendations:    Bao Arora scored a 12/24 on the AM-PAC ADL Inpatient form. Current research shows that an AM-PAC score of 17 or less is typically not associated with a discharge to the patient's home setting. Based on the patient's AM-PAC score and their current ADL deficits, it is recommended that the patient have 3-5 sessions per week of Occupational Therapy at d/c to increase the patient's independence. Please see assessment section for further patient specific details. If patient discharges prior to next session this note will serve as a discharge summary. Please see below for the latest assessment towards goals. OT Equipment Recommendations  Equipment Needed: No      Patient Diagnosis(es): The primary encounter diagnosis was Altered mental status, unspecified altered mental status type. A diagnosis of Chronic back pain, unspecified back location, unspecified back pain laterality was also pertinent to this visit. Assessment    Assessment: Pt agreeable to OOB activity. Pt has his shoes today. Pt c/o feeling light headed with transfer (nurse aware). Cont OT tx per plan of care. Activity Tolerance: Patient limited by pain; Patient limited by endurance (Pt c/o feeling light headed after transfer. (vitals checked, nurse aware))  Equipment Needed: No      Plan   Plan  Times per Week: 2-5  Current Treatment Recommendations: Strengthening;ROM;Balance training;Functional mobility training; Endurance training;Self-Care / ADL;Cognitive reorientation;Equipment evaluation, education, & procurement     Subjective   Subjective  Subjective: Pt in bed upon entry. Pt c/o feeling light headed after transfer.  (vitals checked, nurse aware)  Pain: c/o back pain, not rated, nurse aware           Objective    Vitals     Bed Mobility Training  Bed Mobility Training: Yes  Supine to Sit: Moderate assistance  Scooting:  Moderate assistance  Balance  Standing:  (Pt stood in Alyssa Abu x1 min with CG, pt with Lft lean)  Transfer Training  Sit to Stand: Assist X2 (min assist of 2)  Stand to Sit: Moderate assistance  Bed to Chair:  (via Alyssa Abu and assist of 2)     ADL  Grooming: Setup;Stand by assistance (to wipe face, seated in chair)  LE Dressing: Maximum assistance (to don Lft LE prosthesis and Rt shoe/AFO)  Toileting:  (Denied need)           Patient Education  Education Given To: Patient  Education Provided: Role of Therapy;Plan of Care;Transfer Training  Education Outcome: Continued education needed    Goals                           No goals met  Short Term Goals  Time Frame for Short term goals: Discharge  Short Term Goal 1: Sit edge of bed with SBA x5 min while engaging in activity  Short Term Goal 2: Set up and min assist for simple grooming/hygiene  Short Term Goal 3: Don prosthesis with min assist  New Goal:  Short Term Goal 4: Stance with SBA x2 min to assist with ADL and transfers  Patient Goals   Patient goals : Not stated       Therapy Time   Individual Concurrent Group Co-treatment   Time In 0943         Time Out 1011         Minutes 28         Timed Code Treatment Minutes: [address], OTR/L [id number]

## 2022-04-26 NOTE — DISCHARGE SUMMARY
Hospital Medicine Discharge Summary    Patient ID: Iggy Alvarado      Patient's PCP: Anna Teague Date: 4/20/2022     Discharge Date:   4/26/2022    Admitting Physician: No admitting provider for patient encounter. Discharge Physician: Elmira Newton MD     Discharge Diagnoses: Active Hospital Problems    Diagnosis Date Noted    Altered mental status [R41.82]      Priority: Medium    Polypharmacy [Z79.899] 04/21/2022     Priority: Medium    Poorly controlled diabetes mellitus ([address] [E11.65] 04/21/2022     Priority: Medium    BMI 38.0-38.9,adult [Z68.38] 04/21/2022     Priority: Medium    Acute encephalopathy [G93.40] 04/20/2022     Priority: Medium       The patient was seen and examined on day of discharge and this discharge summary is in conjunction with any daily progress note from day of discharge. Hospital Course: Patient is 80-year-old gentleman with history of diabetes mellitus with polyneuropathy, osteomyelitis s/p left BKA, CKD and, recent lumbar surgery on 2/22 was admitted for intermittent confusion from a nursing home. Patient was intubated for airway protection. Patient was admitted with neurology evaluation recommended MRI but patient was not able to tolerate MRI. Neuro recommended encephalopathy Related to polypharmacy and for concern of seizure he was started on Keppra. Patient seen and examined today alert oriented x3. Denies any nausea, vomiting, fever, chills. No acute event reported overnight. Medically stable for discharge. Final diagnosis  #Acute metabolic encephalopathy concern for polypharmacy  #Suspected seizures. Continue Keppra. Follow-up with neurology as an outpatient. #HUNTER on CKD resolved  #Diabetes mellitus type 2.  #Chronic pain opiate dependence.       Physical Exam Performed:     BP (!) 147/81   Pulse 98   Temp 98.4 °F (36.9 °C) (Oral)   Resp 30   Ht 6' (1.829 m)   Wt 273 lb 13 oz (124.2 kg)   SpO2 93%   BMI 37.14 kg/m² General appearance:  No apparent distress, appears stated age and cooperative. HEENT:  Normal cephalic, atraumatic without obvious deformity. Pupils equal, round, and reactive to light. Extra ocular muscles intact. Conjunctivae/corneas clear. Neck: Supple, with full range of motion. No jugular venous distention. Trachea midline. Respiratory:  Normal respiratory effort. Clear to auscultation, bilaterally without Rales/Wheezes/Rhonchi. Cardiovascular:  Regular rate and rhythm with normal S1/S2 without murmurs, rubs or gallops. Abdomen: Soft, non-tender, non-distended with normal bowel sounds. Musculoskeletal:  Left BKA. Skin: Skin color, texture, turgor normal.    Neurologic:  Neurovascularly intact without any focal sensory/motor deficits. Cranial nerves: II-XII intact, grossly non-focal.  Psychiatric:  Alert and oriented, thought content appropriate, normal insight  Capillary Refill: Brisk,< 3 seconds   Peripheral Pulses: +2 palpable, equal bilaterally       Labs: For convenience and continuity at follow-up the following most recent labs are provided:      CBC:    Lab Results   Component Value Date    WBC 6.4 04/26/2022    HGB 10.7 04/26/2022    HCT 32.7 04/26/2022     04/26/2022       Renal:    Lab Results   Component Value Date     04/26/2022    K 3.5 04/26/2022     04/26/2022    CO2 28 04/26/2022    BUN 17 04/26/2022    CREATININE 1.3 04/26/2022    CALCIUM 9.1 04/26/2022    PHOS 3.6 02/09/2021         Significant Diagnostic Studies    Radiology:   CT CHEST PULMONARY EMBOLISM W CONTRAST   Final Result      1. No evidence of acute or chronic pulmonary embolus   2. Bilateral lower lobe atelectasis or pneumonia   3. Coronary artery calcification   4. Hiatal hernia in addition to a dilated esophagus, presbyesophagus with no retained fluid or fluid levels         CT LUMBAR SPINE WO CONTRAST   Final Result      1. Intact hardware in satisfactory alignment     2. Posterior soft tissue. Correlation with MRI or ultrasound recommended to assess the presence or absence of fluid versus postoperative granulation tissue   3. Transitional vertebra, intervertebral spacer at L3-L4 with evidence of subarticular narrowing associated with facet arthropathy         CT HEAD WO CONTRAST   Final Result      1. No evidence of acute intracranial hemorrhage or mass effect. 2.  Mild chronic small vessel ischemic disease. XR CHEST PORTABLE   Final Result      1. Endotracheal tube at the thoracic inlet. Recommend repositioning/advancement and repeat radiograph. 2.  Bilateral perihilar predominant opacities may reflect edema and/or pneumonia. Consults:     IP CONSULT TO CRITICAL CARE  IP CONSULT TO HOSPITALIST  IP CONSULT TO NEUROLOGY  IP CONSULT TO CRITICAL CARE  IP CONSULT TO CRITICAL CARE  IP CONSULT TO NEPHROLOGY    Disposition:  SNF     Condition at Discharge: Stable    Discharge Instructions/Follow-up:  PCP, Neurology    Code Status:  Full Code     Activity: activity as tolerated    Diet: diabetic diet      Discharge Medications:     Current Discharge Medication List           Details   calcium carbonate (TUMS) 500 MG chewable tablet Take 1 tablet by mouth daily for 14 days  Qty: 14 tablet, Refills: 0      levETIRAcetam (KEPPRA) 500 MG tablet Take 1 tablet by mouth 2 times daily  Qty: 60 tablet, Refills: 3              Details   HYDROcodone-acetaminophen (NORCO)  MG per tablet Take 1 tablet by mouth every 6 hours as needed for Pain for up to 2 days.   Qty: 2 tablet, Refills: 0    Comments: Reduce doses taken as pain becomes manageable  Associated Diagnoses: Chronic back pain, unspecified back location, unspecified back pain laterality      aspirin 81 MG EC tablet Take 1 tablet by mouth daily  Qty: 30 tablet, Refills: 3      losartan-hydroCHLOROthiazide (HYZAAR) 50-12.5 MG per tablet Take 1 tablet by mouth daily  Qty: 30 tablet, Refills: 0      spironolactone (ALDACTONE) 25 MG tablet Take 1 tablet by mouth daily  Qty: 30 tablet, Refills: 3      furosemide (LASIX) 40 MG tablet Take 1 tablet by mouth daily  Qty: 60 tablet, Refills: 3              Details   pantoprazole (PROTONIX) 40 MG tablet Take 40 mg by mouth every morning (before breakfast)       finasteride (PROSCAR) 5 MG tablet Take 5 mg by mouth daily      atorvastatin (LIPITOR) 20 MG tablet Take 20 mg by mouth daily      tamsulosin (FLOMAX) 0.4 MG capsule Take 0.4 mg by mouth daily      acetaminophen (TYLENOL) 500 MG tablet Take 500 mg by mouth every 6 hours as needed for Pain      Multiple Vitamins-Minerals (MULTI COMPLETE PO) Take 1 tablet by mouth daily      melatonin 3 MG TABS tablet Take 3 mg by mouth nightly as needed      senna-docusate (PERICOLACE) 8.6-50 MG per tablet Take 1 tablet by mouth 2 times daily      ferrous sulfate (IRON 325) 325 (65 Fe) MG tablet Take 325 mg by mouth daily (with breakfast)      gabapentin (NEURONTIN) 300 MG capsule Take 300 mg by mouth 3 times daily. amitriptyline (ELAVIL) 25 MG tablet Take 25 mg by mouth nightly      folic acid (FOLVITE) 1 MG tablet Take 1 mg by mouth daily      insulin lispro (HUMALOG) 100 UNIT/ML injection vial Inject 5-15 Units into the skin 3 times daily (before meals) Sliding scale with meals and at HS      omeprazole (PRILOSEC) 20 MG capsule Take 40 mg by mouth Daily       insulin glargine (LANTUS) 100 UNIT/ML injection Inject 21 Units into the skin nightly              Time Spent on discharge is more than 20 minutes in the examination, evaluation, counseling and review of medications and discharge plan. Signed:    Magdalene Genao MD   4/26/2022      Thank you Tesfaye Alcantara for the opportunity to be involved in this patient's care. If you have any questions or concerns please feel free to contact me at [phone].     This chart was likely completed using voice recognition technology and may contain unintended grammatical , phraseology,and/or punctuation errors

## 2022-04-26 NOTE — PROGRESS NOTES
Physical Therapy  Facility/Department: [address] [place] [address] ICU  Daily Treatment Note      Name: Ada Ma  : 1960  MRN: [id number]  Date of Service: 2022    Discharge Recommendations: Ada Ma scored a  on the AM-PAC short mobility form. Current research shows that an AM-PAC score of 17 or less is typically not associated with a discharge to the patient's home setting. Based on the patient's AM-PAC score and their current functional mobility deficits, it is recommended that the patient have 3-5 sessions per week of Physical Therapy at d/c to increase the patient's independence. Please see assessment section for further patient specific details. If patient discharges prior to next session this note will serve as a discharge summary. Please see below for the latest assessment towards goals. Patient Diagnosis(es): The primary encounter diagnosis was Altered mental status, unspecified altered mental status type. A diagnosis of Chronic back pain, unspecified back location, unspecified back pain laterality was also pertinent to this visit. Past Medical History:  has a past medical history of Back pain, CAD (coronary artery disease), CKD (chronic kidney disease) stage 4, GFR 15-29 ml/min ([address] Diabetes mellitus ([address] Hyperlipidemia, Hypertension, and MRSA (methicillin resistant staph aureus) culture positive. Past Surgical History:  has a past surgical history that includes Leg amputation below knee; Foot surgery (Left, 2017); and other surgical history (Right, 2017). Assessment   Body Structures, Functions, Activity Limitations Requiring Skilled Therapeutic Intervention: Decreased functional mobility ; Decreased ROM; Decreased strength;Decreased safe awareness;Decreased cognition;Decreased endurance;Decreased balance;Decreased coordination;Decreased posture  Assessment: Pt demonstrated improved mobility with decreased assist required.  Pt currently requiring mod assist x1 for bed mobility, min assist x2 with STS in santa steady, and dependent bed to chair transfer via santa steady. Pt demonstrates poor standing posture with L lateral/anterior lean requiring VC's/TC's for midline posture. Pt reports increased lightheadedness upon sitting in bedside chair however vitals remained stable. Pt requires max assist for donning L LE prosthesis and shoes. Pt remains a fall risk and not safe to stand alone. Safety concerns for pt returning home however would benefit from SNF placement to improve strength and mobility. Pt would benefit from continued skilled PT to improve functional mobility. Treatment Diagnosis: Decreased functional mobility associated with acute encephalopathy  Requires PT Follow-Up: Yes  Activity Tolerance  Activity Tolerance: Patient limited by pain; Patient limited by endurance     Plan   Plan  Plan:  (2-5)  Current Treatment Recommendations: Strengthening,ROM,Balance training,Functional mobility training,Transfer training,Endurance training,Gait training,Neuromuscular re-education,Cognitive reorientation,Safety education & training,Patient/Caregiver education & training,Therapeutic activities  Plan Comment: Assess gait as able  Safety Devices  Type of Devices: Call light within reach,Nurse notified,Chair alarm in place,Left in chair     Restrictions  Position Activity Restriction  Other position/activity restrictions: Up with assist     Subjective   General  Chart Reviewed: Yes  Additional Pertinent Hx: Mr. Ej Bear is a 65 y/o male presenting with AMS after collapsing in bathroom. Pt has recent lumbar fusion in Feb and returned home form rehab. Pt was dx with acute encephalopathy. Pt was intubated on 4/20 and extubated on 4/21. 4/22CT-head: (-) acute. XR-chest: Endotracheal tube at the thoracic inlet, Bilateral perihilar predominant opacities may reflect edema and/or pneumonia. CT-L spine: Intact hardware in satisfactory alignment.  CT-chest: Bilateral lower lobe atelectasis or pneumonia, (-) PE. MRI-brain ordered. PMH: CAD, CKD IV, DM, HLD, HTN, L BKA. Family / Caregiver Present: No  Referring Practitioner: David Patricia MD  Referral Date : 04/20/22  Diagnosis: Acute encephalopathy  General Comment  Comments: Pt presents with telemetry, SpO2 monitor, BP cuff, R LE prosthesis, and L AFO  Subjective  Subjective: Pt presents lying supine in bed. Pt agreeable to PT. Pt states \"I have my shoes today\". Pain: 7-8/10, low back, RN notified          Social/Functional History  Social/Functional History  Lives With: Spouse  Type of Home: House  Home Layout: Two level,Laundry in basement (Bedroom/bathroom on 2nd floor)  Home Access: Stairs to enter with rails  Entrance Stairs - Number of Steps: 2 DON  Bathroom Shower/Tub: Tub/Shower unit  Bathroom Toilet: Standard  Bathroom Equipment: Shower chair,Grab bars in shower (Sink next to toilet for leverage)  Bathroom Accessibility: Accessible  Home Equipment: Nestora Dom, rolling,Cane,Wheelchair-manual,Reacher  Has the patient had two or more falls in the past year or any fall with injury in the past year?: Yes (Reports \"a few\" falls in the last 4 months d/t losing balance)  ADL Assistance: Dee: Needs assistance (Wife primarily performs cooking, cleaning, and laundry however pt helps)  Homemaking Responsibilities: Yes  Ambulation Assistance: Independent (Recently using a w/c , uses walker ocassionally)  Transfer Assistance: Independent  Active : Yes  Additional Comments: Pt is a questionable historian  Vision/Hearing       Cognition   Orientation  Overall Orientation Status: Within Functional Limits     Objective    Bed mobility  Supine to Sit: Moderate assistance (HOB elevated, increased time and effort, HHA, use of rail)  Scooting:  Moderate assistance (To EOB)  Transfers  Sit to Stand: Minimal Assistance;2 Person Assistance (x1 trial from EOB in santa steady, VC's for hand placement; x1 trial in santa steady)  Stand to sit: Minimal Assistance;2 Person Assistance (VC's for hand placement, required assist for controlled descent)  Bed to Chair: Dependent/Total (Via santa steady)  Comment: Pt reported lightheadedness upon sitting in bedside chair however vitals remained stable, RN notified.         Balance  Posture: Poor (Increased thoracic kyphosis, poor cervical extension, L lateral lean)  Sitting - Static: Good (SBA)  Sitting - Dynamic: Poor (Pt requires max assist to don prosthesis and B shoes)  Standing - Static: Fair (CGA x1 in santa steady x1 min, L lateral/anterior lean, VC's/TC's for upright posture)  Standing - Dynamic: Poor (Dependent via santa steady)           OutComes Score                                                  AM-PAC Score  AM-PAC Inpatient Mobility Raw Score : 11 (04/26/22 1040)  AM-PAC Inpatient T-Scale Score : 33.86 (04/26/22 1040)  Mobility Inpatient CMS 0-100% Score: 72.57 (04/26/22 1040)  Mobility Inpatient CMS G-Code Modifier : CL (04/26/22 1040)          Goals  Short Term Goals  Time Frame for Short term goals: D/C  Short term goal 1: Perform B rolling with min assist - ongoing  Short term goal 2: Perform supine to sit transfer with mod assist x1 via log roll - MET 4/26, revised goal: Perform supine to sit transfer with CGA x1 via log roll  Short term goal 3: Improve static stitting balance to good x10 min with CGA - MET 4/25, revised goal: Improve static standing balance to good x5 min with SBA and walker  Short term goal 4: Perform STS transfer with CGA x1 and walker  Short term goal 5: Perform bed to chair transfer with mod assist x1 and walker  Patient Goals   Patient goals : Not stated       Therapy Time   Individual Concurrent Group Co-treatment   Time In 0943         Time Out 1011         Minutes 28               Timed Code Treatment Minutes:   28    Total Treatment Minutes:  [address], SPT       Therapist was present, directed the patient's care, made skilled judgement, and was responsible for assessment and treatment of the patient.   Karen Baez, [address] [place] [address]

## 2022-04-26 NOTE — DISCHARGE INSTR - COC
Continuity of Care Form    Patient Name: Moon Barrow   :  1960  MRN:  [id number]    Admit date:  2022  Discharge date:  ***    Code Status Order: Full Code   Advance Directives:      Admitting Physician:  No admitting provider for patient encounter.   PCP: Roxy Lackey    Discharging Nurse: [facility] Unit/Room#: 8602/2110-76  Discharging Unit Phone Number: ***    Emergency Contact:   Extended Emergency Contact Information  Primary Emergency Contact: Victoria Meyer  Address: [address] [place] [address],[address], [address] Phone: [phone]  Work Phone: [phone]  Relation: Spouse  Secondary Emergency Contact: Pita W Edison Phone: [phone]  Relation: Child    Past Surgical History:  Past Surgical History:   Procedure Laterality Date    FOOT SURGERY Left 2017    LEG AMPUTATION BELOW KNEE      OTHER SURGICAL HISTORY Right 2017    RIGHT FOOT DEBRIDEMENT INCISION AND DRAINAGE, DELAYED PRIMARY       Immunization History:   Immunization History   Administered Date(s) Administered    COVID-19, Pfizer Purple top, DILUTE for use, 12+ yrs, 30mcg/0.3mL dose 2021, 2021    Influenza Vaccine, unspecified formulation 2018    Pneumococcal Conjugate 13-valent ([id number]) 2018    Pneumococcal Polysaccharide ([id number]) 2014    Td, unspecified formulation 10/14/2011    Tdap (Boostrix, Adacel) 10/11/2011       Active Problems:  Patient Active Problem List   Diagnosis Code    DVT (deep venous thrombosis) ([facility]) I82.409    Osteomyelitis of left foot ([facility]) M86.9    S/P unilateral BKA (below knee amputation) ([facility] Z89.519    HUNTER (acute kidney injury) ([facility] N17.9    HTN (hypertension) I10    DM (diabetes mellitus) ([facility] E11.9    HLD (hyperlipidemia) E78.5    Back pain M54.9    Diabetic foot infection ([facility] E11.628, L08.9    Osteomyelitis of foot ([facility] M86.9    Sepsis ([facility] A41.9    CKD (chronic kidney disease) stage 3, GFR 30-59 ml/min ([facility]) N18.30    Bacterial infection due to streptococcus, group A B95.0    Fever R50.9    Leukocytosis D72.829    Streptococcal bacteremia R78.81, B95.5    Acute encephalopathy G93.40    Polypharmacy Z79.899    Poorly controlled diabetes mellitus ([facility]) E11.65    BMI 38.0-38.9,adult Z68.38    Altered mental status R41.82       Isolation/Infection:   Isolation            No Isolation          Patient Infection Status       Infection Onset Added Last Indicated Last Indicated By Review Planned Expiration Resolved Resolved By    None active    Resolved    MRSA  05/14/17 05/14/17 Alfonso Roberts RN   11/20/18 Manohar Castaneda            Nurse Assessment:  Last Vital Signs: /79   Pulse 94   Temp 98.4 °F (36.9 °C) (Oral)   Resp 22   Ht 6' (1.829 m)   Wt 273 lb 13 oz (124.2 kg)   SpO2 94%   BMI 37.14 kg/m²     Last documented pain score (0-10 scale): Pain Level: 0  Last Weight:   Wt Readings from Last 1 Encounters:   04/24/22 273 lb 13 oz (124.2 kg)     Mental Status:  oriented and alert    IV Access:  - None    Nursing Mobility/ADLs:  Walking   Dependent  Transfer  Dependent  Bathing  Dependent  Dressing  Dependent  Toileting  Dependent  Feeding  Assisted  Med Admin  Assisted  Med Delivery   none    Wound Care Documentation and Therapy:  Wound 04/21/22 Foot Right;Lateral (Active)   Wound Image   04/21/22 1156   Wound Etiology Diabetic 04/26/22 0400   Dressing Status Clean;Dry; Intact 04/26/22 0400   Wound Cleansed Not Cleansed 04/21/22 1156   Dressing/Treatment Betadine swabs/povidone iodine;Silicone border 47/23/97 1156   Offloading for Diabetic Foot Ulcers Offloading boot 04/21/22 1156   Dressing Change Due 04/22/22 04/21/22 1156   Wound Length (cm) 1 cm 04/21/22 1156   Wound Width (cm) 1.5 cm 04/21/22 1156   Wound Depth (cm) 0.1 cm 04/21/22 1156   Wound Surface Area (cm^2) 1.5 cm^2 04/21/22 1156   Wound Volume (cm^3) 0.15 cm^3 04/21/22 1156   Wound Assessment Dry;Pink/red 04/25/22 3707   Drainage Amount None 04/26/22 0400   Odor None 04/25/22 0400   Nelida-wound Assessment Blanchable erythema;Dry/flaky 04/25/22 0400   Margins Attached edges 04/25/22 0400   Wound Thickness Description not for Pressure Injury Partial thickness 04/25/22 0400   Number of days: 4       Wound 04/24/22 Perineum (Active)   Dressing/Treatment Open to air 04/26/22 0400   Wound Assessment Other (Comment) 04/26/22 0400   Drainage Amount None 04/26/22 0400   Odor None 04/25/22 0400   Number of days: 1        Elimination:  Continence: Bowel: No  Bladder: No  Urinary Catheter: None   Colostomy/Ileostomy/Ileal Conduit: No       Date of Last BM: 4/24/2022    Intake/Output Summary (Last 24 hours) at 4/26/2022 0950  Last data filed at 4/26/2022 0600  Gross per 24 hour   Intake --   Output 1250 ml   Net -1250 ml     I/O last 3 completed shifts:  In: -   Out: Bienvenido [Urine:1550]    Safety Concerns: At Risk for Falls    Impairments/Disabilities:      Amputation - RBKA    Nutrition Therapy:  Current Nutrition Therapy:   - Oral Diet:  Low Sodium (2gm)    Routes of Feeding: Oral  Liquids: Thin Liquids  Daily Fluid Restriction: no  Last Modified Barium Swallow with Video (Video Swallowing Test): not done    Treatments at the Time of Hospital Discharge:   Respiratory Treatments: none  Oxygen Therapy:  is not on home oxygen therapy. Ventilator:    - No ventilator support    Rehab Therapies: Physical Therapy, Occupational Therapy, and Orthotics/Prosthetics  Weight Bearing Status/Restrictions: No weight bearing restrictions  Other Medical Equipment (for information only, NOT a DME order):  braces, RBKA prostethic, left ankle stabilizer. Other Treatments: none    Patient's personal belongings (please select all that are sent with patient):  L bka prosthetic, right foot splint.  telephone    RN SIGNATURE:  Electronically signed by Kizzy Trujillo RN on 4/26/22 at 2:49 PM EDT    CASE MANAGEMENT/SOCIAL WORK SECTION    Inpatient Status Date: ***    Readmission Risk Assessment Score:  Readmission Risk              Risk of Unplanned Readmission:  17         Discharging to Facility/ [address]  Address: [address] [facility] [address]  Phone: [phone]    / signature: Electronically signed by Jeannie Leigh RN on 4/26/22 at 11:07 AM EDT    PHYSICIAN SECTION    Prognosis: Fair    Condition at Discharge: Stable    Rehab Potential (if transferring to Rehab): Good    Recommended Labs or Other Treatments After Discharge: PT/OT    Physician Certification: I certify the above information and transfer of Elvis Soto  is necessary for the continuing treatment of the diagnosis listed and that he requires [facility] for less 30 days.      Update Admission H&P: No change in H&P    PHYSICIAN SIGNATURE:  Electronically signed by Irene Runner, MD on 4/26/22 at 9:50 AM EDT

## 2022-04-26 NOTE — CARE COORDINATION
Case Management Assessment            Discharge Note                    Date / Time of Note: 4/26/2022 11:07 AM                  Discharge Note Completed by: Larry Blas RN     Pt will discharge to [facility] today. [address] will transport @ 1945. RN CALL REPORT [phone]  [facility] [phone]    Pt's spouse, Haroldo Benítez, was updated. DAVID HYMAN submitted for admission to [facility] ID : [id number]    Patient Name: Marco A Manrique   YOB: 1960  Diagnosis: Acute encephalopathy [G93.40]  Altered mental status, unspecified altered mental status type [R41.82]   Date / Time: 4/20/2022  3:59 PM    Current PCP: Steve Sharma     Advance Directives:  Code Status: Full Code    Financial:  Payor: Meliton Galindo 150 / Plan: Suellen Lomax PPO / Product Type: *No Product type* /      Pharmacy:    [place] [id number] [id number] - [address] [place] [address] [phone] - F [phone]  [address] [place] [id number]  Phone: [phone] Fax: [phone]    ADLS:  Current PT AM-PAC Score: 11 /24  Current OT AM-PAC Score: 12 /24      DISCHARGE Disposition: [facility] Address: [address] [facility]  Phone: [phone]    LOC at discharge: Skilled  [address] Completed: Yes    Transportation:  Transportation PLAN for discharge: EMS transportation   Mode of Transport: Ambulance stretcher - BLS  Name of [address] [place] [address],[address]: [address] Transport  Time of Transport: 1945    Transport form completed: Yes    The Patient and/or patient representative Jailyn Cross and his family were provided with a choice of provider and agrees with the discharge plan Yes    Freedom of choice list was provided with basic dialogue that supports the patient's individualized plan of care/goals and shares the quality data associated with the providers.  Yes    Larry Blas RN  [facility]  Case [facility] Department  [phone]

## 2022-04-26 NOTE — PLAN OF CARE
Problem: Discharge Planning  Goal: Discharge to home or other facility with appropriate resources  4/25/2022 2036 by Birgit Hammond RN  Outcome: Progressing  4/25/2022 1733 by Chema Marques RN  Outcome: Progressing     Problem: Pain  Goal: Verbalizes/displays adequate comfort level or baseline comfort level  4/25/2022 2036 by Birgit Hammond RN  Outcome: Progressing  4/25/2022 1733 by Chema Marques RN  Outcome: Progressing     Problem: Safety - Medical Restraint  Goal: Remains free of injury from restraints (Restraint for Interference with Medical Device)  Description: INTERVENTIONS:  1. Determine that other, less restrictive measures have been tried or would not be effective before applying the restraint  2. Evaluate the patient's condition at the time of restraint application  3. Inform patient/family regarding the reason for restraint  4. Q2H: Monitor safety, psychosocial status, comfort, nutrition and hydration  4/25/2022 2036 by Birgit Hammond RN  Outcome: Progressing  4/25/2022 1733 by Chema Marques RN  Outcome: Progressing     Problem: Skin/Tissue Integrity  Goal: Absence of new skin breakdown  Description: 1. Monitor for areas of redness and/or skin breakdown  2. Assess vascular access sites hourly  3. Every 4-6 hours minimum:  Change oxygen saturation probe site  4. Every 4-6 hours:  If on nasal continuous positive airway pressure, respiratory therapy assess nares and determine need for appliance change or resting period.   4/25/2022 2036 by Birgit Hammond RN  Outcome: Progressing  4/25/2022 1733 by Chema Marques RN  Outcome: Progressing     Problem: Safety - Adult  Goal: Free from fall injury  4/25/2022 2036 by Birgit Hammond RN  Outcome: Progressing  4/25/2022 1733 by Chema Marques RN  Outcome: Progressing     Problem: ABCDS Injury Assessment  Goal: Absence of physical injury  4/25/2022 2036 by Birgit Hammond RN  Outcome: Progressing  4/25/2022 1733 by Chema Marques RN  Outcome: Progressing     Problem: Chronic Conditions and Co-morbidities  Goal: Patient's chronic conditions and co-morbidity symptoms are monitored and maintained or improved  4/25/2022 2036 by Letty Lynch RN  Outcome: Progressing  4/25/2022 1733 by Bob Snellen, RN  Outcome: Progressing     Problem: Nutrition Deficit:  Goal: Optimize nutritional status  4/25/2022 2036 by Letty Lynch RN  Outcome: Progressing  4/25/2022 1733 by Bob Snellen, RN  Outcome: Progressing  4/25/2022 1542 by Hamzah Mancuso MS, RD, LD  Outcome: Progressing  Note: Nutrition Problem: Inadequate oral intake  Intervention: Continue Current Diet,Start Oral Nutrition Supplement  Nutrition Goals: pt will tolerate >50% PO intake consistently throughout adm

## 2022-04-26 NOTE — PROGRESS NOTES
Physician Progress Note      PATIENTHarvey Mohs  CSN #:                  [id number]  :                       1960  ADMIT DATE:       2022 3:59 PM  [address] [place] DATE:  RESPONDING  PROVIDER #:        Mahad Evans MD        QUERY TEXT:    Type of Encephalopathy: Please provide further specificity, if known. Clinical indicators include: hypoglycemia, intracranial hemorrhage,   polypharmacy, acute, encephalopathy, fever, altered mental status  Options provided:  -- Anoxic/hypoxic encephalopathy  -- Metabolic encephalopathy  -- Toxic encephalopathy  -- Hepatic encephalopathy  -- Hypertensive encephalopathy  -- Other - I will add my own diagnosis  -- Disagree - Not applicable / Not valid  -- Disagree - Clinically Unable to determine / Unknown        PROVIDER RESPONSE TEXT:    The patient has toxic encephalopathy.       Electronically signed by:  Mahad Evans MD 2022 9:39 PM

## 2022-04-27 NOTE — PROGRESS NOTES
Nephrology  Note                                                                                                                                                                                                                                                                                                                                                               Office : [phone]     Fax :[phone]              Patient's Name: Lamin Taylor  4/26/2022    Reason for Consult: HUNTER   Requesting Physician:  Leighton Najjar      Chief Complaint:  Seizure      Good UO  Cr stable   Nausea +    Past Medical History:   Diagnosis Date    Back pain     CAD (coronary artery disease)     CKD (chronic kidney disease) stage 4, GFR 15-29 ml/min ([facility])     Dr. She Garrido    [facility]     Hyperlipidemia     Hypertension     MRSA (methicillin resistant staph aureus) culture positive 05/11/2017    foot       Past Surgical History:   Procedure Laterality Date    FOOT SURGERY Left 05/11/2017    LEG AMPUTATION BELOW KNEE      OTHER SURGICAL HISTORY Right 05/14/2017    RIGHT FOOT DEBRIDEMENT INCISION AND DRAINAGE, DELAYED PRIMARY       Family History   Problem Relation Age of Onset    Heart Disease Mother     High Blood Pressure Mother     Stroke Mother     Arthritis Mother     Diabetes Father     Arthritis Father     Diabetes Brother         reports that he has never smoked. He has never used smokeless tobacco. He reports that he does not drink alcohol and does not use drugs.     Allergies:  Codeine    Current Medications:    melatonin tablet 6 mg, Nightly PRN  promethazine (PHENERGAN) injection 6.25 mg, Once  Venelex ointment, BID  levETIRAcetam (KEPPRA) tablet 500 mg, BID  calcium carbonate (TUMS) chewable tablet 500 mg, TID PRN  HYDROmorphone (DILAUDID) injection 0.25 mg, Q12H PRN  insulin glargine (LANTUS;BASAGLAR) injection pen 23 Units, Nightly  metoclopramide (REGLAN) injection 10 mg, Q6H PRN  heparin (porcine) injection 5,000 Units, 3 times per day  insulin lispro (1 Unit Dial) 0-12 Units, TID WC  insulin lispro (1 Unit Dial) 0-6 Units, Nightly  perflutren lipid microspheres (DEFINITY) injection 1.65 mg, ONCE PRN  sodium chloride flush 0.9 % injection 5-40 mL, 2 times per day  sodium chloride flush 0.9 % injection 5-40 mL, PRN  0.9 % sodium chloride infusion, PRN  ondansetron (ZOFRAN-ODT) disintegrating tablet 4 mg, Q8H PRN   Or  ondansetron (ZOFRAN) injection 4 mg, Q6H PRN  polyethylene glycol (GLYCOLAX) packet 17 g, Daily PRN  acetaminophen (TYLENOL) tablet 650 mg, Q6H PRN   Or  acetaminophen (TYLENOL) suppository 650 mg, Q6H PRN  glucagon (rDNA) injection 1 mg, PRN  dextrose 5 % solution, PRN  amitriptyline (ELAVIL) tablet 25 mg, Nightly  aspirin EC tablet 81 mg, Daily  [Held by provider] losartan-hydroCHLOROthiazide (HYZAAR) 50-12.5 MG per tablet 1 tablet, Daily  atorvastatin (LIPITOR) tablet 10 mg, Daily  [Held by provider] spironolactone (ALDACTONE) tablet 25 mg, Daily  propofol injection, Continuous  glucose chewable tablet 16 g, PRN  dextrose bolus (hypoglycemia) 10% 125 mL, PRN   Or  dextrose bolus (hypoglycemia) 10% 250 mL, PRN  labetalol (NORMODYNE;TRANDATE) injection 10 mg, Q6H PRN          Physical exam:     Vitals:  BP (!) 144/72   Pulse 105   Temp 98.2 °F (36.8 °C) (Oral)   Resp 25   Ht 6' (1.829 m)   Wt 273 lb 13 oz (124.2 kg)   SpO2 93%   BMI 37.14 kg/m²   Constitutional:  AA  Skin: no rash, turgor wnl  Heent:  eomi, mmm  Neck: no bruits or jvd noted  Cardiovascular:  S1, S2 without m/r/g  Respiratory: CTA B without w/r/r  Abdomen:  +bs, soft, nt, nd  Ext: + lower extremity edema  Psychiatric: mood and affect appropriate  Musculoskeletal:  Rom, muscular strength dec     Data:   Labs:  CBC:   Recent Labs     04/24/22 0422 04/25/22  0630 04/26/22  0608   WBC 8.3 7.9 6.4   HGB 10.9* 10.4* 10.7*    240 234     BMP:    Recent Labs     04/24/22 0422 04/25/22  0630 04/26/22  0608    137 139   K 3.8 3.5 3.5    100 101   CO2 22 25 28   BUN 23* 20 17   CREATININE 1.6* 1.4* 1.3   GLUCOSE 318* 248* 204*     Ca/Mg/Phos:   Recent Labs     04/24/22  0422 04/25/22  0630 04/26/22  0608   CALCIUM 9.5 9.0 9.1   MG 1.80 1.60* 1.50*     Hepatic:   No results for input(s): AST, ALT, ALB, BILITOT, ALKPHOS in the last 72 hours. Troponin:   No results for input(s): TROPONINI in the last 72 hours. BNP: No results for input(s): BNP in the last 72 hours. Lipids: No results for input(s): CHOL, TRIG, HDL, LDLCALC, LABVLDL in the last 72 hours. ABGs:   No results for input(s): PHART, PO2ART, XRU8ZEM in the last 72 hours. INR:   No results for input(s): INR in the last 72 hours. UA:No results for input(s): Alma Pen, GLUCOSEU, BILIRUBINUR, Wyline Paco, BLOODU, PHUR, PROTEINU, UROBILINOGEN, NITRU, LEUKOCYTESUR, LABMICR, URINETYPE in the last 72 hours. Urine Microscopic: No results for input(s): LABCAST, BACTERIA, COMU, HYALCAST, WBCUA, RBCUA, EPIU in the last 72 hours. Urine Culture: No results for input(s): LABURIN in the last 72 hours. Urine Chemistry: No results for input(s): Delmis Lynn, PROTEINUR, NAUR in the last 72 hours. IMAGING:  CT CHEST PULMONARY EMBOLISM W CONTRAST   Final Result      1. No evidence of acute or chronic pulmonary embolus   2. Bilateral lower lobe atelectasis or pneumonia   3. Coronary artery calcification   4. Hiatal hernia in addition to a dilated esophagus, presbyesophagus with no retained fluid or fluid levels         CT LUMBAR SPINE WO CONTRAST   Final Result      1. Intact hardware in satisfactory alignment     2. Posterior soft tissue. Correlation with MRI or ultrasound recommended to assess the presence or absence of fluid versus postoperative granulation tissue   3.  Transitional vertebra, intervertebral spacer at L3-L4 with evidence of subarticular narrowing associated with facet arthropathy         CT HEAD WO CONTRAST Final Result      1. No evidence of acute intracranial hemorrhage or mass effect. 2.  Mild chronic small vessel ischemic disease. XR CHEST PORTABLE   Final Result      1. Endotracheal tube at the thoracic inlet. Recommend repositioning/advancement and repeat radiograph. 2.  Bilateral perihilar predominant opacities may reflect edema and/or pneumonia. Assessment/Plan   1. HUNTER On CKD     2. HTN    3. Anemia    4. Acid- base/ Electrolyte imbalance     5.  Seizure     Plan   - off IVF   - Ur studies - reviewed   - Cr stable   - Seizure control   - labs in am   - Monitor lytes       Recommend to dose adjust all medications  based on renal functions  Maintain SBP> 90 mmHg   Daily weights   AVOID NSAIDs  Avoid Nephrotoxins  Monitor Intake/Output  Call if significant decrease in urine output               Thank you for allowing us to participate in care of Nickolas Rosa MD  Feel free to contact me   [facility] [address]  Office : [phone]  Fax :[phone]

## 2024-01-01 NOTE — ED NOTES
Assisted patient to restroom.      Dee Reyes RN  08/02/21 9519
Bed: B17-17  Expected date:   Expected time:   Means of arrival:   Comments:  Mitch Tran RN  08/02/21 1047
O positive

## 2024-05-18 ENCOUNTER — HOSPITAL ENCOUNTER (EMERGENCY)
Age: 64
Discharge: HOME | End: 2024-05-18
Attending: STUDENT IN AN ORGANIZED HEALTH CARE EDUCATION/TRAINING PROGRAM
Payer: MEDICAID

## 2024-05-18 ENCOUNTER — APPOINTMENT (OUTPATIENT)
Dept: GENERAL RADIOLOGY | Age: 64
End: 2024-05-18
Payer: MEDICAID

## 2024-05-18 VITALS
BODY MASS INDEX: 36.76 KG/M2 | RESPIRATION RATE: 16 BRPM | HEART RATE: 87 BPM | TEMPERATURE: 98.2 F | OXYGEN SATURATION: 95 % | SYSTOLIC BLOOD PRESSURE: 145 MMHG | WEIGHT: 295.64 LBS | HEIGHT: 75 IN | DIASTOLIC BLOOD PRESSURE: 79 MMHG

## 2024-05-18 DIAGNOSIS — S62.619A CLOSED FRACTURE OF PROXIMAL PHALANX OF DIGIT OF LEFT HAND, INITIAL ENCOUNTER: Primary | ICD-10-CM

## 2024-05-18 PROCEDURE — 29125 APPL SHORT ARM SPLINT STATIC: CPT

## 2024-05-18 PROCEDURE — 73130 X-RAY EXAM OF HAND: CPT

## 2024-05-18 PROCEDURE — 99283 EMERGENCY DEPT VISIT LOW MDM: CPT

## 2024-05-18 ASSESSMENT — PAIN - FUNCTIONAL ASSESSMENT: PAIN_FUNCTIONAL_ASSESSMENT: 0-10

## 2024-05-18 ASSESSMENT — PAIN DESCRIPTION - DESCRIPTORS: DESCRIPTORS: THROBBING

## 2024-05-18 ASSESSMENT — LIFESTYLE VARIABLES
HOW OFTEN DO YOU HAVE A DRINK CONTAINING ALCOHOL: NEVER
HOW MANY STANDARD DRINKS CONTAINING ALCOHOL DO YOU HAVE ON A TYPICAL DAY: PATIENT DOES NOT DRINK

## 2024-05-18 ASSESSMENT — PAIN DESCRIPTION - ORIENTATION: ORIENTATION: LEFT

## 2024-05-18 ASSESSMENT — PAIN SCALES - GENERAL: PAINLEVEL_OUTOF10: 5

## 2024-05-18 ASSESSMENT — PAIN DESCRIPTION - LOCATION: LOCATION: HAND

## 2024-05-19 NOTE — ED PROVIDER NOTES
ED Attending Attestation Note     Date of evaluation: 5/18/2024    I have discussed the case with the resident. I have personally performed a history, physical exam, and my own medical decision making. I have reviewed the note and agree with the findings and plan. My assessment reveals a 63-year-old male presenting with a fall and left hand pain.  He fell and jammed his left fifth digit.  He has chronic contractures of that hand.  X-ray reveals 1/5 metacarpal fracture.  Patient placed in an ulnar gutter splint and discharged with referral to orthopedics to follow-up in 1 week.  Discharged home with strict return precautions.    Ortho Injury    Date/Time: 5/18/2024 9:05 PM    Performed by: Tim Abarca MD  Authorized by: Tim Abarca MD  Consent: Verbal consent obtained.  Risks and benefits: risks, benefits and alternatives were discussed  Consent given by: patient  Patient identity confirmed: verbally with patient  Injury location: hand  Location details: left hand  Injury type: fracture  Fracture type: fifth metacarpal  Pre-procedure neurovascular assessment: neurovascularly intact  Pre-procedure distal perfusion: normal  Pre-procedure neurological function: normal  Pre-procedure range of motion: reduced    Anesthesia:  Local anesthesia used: no    Sedation:  Patient sedated: no    Manipulation performed: no  Immobilization: splint  Splint type: ulnar gutter  Supplies used: plaster  Post-procedure neurovascular assessment: post-procedure neurovascularly intact  Post-procedure distal perfusion: normal  Post-procedure neurological function: normal  Post-procedure range of motion: unchanged  Patient tolerance: patient tolerated the procedure well with no immediate complications             Tim Abarca MD  05/18/24 6529

## 2024-05-19 NOTE — ED NOTES
Pt condition stable at discharge. Pt and pt's wife at bedside verbalize understanding of discharge instructions. Pt discharged with a copy of AVS. Pt assisted into vehicle by this RN and medic.      Patrick Rutledge RN  05/18/24 2129

## 2024-05-19 NOTE — ED NOTES
[facility]  EMERGENCY DEPARTMENT ENCOUNTER          EM RESIDENT NOTE       Date of evaluation: 5/18/2024    Chief Complaint     Hand Pain (Pt endorses falling while transferring earlier at home. Pt endorses hitting L hand on coffee table and jamming ring and pinky finger. Pt is able to move his finger and does not endorse loss of sensation. Pt endorses a throbbing pain. Skin appears intact on hand. Pt has lac on L forearm )      History of Present Illness     Ty Meyer is a 63 y.o. male who presents to the ED complaining of left hand pain after a fall. He was walking around at home when he lost his balance and fell on his out-stretched hand. He had extreme pain right away and noticed a blood blister form over his left finger. He did not hit his head during the fall. His other hand is fine.    MEDICAL DECISION MAKING / ASSESSMENT / PLAN     INITIAL VITALS: BP: (!) 145/79, Temp: 98.2 °F (36.8 °C), Pulse: 87, Respirations: 16, SpO2: 95 %    Ty Meyer is a 63 y.o. male who presents to the ED complaining of left hand pain after a fall. Xray showed a closed fracture of the proximal fifth phalanx of left hand. An ulnar gutter was applied using plaster given his ulnar claw and he will follow up with orthopedics in one month.      Medical Decision Making  Amount and/or Complexity of Data Reviewed  Radiology: ordered.        This patient was also evaluated by the attending physician. All care plans werediscussed and agreed upon.    Clinical Impression     1. Closed fracture of proximal phalanx of digit of left hand, initial encounter        Disposition     PATIENT REFERRED TO:  Chucho Neal MD  [address] [facility]  [address]  [place] [address]  [phone]            DISCHARGE MEDICATIONS:  New Prescriptions    No medications on file       DISPOSITION Decision To Discharge 05/18/2024 09:07:54 PM        Diagnostic Results and Other Data     RADIOLOGY:  XR HAND LEFT (MIN 3 VIEWS)   Final Result

## 2024-05-20 ENCOUNTER — TELEPHONE (OUTPATIENT)
Dept: ORTHOPEDIC SURGERY | Age: 64
End: 2024-05-20

## 2024-05-20 NOTE — TELEPHONE ENCOUNTER
Unable to reach patient regarding ED referral for an appointment. Upon return call please schedule with Dr. Neal.

## 2024-05-22 NOTE — TELEPHONE ENCOUNTER
3rd attempt: Unable to leave VM regarding ED referral. Upon return call please schedule with Dr. Neal. Letter mailled

## 2025-04-14 ENCOUNTER — HOSPITAL ENCOUNTER (EMERGENCY)
Age: 65
Discharge: HOME | End: 2025-04-15
Attending: EMERGENCY MEDICINE
Payer: MEDICAID

## 2025-04-14 DIAGNOSIS — K40.91 UNILATERAL RECURRENT INGUINAL HERNIA WITHOUT OBSTRUCTION OR GANGRENE: Primary | ICD-10-CM

## 2025-04-14 PROCEDURE — 80053 COMPREHEN METABOLIC PANEL: CPT

## 2025-04-14 PROCEDURE — 99285 EMERGENCY DEPT VISIT HI MDM: CPT

## 2025-04-14 PROCEDURE — 83605 ASSAY OF LACTIC ACID: CPT

## 2025-04-14 PROCEDURE — 85025 COMPLETE CBC W/AUTO DIFF WBC: CPT

## 2025-04-14 RX ORDER — FENTANYL CITRATE 50 UG/ML
75 INJECTION, SOLUTION INTRAMUSCULAR; INTRAVENOUS ONCE
Status: COMPLETED | OUTPATIENT
Start: 2025-04-15 | End: 2025-04-15

## 2025-04-14 ASSESSMENT — PAIN DESCRIPTION - ORIENTATION: ORIENTATION: LOWER;RIGHT

## 2025-04-14 ASSESSMENT — PAIN - FUNCTIONAL ASSESSMENT: PAIN_FUNCTIONAL_ASSESSMENT: 0-10

## 2025-04-14 ASSESSMENT — PAIN SCALES - GENERAL: PAINLEVEL_OUTOF10: 10

## 2025-04-14 ASSESSMENT — PAIN DESCRIPTION - LOCATION: LOCATION: ABDOMEN

## 2025-04-15 ENCOUNTER — APPOINTMENT (OUTPATIENT)
Dept: CT IMAGING | Age: 65
End: 2025-04-15
Payer: MEDICAID

## 2025-04-15 VITALS
HEIGHT: 74 IN | RESPIRATION RATE: 19 BRPM | BODY MASS INDEX: 40.43 KG/M2 | TEMPERATURE: 97.8 F | OXYGEN SATURATION: 93 % | DIASTOLIC BLOOD PRESSURE: 73 MMHG | HEART RATE: 71 BPM | SYSTOLIC BLOOD PRESSURE: 121 MMHG | WEIGHT: 315 LBS

## 2025-04-15 LAB
ALBUMIN SERPL-MCNC: 3.9 G/DL (ref 3.4–5)
ALBUMIN/GLOB SERPL: 1.1 {RATIO} (ref 1.1–2.2)
ALP SERPL-CCNC: 121 U/L (ref 40–129)
ALT SERPL-CCNC: 15 U/L (ref 10–40)
ANION GAP SERPL CALCULATED.3IONS-SCNC: 13 MMOL/L (ref 3–16)
AST SERPL-CCNC: 23 U/L (ref 15–37)
BASOPHILS # BLD: 0 K/UL (ref 0–0.2)
BASOPHILS NFR BLD: 0.3 %
BILIRUB SERPL-MCNC: 0.3 MG/DL (ref 0–1)
BUN SERPL-MCNC: 33 MG/DL (ref 7–20)
CALCIUM SERPL-MCNC: 9 MG/DL (ref 8.3–10.6)
CHLORIDE SERPL-SCNC: 107 MMOL/L (ref 99–110)
CO2 SERPL-SCNC: 20 MMOL/L (ref 21–32)
CREAT SERPL-MCNC: 2.3 MG/DL (ref 0.8–1.3)
DEPRECATED RDW RBC AUTO: 15.9 % (ref 12.4–15.4)
EOSINOPHIL # BLD: 0.6 K/UL (ref 0–0.6)
EOSINOPHIL NFR BLD: 9 %
GFR SERPLBLD CREATININE-BSD FMLA CKD-EPI: 31 ML/MIN/{1.73_M2}
GLUCOSE SERPL-MCNC: 175 MG/DL (ref 70–99)
HCT VFR BLD AUTO: 28.5 % (ref 40.5–52.5)
HGB BLD-MCNC: 9.3 G/DL (ref 13.5–17.5)
LACTATE BLDV-SCNC: 1.2 MMOL/L (ref 0.4–1.9)
LYMPHOCYTES # BLD: 1 K/UL (ref 1–5.1)
LYMPHOCYTES NFR BLD: 13.8 %
MCH RBC QN AUTO: 29.4 PG (ref 26–34)
MCHC RBC AUTO-ENTMCNC: 32.5 G/DL (ref 31–36)
MCV RBC AUTO: 90.6 FL (ref 80–100)
MONOCYTES # BLD: 0.6 K/UL (ref 0–1.3)
MONOCYTES NFR BLD: 8.6 %
NEUTROPHILS # BLD: 4.8 K/UL (ref 1.7–7.7)
NEUTROPHILS NFR BLD: 68.3 %
PLATELET # BLD AUTO: 187 K/UL (ref 135–450)
PMV BLD AUTO: 7.9 FL (ref 5–10.5)
POTASSIUM SERPL-SCNC: 5.2 MMOL/L (ref 3.5–5.1)
PROT SERPL-MCNC: 7.6 G/DL (ref 6.4–8.2)
RBC # BLD AUTO: 3.15 M/UL (ref 4.2–5.9)
SODIUM SERPL-SCNC: 140 MMOL/L (ref 136–145)
WBC # BLD AUTO: 7 K/UL (ref 4–11)

## 2025-04-15 PROCEDURE — 2580000003 HC RX 258: Performed by: EMERGENCY MEDICINE

## 2025-04-15 PROCEDURE — 6360000002 HC RX W HCPCS: Performed by: EMERGENCY MEDICINE

## 2025-04-15 PROCEDURE — 6370000000 HC RX 637 (ALT 250 FOR IP): Performed by: EMERGENCY MEDICINE

## 2025-04-15 PROCEDURE — 6360000004 HC RX CONTRAST MEDICATION: Performed by: EMERGENCY MEDICINE

## 2025-04-15 PROCEDURE — 74177 CT ABD & PELVIS W/CONTRAST: CPT

## 2025-04-15 PROCEDURE — 96374 THER/PROPH/DIAG INJ IV PUSH: CPT

## 2025-04-15 PROCEDURE — 96375 TX/PRO/DX INJ NEW DRUG ADDON: CPT

## 2025-04-15 RX ORDER — ACETAMINOPHEN 500 MG
1000 TABLET ORAL
Status: COMPLETED | OUTPATIENT
Start: 2025-04-15 | End: 2025-04-15

## 2025-04-15 RX ORDER — SODIUM CHLORIDE, SODIUM LACTATE, POTASSIUM CHLORIDE, AND CALCIUM CHLORIDE .6; .31; .03; .02 G/100ML; G/100ML; G/100ML; G/100ML
1000 INJECTION, SOLUTION INTRAVENOUS ONCE
Status: COMPLETED | OUTPATIENT
Start: 2025-04-15 | End: 2025-04-15

## 2025-04-15 RX ORDER — IOPAMIDOL 755 MG/ML
75 INJECTION, SOLUTION INTRAVASCULAR
Status: COMPLETED | OUTPATIENT
Start: 2025-04-15 | End: 2025-04-15

## 2025-04-15 RX ORDER — MIDAZOLAM HYDROCHLORIDE 1 MG/ML
2 INJECTION, SOLUTION INTRAMUSCULAR; INTRAVENOUS ONCE
Status: COMPLETED | OUTPATIENT
Start: 2025-04-15 | End: 2025-04-15

## 2025-04-15 RX ADMIN — ACETAMINOPHEN 1000 MG: 500 TABLET ORAL at 02:25

## 2025-04-15 RX ADMIN — SODIUM CHLORIDE, SODIUM LACTATE, POTASSIUM CHLORIDE, AND CALCIUM CHLORIDE 1000 ML: .6; .31; .03; .02 INJECTION, SOLUTION INTRAVENOUS at 01:10

## 2025-04-15 RX ADMIN — FENTANYL CITRATE 75 MCG: 50 INJECTION INTRAMUSCULAR; INTRAVENOUS at 00:03

## 2025-04-15 RX ADMIN — MIDAZOLAM HYDROCHLORIDE 2 MG: 1 INJECTION, SOLUTION INTRAMUSCULAR; INTRAVENOUS at 01:11

## 2025-04-15 RX ADMIN — IOPAMIDOL 75 ML: 755 INJECTION, SOLUTION INTRAVENOUS at 00:58

## 2025-04-15 ASSESSMENT — PAIN SCALES - GENERAL: PAINLEVEL_OUTOF10: 10

## 2025-04-15 ASSESSMENT — PAIN - FUNCTIONAL ASSESSMENT: PAIN_FUNCTIONAL_ASSESSMENT: NONE - DENIES PAIN

## 2025-04-15 ASSESSMENT — PAIN DESCRIPTION - LOCATION: LOCATION: ABDOMEN

## 2025-04-15 NOTE — FLOWSHEET NOTE
Patient arrived via EMS for RLQ abd pain, chronic hernia that started causing severe pain about 1 hr ago @ 2200.

## 2025-04-15 NOTE — ED NOTES
Patient reviewed and discharged by MD. IV cannula removed, after visit summary given and instructed. Patient left under the care of his spouse in no signs of distress     Joie Fink RN  04/15/25 0252

## 2025-04-15 NOTE — ED PROVIDER NOTES
[facility]  EMERGENCY DEPARTMENT ENCOUNTER          ATTENDING PHYSICIAN NOTE       Date of evaluation: 4/14/2025    Chief Complaint     Abdominal Pain (Patient arrived via EMS for RLQ abd pain, chronic hernia that started causing severe pain about 1 hr ago @ 2200./)      History of Present Illness     Ty Meyer is a 64 y.o. male who presents to the emergency department today complaining of pain at site of a hernia.  Patient has a chronic inguinal hernia is having a bowel movement at around 930 or 10:00 tonight noticed that the hernia was larger with more pain following this.  Reports no vomiting.    Review of Systems     Review of Systems  All systems were reviewed with the patient/family and negative except as otherwise documented in the HPI.      Past Medical, Surgical, Family, and Social History     He has a past medical history of Back pain, CAD (coronary artery disease), CKD (chronic kidney disease) stage 4, GFR 15-29 ml/min (HCC), Diabetes mellitus (HCC), Hyperlipidemia, Hypertension, and MRSA (methicillin resistant staph aureus) culture positive.  He has a past surgical history that includes Leg amputation below knee; Foot surgery (Left, 05/11/2017); and other surgical history (Right, 05/14/2017).  His family history includes Arthritis in his father and mother; Diabetes in his brother and father; Heart Disease in his mother; High Blood Pressure in his mother; Stroke in his mother.  He reports that he has never smoked. He has never used smokeless tobacco. He reports that he does not drink alcohol and does not use drugs.    Medications     Previous Medications    ACETAMINOPHEN (TYLENOL) 500 MG TABLET    Take 1 tablet by mouth every 6 hours as needed for Pain    AMITRIPTYLINE (ELAVIL) 25 MG TABLET    Take 1 tablet by mouth nightly    APIXABAN (ELIQUIS) 5 MG TABS TABLET    Take by mouth 2 times daily    ASPIRIN 81 MG EC TABLET    Take 1 tablet by mouth daily    ATORVASTATIN (LIPITOR) 20 MG TABLET

## 2025-04-15 NOTE — CONSULTS
Bariatric Surgery   Resident Consult Note    Reason for Consult: inguinal hernia    History of Present Illness:   Ty Meyer is a 64 y.o. male with Hx of CAD, CKD, DM, HTN, DVT (eliquis), GERD and hiatal hernia associated with Cuauhtemoc ulcers, lumbar postlaminectomy syndrome, and chronic bilateral hernias, who presents with acute pain in right inguinal hernia.     Pain started shortly after 10 pm last night. Patient had a bowel movement just prior to onset of pain, associated with increased size of bulge in right inguinal hernia. Denies n/v. No skin changes, erythema. Denies further flatus, bowel movements. Hernias have been present for years; have been associated with pain without known obstructive symptoms. Prior imaging from 2017 with fat containing bilateral inguinal hernias.     In the ED,  afebrile, HDS. ED had attempted reduction after fentanyl unsuccessfully. Cr 2.3 (baseline ~1.9). WBC and lactate wnl.     Patient underwent CT with finding of large right inguinal hernia containing cecum, small bowel. Ascites with hernia sac.     Patient is on eliquis and aspirin. Last took eliquis around 10 pm.     Past Medical History:        Diagnosis Date    Back pain     CAD (coronary artery disease)     CKD (chronic kidney disease) stage 4, GFR 15-29 ml/min ([facility])     Dr. Long    Diabetes mellitus ([facility])     Hyperlipidemia     Hypertension     MRSA (methicillin resistant staph aureus) culture positive 05/11/2017    foot       Past Surgical History:        Procedure Laterality Date    FOOT SURGERY Left 05/11/2017    LEG AMPUTATION BELOW KNEE      OTHER SURGICAL HISTORY Right 05/14/2017    RIGHT FOOT DEBRIDEMENT INCISION AND DRAINAGE, DELAYED PRIMARY       Allergies:  Codeine    Medications:   Home Meds  No current facility-administered medications on file prior to encounter.     Current Outpatient Medications on File Prior to Encounter   Medication Sig Dispense Refill    apixaban (ELIQUIS) 5 MG TABS tablet Take by

## 2025-04-22 NOTE — PROGRESS NOTES
[facility]   General & Laparoscopic Surgery  [employer]       HPI:    Ty Meyer is very pleasant 64 y.o. male who is referred   for consultation with regards to bilateral bulge.       Patient first noted right inguinal bulge several years ago, and has started causing progressively worsening pain. Pain is exacerbated with moving, straining and strenuous activities. The bulge is reducible. He was recently seen the ED and had a CT of a/p where inguinal hernia was confirmed. Patient reports since leaving the ED his scrotum has been swollen. On CT scan small left inguinal hernia is present as well.   Patient denies any nausea, vomiting, fevers, chills, shortness of breath, chest pain, cough, constipation or difficulty urinating.    Non smoker, non drinker, currently taking blood thinners, not on chronic steroids.   Hx of blood clots, no cardiac hx. Hx of left below the knee amputation, right foot debridement.     Past Medical History:   Diagnosis Date    Back pain     CAD (coronary artery disease)     CKD (chronic kidney disease) stage 4, GFR 15-29 ml/min ([facility])     Dr. Long    Diabetes mellitus ([facility])     Hyperlipidemia     Hypertension     MRSA (methicillin resistant staph aureus) culture positive 05/11/2017    foot     Past Surgical History:   Procedure Laterality Date    FOOT SURGERY Left 05/11/2017    LEG AMPUTATION BELOW KNEE      OTHER SURGICAL HISTORY Right 05/14/2017    RIGHT FOOT DEBRIDEMENT INCISION AND DRAINAGE, DELAYED PRIMARY     Family History   Problem Relation Age of Onset    Heart Disease Mother     High Blood Pressure Mother     Stroke Mother     Arthritis Mother     Diabetes Father     Arthritis Father     Diabetes Brother      Social History     Tobacco Use    Smoking status: Never    Smokeless tobacco: Never   Substance Use Topics    Alcohol use: No     I counseled the patient on the importance of not smoking and risks of ETOH.   Allergies   Allergen Reactions

## 2025-04-24 ENCOUNTER — INITIAL CONSULT (OUTPATIENT)
Dept: BARIATRICS/WEIGHT MGMT | Age: 65
End: 2025-04-24
Payer: MEDICAID

## 2025-04-24 VITALS
SYSTOLIC BLOOD PRESSURE: 146 MMHG | BODY MASS INDEX: 40.43 KG/M2 | DIASTOLIC BLOOD PRESSURE: 78 MMHG | HEART RATE: 91 BPM | WEIGHT: 315 LBS | HEIGHT: 74 IN

## 2025-04-24 DIAGNOSIS — K40.90 RIGHT INGUINAL HERNIA: Primary | ICD-10-CM

## 2025-04-24 DIAGNOSIS — K40.90 LEFT INGUINAL HERNIA: ICD-10-CM

## 2025-04-24 PROCEDURE — 3077F SYST BP >= 140 MM HG: CPT | Performed by: SURGERY

## 2025-04-24 PROCEDURE — 3078F DIAST BP <80 MM HG: CPT | Performed by: SURGERY

## 2025-04-24 PROCEDURE — G8417 CALC BMI ABV UP PARAM F/U: HCPCS | Performed by: SURGERY

## 2025-04-24 PROCEDURE — G8427 DOCREV CUR MEDS BY ELIG CLIN: HCPCS | Performed by: SURGERY

## 2025-04-24 PROCEDURE — 99204 OFFICE O/P NEW MOD 45 MIN: CPT | Performed by: SURGERY

## 2025-04-24 PROCEDURE — 1036F TOBACCO NON-USER: CPT | Performed by: SURGERY

## 2025-04-24 PROCEDURE — 3017F COLORECTAL CA SCREEN DOC REV: CPT | Performed by: SURGERY

## 2025-05-01 ENCOUNTER — TRANSCRIBE ORDERS (OUTPATIENT)
Dept: ADMINISTRATIVE | Age: 65
End: 2025-05-01

## 2025-05-01 DIAGNOSIS — J90 PLEURAL EFFUSION, BILATERAL: Primary | ICD-10-CM

## 2025-05-09 ENCOUNTER — HOSPITAL ENCOUNTER (OUTPATIENT)
Dept: CT IMAGING | Age: 65
Discharge: HOME | End: 2025-05-09
Payer: MEDICAID

## 2025-05-09 DIAGNOSIS — J90 PLEURAL EFFUSION, BILATERAL: ICD-10-CM

## 2025-05-09 PROCEDURE — 71250 CT THORAX DX C-: CPT

## 2025-05-10 ENCOUNTER — APPOINTMENT (OUTPATIENT)
Dept: CT IMAGING | Age: 65
DRG: 291 | End: 2025-05-10
Payer: COMMERCIAL

## 2025-05-10 ENCOUNTER — HOSPITAL ENCOUNTER (INPATIENT)
Age: 65
LOS: 12 days | Discharge: NURSING, REHAB OR LONG-TERM CARE | DRG: 291 | End: 2025-05-23
Attending: STUDENT IN AN ORGANIZED HEALTH CARE EDUCATION/TRAINING PROGRAM | Admitting: INTERNAL MEDICINE
Payer: COMMERCIAL

## 2025-05-10 ENCOUNTER — APPOINTMENT (OUTPATIENT)
Dept: GENERAL RADIOLOGY | Age: 65
DRG: 291 | End: 2025-05-10
Payer: COMMERCIAL

## 2025-05-10 DIAGNOSIS — S82.891A CLOSED FRACTURE OF RIGHT ANKLE, INITIAL ENCOUNTER: ICD-10-CM

## 2025-05-10 DIAGNOSIS — F41.9 ACUTE ANXIETY: ICD-10-CM

## 2025-05-10 DIAGNOSIS — J81.0 ACUTE PULMONARY EDEMA (HCC): Primary | ICD-10-CM

## 2025-05-10 DIAGNOSIS — I50.9 ACUTE CONGESTIVE HEART FAILURE, UNSPECIFIED HEART FAILURE TYPE (HCC): ICD-10-CM

## 2025-05-10 DIAGNOSIS — R55 SYNCOPE, UNSPECIFIED SYNCOPE TYPE: ICD-10-CM

## 2025-05-10 DIAGNOSIS — R55 SYNCOPE AND COLLAPSE: ICD-10-CM

## 2025-05-10 DIAGNOSIS — I50.31 ACUTE DIASTOLIC CONGESTIVE HEART FAILURE (HCC): ICD-10-CM

## 2025-05-10 LAB
BASOPHILS # BLD: 0 K/UL (ref 0–0.2)
BASOPHILS NFR BLD: 0.1 %
DEPRECATED RDW RBC AUTO: 15.8 % (ref 12.4–15.4)
EOSINOPHIL # BLD: 0.2 K/UL (ref 0–0.6)
EOSINOPHIL NFR BLD: 1.8 %
GLUCOSE BLD-MCNC: 206 MG/DL (ref 70–99)
HCT VFR BLD AUTO: 26.8 % (ref 40.5–52.5)
HGB BLD-MCNC: 8.9 G/DL (ref 13.5–17.5)
LYMPHOCYTES # BLD: 0.4 K/UL (ref 1–5.1)
LYMPHOCYTES NFR BLD: 4.6 %
MCH RBC QN AUTO: 29.5 PG (ref 26–34)
MCHC RBC AUTO-ENTMCNC: 33.1 G/DL (ref 31–36)
MCV RBC AUTO: 89.2 FL (ref 80–100)
MONOCYTES # BLD: 0.7 K/UL (ref 0–1.3)
MONOCYTES NFR BLD: 7.7 %
NEUTROPHILS # BLD: 7.5 K/UL (ref 1.7–7.7)
NEUTROPHILS NFR BLD: 85.8 %
PERFORMED ON: ABNORMAL
PLATELET # BLD AUTO: 177 K/UL (ref 135–450)
PMV BLD AUTO: 8.5 FL (ref 5–10.5)
RBC # BLD AUTO: 3 M/UL (ref 4.2–5.9)
WBC # BLD AUTO: 8.7 K/UL (ref 4–11)

## 2025-05-10 PROCEDURE — 84443 ASSAY THYROID STIM HORMONE: CPT

## 2025-05-10 PROCEDURE — 83540 ASSAY OF IRON: CPT

## 2025-05-10 PROCEDURE — 83550 IRON BINDING TEST: CPT

## 2025-05-10 PROCEDURE — 83735 ASSAY OF MAGNESIUM: CPT

## 2025-05-10 PROCEDURE — 96365 THER/PROPH/DIAG IV INF INIT: CPT

## 2025-05-10 PROCEDURE — 70450 CT HEAD/BRAIN W/O DYE: CPT

## 2025-05-10 PROCEDURE — 83880 ASSAY OF NATRIURETIC PEPTIDE: CPT

## 2025-05-10 PROCEDURE — 93005 ELECTROCARDIOGRAM TRACING: CPT

## 2025-05-10 PROCEDURE — 83036 HEMOGLOBIN GLYCOSYLATED A1C: CPT

## 2025-05-10 PROCEDURE — 85025 COMPLETE CBC W/AUTO DIFF WBC: CPT

## 2025-05-10 PROCEDURE — 83605 ASSAY OF LACTIC ACID: CPT

## 2025-05-10 PROCEDURE — 82728 ASSAY OF FERRITIN: CPT

## 2025-05-10 PROCEDURE — 73610 X-RAY EXAM OF ANKLE: CPT

## 2025-05-10 PROCEDURE — 99285 EMERGENCY DEPT VISIT HI MDM: CPT

## 2025-05-10 PROCEDURE — 82803 BLOOD GASES ANY COMBINATION: CPT

## 2025-05-10 PROCEDURE — 80053 COMPREHEN METABOLIC PANEL: CPT

## 2025-05-10 PROCEDURE — 71045 X-RAY EXAM CHEST 1 VIEW: CPT

## 2025-05-10 RX ORDER — ONDANSETRON 4 MG/1
4 TABLET, ORALLY DISINTEGRATING ORAL ONCE
Status: COMPLETED | OUTPATIENT
Start: 2025-05-11 | End: 2025-05-11

## 2025-05-10 ASSESSMENT — PAIN DESCRIPTION - FREQUENCY: FREQUENCY: CONTINUOUS

## 2025-05-10 ASSESSMENT — PAIN SCALES - GENERAL: PAINLEVEL_OUTOF10: 6

## 2025-05-10 ASSESSMENT — PAIN - FUNCTIONAL ASSESSMENT: PAIN_FUNCTIONAL_ASSESSMENT: 0-10

## 2025-05-10 ASSESSMENT — PAIN DESCRIPTION - LOCATION: LOCATION: BACK;LEG

## 2025-05-10 ASSESSMENT — PAIN DESCRIPTION - DESCRIPTORS: DESCRIPTORS: ACHING

## 2025-05-10 ASSESSMENT — PAIN DESCRIPTION - PAIN TYPE: TYPE: CHRONIC PAIN

## 2025-05-10 ASSESSMENT — PAIN DESCRIPTION - ORIENTATION: ORIENTATION: RIGHT;LEFT;LOWER

## 2025-05-11 ENCOUNTER — APPOINTMENT (OUTPATIENT)
Dept: GENERAL RADIOLOGY | Age: 65
DRG: 291 | End: 2025-05-11
Payer: COMMERCIAL

## 2025-05-11 PROBLEM — R55 SYNCOPE AND COLLAPSE: Status: ACTIVE | Noted: 2025-05-11

## 2025-05-11 LAB
ALBUMIN SERPL-MCNC: 3.5 G/DL (ref 3.4–5)
ALBUMIN SERPL-MCNC: 3.9 G/DL (ref 3.4–5)
ALBUMIN/GLOB SERPL: 1.1 {RATIO} (ref 1.1–2.2)
ALP SERPL-CCNC: 116 U/L (ref 40–129)
ALP SERPL-CCNC: 117 U/L (ref 40–129)
ALT SERPL-CCNC: 15 U/L (ref 10–40)
ALT SERPL-CCNC: 16 U/L (ref 10–40)
ANION GAP SERPL CALCULATED.3IONS-SCNC: 12 MMOL/L (ref 3–16)
ANION GAP SERPL CALCULATED.3IONS-SCNC: 15 MMOL/L (ref 3–16)
AST SERPL-CCNC: 24 U/L (ref 15–37)
AST SERPL-CCNC: 26 U/L (ref 15–37)
BACTERIA URNS QL MICRO: ABNORMAL /HPF
BASE EXCESS BLDV CALC-SCNC: -4.3 MMOL/L (ref -2–3)
BASOPHILS # BLD: 0 K/UL (ref 0–0.2)
BASOPHILS NFR BLD: 0.3 %
BILIRUB DIRECT SERPL-MCNC: 0.2 MG/DL (ref 0–0.3)
BILIRUB INDIRECT SERPL-MCNC: 0.3 MG/DL (ref 0–1)
BILIRUB SERPL-MCNC: 0.5 MG/DL (ref 0–1)
BILIRUB SERPL-MCNC: 0.8 MG/DL (ref 0–1)
BILIRUB UR QL STRIP.AUTO: NEGATIVE
BUN SERPL-MCNC: 38 MG/DL (ref 7–20)
BUN SERPL-MCNC: 40 MG/DL (ref 7–20)
CALCIUM SERPL-MCNC: 8.6 MG/DL (ref 8.3–10.6)
CALCIUM SERPL-MCNC: 9.1 MG/DL (ref 8.3–10.6)
CHLORIDE SERPL-SCNC: 104 MMOL/L (ref 99–110)
CHLORIDE SERPL-SCNC: 105 MMOL/L (ref 99–110)
CHOLEST SERPL-MCNC: 94 MG/DL (ref 0–199)
CLARITY UR: CLEAR
CO2 BLDV-SCNC: 23 MMOL/L
CO2 SERPL-SCNC: 19 MMOL/L (ref 21–32)
CO2 SERPL-SCNC: 22 MMOL/L (ref 21–32)
COHGB MFR BLDV: 1.6 % (ref 0–1.5)
COLOR UR: YELLOW
CREAT SERPL-MCNC: 2.4 MG/DL (ref 0.8–1.3)
CREAT SERPL-MCNC: 2.4 MG/DL (ref 0.8–1.3)
CREAT UR-MCNC: 46.7 MG/DL (ref 39–259)
DEPRECATED RDW RBC AUTO: 15.4 % (ref 12.4–15.4)
DO-HGB MFR BLDV: 6.5 %
EKG ATRIAL RATE: 95 BPM
EKG DIAGNOSIS: NORMAL
EKG P AXIS: 49 DEGREES
EKG P-R INTERVAL: 126 MS
EKG Q-T INTERVAL: 406 MS
EKG QRS DURATION: 160 MS
EKG QTC CALCULATION (BAZETT): 510 MS
EKG R AXIS: 146 DEGREES
EKG T AXIS: 25 DEGREES
EKG VENTRICULAR RATE: 95 BPM
EOSINOPHIL # BLD: 0.1 K/UL (ref 0–0.6)
EOSINOPHIL NFR BLD: 0.9 %
EPI CELLS #/AREA URNS HPF: ABNORMAL /HPF (ref 0–5)
EST. AVERAGE GLUCOSE BLD GHB EST-MCNC: 157.1 MG/DL
FERRITIN SERPL IA-MCNC: 83.4 NG/ML (ref 30–400)
FLUAV RNA RESP QL NAA+PROBE: NOT DETECTED
FLUBV RNA RESP QL NAA+PROBE: NOT DETECTED
GFR SERPLBLD CREATININE-BSD FMLA CKD-EPI: 29 ML/MIN/{1.73_M2}
GFR SERPLBLD CREATININE-BSD FMLA CKD-EPI: 29 ML/MIN/{1.73_M2}
GLUCOSE BLD-MCNC: 223 MG/DL (ref 70–99)
GLUCOSE BLD-MCNC: 238 MG/DL (ref 70–99)
GLUCOSE BLD-MCNC: 250 MG/DL (ref 70–99)
GLUCOSE BLD-MCNC: 250 MG/DL (ref 70–99)
GLUCOSE BLD-MCNC: 289 MG/DL (ref 70–99)
GLUCOSE BLD-MCNC: 306 MG/DL (ref 70–99)
GLUCOSE SERPL-MCNC: 204 MG/DL (ref 70–99)
GLUCOSE SERPL-MCNC: 263 MG/DL (ref 70–99)
GLUCOSE UR STRIP.AUTO-MCNC: NEGATIVE MG/DL
HBA1C MFR BLD: 7.1 %
HCO3 BLDV-SCNC: 21.3 MMOL/L (ref 24–28)
HCT VFR BLD AUTO: 25.3 % (ref 40.5–52.5)
HDLC SERPL-MCNC: 43 MG/DL (ref 40–60)
HGB BLD-MCNC: 8.4 G/DL (ref 13.5–17.5)
HGB UR QL STRIP.AUTO: ABNORMAL
IRON SATN MFR SERPL: 8 % (ref 20–50)
IRON SERPL-MCNC: 20 UG/DL (ref 59–158)
KETONES UR STRIP.AUTO-MCNC: ABNORMAL MG/DL
LACTATE BLDV-SCNC: 1.2 MMOL/L (ref 0.4–2)
LACTATE BLDV-SCNC: 2 MMOL/L (ref 0.4–2)
LDLC SERPL CALC-MCNC: 37 MG/DL
LEUKOCYTE ESTERASE UR QL STRIP.AUTO: NEGATIVE
LYMPHOCYTES # BLD: 0.4 K/UL (ref 1–5.1)
LYMPHOCYTES NFR BLD: 5.4 %
MAGNESIUM SERPL-MCNC: 1.61 MG/DL (ref 1.8–2.4)
MAGNESIUM SERPL-MCNC: 1.92 MG/DL (ref 1.8–2.4)
MCH RBC QN AUTO: 29.6 PG (ref 26–34)
MCHC RBC AUTO-ENTMCNC: 33 G/DL (ref 31–36)
MCV RBC AUTO: 89.6 FL (ref 80–100)
METHGB MFR BLDV: <0 % (ref 0–1.5)
MONOCYTES # BLD: 0.7 K/UL (ref 0–1.3)
MONOCYTES NFR BLD: 8.5 %
NEUTROPHILS # BLD: 6.9 K/UL (ref 1.7–7.7)
NEUTROPHILS NFR BLD: 84.9 %
NITRITE UR QL STRIP.AUTO: NEGATIVE
NT-PROBNP SERPL-MCNC: 3845 PG/ML (ref 0–124)
PCO2 BLDV: 40.7 MMHG (ref 41–51)
PERFORMED ON: ABNORMAL
PH BLDV: 7.33 [PH] (ref 7.35–7.45)
PH UR STRIP.AUTO: 6 [PH] (ref 5–8)
PHOSPHATE SERPL-MCNC: 4.2 MG/DL (ref 2.5–4.9)
PLATELET # BLD AUTO: 163 K/UL (ref 135–450)
PMV BLD AUTO: 8.3 FL (ref 5–10.5)
PO2 BLDV: 69.6 MMHG (ref 25–40)
POTASSIUM SERPL-SCNC: 4.9 MMOL/L (ref 3.5–5.1)
POTASSIUM SERPL-SCNC: 5.3 MMOL/L (ref 3.5–5.1)
PROT SERPL-MCNC: 7.1 G/DL (ref 6.4–8.2)
PROT SERPL-MCNC: 7.3 G/DL (ref 6.4–8.2)
PROT UR STRIP.AUTO-MCNC: 100 MG/DL
RBC # BLD AUTO: 2.82 M/UL (ref 4.2–5.9)
RBC #/AREA URNS HPF: ABNORMAL /HPF (ref 0–4)
SAO2 % BLDV: 93 %
SARS-COV-2 RNA RESP QL NAA+PROBE: NOT DETECTED
SODIUM SERPL-SCNC: 138 MMOL/L (ref 136–145)
SODIUM SERPL-SCNC: 139 MMOL/L (ref 136–145)
SP GR UR STRIP.AUTO: 1.02 (ref 1–1.03)
TIBC SERPL-MCNC: 247 UG/DL (ref 260–445)
TRIGL SERPL-MCNC: 71 MG/DL (ref 0–150)
TROPONIN, HIGH SENSITIVITY: 32 NG/L (ref 0–22)
TROPONIN, HIGH SENSITIVITY: 41 NG/L (ref 0–22)
TSH SERPL DL<=0.005 MIU/L-ACNC: 0.65 UIU/ML (ref 0.27–4.2)
UA DIPSTICK W REFLEX MICRO PNL UR: YES
URN SPEC COLLECT METH UR: ABNORMAL
UROBILINOGEN UR STRIP-ACNC: 0.2 E.U./DL
UUN UR-MCNC: 290 MG/DL (ref 800–1666)
VLDLC SERPL CALC-MCNC: 14 MG/DL
WBC # BLD AUTO: 8.1 K/UL (ref 4–11)
WBC #/AREA URNS HPF: ABNORMAL /HPF (ref 0–5)

## 2025-05-11 PROCEDURE — 2580000003 HC RX 258

## 2025-05-11 PROCEDURE — 81001 URINALYSIS AUTO W/SCOPE: CPT

## 2025-05-11 PROCEDURE — 80076 HEPATIC FUNCTION PANEL: CPT

## 2025-05-11 PROCEDURE — 99223 1ST HOSP IP/OBS HIGH 75: CPT | Performed by: SURGERY

## 2025-05-11 PROCEDURE — 6360000002 HC RX W HCPCS: Performed by: STUDENT IN AN ORGANIZED HEALTH CARE EDUCATION/TRAINING PROGRAM

## 2025-05-11 PROCEDURE — 87086 URINE CULTURE/COLONY COUNT: CPT

## 2025-05-11 PROCEDURE — 84484 ASSAY OF TROPONIN QUANT: CPT

## 2025-05-11 PROCEDURE — 6370000000 HC RX 637 (ALT 250 FOR IP)

## 2025-05-11 PROCEDURE — 83605 ASSAY OF LACTIC ACID: CPT

## 2025-05-11 PROCEDURE — 84540 ASSAY OF URINE/UREA-N: CPT

## 2025-05-11 PROCEDURE — 82570 ASSAY OF URINE CREATININE: CPT

## 2025-05-11 PROCEDURE — 6360000002 HC RX W HCPCS

## 2025-05-11 PROCEDURE — 80061 LIPID PANEL: CPT

## 2025-05-11 PROCEDURE — 2500000003 HC RX 250 WO HCPCS

## 2025-05-11 PROCEDURE — 80069 RENAL FUNCTION PANEL: CPT

## 2025-05-11 PROCEDURE — 83735 ASSAY OF MAGNESIUM: CPT

## 2025-05-11 PROCEDURE — 73590 X-RAY EXAM OF LOWER LEG: CPT

## 2025-05-11 PROCEDURE — 99222 1ST HOSP IP/OBS MODERATE 55: CPT | Performed by: INTERNAL MEDICINE

## 2025-05-11 PROCEDURE — 36415 COLL VENOUS BLD VENIPUNCTURE: CPT

## 2025-05-11 PROCEDURE — 96375 TX/PRO/DX INJ NEW DRUG ADDON: CPT

## 2025-05-11 PROCEDURE — 85025 COMPLETE CBC W/AUTO DIFF WBC: CPT

## 2025-05-11 PROCEDURE — 1200000000 HC SEMI PRIVATE

## 2025-05-11 PROCEDURE — 87636 SARSCOV2 & INF A&B AMP PRB: CPT

## 2025-05-11 RX ORDER — GABAPENTIN 300 MG/1
300 CAPSULE ORAL 3 TIMES DAILY
Status: DISCONTINUED | OUTPATIENT
Start: 2025-05-11 | End: 2025-05-11

## 2025-05-11 RX ORDER — OXYCODONE HYDROCHLORIDE 5 MG/1
10 TABLET ORAL EVERY 4 HOURS PRN
Refills: 0 | Status: DISCONTINUED | OUTPATIENT
Start: 2025-05-11 | End: 2025-05-23 | Stop reason: HOSPADM

## 2025-05-11 RX ORDER — FINASTERIDE 5 MG/1
5 TABLET, FILM COATED ORAL DAILY
Status: DISCONTINUED | OUTPATIENT
Start: 2025-05-11 | End: 2025-05-23 | Stop reason: HOSPADM

## 2025-05-11 RX ORDER — SODIUM CHLORIDE 0.9 % (FLUSH) 0.9 %
5-40 SYRINGE (ML) INJECTION PRN
Status: DISCONTINUED | OUTPATIENT
Start: 2025-05-11 | End: 2025-05-23 | Stop reason: HOSPADM

## 2025-05-11 RX ORDER — GABAPENTIN 300 MG/1
300 CAPSULE ORAL 2 TIMES DAILY
Status: DISCONTINUED | OUTPATIENT
Start: 2025-05-11 | End: 2025-05-21

## 2025-05-11 RX ORDER — AMITRIPTYLINE HYDROCHLORIDE 50 MG/1
25 TABLET ORAL NIGHTLY
Status: DISCONTINUED | OUTPATIENT
Start: 2025-05-11 | End: 2025-05-23 | Stop reason: HOSPADM

## 2025-05-11 RX ORDER — INSULIN LISPRO 100 [IU]/ML
0-8 INJECTION, SOLUTION INTRAVENOUS; SUBCUTANEOUS
Status: DISCONTINUED | OUTPATIENT
Start: 2025-05-11 | End: 2025-05-13

## 2025-05-11 RX ORDER — ACETAMINOPHEN 325 MG/1
650 TABLET ORAL EVERY 6 HOURS PRN
Status: DISCONTINUED | OUTPATIENT
Start: 2025-05-11 | End: 2025-05-23 | Stop reason: HOSPADM

## 2025-05-11 RX ORDER — INSULIN LISPRO 100 [IU]/ML
0-8 INJECTION, SOLUTION INTRAVENOUS; SUBCUTANEOUS
Status: DISCONTINUED | OUTPATIENT
Start: 2025-05-11 | End: 2025-05-11

## 2025-05-11 RX ORDER — INSULIN GLARGINE 100 [IU]/ML
15 INJECTION, SOLUTION SUBCUTANEOUS NIGHTLY
Status: DISCONTINUED | OUTPATIENT
Start: 2025-05-11 | End: 2025-05-12

## 2025-05-11 RX ORDER — INSULIN LISPRO 100 [IU]/ML
0-8 INJECTION, SOLUTION INTRAVENOUS; SUBCUTANEOUS EVERY 6 HOURS
Status: DISCONTINUED | OUTPATIENT
Start: 2025-05-11 | End: 2025-05-11

## 2025-05-11 RX ORDER — OXYCODONE HYDROCHLORIDE 5 MG/1
5 TABLET ORAL EVERY 4 HOURS PRN
Refills: 0 | Status: DISCONTINUED | OUTPATIENT
Start: 2025-05-11 | End: 2025-05-23 | Stop reason: HOSPADM

## 2025-05-11 RX ORDER — ONDANSETRON 4 MG/1
4 TABLET, ORALLY DISINTEGRATING ORAL EVERY 8 HOURS PRN
Status: DISCONTINUED | OUTPATIENT
Start: 2025-05-11 | End: 2025-05-23 | Stop reason: HOSPADM

## 2025-05-11 RX ORDER — POLYETHYLENE GLYCOL 3350 17 G/17G
17 POWDER, FOR SOLUTION ORAL DAILY PRN
Status: DISCONTINUED | OUTPATIENT
Start: 2025-05-11 | End: 2025-05-23 | Stop reason: HOSPADM

## 2025-05-11 RX ORDER — DEXTROSE MONOHYDRATE 100 MG/ML
INJECTION, SOLUTION INTRAVENOUS CONTINUOUS PRN
Status: DISCONTINUED | OUTPATIENT
Start: 2025-05-11 | End: 2025-05-23 | Stop reason: HOSPADM

## 2025-05-11 RX ORDER — TAMSULOSIN HYDROCHLORIDE 0.4 MG/1
0.4 CAPSULE ORAL DAILY
Status: DISCONTINUED | OUTPATIENT
Start: 2025-05-11 | End: 2025-05-23 | Stop reason: HOSPADM

## 2025-05-11 RX ORDER — GLUCAGON 1 MG/ML
1 KIT INJECTION PRN
Status: DISCONTINUED | OUTPATIENT
Start: 2025-05-11 | End: 2025-05-23 | Stop reason: HOSPADM

## 2025-05-11 RX ORDER — ACETAMINOPHEN 650 MG/1
650 SUPPOSITORY RECTAL EVERY 6 HOURS PRN
Status: DISCONTINUED | OUTPATIENT
Start: 2025-05-11 | End: 2025-05-23 | Stop reason: HOSPADM

## 2025-05-11 RX ORDER — MAGNESIUM SULFATE IN WATER 40 MG/ML
2000 INJECTION, SOLUTION INTRAVENOUS ONCE
Status: COMPLETED | OUTPATIENT
Start: 2025-05-11 | End: 2025-05-11

## 2025-05-11 RX ORDER — ONDANSETRON 2 MG/ML
4 INJECTION INTRAMUSCULAR; INTRAVENOUS EVERY 6 HOURS PRN
Status: DISCONTINUED | OUTPATIENT
Start: 2025-05-11 | End: 2025-05-23 | Stop reason: HOSPADM

## 2025-05-11 RX ORDER — INSULIN GLARGINE 100 [IU]/ML
15 INJECTION, SOLUTION SUBCUTANEOUS ONCE
Status: COMPLETED | OUTPATIENT
Start: 2025-05-11 | End: 2025-05-11

## 2025-05-11 RX ORDER — SENNA AND DOCUSATE SODIUM 50; 8.6 MG/1; MG/1
1 TABLET, FILM COATED ORAL 2 TIMES DAILY
Status: DISCONTINUED | OUTPATIENT
Start: 2025-05-11 | End: 2025-05-23 | Stop reason: HOSPADM

## 2025-05-11 RX ORDER — SODIUM CHLORIDE 0.9 % (FLUSH) 0.9 %
5-40 SYRINGE (ML) INJECTION EVERY 12 HOURS SCHEDULED
Status: DISCONTINUED | OUTPATIENT
Start: 2025-05-11 | End: 2025-05-23 | Stop reason: HOSPADM

## 2025-05-11 RX ORDER — SODIUM CHLORIDE 9 MG/ML
INJECTION, SOLUTION INTRAVENOUS PRN
Status: DISCONTINUED | OUTPATIENT
Start: 2025-05-11 | End: 2025-05-23 | Stop reason: HOSPADM

## 2025-05-11 RX ORDER — ATORVASTATIN CALCIUM 20 MG/1
20 TABLET, FILM COATED ORAL DAILY
Status: DISCONTINUED | OUTPATIENT
Start: 2025-05-11 | End: 2025-05-23 | Stop reason: HOSPADM

## 2025-05-11 RX ORDER — ASPIRIN 81 MG/1
81 TABLET ORAL DAILY
Status: DISCONTINUED | OUTPATIENT
Start: 2025-05-11 | End: 2025-05-23 | Stop reason: HOSPADM

## 2025-05-11 RX ORDER — FUROSEMIDE 10 MG/ML
40 INJECTION INTRAMUSCULAR; INTRAVENOUS ONCE
Status: COMPLETED | OUTPATIENT
Start: 2025-05-11 | End: 2025-05-11

## 2025-05-11 RX ORDER — PANTOPRAZOLE SODIUM 40 MG/1
40 TABLET, DELAYED RELEASE ORAL
Status: DISCONTINUED | OUTPATIENT
Start: 2025-05-11 | End: 2025-05-23 | Stop reason: HOSPADM

## 2025-05-11 RX ORDER — SPIRONOLACTONE 25 MG/1
25 TABLET ORAL DAILY
Status: DISCONTINUED | OUTPATIENT
Start: 2025-05-11 | End: 2025-05-11

## 2025-05-11 RX ORDER — FUROSEMIDE 10 MG/ML
40 INJECTION INTRAMUSCULAR; INTRAVENOUS 2 TIMES DAILY
Status: DISCONTINUED | OUTPATIENT
Start: 2025-05-11 | End: 2025-05-11

## 2025-05-11 RX ADMIN — IRON SUCROSE 200 MG: 20 INJECTION, SOLUTION INTRAVENOUS at 04:32

## 2025-05-11 RX ADMIN — AMITRIPTYLINE HYDROCHLORIDE 25 MG: 50 TABLET ORAL at 20:46

## 2025-05-11 RX ADMIN — TAMSULOSIN HYDROCHLORIDE 0.4 MG: 0.4 CAPSULE ORAL at 11:49

## 2025-05-11 RX ADMIN — INSULIN LISPRO 6 UNITS: 100 INJECTION, SOLUTION INTRAVENOUS; SUBCUTANEOUS at 04:11

## 2025-05-11 RX ADMIN — GABAPENTIN 300 MG: 300 CAPSULE ORAL at 11:49

## 2025-05-11 RX ADMIN — SODIUM CHLORIDE, PRESERVATIVE FREE 5 ML: 5 INJECTION INTRAVENOUS at 11:49

## 2025-05-11 RX ADMIN — OXYCODONE 10 MG: 5 TABLET ORAL at 04:41

## 2025-05-11 RX ADMIN — FUROSEMIDE 40 MG: 10 INJECTION, SOLUTION INTRAMUSCULAR; INTRAVENOUS at 01:26

## 2025-05-11 RX ADMIN — INSULIN LISPRO 4 UNITS: 100 INJECTION, SOLUTION INTRAVENOUS; SUBCUTANEOUS at 17:48

## 2025-05-11 RX ADMIN — INSULIN LISPRO 2 UNITS: 100 INJECTION, SOLUTION INTRAVENOUS; SUBCUTANEOUS at 09:27

## 2025-05-11 RX ADMIN — ONDANSETRON 4 MG: 4 TABLET, ORALLY DISINTEGRATING ORAL at 00:01

## 2025-05-11 RX ADMIN — INSULIN LISPRO 4 UNITS: 100 INJECTION, SOLUTION INTRAVENOUS; SUBCUTANEOUS at 13:32

## 2025-05-11 RX ADMIN — OXYCODONE 10 MG: 5 TABLET ORAL at 17:51

## 2025-05-11 RX ADMIN — INSULIN GLARGINE 15 UNITS: 100 INJECTION, SOLUTION SUBCUTANEOUS at 04:11

## 2025-05-11 RX ADMIN — MAGNESIUM SULFATE HEPTAHYDRATE 2000 MG: 40 INJECTION, SOLUTION INTRAVENOUS at 01:31

## 2025-05-11 RX ADMIN — SENNOSIDES AND DOCUSATE SODIUM 1 TABLET: 50; 8.6 TABLET ORAL at 20:46

## 2025-05-11 RX ADMIN — ATORVASTATIN CALCIUM 20 MG: 20 TABLET, FILM COATED ORAL at 20:48

## 2025-05-11 RX ADMIN — INSULIN GLARGINE 15 UNITS: 100 INJECTION, SOLUTION SUBCUTANEOUS at 20:46

## 2025-05-11 RX ADMIN — GABAPENTIN 300 MG: 300 CAPSULE ORAL at 20:46

## 2025-05-11 RX ADMIN — PANTOPRAZOLE SODIUM 40 MG: 40 TABLET, DELAYED RELEASE ORAL at 06:40

## 2025-05-11 RX ADMIN — SODIUM CHLORIDE, PRESERVATIVE FREE 10 ML: 5 INJECTION INTRAVENOUS at 20:47

## 2025-05-11 RX ADMIN — FINASTERIDE 5 MG: 5 TABLET, FILM COATED ORAL at 11:49

## 2025-05-11 RX ADMIN — OXYCODONE 10 MG: 5 TABLET ORAL at 23:03

## 2025-05-11 ASSESSMENT — PAIN DESCRIPTION - DESCRIPTORS
DESCRIPTORS: ACHING
DESCRIPTORS: ACHING;SHARP
DESCRIPTORS: ACHING

## 2025-05-11 ASSESSMENT — PAIN DESCRIPTION - ORIENTATION
ORIENTATION: RIGHT;POSTERIOR
ORIENTATION: RIGHT
ORIENTATION: POSTERIOR

## 2025-05-11 ASSESSMENT — PAIN - FUNCTIONAL ASSESSMENT
PAIN_FUNCTIONAL_ASSESSMENT: PREVENTS OR INTERFERES SOME ACTIVE ACTIVITIES AND ADLS
PAIN_FUNCTIONAL_ASSESSMENT: PREVENTS OR INTERFERES WITH ALL ACTIVE AND SOME PASSIVE ACTIVITIES
PAIN_FUNCTIONAL_ASSESSMENT: PREVENTS OR INTERFERES WITH MANY ACTIVE NOT PASSIVE ACTIVITIES

## 2025-05-11 ASSESSMENT — PAIN DESCRIPTION - LOCATION
LOCATION: BACK
LOCATION: ANKLE
LOCATION: BACK

## 2025-05-11 ASSESSMENT — PAIN SCALES - GENERAL
PAINLEVEL_OUTOF10: 6
PAINLEVEL_OUTOF10: 8
PAINLEVEL_OUTOF10: 6
PAINLEVEL_OUTOF10: 3
PAINLEVEL_OUTOF10: 4

## 2025-05-11 NOTE — H&P
Internal Medicine H&P    Date:  2025   Patient:  Ty Meyer   Patient :  1960     CC:  Altered Mental Status       Source of HPI: Patient, chart review    Subjective     HPI:  Mr. Ty Meyer is a 64 y.o. male with a MHx significant for DVT on Eliquis, HTN, T2DM on insulin, ?seizure disorder off of AEDs for years, osteomyelitis s/p LLE BKA, CAD, CKD 3b, SY presenting after a fall and syncopal episode.    History provided by patient, his wife, and son at bedside. Patient states that earlier yesterday while he was working in the yard, he attempted to get back into his wheelchair and fell. He states that as he was getting ready to lower himself down, the wheelchair moved out from under him. He felt pain and a \"popping sound\" from his right leg. Required EMS to be able to get him back up, declined medical evaluation at that time. Later on that same day, right after transferring himself from his wheelchair to recliner, his wife states he had an episode of loss of consciousness that lasted about 3 minutes. The patient denies feeling any symptoms prior to this event. His wife said he looked like he had passed out, then woke up 3 minutes later and was back to his neurologic baseline. The patient denies any chest pain, increased shortness of breath, lightheadedness, or other concerning symptoms after the event. He was brought to the ED by EMS.     He does endorse feeling dyspnea at rest and on exertion for the past few months. He has not had any coughing, wheezing, fevers, chills. He denies any history of COPD, asthma, or smoking. He is unable to lay flat due to his scoliosis and multiple back procedures, so unclear orthopnea. Denies PND, swelling in extremities.       PMHx:      Diagnosis Date    Abdominal hernia     Back pain     CAD (coronary artery disease)     CKD (chronic kidney disease) stage 4, GFR 15-29 ml/min ([facility])     Dr. Long    Diabetes mellitus ([facility])     Hyperlipidemia      admission.  Most likely due to cardiorenal, given fluid overload and acute CHF.  - Check urine urea nitrogen, urine creatinine to calculate FEUrea  - Diuresis per above  - Avoid nephrotoxins, renally dose medications  - Daily RFP/Mg    Right ankle fracture  Occurred after suffering a fall.  Xray shows fractures of the distal tibia and fibula with extension of the tibial fracture into the ankle joint space.  Has good distal pulses, able to move toes.  - Orthopedic surgery consulted  - Pain control  - Non-weight bearing on that leg  - NPO @ MN, holding AC    Bilateral inguinal hernias  Has chronic bilateral inguinal hernias.  Recently his right had concern for incarceration, was seen in our ED, had it reduced. Since then it has fallen back into its herniated place, with increased pain and swelling. Still having BMs like normal without issue.   - General surgery consulted for evaluation      Chronic medical conditions:    Acute on chronic normocytic anemia  Has history of SY, with recent OP labs backing this up.  - Will give IV iron one dose today  - Continue oral supplement later on in admission    DVT history  Holding Eliquis for now given likely orthopedic surgery.    T2DM  Most recent HbA1c 7.% in 04/2025.  On glargine 21 units nightly, SSI at home.  - Lantus 15 units nightly for now, SSI  - NPO today, will most likely need to adjust insulin once diet is resumed    Polyneuropathy  Continue home gabapentin, dose adjusted for renal function        DVT PPx: None, chemical DVT ppx contraindicated, has RLE fracture, LLE BKA  Diet:  ADULT DIET; Regular; No Added Salt (3-4 gm)   Code status:  Full Code     ELOS: several days  Barriers to discharge: Ankle fracture, CHFe  Disposition  - Preadmission: Home  - Current: [facility]  - Upon discharge: TBD      Will discuss with attending physician John Paul Vargas MD Stephen Dockins, DO  Internal Medicine, PGY-2  05/11/25  3:46 AM  Contact via [employer]

## 2025-05-11 NOTE — ED PROVIDER NOTES
ED Attending Attestation Note     Date of evaluation: 5/10/2025    This patient was seen by the resident.  I have seen and examined the patient, agree with the workup, evaluation, management and diagnosis. The care plan has been discussed.  I have reviewed the ECG and concur with the resident's interpretation.  My assessment reveals a 63yo with a PMH of DVT (on eliquis), CAD, DM2, L BKA, who presents with a mechanical fall earlier today as well as a syncopal event, now presenting with generalized weakness and concerns of lethargy per family at bedside.    On my evaluation, patient is found to be alert, interactive, no obvious signs of distress.  Exam is notable for a large right-sided inguinal bulge-which appears unchanged per patient family at bedside.  Chart review revealed patient had a recent ED presentation, where he was found to have a right inguinal bulge- found to be large inguinal hernia on CT scan. Seen by surgery as an outpatient less than 1 week ago who plan for outpatient manage.  On my evaluation, the bulge is soft without pain out of proportion to exam or reported obstipation to suggest underlying incarceration/strangulation. Furthermore, lactic acid level found to be <2 making active gut ischemia less likely.     In terms of patient's syncopal, he is found to have a significantly elevated BNP >3000 and pulmonary edema on chest x-ray.  As a result, underlying CHF was considered likely.  Patient was subsequently diuresed with 40 mg of IV Lasix. In the setting of CKD, cross-sectional imaging of the chest was held at this time.     Remainder of exam reveals tenderness over the right ankle in the setting of recent fall and syncopal.  Confirmed to have a distal tibia fibula fracture without significant dislocation.  Foot appears to be neurovascularly intact.  As result a posterior short leg splint with stirrups was placed.  I personally reassessed the patient after splint placement where he was found to

## 2025-05-11 NOTE — PROGRESS NOTES
4 Eyes Skin Assessment     NAME:  Ty Meyer  YOB: 1960  MEDICAL RECORD NUMBER:  [id number]    The patient is being assessed for  Admission    I agree that at least one RN has performed a thorough Head to Toe Skin Assessment on the patient. ALL assessment sites listed below have been assessed.      Areas assessed by both nurses:    Head, Face, Ears, Shoulders, Back, Chest, Arms, Elbows, Hands, Sacrum. Buttock, Coccyx, Ischium, and Legs. Feet and Heels    Scattered bruises to left stump, redness to groin and scrotal area        Does the Patient have a Wound? No noted wound(s)       Stephen Prevention initiated by RN: Yes  Wound Care Orders initiated by RN: No    Pressure Injury (Stage 3,4, Unstageable, DTI, NWPT, and Complex wounds) if present, place Wound referral order by RN under : No    New Ostomies, if present place, Ostomy referral order under : No     Nurse 1 eSignature: Electronically signed by Judy Lanier RN on 5/11/25 at 4:46 AM EDT    **SHARE this note so that the co-signing nurse can place an eSignature**    Nurse 2 eSignature: {Esignature:[id number]}

## 2025-05-11 NOTE — ED TRIAGE NOTES
Pt co \"feeling off\" and unable to describe further other than \"tired\". Pt responds to verbal but often falls asleep in between questioning. Per EMS pt \"went out\" with wife while in [place]. Pt did not have syncopal episode witnessed by EMS but was having o2  sat in 89% on RA and was placed on oxygen and lethargic for EMS. EMS also notes making a run on pt earlier today for a lift assist after having a fall while getting into wc. Pt describes that his legs got weak and twisted when getting into [facility] and felt something \"pop\" but refused transport with previous EMS visit. Pt denies hitting head with fall. Pt is on eliquis. Pt trialed  off oxygen and pt intermittently desats to 88-89% on RA. PT placed on 2L NC and o2 sats remain 93% and above. Pt reports chronic pain to back and legs. PIV to L arm by EMS. Blood glucose and EKG obtained. MD bedside for evaluation

## 2025-05-11 NOTE — PROGRESS NOTES
Medicated with Oxycodone 10 mg po as ordered for complaints of back pain. Will continue to monitor alteration in comfort

## 2025-05-11 NOTE — PROGRESS NOTES
Pt admitted to room 6321, awake and oriented x 4. 2 liters NC, bilateral crackles. Vital signs stable, afebrile. Oriented to bed, call light in reach, bed alarm in placem

## 2025-05-11 NOTE — CONSULTS
General Surgery   Resident Consult Note    Reason for Consult: bilateral inguinal hernias    History of Present Illness:   Ty Meyer is a 64 y.o. male with Hx of CAD, CKD, DM, HTN, DVT (eliquis), GERD and hiatal hernia associated with Cuauhtemoc ulcers, lumbar postlaminectomy syndrome, and chronic bilateral hernias, who presents with bilateral inguinal hernias. Pt had a syncopal event causing a fall when trying to get into his wheel chair yesterday. States he felt a pop in his right leg/groin. Has known BL inguinal hernias for which the patient is following with Dr. Carpenter as outpatient, prior plan included cardiac and pulmonary workup prior to surgical intervention.  General surgery consulted for evaluation of bilateral inguinal hernias.     Patient states his hernia recurred a day after reduction in the ED in April. He has had stable discomfort since that day in April. Denies increased pain. Has had some increased scrotal swelling for which he is doing scrotal elevation. Denies constipation, obstipation, nausea, or vomiting.     Past Medical History:        Diagnosis Date    Abdominal hernia     Back pain     CAD (coronary artery disease)     CKD (chronic kidney disease) stage 4, GFR 15-29 ml/min ([facility])     Dr. Long    Diabetes mellitus ([facility])     Hyperlipidemia     Hypertension     MRSA (methicillin resistant staph aureus) culture positive 05/11/2017    foot       Past Surgical History:           Procedure Laterality Date    BACK SURGERY  2020    FOOT SURGERY Left 05/11/2017    LEG AMPUTATION BELOW KNEE      OTHER SURGICAL HISTORY Right 05/14/2017    RIGHT FOOT DEBRIDEMENT INCISION AND DRAINAGE, DELAYED PRIMARY       Allergies:  Codeine    Medications:   Home Meds  No current facility-administered medications on file prior to encounter.     Current Outpatient Medications on File Prior to Encounter   Medication Sig Dispense Refill    apixaban (ELIQUIS) 5 MG TABS tablet Take by mouth 2 times daily      aspirin 81 MG

## 2025-05-11 NOTE — CONSULTS
[facility]  Cardiology Inpatient Consult Service                                                                                          Pt Name: Ty Meyer  Age: 64 y.o.  Sex: male  : 1960  Location: 6321/6321-01    Referring Physician: Fernando Mauro MD  Primary cardiologist : None on file    Reason for Consult:     Reason for Consultation/Chief Complaint: Concern of CHF    HPI:      Ty Meyer is a 64 y.o. male with a past medical history of DVT, diabetes mellitus, left BKA who presented to the hospital with altered mental status.  Earlier today patient had an episode of fall when he was transferring from his wheelchair to the recliner.  Later patient was found to be mildly cyanotic, obtunded.  Patient was apparently hypoxemic when EMS arrived.  Currently patient is mentating okay.  Denies any chest pain.  No worsening shortness of breath.  Has a large inguinal hernia.  Has pain in the right lower extremity.      Histories     Past Medical History:   has a past medical history of Abdominal hernia, Back pain, CAD (coronary artery disease), CKD (chronic kidney disease) stage 4, GFR 15-29 ml/min (HCC), Diabetes mellitus (HCC), Hyperlipidemia, Hypertension, and MRSA (methicillin resistant staph aureus) culture positive.    Surgical History:   has a past surgical history that includes Leg amputation below knee; Foot surgery (Left, 2017); other surgical history (Right, 2017); and back surgery ().     Social History:   reports that he has never smoked. He has never used smokeless tobacco. He reports that he does not drink alcohol and does not use drugs.     Family History:  Noncontributory      Medications:       Home Medications  Were reviewed and are listed in nursing record. and/or listed below  Prior to Admission medications    Medication Sig Start Date End Date Taking? Authorizing Provider   apixaban (ELIQUIS) 5 MG TABS tablet Take by mouth 2 times  daily 3/24/23  Yes Caro Aiken MD   aspirin 81 MG EC tablet Take 1 tablet by mouth daily 4/26/22  Yes Rosalind Patel MD   spironolactone (ALDACTONE) 25 MG tablet Take 1 tablet by mouth daily 4/28/22  Yes Rosalind Patel MD   finasteride (PROSCAR) 5 MG tablet Take 1 tablet by mouth daily   Yes Caro Aiken MD   atorvastatin (LIPITOR) 20 MG tablet Take 1 tablet by mouth daily   Yes Caro Aiken MD   tamsulosin (FLOMAX) 0.4 MG capsule Take 1 capsule by mouth daily   Yes Caro Aiken MD   acetaminophen (TYLENOL) 500 MG tablet Take 1 tablet by mouth every 6 hours as needed for Pain   Yes Caro Aiken MD   Multiple Vitamins-Minerals (MULTI COMPLETE PO) Take 1 tablet by mouth daily   Yes Caro Aiken MD   melatonin 3 MG TABS tablet Take 1 tablet by mouth nightly as needed   Yes Caro Aiken MD   senna-docusate (PERICOLACE) 8.6-50 MG per tablet Take 1 tablet by mouth 2 times daily   Yes Caro Aiken MD   ferrous sulfate (IRON 325) 325 (65 Fe) MG tablet Take 1 tablet by mouth daily (with breakfast)   Yes ProviderCaro MD   gabapentin (NEURONTIN) 300 MG capsule Take 1 capsule by mouth 3 times daily.   Yes Caro Aiken MD   amitriptyline (ELAVIL) 25 MG tablet Take 1 tablet by mouth nightly   Yes Caro Aiken MD   folic acid (FOLVITE) 1 MG tablet Take 1 tablet by mouth daily   Yes Caro Aiken MD   insulin lispro (HUMALOG) 100 UNIT/ML injection vial Inject 5-15 Units into the skin 3 times daily (before meals) Sliding scale with meals and at HS   Yes Caro Aiken MD   omeprazole (PRILOSEC) 20 MG capsule Take 2 capsules by mouth Daily   Yes Caro Aiken MD   insulin glargine (LANTUS) 100 UNIT/ML injection Inject 21 Units into the skin nightly   Yes Caro Aiken MD   levETIRAcetam (KEPPRA) 500 MG tablet Take 1 tablet by mouth 2 times daily  Patient not taking: Reported on 5/11/2025 4/25/22   Jorge  MD Rosalind          Inpatient Medications:   sodium chloride flush  5-40 mL IntraVENous 2 times per day    insulin glargine  15 Units SubCUTAneous Nightly    insulin lispro  0-8 Units SubCUTAneous Q6H    amitriptyline  25 mg Oral Nightly    [Held by provider] apixaban  5 mg Oral BID    [Held by provider] aspirin  81 mg Oral Daily    atorvastatin  20 mg Oral Daily    finasteride  5 mg Oral Daily    tamsulosin  0.4 mg Oral Daily    pantoprazole  40 mg Oral QAM AC    sennosides-docusate sodium  1 tablet Oral BID    gabapentin  300 mg Oral BID       IV drips:   sodium chloride      dextrose         PRN:  sodium chloride flush, sodium chloride, ondansetron **OR** ondansetron, polyethylene glycol, acetaminophen **OR** acetaminophen, glucose, dextrose bolus **OR** dextrose bolus, glucagon (rDNA), dextrose, melatonin, oxyCODONE **OR** oxyCODONE    Allergy:     Codeine       Review of Systems:     As per HPI.  Rest of 10 system review negative      Physical Examination:     Vitals:    05/11/25 0345 05/11/25 0441 05/11/25 0511 05/11/25 0814   BP: 138/78   124/64   Pulse: 85   86   Resp: 18 18 18 20   Temp: 98.3 °F (36.8 °C)   97.6 °F (36.4 °C)   TempSrc: Oral   Oral   SpO2: 96%   97%   Weight: (!) 146.7 kg (323 lb 6.6 oz)      Height: 1.905 m (6' 3\")          Wt Readings from Last 3 Encounters:   05/11/25 (!) 146.7 kg (323 lb 6.6 oz)   04/24/25 (!) 146.1 kg (322 lb)   04/14/25 (!) 146.3 kg (322 lb 8 oz)         General appearance: alert, appears stated age, cooperative and no distress.  Obese.  Lungs: Unlabored breathing, decreased breath sounds in bases  Heart: regular rate and rhythm and S1, S2 normal,  Abdomen: soft, non-tender; bowel sounds normal  Extremities: no cyanosis, left BKA.  Right leg is significantly swollen, bandaged  Skin: Skin color, texture, turgor normal.  Neurologic: No focal deficits. A, O x 3       Labs:     Recent Labs     05/10/25  2346 05/11/25  0545    139   K 4.9 5.3*   BUN 38* 40*

## 2025-05-11 NOTE — ED NOTES
Place a sugar tong and short leg posterior splint, checked C,M,S and is WNL.      To Berry, RN  05/11/25 1170

## 2025-05-11 NOTE — PROGRESS NOTES
Internal Medicine Progress Note    Admit Date: 5/10/2025  Hospital Day: 2   Patient name: Ty Meyer   : 1960     CC:   Altered Mental Status     Interval history:   Patient seen at bedside this morning.  We had discussed over the events that occurred which led him to the hospital.  He states he was going to the bathroom and had an episode of LOC which lasted for few minutes and he returned to baseline immediately without any postictal symptoms.  He states he believes he had passed out when he sitting was on the bathroom.  He was seen by general surgery and no plans for immediate intervention for his hernias as they are easily reducible.    Medications   Scheduled Meds:   sodium chloride flush  5-40 mL IntraVENous 2 times per day    insulin glargine  15 Units SubCUTAneous Nightly    amitriptyline  25 mg Oral Nightly    [Held by provider] apixaban  5 mg Oral BID    [Held by provider] aspirin  81 mg Oral Daily    atorvastatin  20 mg Oral Daily    finasteride  5 mg Oral Daily    tamsulosin  0.4 mg Oral Daily    pantoprazole  40 mg Oral QAM AC    sennosides-docusate sodium  1 tablet Oral BID    gabapentin  300 mg Oral BID    [START ON 2025] iron sucrose  300 mg IntraVENous Q24H    insulin lispro  0-8 Units SubCUTAneous TID WC     Continuous Infusions:   sodium chloride      dextrose       Objective   Vitals  Patient Vitals for the past 8 hrs:   BP Temp Temp src Pulse Resp SpO2   25 1258 119/67 97.5 °F (36.4 °C) Oral 89 18 97 %   25 0814 124/64 97.6 °F (36.4 °C) Oral 86 20 97 %       Intake/Output Summary (Last 24 hours) at 2025 1332  Last data filed at 2025 0600  Gross per 24 hour   Intake --   Output 400 ml   Net -400 ml       ROS: A 10 point review of systems was conducted and significant findings are noted in the interval history.    Physical Exam  Constitutional:       General: He is not in acute distress.     Appearance: He is obese.      Comments: Chronically  04/2025.  On glargine 21 units nightly, SSI at home.  - Lantus 15 units nightly for now, SSI  - NPO today, will most likely need to adjust insulin once diet is resumed     Polyneuropathy  Continue home gabapentin, dose adjusted for renal function    Code Status: Full Code   FEN: ADULT DIET; Regular; No Added Salt (3-4 gm)   PPX: SCDs  DISPO: IP    Nissa Denise MD, PGY-2  Internal Medicine   05/11/25  1:32 PM    This patient will be staffed and discussed with attending physician Fernando Mauro MD.

## 2025-05-11 NOTE — ED PROVIDER NOTES
THE [facility]  EMERGENCY DEPARTMENT ENCOUNTER          [facility] RESIDENT NOTE       Date of evaluation: 5/10/2025    Chief Complaint     Altered Mental Status    History of Present Illness     Ty Meyer is a 64 y.o. male with hx of inguinal hernia, HTN, DM, CKD, CAD, DVT (Eliquis), who presents via EMS for AMS. Wife is at bedside to provide collateral history. Patient has been feeling more tired with generalized weakness today. Earlier today patient had a fall and required EMS lift assist, at this time he stated his legs felt weak and he felt his R ankle potentially pop. He fell on his R hip at the time, but denied hitting his head. Wife also notes he has been holding his ankle in inverted position and she had placed a brace on it. Later on in the day patient had an episode of LOC according to wife while he was seated in his reclining chair where he became unresponsive. Patient does not recall this event. He has no history of seizures. He does note that he is SOB, however notes being short of breath often. No O2 requirements at baseline, but placed on 4L per EMS. He also has chronic back pain. Of note, previously seen 4/14 for scrotal swelling, found to have large R inguinal hernia which was reduced. However the next day patient states it came back out. He has not been able to urinate normally, and now dribbles.     ASSESSMENT / PLAN  (MEDICAL DECISION MAKING)     INITIAL VITALS: BP: 138/75, Temp: 97.5 °F (36.4 °C), Pulse: 93, Respirations: 16, SpO2: 96 %     Ty Meyer is a 64 y.o. male hx of inguinal hernia, HTN, DM, CKD, CAD, DVT (Eliquis), who presents via EMS for AMS. Reported fall earlier in the day, followed by a period of LOC. No prior hx of seizures. On presentation normotensive, satting 90s on 2L nc, borderline tachycardic. On physical exam AOX3, but appears lethargic on questioning. On auscultation, bibasilar crackles and on 2L. No focal neurological deficits, has BKA of LLE, upper extremites  5/5 strength. No abdominal pain to palpation. Has a large inguinal hernia. EKG showed no evidence of acute ischemia. CT head obtained due to hx of fall and blood thinners, this revealed no bleeds. CBC at baseline. CMP revealed Cr 2.5, close to baseline. LA WNL. BNP 3.8k. CXR revealed pulmonary vascular congestion. VBG compensated. UA was obtained given recent issues with urinary stream and concern for UTI as cause for AMS, this was unremarkable. XR of R ankle did reveal fracture of distal tibia and fibula. Patient reports no hx of heart failure, but previously was on furosemide 40 PO daily. Last echo 2022 with EF 50-55%, normal diastolic function. Patient was given 40 IV lasix. At the point of sign out xray tib/fib pending. Patient will be admitted for further workup and management of acute fractures, and acute hypoxic respiratory failure 2/2 suspected HF exacerbation.     Is this patient to be included in the SEP-1 core measure? No Exclusion criteria - the patient is NOT to be included for SEP-1 Core Measure due to: 2+ SIRS criteria are not met    Medical Decision Making  Amount and/or Complexity of Data Reviewed  Labs: ordered.  Radiology: ordered.    Risk  Prescription drug management.        This patient was also evaluated by the attending physician. All care plans were discussed and agreed upon.    Clinical Impression     1. Acute pulmonary edema (HCC)        Disposition     PATIENT REFERRED TO:  No follow-up provider specified.    DISCHARGE MEDICATIONS:  New Prescriptions    No medications on file       DISPOSITION       pending      Diagnostic Results and Other Data       RADIOLOGY:  XR TIBIA FIBULA RIGHT (2 VIEWS)   Final Result   1. Distal tibial and fibular fractures as previously described. The proximal tibia and fibula are intact.      Electronically signed by Lalito Chacon      XR ANKLE RIGHT (MIN 3 VIEWS)   Final Result   1. Fractures of the distal tibia and fibula with extension of the tibial fracture  none  Clinical Impression: no acute changes  Comparison:  2022    ED BEDSIDE ULTRASOUND:  No results found.    RECENT VITALS:  BP: (!) 149/91, Temp: 97.5 °F (36.4 °C),Pulse: 93, Respirations: 16, SpO2: 98 %     Procedures     N/a    ED Course     Nursing Notes, Past Medical Hx, Past Surgical Hx, Social Hx, Allergies, and FamilyHx were reviewed.         The patient was given the following medications:  Orders Placed This Encounter   Medications    ondansetron (ZOFRAN-ODT) disintegrating tablet 4 mg    furosemide (LASIX) injection 40 mg    magnesium sulfate 2000 mg in 50 mL IVPB premix       CONSULTS:  None    Review of Systems   Constitutional:  Positive for fatigue. Negative for chills and fever.   Respiratory:  Positive for shortness of breath. Negative for cough.    Cardiovascular:  Negative for chest pain, palpitations and leg swelling.   Genitourinary:  Negative for dysuria.   Musculoskeletal:  Positive for back pain.   Neurological:  Positive for syncope and weakness (generalized).       Past Medical, Surgical, Family, and Social History     He has a past medical history of Abdominal hernia, Back pain, CAD (coronary artery disease), CKD (chronic kidney disease) stage 4, GFR 15-29 ml/min ([facility]), Diabetes mellitus ([facility]), Hyperlipidemia, Hypertension, and MRSA (methicillin resistant staph aureus) culture positive.  He has a past surgical history that includes Leg amputation below knee; Foot surgery (Left, 05/11/2017); other surgical history (Right, 05/14/2017); and back surgery (2020).  His family history includes Arthritis in his father and mother; Diabetes in his brother and father; Heart Disease in his mother; High Blood Pressure in his mother; Stroke in his mother.  He reports that he has never smoked. He has never used smokeless tobacco. He reports that he does not drink alcohol and does not use drugs.    Medications     Previous Medications    ACETAMINOPHEN (TYLENOL) 500 MG TABLET    Take 1 tablet by mouth

## 2025-05-11 NOTE — ED NOTES
Patient Name: Ty Meyer  : 1960 64 y.o.  MRN: [id number]  ED Room #: B14/B14-14     Chief complaint:   Chief Complaint   Patient presents with    Altered Mental Status     Hospital Problem/Diagnosis:   Hospital Problems           Last Modified POA    * (Principal) Syncope and collapse 2025 Yes         O2 Flow Rate:O2 Device: Nasal cannula O2 Flow Rate (L/min): 2 L/min (if applicable)  Cardiac Rhythm:   (if applicable)  Active LDA's:   Peripheral IV 05/10/25 Left;Proximal Forearm (Active)   Site Assessment Clean, dry & intact 05/10/25 2317   Line Status Blood return noted 05/10/25 2317            How does patient ambulate? Unknown, did not assess in the Emergency Department    2. How does patient take pills? Whole with Water    3. Is patient alert? Drowsy, but responds to voice    4. Is patient oriented? To Person, To Place, and Follows Commands    5.   Patient arrived from:  home  Facility Name: ___________________________________________    6. If patient is disoriented or from a Skill Nursing Facility has family been notified of admission? No    7. Patient belongings? Belongings: Clothing    Disposition of belongings? Kept with Patient     8. Any specific patient or family belongings/needs/dynamics?   a. Oxygen 2 liters nc to keep spo2 > 92%, fall earlier in day squad called for lyft assistance, later in evening increased lethargy   9. Miscellaneous comments/pending orders?  a. See admit orders       If there are any additional questions please reach out to the Emergency Department.      Cecelia Parra, RN  [phone]

## 2025-05-12 ENCOUNTER — APPOINTMENT (OUTPATIENT)
Age: 65
DRG: 291 | End: 2025-05-12
Payer: COMMERCIAL

## 2025-05-12 PROBLEM — S82.891A CLOSED FRACTURE OF RIGHT ANKLE: Status: ACTIVE | Noted: 2025-05-12

## 2025-05-12 PROBLEM — J81.0 ACUTE PULMONARY EDEMA (HCC): Status: ACTIVE | Noted: 2025-05-12

## 2025-05-12 PROBLEM — I50.9 ACUTE CONGESTIVE HEART FAILURE (HCC): Status: ACTIVE | Noted: 2025-05-12

## 2025-05-12 LAB
ALBUMIN SERPL-MCNC: 3.5 G/DL (ref 3.4–5)
ANION GAP SERPL CALCULATED.3IONS-SCNC: 12 MMOL/L (ref 3–16)
BACTERIA UR CULT: NORMAL
BASOPHILS # BLD: 0 K/UL (ref 0–0.2)
BASOPHILS NFR BLD: 0.3 %
BUN SERPL-MCNC: 48 MG/DL (ref 7–20)
CALCIUM SERPL-MCNC: 8.6 MG/DL (ref 8.3–10.6)
CHLORIDE SERPL-SCNC: 102 MMOL/L (ref 99–110)
CO2 SERPL-SCNC: 21 MMOL/L (ref 21–32)
CREAT SERPL-MCNC: 2.9 MG/DL (ref 0.8–1.3)
DEPRECATED RDW RBC AUTO: 15.4 % (ref 12.4–15.4)
ECHO AO ASC DIAM: 3.5 CM
ECHO AO ASCENDING AORTA INDEX: 1.3 CM/M2
ECHO AO ROOT DIAM: 3.2 CM
ECHO AO ROOT INDEX: 1.19 CM/M2
ECHO AV AREA PEAK VELOCITY: 2.7 CM2
ECHO AV AREA/BSA PEAK VELOCITY: 1 CM2/M2
ECHO AV PEAK GRADIENT: 7 MMHG
ECHO AV PEAK VELOCITY: 1.3 M/S
ECHO AV VELOCITY RATIO: 0.69
ECHO BSA: 2.78 M2
ECHO EST RA PRESSURE: 15 MMHG
ECHO IVC EXP: 2.5 CM
ECHO IVC INSP: 2.3 CM
ECHO LA AREA 2C: 28 CM2
ECHO LA AREA 4C: 26.4 CM2
ECHO LA MAJOR AXIS: 7.2 CM
ECHO LA MINOR AXIS: 6.1 CM
ECHO LA VOL BP: 97 ML (ref 18–58)
ECHO LA VOL MOD A2C: 105 ML (ref 18–58)
ECHO LA VOL MOD A4C: 76 ML (ref 18–58)
ECHO LA VOL/BSA BIPLANE: 36 ML/M2 (ref 16–34)
ECHO LA VOLUME INDEX MOD A2C: 39 ML/M2 (ref 16–34)
ECHO LA VOLUME INDEX MOD A4C: 28 ML/M2 (ref 16–34)
ECHO LV E' LATERAL VELOCITY: 8.66 CM/S
ECHO LV E' SEPTAL VELOCITY: 7.08 CM/S
ECHO LV EDV A2C: 290 ML
ECHO LV EDV A4C: 263 ML
ECHO LV EDV INDEX A4C: 98 ML/M2
ECHO LV EDV NDEX A2C: 108 ML/M2
ECHO LV EF PHYSICIAN: 50 %
ECHO LV EJECTION FRACTION A2C: 50 %
ECHO LV EJECTION FRACTION A4C: 52 %
ECHO LV EJECTION FRACTION BIPLANE: 50 % (ref 55–100)
ECHO LV ESV A2C: 145 ML
ECHO LV ESV A4C: 126 ML
ECHO LV ESV INDEX A2C: 54 ML/M2
ECHO LV ESV INDEX A4C: 47 ML/M2
ECHO LV FRACTIONAL SHORTENING: 25 % (ref 28–44)
ECHO LV INTERNAL DIMENSION DIASTOLE INDEX: 2.57 CM/M2
ECHO LV INTERNAL DIMENSION DIASTOLIC: 6.9 CM (ref 4.2–5.9)
ECHO LV INTERNAL DIMENSION SYSTOLIC INDEX: 1.93 CM/M2
ECHO LV INTERNAL DIMENSION SYSTOLIC: 5.2 CM
ECHO LV IVSD: 1.2 CM (ref 0.6–1)
ECHO LV MASS 2D: 375.1 G (ref 88–224)
ECHO LV MASS INDEX 2D: 139.5 G/M2 (ref 49–115)
ECHO LV POSTERIOR WALL DIASTOLIC: 1.1 CM (ref 0.6–1)
ECHO LV RELATIVE WALL THICKNESS RATIO: 0.32
ECHO LVOT AREA: 3.8 CM2
ECHO LVOT DIAM: 2.2 CM
ECHO LVOT MEAN GRADIENT: 2 MMHG
ECHO LVOT PEAK GRADIENT: 4 MMHG
ECHO LVOT PEAK VELOCITY: 0.9 M/S
ECHO LVOT STROKE VOLUME INDEX: 22.6 ML/M2
ECHO LVOT SV: 60.8 ML
ECHO LVOT VTI: 16 CM
ECHO MV A VELOCITY: 0.91 M/S
ECHO MV E DECELERATION TIME (DT): 103 MS
ECHO MV E VELOCITY: 1 M/S
ECHO MV E/A RATIO: 1.1
ECHO MV E/E' LATERAL: 11.55
ECHO MV E/E' RATIO (AVERAGED): 12.84
ECHO MV E/E' SEPTAL: 14.12
ECHO MV REGURGITANT PEAK GRADIENT: 92 MMHG
ECHO MV REGURGITANT PEAK VELOCITY: 4.8 M/S
ECHO PV MAX VELOCITY: 0.5 M/S
ECHO PV PEAK GRADIENT: 1 MMHG
ECHO RA AREA 4C: 19.5 CM2
ECHO RA END SYSTOLIC VOLUME APICAL 4 CHAMBER INDEX BSA: 21 ML/M2
ECHO RA VOLUME: 56 ML
ECHO RV FREE WALL PEAK S': 10.9 CM/S
ECHO RV TAPSE: 3 CM (ref 1.7–?)
EOSINOPHIL # BLD: 0.9 K/UL (ref 0–0.6)
EOSINOPHIL NFR BLD: 10 %
GFR SERPLBLD CREATININE-BSD FMLA CKD-EPI: 23 ML/MIN/{1.73_M2}
GLUCOSE BLD-MCNC: 243 MG/DL (ref 70–99)
GLUCOSE BLD-MCNC: 256 MG/DL (ref 70–99)
GLUCOSE BLD-MCNC: 284 MG/DL (ref 70–99)
GLUCOSE BLD-MCNC: 350 MG/DL (ref 70–99)
GLUCOSE SERPL-MCNC: 230 MG/DL (ref 70–99)
HCT VFR BLD AUTO: 25.3 % (ref 40.5–52.5)
HGB BLD-MCNC: 8.3 G/DL (ref 13.5–17.5)
LYMPHOCYTES # BLD: 0.6 K/UL (ref 1–5.1)
LYMPHOCYTES NFR BLD: 6.5 %
MAGNESIUM SERPL-MCNC: 1.98 MG/DL (ref 1.8–2.4)
MCH RBC QN AUTO: 29.7 PG (ref 26–34)
MCHC RBC AUTO-ENTMCNC: 32.8 G/DL (ref 31–36)
MCV RBC AUTO: 90.6 FL (ref 80–100)
MONOCYTES # BLD: 1 K/UL (ref 0–1.3)
MONOCYTES NFR BLD: 12.2 %
NEUTROPHILS # BLD: 6.1 K/UL (ref 1.7–7.7)
NEUTROPHILS NFR BLD: 71 %
PERFORMED ON: ABNORMAL
PHOSPHATE SERPL-MCNC: 4.7 MG/DL (ref 2.5–4.9)
PLATELET # BLD AUTO: 150 K/UL (ref 135–450)
PMV BLD AUTO: 8.3 FL (ref 5–10.5)
POTASSIUM SERPL-SCNC: 5.2 MMOL/L (ref 3.5–5.1)
RBC # BLD AUTO: 2.79 M/UL (ref 4.2–5.9)
SODIUM SERPL-SCNC: 135 MMOL/L (ref 136–145)
WBC # BLD AUTO: 8.6 K/UL (ref 4–11)

## 2025-05-12 PROCEDURE — 86038 ANTINUCLEAR ANTIBODIES: CPT

## 2025-05-12 PROCEDURE — 93306 TTE W/DOPPLER COMPLETE: CPT | Performed by: INTERNAL MEDICINE

## 2025-05-12 PROCEDURE — 6370000000 HC RX 637 (ALT 250 FOR IP)

## 2025-05-12 PROCEDURE — 6360000002 HC RX W HCPCS

## 2025-05-12 PROCEDURE — 99223 1ST HOSP IP/OBS HIGH 75: CPT | Performed by: INTERNAL MEDICINE

## 2025-05-12 PROCEDURE — 99232 SBSQ HOSP IP/OBS MODERATE 35: CPT | Performed by: SURGERY

## 2025-05-12 PROCEDURE — 2580000003 HC RX 258

## 2025-05-12 PROCEDURE — 2500000003 HC RX 250 WO HCPCS

## 2025-05-12 PROCEDURE — 85025 COMPLETE CBC W/AUTO DIFF WBC: CPT

## 2025-05-12 PROCEDURE — C8929 TTE W OR WO FOL WCON,DOPPLER: HCPCS

## 2025-05-12 PROCEDURE — 36415 COLL VENOUS BLD VENIPUNCTURE: CPT

## 2025-05-12 PROCEDURE — 1200000000 HC SEMI PRIVATE

## 2025-05-12 PROCEDURE — 99231 SBSQ HOSP IP/OBS SF/LOW 25: CPT | Performed by: INTERNAL MEDICINE

## 2025-05-12 PROCEDURE — 83735 ASSAY OF MAGNESIUM: CPT

## 2025-05-12 PROCEDURE — 80074 ACUTE HEPATITIS PANEL: CPT

## 2025-05-12 PROCEDURE — 80069 RENAL FUNCTION PANEL: CPT

## 2025-05-12 PROCEDURE — 6360000004 HC RX CONTRAST MEDICATION

## 2025-05-12 RX ORDER — INSULIN GLARGINE 100 [IU]/ML
21 INJECTION, SOLUTION SUBCUTANEOUS NIGHTLY
Status: DISCONTINUED | OUTPATIENT
Start: 2025-05-12 | End: 2025-05-13

## 2025-05-12 RX ADMIN — FINASTERIDE 5 MG: 5 TABLET, FILM COATED ORAL at 09:25

## 2025-05-12 RX ADMIN — SODIUM ZIRCONIUM CYCLOSILICATE 10 G: 10 POWDER, FOR SUSPENSION ORAL at 09:26

## 2025-05-12 RX ADMIN — IRON SUCROSE 300 MG: 20 INJECTION, SOLUTION INTRAVENOUS at 09:35

## 2025-05-12 RX ADMIN — OXYCODONE 10 MG: 5 TABLET ORAL at 05:28

## 2025-05-12 RX ADMIN — APIXABAN 5 MG: 5 TABLET, FILM COATED ORAL at 21:43

## 2025-05-12 RX ADMIN — Medication 3 MG: at 21:43

## 2025-05-12 RX ADMIN — INSULIN GLARGINE 21 UNITS: 100 INJECTION, SOLUTION SUBCUTANEOUS at 21:44

## 2025-05-12 RX ADMIN — SODIUM CHLORIDE, PRESERVATIVE FREE 10 ML: 5 INJECTION INTRAVENOUS at 21:43

## 2025-05-12 RX ADMIN — TAMSULOSIN HYDROCHLORIDE 0.4 MG: 0.4 CAPSULE ORAL at 09:26

## 2025-05-12 RX ADMIN — ACETAMINOPHEN 650 MG: 325 TABLET ORAL at 11:37

## 2025-05-12 RX ADMIN — SULFUR HEXAFLUORIDE 2 ML: 60.7; .19; .19 INJECTION, POWDER, LYOPHILIZED, FOR SUSPENSION INTRAVENOUS; INTRAVESICAL at 10:34

## 2025-05-12 RX ADMIN — PANTOPRAZOLE SODIUM 40 MG: 40 TABLET, DELAYED RELEASE ORAL at 05:28

## 2025-05-12 RX ADMIN — ATORVASTATIN CALCIUM 20 MG: 20 TABLET, FILM COATED ORAL at 09:26

## 2025-05-12 RX ADMIN — OXYCODONE 10 MG: 5 TABLET ORAL at 11:37

## 2025-05-12 RX ADMIN — GABAPENTIN 300 MG: 300 CAPSULE ORAL at 21:43

## 2025-05-12 RX ADMIN — SODIUM CHLORIDE, PRESERVATIVE FREE 10 ML: 5 INJECTION INTRAVENOUS at 09:38

## 2025-05-12 RX ADMIN — AMITRIPTYLINE HYDROCHLORIDE 25 MG: 50 TABLET ORAL at 21:43

## 2025-05-12 RX ADMIN — INSULIN LISPRO 4 UNITS: 100 INJECTION, SOLUTION INTRAVENOUS; SUBCUTANEOUS at 17:27

## 2025-05-12 RX ADMIN — INSULIN LISPRO 4 UNITS: 100 INJECTION, SOLUTION INTRAVENOUS; SUBCUTANEOUS at 09:26

## 2025-05-12 RX ADMIN — SENNOSIDES AND DOCUSATE SODIUM 1 TABLET: 50; 8.6 TABLET ORAL at 21:43

## 2025-05-12 RX ADMIN — GABAPENTIN 300 MG: 300 CAPSULE ORAL at 09:25

## 2025-05-12 RX ADMIN — SENNOSIDES AND DOCUSATE SODIUM 1 TABLET: 50; 8.6 TABLET ORAL at 09:26

## 2025-05-12 ASSESSMENT — PAIN - FUNCTIONAL ASSESSMENT
PAIN_FUNCTIONAL_ASSESSMENT: PREVENTS OR INTERFERES WITH MANY ACTIVE NOT PASSIVE ACTIVITIES
PAIN_FUNCTIONAL_ASSESSMENT: PREVENTS OR INTERFERES WITH ALL ACTIVE AND SOME PASSIVE ACTIVITIES

## 2025-05-12 ASSESSMENT — PAIN DESCRIPTION - ORIENTATION
ORIENTATION: RIGHT
ORIENTATION: RIGHT

## 2025-05-12 ASSESSMENT — PAIN SCALES - GENERAL
PAINLEVEL_OUTOF10: 7
PAINLEVEL_OUTOF10: 6
PAINLEVEL_OUTOF10: 7

## 2025-05-12 ASSESSMENT — PAIN DESCRIPTION - LOCATION
LOCATION: LEG
LOCATION: BACK
LOCATION: BACK;ANKLE

## 2025-05-12 ASSESSMENT — PAIN DESCRIPTION - DESCRIPTORS
DESCRIPTORS: ACHING
DESCRIPTORS: ACHING

## 2025-05-12 NOTE — DISCHARGE INSTRUCTIONS
Please make an appointment in a week with the cardiologist ( )  Please make an appointment in a week with the nephrologist ()  Please make an appointment in a week with your Primary care doctor   Please make an appointment in a week with Darius Oliver (orthopedist) for a final plan on your right ankle fracture and follow up   Please make an appointment in a week with Dr glez general surgery , for a definitive plan for bilateral inguinal hernias   Please start taking Lasix 40 mg daily   Please check your blood sugar 4 times daily before breakfast, lunch,dinner and before go to bed  If you have any questions, do not hesitate to contact us or your PCP   If you feel seriously ill, please go directly to the Emergency Department, or call 911.        Extra Heart Failure Education/ Tools/ Resources:     https://InHomeVest.[address]/publication/?m=615344   --- this is American Heart Association interactive Healthier Living with Heart Failure guidebook.  Please click hyperlink or copy / paste link into search bar. The QR Code is also available below. Use your mouse to scroll through the pages.  Lots of information about weight monitoring, diet tips, activity, meds, etc    Heart Failure Tools and Resources QR Code is below. It includes multiple resources to include symptom tracker, med tracker, further HF info, and access to a HF Support Network online Community    HF [place]  -- this is a free smart phone joana available for iPhone and Android download.  Use your phone to track sodium / fluid intake, zone tool symptom tracking, weights, medications, etc. Click on this hyperlink  HF [place]   for QR code for easy download or the link is also found in the below HF Tools and Resources.      DASH (Dietary Approach to Stop Hypertension) diet --  https://www.nhlbi.nih.gov/education/dash-eating-plan -- this diet is a flexible eating plan that promotes heart healthy eating style.  Click on hyperlink

## 2025-05-12 NOTE — PROGRESS NOTES
General Surgery   Daily Progress Note  Patient: Ty Meyer      CC: bilateral inguinal hernias     SUBJECTIVE:   Patient rested well overnight, complaints of back and right hip/leg pain, voiding, tolerating diet    ROS:   A 14 point review of systems was conducted, significant findings as noted above. All other systems negative.    OBJECTIVE:    PHYSICAL EXAM:    Vitals:    05/11/25 2304 05/11/25 2333 05/12/25 0327 05/12/25 0528   BP: 129/75  (!) 131/55    Pulse: 85  81    Resp: 18 18 18 18   Temp: 97.7 °F (36.5 °C)  98.2 °F (36.8 °C)    TempSrc: Oral  Axillary    SpO2: 93%  93%    Weight:       Height:         General appearance: alert, no acute distress  Eyes: PERRLA, no scleral icterus  Neck: trachea midline, no JVD  Chest/Lungs: normal effort, no adventitious breathing, no accessory muscle use, on RA  Cardiovascular: RRR  Abdomen:  obese, soft, +large R inguinal hernia without skin changes, reduced at bediside without difficulty. Moderate L inguinal hernia, without skin changes, reduced at bedside without difficulty, significant edema testicles   Skin: warm and dry, no rashes  Extremities: L BKA     ASSESSMENT & PLAN:   This is a 64 y.o. male with Hx of CAD, CKD, DM, HTN, DVT (eliquis), GERD and hiatal hernia associated with Cuauhtemoc ulcers, lumbar postlaminectomy syndrome, and chronic bilateral hernias, who presents s/p fall due to syncopal episode. General surgery consulted for bilateral hernias     - No acute surgical intervention at this time. Easily reducible  - MMPC  - elevate testicles for edema/pain  - Continue regular diet  - cardiac and pulmonary workups, appreciate recs  - rest of care per primary    ANALILIA Muhammad-CNP  General Surgery  05/12/25  6:55 AM

## 2025-05-12 NOTE — PROGRESS NOTES
Internal Medicine Progress Note    Admit Date: 5/10/2025  Hospital Day: 3   Patient name: Ty Meyer   : 1960     CC:   Altered Mental Status     Interval history:   Patient seen at bedside today he was with his daughter at bedside, patient complain about tiredness and SOB also low appetite     Medications   Scheduled Meds:   insulin glargine  21 Units SubCUTAneous Nightly    sodium chloride flush  5-40 mL IntraVENous 2 times per day    amitriptyline  25 mg Oral Nightly    apixaban  5 mg Oral BID    aspirin  81 mg Oral Daily    atorvastatin  20 mg Oral Daily    finasteride  5 mg Oral Daily    tamsulosin  0.4 mg Oral Daily    pantoprazole  40 mg Oral QAM AC    sennosides-docusate sodium  1 tablet Oral BID    gabapentin  300 mg Oral BID    iron sucrose  300 mg IntraVENous Q24H    insulin lispro  0-8 Units SubCUTAneous TID WC     Continuous Infusions:   sodium chloride      dextrose       Objective   Vitals  Patient Vitals for the past 8 hrs:   BP Temp Temp src Pulse Resp SpO2 Height Weight   25 1144 (!) 138/92 97.7 °F (36.5 °C) Axillary 86 18 96 % -- --   25 1034 136/86 -- -- -- -- -- 1.905 m (6' 3\") (!) 146.5 kg (323 lb)   25 0938 136/86 98.5 °F (36.9 °C) Axillary 93 16 94 % -- --       Intake/Output Summary (Last 24 hours) at 2025 1538  Last data filed at 2025 0600  Gross per 24 hour   Intake --   Output 1100 ml   Net -1100 ml       ROS: A 10 point review of systems was conducted and significant findings are noted in the interval history.    Physical Exam  Constitutional:       General: He is not in acute distress.     Appearance: He is obese.      Comments: Chronically ill-appearing   HENT:      Head: Normocephalic.      Mouth/Throat:      Mouth: Mucous membranes are moist.      Pharynx: Oropharynx is clear.   Eyes:      Conjunctiva/sclera: Conjunctivae normal.      Pupils: Pupils are equal, round, and reactive to light.   Cardiovascular:      Rate and Rhythm: Normal  rate and regular rhythm.      Pulses: Normal pulses.      Heart sounds: Normal heart sounds.      Comments: Unable to properly assess JVD and hepatojugular reflex 2/2 body habitus.  Pulmonary:      Effort: Pulmonary effort is normal. No respiratory distress.      Breath sounds: Normal breath sounds. No wheezing or rales.      Comments: 2L NC SpO2 95%  Abdominal:      Palpations: Abdomen is soft.      Tenderness: There is no abdominal tenderness. There is no guarding.      Hernia: A hernia is present.      Comments: Moderately-sized left inguinal hernia, mildly tender to palpation.  Large right inguinal hernia, mildly tender to palpation.   Musculoskeletal:      Right lower leg: No edema.      Comments: Right ankle in brace and bandage. Toes are mobile.  LLE surgically absent below the knee.   Skin:     General: Skin is warm and dry.      Capillary Refill: Capillary refill takes 2 to 3 seconds.      Coloration: Skin is pale.   Neurological:      General: No focal deficit present.      Mental Status: He is oriented to person, place, and time.      Cranial Nerves: No cranial nerve deficit.      Motor: No weakness.   Psychiatric:         Mood and Affect: Mood normal.         Thought Content: Thought content normal.    LABS  CBC:   Recent Labs     05/10/25  2346 05/11/25  0545 05/12/25  0513   WBC 8.7 8.1 8.6   HGB 8.9* 8.4* 8.3*   HCT 26.8* 25.3* 25.3*    163 150   MCV 89.2 89.6 90.6     Renal:    Recent Labs     05/10/25  2346 05/11/25  0545 05/12/25  0513    139 135*   K 4.9 5.3* 5.2*    105 102   CO2 19* 22 21   BUN 38* 40* 48*   CREATININE 2.4* 2.4* 2.9*   GLUCOSE 204* 263* 230*   CALCIUM 9.1 8.6 8.6   MG 1.61* 1.92 1.98   PHOS  --  4.2 4.7   ANIONGAP 15 12 12     Hepatic:   Recent Labs     05/10/25  2346 05/11/25  0545   AST 26 24   ALT 16 15   BILITOT 0.8 0.5   BILIDIR  --  0.2   ALKPHOS 117 116       -----------------------------------------------------------------  Imaging:  XR TIBIA FIBULA

## 2025-05-12 NOTE — PROGRESS NOTES
[facility]  Cardiology Inpatient Consult Service  Daily Progress Note        Admit Date:  5/10/2025    Referring Physician: Shahid Yarbrough MD      Assessment & Plan:     Altered mental status-improved  Morbid obesity  History of DVT on Eliquis prior to admission-currently on hold  CKD 3  Syncope versus seizure    Telemetry shows no significant cardiac arrhythmias.  Waiting on echo results  Event monitor at discharge  Renal function is worsening.        Subjective:   Interval history:  Resting comfortably    Medications:   insulin glargine  21 Units SubCUTAneous Nightly    sodium zirconium cyclosilicate  10 g Oral Once    sodium chloride flush  5-40 mL IntraVENous 2 times per day    amitriptyline  25 mg Oral Nightly    [Held by provider] apixaban  5 mg Oral BID    [Held by provider] aspirin  81 mg Oral Daily    atorvastatin  20 mg Oral Daily    finasteride  5 mg Oral Daily    tamsulosin  0.4 mg Oral Daily    pantoprazole  40 mg Oral QAM AC    sennosides-docusate sodium  1 tablet Oral BID    gabapentin  300 mg Oral BID    iron sucrose  300 mg IntraVENous Q24H    insulin lispro  0-8 Units SubCUTAneous TID WC       IV drips:   sodium chloride      dextrose         PRN:  sodium chloride flush, sodium chloride, ondansetron **OR** ondansetron, polyethylene glycol, acetaminophen **OR** acetaminophen, glucose, dextrose bolus **OR** dextrose bolus, glucagon (rDNA), dextrose, melatonin, oxyCODONE **OR** oxyCODONE      Objective:     Vitals:    05/11/25 2304 05/11/25 2333 05/12/25 0327 05/12/25 0528   BP: 129/75  (!) 131/55    Pulse: 85  81    Resp: 18 18 18 18   Temp: 97.7 °F (36.5 °C)  98.2 °F (36.8 °C)    TempSrc: Oral  Axillary    SpO2: 93%  93%    Weight:       Height:           Intake/Output Summary (Last 24 hours) at 5/12/2025 0904  Last data filed at 5/12/2025 0600  Gross per 24 hour   Intake --   Output 1100 ml   Net -1100 ml     I/O last 3 completed shifts:  In: -   Out: 2000  [facility] [address] [address]  Ph: [phone]  Fax: [phone]

## 2025-05-12 NOTE — CARE COORDINATION
Case Management Assessment  Initial Evaluation    Date/Time of Evaluation: 5/12/2025 3:36 PM  Assessment Completed by: Keesha Travis    If patient is discharged prior to next notation, then this note serves as note for discharge by case management.    Patient Name: Ty Meyer                   YOB: 1960  Diagnosis: Syncope and collapse [R55]  Acute pulmonary edema (HCC) [J81.0]  Closed fracture of right ankle, initial encounter [Z22.112L]  Acute congestive heart failure, unspecified heart failure type (HCC) [I50.9]                   Date / Time: 5/10/2025 10:51 PM    Patient Admission Status: Inpatient   Readmission Risk (Low < 19, Mod (19-27), High > 27): Readmission Risk Score: 20.8    Current PCP: Jose Chew MD  PCP verified by CM? Yes    Chart Reviewed: Yes      History Provided by: Patient  Patient Orientation: Alert and Oriented    Patient Cognition: Alert    Hospitalization in the last 30 days (Readmission):  No    If yes, Readmission Assessment in  Navigator will be completed.    Advance Directives:      Code Status: Full Code   Patient's Primary Decision Maker is: Legal Next of Kin      Discharge Planning:    Patient lives with: Spouse/Significant Other Type of Home: House  Primary Care Giver: Self  Patient Support Systems include: Family Members   Current Financial resources: Medicaid  Current community resources: None  Current services prior to admission: None            Current DME:              Type of Home Care services:  None    ADLS  Prior functional level: Assistance with the following:, Mobility (Walker)  Current functional level: Assistance with the following:, Mobility (Walker)    PT AM-PAC:   /24  OT AM-PAC:   /24    Family can provide assistance at DC: Yes  Would you like Case Management to discuss the discharge plan with any other family members/significant others, and if so, who? No  Plans to Return to Present Housing: Yes  Other Identified Issues/Barriers to

## 2025-05-12 NOTE — CONSULTS
Nephrology Consult Note                                                                                                                                                                                                                                                                                                                                                               Office : [phone]     Fax :[phone]    Patient's Name: Ty Meyer  9:57 PM  5/12/2025    Reason for Consult:  HUNTER on CKD 3  Requesting Physician:  Jose Chew MD  Chief Complaint:    Chief Complaint   Patient presents with    Altered Mental Status       Assessment/Plan     # HUNTER on CKD 3  - Suspect secondary to volume changes   - Check urine studies   - Check bladder scan and PVR  - Closely monitor UOP and volume status. S/p IV Lasix  - Avoid nephrotoxins  - Monitor renal labs     # HTN  - BP's controlled  - Monitor     # Acid- base/ Electrolyte imbalance   # Hyperkalemia  - Given Lokelma today  - Plan on low K diet  - Monitor     # DM 2  - Management per primary  - Check UACR    # Syncope vs seizure  - Echo pending  - Event monitor at discharge  - Cardiology on board    # Right bimalleolar ankle fx  - Ortho on board  - Will need outpatient follow-up       History of Present Ilness:    Ty Meyer is a 64 y.o. male with a PMH of HTN, DM2, osteomyelitis s/p LLE BKA, CKD 3, who presented after a fall and syncopal episode. We are being consulted for HUNTER on CKD 3.    Interval hx:        Past Medical History:   Diagnosis Date    Abdominal hernia     Back pain     CAD (coronary artery disease)     CKD (chronic kidney disease) stage 4, GFR 15-29 ml/min ([facility])     Dr. Long    Diabetes mellitus ([facility])     Hyperlipidemia     Hypertension     MRSA (methicillin resistant staph aureus) culture positive 05/11/2017    foot       Past Surgical History:   Procedure Laterality Date    BACK SURGERY  2020    FOOT SURGERY Left 05/11/2017    LEG

## 2025-05-12 NOTE — CONSULTS
Bariatric Surgery   Resident Consult Note    Reason for Consult: bilateral inguinal hernias    History of Present Illness:   Ty Meyer is a 64 y.o. male with Hx of CAD, CKD, DM, HTN, DVT (eliquis), GERD and hiatal hernia associated with Cuauhtemoc ulcers, lumbar postlaminectomy syndrome, and chronic bilateral hernias, who presents with bilateral inguinal hernias. Pt had a syncopal event causing a fall when trying to get into his wheel chair yesterday. States he felt a pop in his right leg/groin. Has known BL inguinal hernias for which the patient is following with Dr. Carpenter as outpatient, prior plan included cardiac and pulmonary workup prior to surgical intervention.  General surgery consulted for evaluation of bilateral inguinal hernias.     Patient states his hernia recurred a day after reduction in the ED in April. He has had stable discomfort since that day in April. Denies increased pain. Has had some increased scrotal swelling for which he is doing scrotal elevation. Denies constipation, obstipation, nausea, or vomiting.     Past Medical History:        Diagnosis Date    Abdominal hernia     Back pain     CAD (coronary artery disease)     CKD (chronic kidney disease) stage 4, GFR 15-29 ml/min ([facility])     Dr. Long    Diabetes mellitus ([facility])     Hyperlipidemia     Hypertension     MRSA (methicillin resistant staph aureus) culture positive 05/11/2017    foot       Past Surgical History:           Procedure Laterality Date    BACK SURGERY  2020    FOOT SURGERY Left 05/11/2017    LEG AMPUTATION BELOW KNEE      OTHER SURGICAL HISTORY Right 05/14/2017    RIGHT FOOT DEBRIDEMENT INCISION AND DRAINAGE, DELAYED PRIMARY       Allergies:  Codeine    Medications:   Home Meds  No current facility-administered medications on file prior to encounter.     Current Outpatient Medications on File Prior to Encounter   Medication Sig Dispense Refill    apixaban (ELIQUIS) 5 MG TABS tablet Take by mouth 2 times daily      aspirin 81  rashes  Extremities: L BKA     Labs:    CBC:   Recent Labs     05/10/25  2346 05/11/25  0545 05/12/25  0513   WBC 8.7 8.1 8.6   HGB 8.9* 8.4* 8.3*   HCT 26.8* 25.3* 25.3*   MCV 89.2 89.6 90.6    163 150     BMP:   Recent Labs     05/10/25  2346 05/11/25  0545 05/12/25  0513    139 135*   K 4.9 5.3* 5.2*    105 102   CO2 19* 22 21   PHOS  --  4.2 4.7   BUN 38* 40* 48*   CREATININE 2.4* 2.4* 2.9*     PT/INR: No results for input(s): \"PROTIME\", \"INR\" in the last 72 hours.  APTT: No results for input(s): \"APTT\" in the last 72 hours.  Liver Profile:  Lab Results   Component Value Date/Time    AST 24 05/11/2025 05:45 AM    ALT 15 05/11/2025 05:45 AM    BILIDIR 0.2 05/11/2025 05:45 AM    BILITOT 0.5 05/11/2025 05:45 AM    ALKPHOS 116 05/11/2025 05:45 AM    PROTEIN 7.1 05/11/2025 05:45 AM     Lab Results   Component Value Date/Time    CHOL 94 05/11/2025 05:45 AM    HDL 43 05/11/2025 05:45 AM    TRIG 71 05/11/2025 05:45 AM     UA:   Lab Results   Component Value Date/Time    COLORU Yellow 05/11/2025 12:39 AM    PHUR 6.0 05/11/2025 12:39 AM    PHUR 5.5 05/11/2017 06:30 AM    WBCUA 0-2 05/11/2025 12:39 AM    RBCUA 0-2 05/11/2025 12:39 AM    BACTERIA Rare 05/11/2025 12:39 AM    CLARITYU Clear 05/11/2025 12:39 AM    LEUKOCYTESUR Negative 05/11/2025 12:39 AM    UROBILINOGEN 0.2 05/11/2025 12:39 AM    BILIRUBINUR Negative 05/11/2025 12:39 AM    BLOODU TRACE-INTACT 05/11/2025 12:39 AM    GLUCOSEU Negative 05/11/2025 12:39 AM    GLUCOSEU Negative 10/08/2011 02:45 AM       Imaging:   XR TIBIA FIBULA RIGHT (2 VIEWS)   Final Result   1. Distal tibial and fibular fractures as previously described. The proximal tibia and fibula are intact.      Electronically signed by Miso      XR ANKLE RIGHT (MIN 3 VIEWS)   Final Result   1. Fractures of the distal tibia and fibula with extension of the tibial fracture into the ankle joint space.      Electronically signed by Miso      XR CHEST PORTABLE   Final Result

## 2025-05-12 NOTE — PLAN OF CARE
Problem: Pain  Goal: Verbalizes/displays adequate comfort level or baseline comfort level  Outcome: Progressing   Medicated with Oxycodone 10 mg po for complaints of pain to scrotum and right ankle. Right ankle dressing C/D/I. Will continue to monitor alteration in comfort

## 2025-05-12 NOTE — CONSULTS
Department of Orthopedic Surgery  Attending   Consult Note        Reason for Consult:  LEFT Ankle Fx  Requesting Physician: Shahid Yarbrough MD  Date of Service: 5/12/2025 7:55 AM    CHIEF COMPLAINT:  As Above    History Obtained From:  patient, electronic medical record    HISTORY OF PRESENT ILLNESS:                The patient is a 64 y.o. male who presents with above chief complaint.  Admitted with concerns CHF, mobility challenges and Left ankle Fx.  Has DM, CAD, extensive Hx, BMI 40      Past Medical History:        Diagnosis Date    Abdominal hernia     Back pain     CAD (coronary artery disease)     CKD (chronic kidney disease) stage 4, GFR 15-29 ml/min ([facility])     Dr. Long    Diabetes mellitus ([facility])     Hyperlipidemia     Hypertension     MRSA (methicillin resistant staph aureus) culture positive 05/11/2017    foot     Past Surgical History:        Procedure Laterality Date    BACK SURGERY  2020    FOOT SURGERY Left 05/11/2017    LEG AMPUTATION BELOW KNEE      OTHER SURGICAL HISTORY Right 05/14/2017    RIGHT FOOT DEBRIDEMENT INCISION AND DRAINAGE, DELAYED PRIMARY         Medications Prior to Admission:   Prior to Admission medications    Medication Sig Start Date End Date Taking? Authorizing Provider   apixaban (ELIQUIS) 5 MG TABS tablet Take by mouth 2 times daily 3/24/23  Yes Caro Aiken MD   aspirin 81 MG EC tablet Take 1 tablet by mouth daily 4/26/22  Yes Rosalind Patel MD   spironolactone (ALDACTONE) 25 MG tablet Take 1 tablet by mouth daily 4/28/22  Yes Rosalind Patel MD   finasteride (PROSCAR) 5 MG tablet Take 1 tablet by mouth daily   Yes Caro Aiken MD   atorvastatin (LIPITOR) 20 MG tablet Take 1 tablet by mouth daily   Yes Caro Aiken MD   tamsulosin (FLOMAX) 0.4 MG capsule Take 1 capsule by mouth daily   Yes Caro Aiken MD   acetaminophen (TYLENOL) 500 MG tablet Take 1 tablet by mouth every 6 hours as needed for Pain   Yes Caro Aiken MD   Multiple

## 2025-05-13 ENCOUNTER — APPOINTMENT (OUTPATIENT)
Dept: ULTRASOUND IMAGING | Age: 65
DRG: 291 | End: 2025-05-13
Payer: COMMERCIAL

## 2025-05-13 LAB
ALBUMIN SERPL-MCNC: 3.3 G/DL (ref 3.4–5)
AMMONIA PLAS-SCNC: 26 UMOL/L (ref 16–60)
ANION GAP SERPL CALCULATED.3IONS-SCNC: 9 MMOL/L (ref 3–16)
BASOPHILS # BLD: 0 K/UL (ref 0–0.2)
BASOPHILS NFR BLD: 0.1 %
BUN SERPL-MCNC: 54 MG/DL (ref 7–20)
CALCIUM SERPL-MCNC: 8.6 MG/DL (ref 8.3–10.6)
CHLORIDE SERPL-SCNC: 99 MMOL/L (ref 99–110)
CO2 SERPL-SCNC: 21 MMOL/L (ref 21–32)
CREAT SERPL-MCNC: 3.2 MG/DL (ref 0.8–1.3)
CRP SERPL-MCNC: 202 MG/L (ref 0–5.1)
DEPRECATED RDW RBC AUTO: 15.6 % (ref 12.4–15.4)
EOSINOPHIL # BLD: 0.5 K/UL (ref 0–0.6)
EOSINOPHIL NFR BLD: 5.8 %
ERYTHROCYTE [SEDIMENTATION RATE] IN BLOOD BY WESTERGREN METHOD: 48 MM/HR (ref 0–20)
GFR SERPLBLD CREATININE-BSD FMLA CKD-EPI: 21 ML/MIN/{1.73_M2}
GLUCOSE BLD-MCNC: 278 MG/DL (ref 70–99)
GLUCOSE BLD-MCNC: 322 MG/DL (ref 70–99)
GLUCOSE BLD-MCNC: 323 MG/DL (ref 70–99)
GLUCOSE BLD-MCNC: 349 MG/DL (ref 70–99)
GLUCOSE SERPL-MCNC: 284 MG/DL (ref 70–99)
HAV IGM SERPL QL IA: NORMAL
HBV CORE IGM SERPL QL IA: NORMAL
HBV SURFACE AG SERPL QL IA: NORMAL
HCT VFR BLD AUTO: 25.8 % (ref 40.5–52.5)
HCV AB SERPL QL IA: NORMAL
HGB BLD-MCNC: 8.5 G/DL (ref 13.5–17.5)
LYMPHOCYTES # BLD: 0.5 K/UL (ref 1–5.1)
LYMPHOCYTES NFR BLD: 5.4 %
MAGNESIUM SERPL-MCNC: 2.02 MG/DL (ref 1.8–2.4)
MCH RBC QN AUTO: 29.7 PG (ref 26–34)
MCHC RBC AUTO-ENTMCNC: 33 G/DL (ref 31–36)
MCV RBC AUTO: 90.1 FL (ref 80–100)
MONOCYTES # BLD: 1.1 K/UL (ref 0–1.3)
MONOCYTES NFR BLD: 12.4 %
NEUTROPHILS # BLD: 6.8 K/UL (ref 1.7–7.7)
NEUTROPHILS NFR BLD: 76.3 %
NT-PROBNP SERPL-MCNC: ABNORMAL PG/ML (ref 0–124)
PERFORMED ON: ABNORMAL
PHOSPHATE SERPL-MCNC: 4.5 MG/DL (ref 2.5–4.9)
PLATELET # BLD AUTO: 146 K/UL (ref 135–450)
PMV BLD AUTO: 8.5 FL (ref 5–10.5)
POTASSIUM SERPL-SCNC: 5.2 MMOL/L (ref 3.5–5.1)
RBC # BLD AUTO: 2.86 M/UL (ref 4.2–5.9)
SODIUM SERPL-SCNC: 129 MMOL/L (ref 136–145)
WBC # BLD AUTO: 8.9 K/UL (ref 4–11)

## 2025-05-13 PROCEDURE — 6370000000 HC RX 637 (ALT 250 FOR IP)

## 2025-05-13 PROCEDURE — 99231 SBSQ HOSP IP/OBS SF/LOW 25: CPT | Performed by: INTERNAL MEDICINE

## 2025-05-13 PROCEDURE — 85025 COMPLETE CBC W/AUTO DIFF WBC: CPT

## 2025-05-13 PROCEDURE — 82140 ASSAY OF AMMONIA: CPT

## 2025-05-13 PROCEDURE — 86431 RHEUMATOID FACTOR QUANT: CPT

## 2025-05-13 PROCEDURE — 51798 US URINE CAPACITY MEASURE: CPT

## 2025-05-13 PROCEDURE — 6360000002 HC RX W HCPCS: Performed by: INTERNAL MEDICINE

## 2025-05-13 PROCEDURE — 1200000000 HC SEMI PRIVATE

## 2025-05-13 PROCEDURE — 83880 ASSAY OF NATRIURETIC PEPTIDE: CPT

## 2025-05-13 PROCEDURE — 83010 ASSAY OF HAPTOGLOBIN QUANT: CPT

## 2025-05-13 PROCEDURE — 85652 RBC SED RATE AUTOMATED: CPT

## 2025-05-13 PROCEDURE — 83735 ASSAY OF MAGNESIUM: CPT

## 2025-05-13 PROCEDURE — 86160 COMPLEMENT ANTIGEN: CPT

## 2025-05-13 PROCEDURE — 2500000003 HC RX 250 WO HCPCS

## 2025-05-13 PROCEDURE — 99233 SBSQ HOSP IP/OBS HIGH 50: CPT | Performed by: INTERNAL MEDICINE

## 2025-05-13 PROCEDURE — 6360000002 HC RX W HCPCS

## 2025-05-13 PROCEDURE — 86812 HLA TYPING A B OR C: CPT

## 2025-05-13 PROCEDURE — 36415 COLL VENOUS BLD VENIPUNCTURE: CPT

## 2025-05-13 PROCEDURE — 86200 CCP ANTIBODY: CPT

## 2025-05-13 PROCEDURE — 80069 RENAL FUNCTION PANEL: CPT

## 2025-05-13 PROCEDURE — 76770 US EXAM ABDO BACK WALL COMP: CPT

## 2025-05-13 PROCEDURE — 86140 C-REACTIVE PROTEIN: CPT

## 2025-05-13 PROCEDURE — 2580000003 HC RX 258

## 2025-05-13 RX ORDER — FUROSEMIDE 10 MG/ML
40 INJECTION INTRAMUSCULAR; INTRAVENOUS ONCE
Status: COMPLETED | OUTPATIENT
Start: 2025-05-13 | End: 2025-05-13

## 2025-05-13 RX ORDER — INSULIN GLARGINE 100 [IU]/ML
25 INJECTION, SOLUTION SUBCUTANEOUS NIGHTLY
Status: DISCONTINUED | OUTPATIENT
Start: 2025-05-13 | End: 2025-05-15

## 2025-05-13 RX ORDER — FUROSEMIDE 10 MG/ML
60 INJECTION INTRAMUSCULAR; INTRAVENOUS 2 TIMES DAILY
Status: DISCONTINUED | OUTPATIENT
Start: 2025-05-13 | End: 2025-05-14

## 2025-05-13 RX ORDER — INSULIN LISPRO 100 [IU]/ML
0-16 INJECTION, SOLUTION INTRAVENOUS; SUBCUTANEOUS
Status: DISCONTINUED | OUTPATIENT
Start: 2025-05-13 | End: 2025-05-23 | Stop reason: HOSPADM

## 2025-05-13 RX ADMIN — SENNOSIDES AND DOCUSATE SODIUM 1 TABLET: 50; 8.6 TABLET ORAL at 09:30

## 2025-05-13 RX ADMIN — PANTOPRAZOLE SODIUM 40 MG: 40 TABLET, DELAYED RELEASE ORAL at 05:16

## 2025-05-13 RX ADMIN — SODIUM CHLORIDE, PRESERVATIVE FREE 10 ML: 5 INJECTION INTRAVENOUS at 19:58

## 2025-05-13 RX ADMIN — INSULIN GLARGINE 25 UNITS: 100 INJECTION, SOLUTION SUBCUTANEOUS at 20:00

## 2025-05-13 RX ADMIN — ASPIRIN 81 MG: 81 TABLET, COATED ORAL at 09:30

## 2025-05-13 RX ADMIN — INSULIN LISPRO 12 UNITS: 100 INJECTION, SOLUTION INTRAVENOUS; SUBCUTANEOUS at 20:00

## 2025-05-13 RX ADMIN — INSULIN LISPRO 8 UNITS: 100 INJECTION, SOLUTION INTRAVENOUS; SUBCUTANEOUS at 09:38

## 2025-05-13 RX ADMIN — FUROSEMIDE 60 MG: 10 INJECTION, SOLUTION INTRAMUSCULAR; INTRAVENOUS at 17:03

## 2025-05-13 RX ADMIN — OXYCODONE 10 MG: 5 TABLET ORAL at 16:56

## 2025-05-13 RX ADMIN — IRON SUCROSE 300 MG: 20 INJECTION, SOLUTION INTRAVENOUS at 09:46

## 2025-05-13 RX ADMIN — SENNOSIDES AND DOCUSATE SODIUM 1 TABLET: 50; 8.6 TABLET ORAL at 19:56

## 2025-05-13 RX ADMIN — APIXABAN 5 MG: 5 TABLET, FILM COATED ORAL at 19:56

## 2025-05-13 RX ADMIN — ATORVASTATIN CALCIUM 20 MG: 20 TABLET, FILM COATED ORAL at 09:30

## 2025-05-13 RX ADMIN — INSULIN LISPRO 16 UNITS: 100 INJECTION, SOLUTION INTRAVENOUS; SUBCUTANEOUS at 16:56

## 2025-05-13 RX ADMIN — TAMSULOSIN HYDROCHLORIDE 0.4 MG: 0.4 CAPSULE ORAL at 09:30

## 2025-05-13 RX ADMIN — FINASTERIDE 5 MG: 5 TABLET, FILM COATED ORAL at 09:30

## 2025-05-13 RX ADMIN — AMITRIPTYLINE HYDROCHLORIDE 25 MG: 50 TABLET ORAL at 19:56

## 2025-05-13 RX ADMIN — GABAPENTIN 300 MG: 300 CAPSULE ORAL at 09:30

## 2025-05-13 RX ADMIN — FUROSEMIDE 40 MG: 10 INJECTION, SOLUTION INTRAMUSCULAR; INTRAVENOUS at 09:30

## 2025-05-13 RX ADMIN — GABAPENTIN 300 MG: 300 CAPSULE ORAL at 19:56

## 2025-05-13 RX ADMIN — SODIUM ZIRCONIUM CYCLOSILICATE 10 G: 10 POWDER, FOR SUSPENSION ORAL at 17:03

## 2025-05-13 RX ADMIN — APIXABAN 5 MG: 5 TABLET, FILM COATED ORAL at 09:30

## 2025-05-13 ASSESSMENT — PAIN SCALES - GENERAL: PAINLEVEL_OUTOF10: 8

## 2025-05-13 NOTE — CARE COORDINATION
UPDATE:  Wife called back and stated her preference for facility is [facility].  CM to place referral after PT/OT eval.  Electronically signed by ANNETTE Sandra, RN Case Manager on 5/13/2025 at 4:50 PM        DISCHARGE PLANNING:    Chart reviewed.    Patient from home with wife.      Patient admitted with syncope and collapse, 2L O2 (baseline is RA),CKD III, ECHO completed, Cardiology & Nephrology following, bilateral inguinal hernias, General Surgery consulted, right ankle fracture, Ortho consulted and recs in.        Plan is TBD.  Patient will need PT/OT when appropriate.  Will likely need placement.  Wife will call with preference tomorrow.    Electronically signed by ANNETTE Sandra, RN Case Manager on 5/13/2025 at 4:42 PM

## 2025-05-13 NOTE — PROGRESS NOTES
Nephrology Progress Note                                                                                                                                                                                                                                                                                                                                                               Office : [phone]     Fax :[phone]    Patient's Name: Ty Meyer  8:51 AM  5/13/2025    Reason for Consult:  HUNTER on CKD 3  Requesting Physician:  Jose Chew MD  Chief Complaint:    Chief Complaint   Patient presents with    Altered Mental Status       Assessment/Plan     # HUNTER on CKD 3  # Possible CRS  - Creatinine worsening now up to 3.2  - Suspect secondary to volume changes   - Check urine studies   - Check NASRA today  - Closely monitor UOP and volume status. Continue IV Lasix 60 mg BID   - Avoid nephrotoxins  - Monitor renal labs     # HFpEF  - Echo with EF 50-55%, grade II DD with increased LAP  - BNP worse ~11.5K  - Diuresis as above     # HTN  - BP's elevated  - Diuresis as above   - Monitor     # Acid- base/ Electrolyte imbalance   # Hyperkalemia  - S/p Lokelma   - Continue low K diet  - Monitor   # Hyponatremia  - Should improve with diuresis  - Monitor     # DM 2  - Management per primary  - Check UACR    # Syncope vs seizure  - Event monitor at discharge  - Cardiology on board    # Right bimalleolar ankle fx  - Ortho on board  - Will need outpatient follow-up       History of Present Ilness:    Ty Meyer is a 64 y.o. male with a PMH of HTN, DM2, osteomyelitis s/p LLE BKA, CKD 3, who presented after a fall and syncopal episode. We are being consulted for HUNTER on CKD 3.    Interval hx:    Creatinine worse  Potassium still 5.2  Sodium down  Weight is up       Past Medical History:   Diagnosis Date    Abdominal hernia     Back pain     CAD (coronary artery disease)     CKD (chronic kidney disease) stage 4, GFR

## 2025-05-13 NOTE — PROGRESS NOTES
Physician Progress Note      PATIENT:               WYATT MORENO  CSN #:                  [id number]  :                       1960  ADMIT DATE:       5/10/2025 10:51 PM  DISCH DATE:  RESPONDING  PROVIDER #:        Be Freeman          QUERY TEXT:    Diastolic Congestive Heart Failure is documented in the medical record .    Please document the type and acuity:    The clinical indicators include:  64 y.o. male with a MHx significant for DVT on Eliquis, HTN, T2DM on insulin,   ?seizure disorder off of AEDs for years, osteomyelitis s/p LLE BKA, CAD, CKD   3b, SY    5/10 CXR \"Pulmonary edema\"    5/10 ED provider note \"dyspnea secondary to pulmonary edema...Breath sounds:   Rales (bibasilar) present.\"     Cardiology Consult note \"May have an element of acute on chronic   diastolic heart failure\"     Nephrology note \"Ext: lower extremity edema Yes\"     Internal Medicine Progress note \"HFpEF...Grade II diastolic   dysfunction...CT chest :small Bilateral pleural effusion \"    Cardiology and Nephrology consult. ECHO, CT of chest, CXR, IV Lasix 40mg x 2,   supportive care  Options provided:  -- Acute on Chronic Diastolic CHF/HFpEF  -- Other - I will add my own diagnosis  -- Disagree - Not applicable / Not valid  -- Disagree - Clinically unable to determine / Unknown  -- Refer to Clinical Documentation Reviewer    PROVIDER RESPONSE TEXT:    This patient is in acute on chronic diastolic CHF/HFpEF.    Query created by: Sona Sawyer on 2025 3:25 PM      Electronically signed by:  Be Freeman 2025 3:28 PM

## 2025-05-13 NOTE — PROGRESS NOTES
Internal Medicine Progress Note    Admit Date: 5/10/2025  Hospital Day: 3   Patient name: Ty Meyer   : 1960     CC:   Altered Mental Status     Interval history:   Patient seen at bedside look more awake today  He state that he is feeling much better ant his appetite today also in improving   He denied any other complain   Medications   Scheduled Meds:   insulin glargine  21 Units SubCUTAneous Nightly    sodium chloride flush  5-40 mL IntraVENous 2 times per day    amitriptyline  25 mg Oral Nightly    apixaban  5 mg Oral BID    aspirin  81 mg Oral Daily    atorvastatin  20 mg Oral Daily    finasteride  5 mg Oral Daily    tamsulosin  0.4 mg Oral Daily    pantoprazole  40 mg Oral QAM AC    sennosides-docusate sodium  1 tablet Oral BID    gabapentin  300 mg Oral BID    iron sucrose  300 mg IntraVENous Q24H    insulin lispro  0-8 Units SubCUTAneous TID WC     Continuous Infusions:   sodium chloride      dextrose       Objective   Vitals  Patient Vitals for the past 8 hrs:   BP Temp Temp src Pulse Resp SpO2   25 1625 (!) 114/44 98 °F (36.7 °C) Axillary 82 16 92 %       Intake/Output Summary (Last 24 hours) at 2025  Last data filed at 2025 0600  Gross per 24 hour   Intake --   Output 400 ml   Net -400 ml       ROS: A 10 point review of systems was conducted and significant findings are noted in the interval history.    Physical Exam  Constitutional:       General: He is not in acute distress.     Appearance: He is obese.      Comments: Chronically ill-appearing   HENT:      Head: Normocephalic.      Mouth/Throat:      Mouth: Mucous membranes are moist.      Pharynx: Oropharynx is clear.   Eyes:      Conjunctiva/sclera: Conjunctivae normal.      Pupils: Pupils are equal, round, and reactive to light.   Cardiovascular:      Rate and Rhythm: Normal rate and regular rhythm.      Pulses: Normal pulses.      Heart sounds: Normal heart sounds.      Comments: Unable to properly assess JVD  now 2.4>2.9 on admission.creatinine,Ur:46.7 Urea nitrogen:290 low ,FE Urea(37.5%)pending sodium ur   Most likely due to cardiorenal, given fluid overload and acute CHF.  - Check urine urea nitrogen, urine creatinine to calculate FEUrea  - Diuresis per above (held)  - Avoid nephrotoxins, renally dose medications  - Daily RFP/Mg  -Hold home spironolactone (hold)  -nephrology consulted  :Chaz     Anasarca  Patient present generalized edema, no pitting  He state that he had it chronically  -ALEJANDRA, Hepatitis panel , C4,C3 C-reactive protein , Sed rate     Right ankle fracture  Occurred after suffering a fall.  Xray shows fractures of the distal tibia and fibula with extension of the tibial fracture into the ankle joint space.  Has good distal pulses, able to move toes.  - Orthopedic surgery consulted  - Pain control  - Non-weight bearing on that leg  - holding AC     Bilateral inguinal hernias  Has chronic bilateral inguinal hernias.  Recently his right had concern for incarceration, was seen in our ED, had it reduced. Since then it has fallen back into its herniated place, with increased pain and swelling. Still having BMs like normal without issue.   - General surgery consulted for evaluation; No plans for immediate intervention. Easily reducible.      Chronic medical conditions:     Acute on chronic normocytic anemia  Patient not notice any black bowel movement or signs of blood in stool   Has history of SY, with recent OP labs backing this up.  - Venofer 300 mg every 24 hours   -Guaiac from stool   -Haptoglobin      DVT history   Eliquis      T2DM  Most recent HbA1c 7.% in 04/2025.  On glargine 21 units nightly, SSI at home.  - increased today Lantus to 25  units nightly for now, SSI     Polyneuropathy  Continue home gabapentin, dose adjusted for renal function    Code Status: Full Code   FEN: ADULT DIET; Regular; Low Potassium (Less than 3000 mg/day)   PPX: SCDs  DISPO: IP    Crissy Beard MD,

## 2025-05-13 NOTE — PROGRESS NOTES
[facility]  Cardiology Inpatient Consult Service  Daily Progress Note        Admit Date:  5/10/2025    Referring Physician: Shahid Yarbrough MD      Assessment & Plan:     Altered mental status-improved  Morbid obesity  History of DVT on Eliquis prior to admission-currently on hold  CKD 3, HUNTER  Syncope versus seizure    Telemetry shows short runs of atrial tachycardia  Diuretics per renal        Subjective:   Interval history:  Patient asleep    Medications:   insulin glargine  25 Units SubCUTAneous Nightly    insulin lispro  0-16 Units SubCUTAneous 4x Daily AC & HS    furosemide  60 mg IntraVENous BID    sodium chloride flush  5-40 mL IntraVENous 2 times per day    amitriptyline  25 mg Oral Nightly    apixaban  5 mg Oral BID    aspirin  81 mg Oral Daily    atorvastatin  20 mg Oral Daily    finasteride  5 mg Oral Daily    tamsulosin  0.4 mg Oral Daily    pantoprazole  40 mg Oral QAM AC    sennosides-docusate sodium  1 tablet Oral BID    gabapentin  300 mg Oral BID       IV drips:   sodium chloride      dextrose         PRN:  sodium chloride flush, sodium chloride, ondansetron **OR** ondansetron, polyethylene glycol, acetaminophen **OR** acetaminophen, glucose, dextrose bolus **OR** dextrose bolus, glucagon (rDNA), dextrose, melatonin, oxyCODONE **OR** oxyCODONE      Objective:     Vitals:    05/12/25 2348 05/13/25 0408 05/13/25 0517 05/13/25 0949   BP: (!) 182/97 (!) 198/80  120/68   Pulse: 91 93  94   Resp: 18 18  16   Temp: 98.8 °F (37.1 °C) 98.6 °F (37 °C)  98.3 °F (36.8 °C)   TempSrc: Axillary Axillary  Axillary   SpO2: 97% 98%  97%   Weight:   (!) 149.3 kg (329 lb 2.4 oz)    Height:           Intake/Output Summary (Last 24 hours) at 5/13/2025 1228  Last data filed at 5/13/2025 0521  Gross per 24 hour   Intake --   Output 500 ml   Net -500 ml     I/O last 3 completed shifts:  In: -   Out: 900 [Urine:900]  Wt Readings from Last 3 Encounters:   05/13/25 (!) 149.3 kg (329 lb 2.4 oz)

## 2025-05-14 ENCOUNTER — APPOINTMENT (OUTPATIENT)
Dept: CT IMAGING | Age: 65
DRG: 291 | End: 2025-05-14
Payer: COMMERCIAL

## 2025-05-14 LAB
ALBUMIN SERPL-MCNC: 3.2 G/DL (ref 3.4–5)
ALBUMIN SERPL-MCNC: 3.2 G/DL (ref 3.4–5)
ANA SER QL IA: NEGATIVE
ANION GAP SERPL CALCULATED.3IONS-SCNC: 12 MMOL/L (ref 3–16)
ANION GAP SERPL CALCULATED.3IONS-SCNC: 16 MMOL/L (ref 3–16)
BACTERIA URNS QL MICRO: ABNORMAL /HPF
BASOPHILS # BLD: 0 K/UL (ref 0–0.2)
BASOPHILS NFR BLD: 0.5 %
BILIRUB UR QL STRIP.AUTO: NEGATIVE
BUN SERPL-MCNC: 61 MG/DL (ref 7–20)
BUN SERPL-MCNC: 62 MG/DL (ref 7–20)
C3 SERPL-MCNC: 160 MG/DL (ref 90–180)
C4 SERPL-MCNC: 22.7 MG/DL (ref 10–40)
CALCIUM SERPL-MCNC: 8.4 MG/DL (ref 8.3–10.6)
CALCIUM SERPL-MCNC: 8.4 MG/DL (ref 8.3–10.6)
CCP IGG SERPL-ACNC: <0.5 U/ML (ref 0–2.9)
CHLORIDE SERPL-SCNC: 98 MMOL/L (ref 99–110)
CHLORIDE SERPL-SCNC: 99 MMOL/L (ref 99–110)
CK SERPL-CCNC: 81 U/L (ref 39–308)
CLARITY UR: CLEAR
CO2 SERPL-SCNC: 17 MMOL/L (ref 21–32)
CO2 SERPL-SCNC: 21 MMOL/L (ref 21–32)
COLOR UR: YELLOW
CREAT SERPL-MCNC: 3.9 MG/DL (ref 0.8–1.3)
CREAT SERPL-MCNC: 4 MG/DL (ref 0.8–1.3)
DEPRECATED RDW RBC AUTO: 15.5 % (ref 12.4–15.4)
EOSINOPHIL # BLD: 0.6 K/UL (ref 0–0.6)
EOSINOPHIL NFR BLD: 6.9 %
EPI CELLS #/AREA URNS HPF: ABNORMAL /HPF (ref 0–5)
GFR SERPLBLD CREATININE-BSD FMLA CKD-EPI: 16 ML/MIN/{1.73_M2}
GFR SERPLBLD CREATININE-BSD FMLA CKD-EPI: 16 ML/MIN/{1.73_M2}
GLUCOSE BLD-MCNC: 126 MG/DL (ref 70–99)
GLUCOSE BLD-MCNC: 171 MG/DL (ref 70–99)
GLUCOSE BLD-MCNC: 245 MG/DL (ref 70–99)
GLUCOSE BLD-MCNC: 309 MG/DL (ref 70–99)
GLUCOSE SERPL-MCNC: 119 MG/DL (ref 70–99)
GLUCOSE SERPL-MCNC: 278 MG/DL (ref 70–99)
GLUCOSE UR STRIP.AUTO-MCNC: NEGATIVE MG/DL
HAPTOGLOB SERPL-MCNC: 248 MG/DL (ref 30–200)
HCT VFR BLD AUTO: 24.4 % (ref 40.5–52.5)
HGB BLD-MCNC: 8 G/DL (ref 13.5–17.5)
HGB UR QL STRIP.AUTO: NEGATIVE
INR PPP: 1.96 (ref 0.85–1.15)
KETONES UR STRIP.AUTO-MCNC: NEGATIVE MG/DL
LEUKOCYTE ESTERASE UR QL STRIP.AUTO: NEGATIVE
LYMPHOCYTES # BLD: 0.7 K/UL (ref 1–5.1)
LYMPHOCYTES NFR BLD: 8.3 %
MAGNESIUM SERPL-MCNC: 1.91 MG/DL (ref 1.8–2.4)
MAGNESIUM SERPL-MCNC: 1.96 MG/DL (ref 1.8–2.4)
MCH RBC QN AUTO: 29.8 PG (ref 26–34)
MCHC RBC AUTO-ENTMCNC: 32.8 G/DL (ref 31–36)
MCV RBC AUTO: 90.7 FL (ref 80–100)
MONOCYTES # BLD: 1.1 K/UL (ref 0–1.3)
MONOCYTES NFR BLD: 12.5 %
NEUTROPHILS # BLD: 6.1 K/UL (ref 1.7–7.7)
NEUTROPHILS NFR BLD: 71.8 %
NITRITE UR QL STRIP.AUTO: NEGATIVE
NT-PROBNP SERPL-MCNC: ABNORMAL PG/ML (ref 0–124)
PERFORMED ON: ABNORMAL
PH UR STRIP.AUTO: 6 [PH] (ref 5–8)
PHOSPHATE SERPL-MCNC: 5 MG/DL (ref 2.5–4.9)
PHOSPHATE SERPL-MCNC: 5.1 MG/DL (ref 2.5–4.9)
PLATELET # BLD AUTO: 155 K/UL (ref 135–450)
PMV BLD AUTO: 8.1 FL (ref 5–10.5)
POTASSIUM SERPL-SCNC: 5.1 MMOL/L (ref 3.5–5.1)
POTASSIUM SERPL-SCNC: 5.3 MMOL/L (ref 3.5–5.1)
PROT UR STRIP.AUTO-MCNC: ABNORMAL MG/DL
PROTHROMBIN TIME: 22.4 SEC (ref 11.9–14.9)
RBC # BLD AUTO: 2.69 M/UL (ref 4.2–5.9)
RBC #/AREA URNS HPF: ABNORMAL /HPF (ref 0–4)
RHEUMATOID FACT SER IA-ACNC: 11.6 IU/ML
SODIUM SERPL-SCNC: 131 MMOL/L (ref 136–145)
SODIUM SERPL-SCNC: 132 MMOL/L (ref 136–145)
SP GR UR STRIP.AUTO: 1.02 (ref 1–1.03)
UA DIPSTICK W REFLEX MICRO PNL UR: YES
URATE SERPL-MCNC: 9.6 MG/DL (ref 3.5–7.2)
URN SPEC COLLECT METH UR: ABNORMAL
UROBILINOGEN UR STRIP-ACNC: 0.2 E.U./DL
WBC # BLD AUTO: 8.4 K/UL (ref 4–11)
WBC #/AREA URNS HPF: ABNORMAL /HPF (ref 0–5)

## 2025-05-14 PROCEDURE — 82550 ASSAY OF CK (CPK): CPT

## 2025-05-14 PROCEDURE — 36415 COLL VENOUS BLD VENIPUNCTURE: CPT

## 2025-05-14 PROCEDURE — 1200000000 HC SEMI PRIVATE

## 2025-05-14 PROCEDURE — 85610 PROTHROMBIN TIME: CPT

## 2025-05-14 PROCEDURE — 99231 SBSQ HOSP IP/OBS SF/LOW 25: CPT | Performed by: INTERNAL MEDICINE

## 2025-05-14 PROCEDURE — 6370000000 HC RX 637 (ALT 250 FOR IP)

## 2025-05-14 PROCEDURE — 84550 ASSAY OF BLOOD/URIC ACID: CPT

## 2025-05-14 PROCEDURE — 83735 ASSAY OF MAGNESIUM: CPT

## 2025-05-14 PROCEDURE — 6360000002 HC RX W HCPCS: Performed by: INTERNAL MEDICINE

## 2025-05-14 PROCEDURE — 99233 SBSQ HOSP IP/OBS HIGH 50: CPT | Performed by: INTERNAL MEDICINE

## 2025-05-14 PROCEDURE — 2580000003 HC RX 258: Performed by: INTERNAL MEDICINE

## 2025-05-14 PROCEDURE — 83880 ASSAY OF NATRIURETIC PEPTIDE: CPT

## 2025-05-14 PROCEDURE — 80069 RENAL FUNCTION PANEL: CPT

## 2025-05-14 PROCEDURE — 81001 URINALYSIS AUTO W/SCOPE: CPT

## 2025-05-14 PROCEDURE — 85025 COMPLETE CBC W/AUTO DIFF WBC: CPT

## 2025-05-14 PROCEDURE — 74176 CT ABD & PELVIS W/O CONTRAST: CPT

## 2025-05-14 PROCEDURE — 86200 CCP ANTIBODY: CPT

## 2025-05-14 RX ADMIN — SENNOSIDES AND DOCUSATE SODIUM 1 TABLET: 50; 8.6 TABLET ORAL at 20:43

## 2025-05-14 RX ADMIN — FUROSEMIDE 10 MG/HR: 10 INJECTION, SOLUTION INTRAMUSCULAR; INTRAVENOUS at 09:21

## 2025-05-14 RX ADMIN — APIXABAN 5 MG: 5 TABLET, FILM COATED ORAL at 09:23

## 2025-05-14 RX ADMIN — FUROSEMIDE 10 MG/HR: 10 INJECTION, SOLUTION INTRAMUSCULAR; INTRAVENOUS at 20:34

## 2025-05-14 RX ADMIN — CHLOROTHIAZIDE SODIUM 125 MG: 500 INJECTION, POWDER, LYOPHILIZED, FOR SOLUTION INTRAVENOUS at 09:19

## 2025-05-14 RX ADMIN — TAMSULOSIN HYDROCHLORIDE 0.4 MG: 0.4 CAPSULE ORAL at 09:23

## 2025-05-14 RX ADMIN — PANTOPRAZOLE SODIUM 40 MG: 40 TABLET, DELAYED RELEASE ORAL at 05:01

## 2025-05-14 RX ADMIN — GABAPENTIN 300 MG: 300 CAPSULE ORAL at 20:43

## 2025-05-14 RX ADMIN — SENNOSIDES AND DOCUSATE SODIUM 1 TABLET: 50; 8.6 TABLET ORAL at 09:23

## 2025-05-14 RX ADMIN — ASPIRIN 81 MG: 81 TABLET, COATED ORAL at 09:23

## 2025-05-14 RX ADMIN — GABAPENTIN 300 MG: 300 CAPSULE ORAL at 09:23

## 2025-05-14 RX ADMIN — CEFEPIME 2000 MG: 2 INJECTION, POWDER, FOR SOLUTION INTRAVENOUS at 19:04

## 2025-05-14 RX ADMIN — FINASTERIDE 5 MG: 5 TABLET, FILM COATED ORAL at 09:23

## 2025-05-14 RX ADMIN — ATORVASTATIN CALCIUM 20 MG: 20 TABLET, FILM COATED ORAL at 09:23

## 2025-05-14 RX ADMIN — INSULIN GLARGINE 25 UNITS: 100 INJECTION, SOLUTION SUBCUTANEOUS at 20:44

## 2025-05-14 RX ADMIN — AMITRIPTYLINE HYDROCHLORIDE 25 MG: 50 TABLET ORAL at 20:43

## 2025-05-14 RX ADMIN — OXYCODONE 10 MG: 5 TABLET ORAL at 17:06

## 2025-05-14 ASSESSMENT — PAIN SCALES - GENERAL
PAINLEVEL_OUTOF10: 8
PAINLEVEL_OUTOF10: 9

## 2025-05-14 ASSESSMENT — PAIN DESCRIPTION - ORIENTATION: ORIENTATION: RIGHT

## 2025-05-14 ASSESSMENT — PAIN DESCRIPTION - LOCATION: LOCATION: ANKLE

## 2025-05-14 NOTE — CONSULTS
[facility] -  Clinical Pharmacy Note    Vancomycin - Management by Pharmacy    Consult Date(s): 5/14/25  Consulting Provider(s): Shahid Yarbrough MD     Assessment / Plan  Scrotal Cellulitis - Vancomycin  Concurrent Antimicrobials: cefepime  Day of Vanc Therapy / Ordered Duration: 1 of tbd  Current Dosing Method: Intermittent Dosing by Levels  Therapeutic Goal: Trough ~ 15 mg/L  Current Dose / Plan:   Pt Scr today = 3.9, baseline seems to be ~2.3-2.5  Will dose vancomycin intermittently  Loading dose of 2500 mg x 1 ordered  Random level ordered for tomorrow AM to assist with further dosing  Will continue to monitor clinical condition and make adjustments to regimen as appropriate.    Thank you for consulting pharmacy,    Timothy Martins, PharmD  Main Pharmacy: [id number]  5/14/2025 5:06 PM      Interval update:  therapy initiation     Subjective/Objective:   Ty Meyer is a 64 y.o. male with a PMHx significant for DVT on Eliquis, HTN, T2DM on insulin, seizure disorder off of AEDs for years, osteomyelitis s/p LLE BKA, CAD, CKD 3b who is admitted with R ankle fracture and BL inguinal hernias, now with concern for scrotal cellulitis.     Pharmacy is consulted to manage vancomycin.    Ht Readings from Last 1 Encounters:   05/12/25 1.905 m (6' 3\")     Wt Readings from Last 1 Encounters:   05/14/25 (!) 151.1 kg (333 lb 1.8 oz)     Current & Prior Antimicrobial Regimen(s):  Cefepime (5/14 - current)  Vancomycin intermittent    Date Vanc Level Vanc Dose   5/14 -- 2500 mg   5/15            Vancomycin Level(s) / Doses:    Date Time Dose Type of Level / Level Interpretation                 Note: Serum levels collected for AUC-based dosing may be high if collected in close proximity to the dose administered. This is not necessarily indicative of toxicity.    Cultures & Sensitivities:    Date Site Micro Susceptibility / Result   5/22 Urine NGTD            Recent Labs     05/12/25  0513 05/13/25  0454 05/14/25  0605   CREATININE

## 2025-05-14 NOTE — PROGRESS NOTES
Internal Medicine Progress Note    Admit Date: 5/10/2025  Hospital Day: 5   Patient name: Ty Meyer   : 1960     CC:   Altered Mental Status     Interval history:   Patient state that he continue to feel tired   His appetite is normal , he was able to eat his breakfast in the morning  He also noticed continue swollen on arms     Medications   Scheduled Meds:   insulin glargine  25 Units SubCUTAneous Nightly    insulin lispro  0-16 Units SubCUTAneous 4x Daily AC & HS    sodium chloride flush  5-40 mL IntraVENous 2 times per day    amitriptyline  25 mg Oral Nightly    [Held by provider] apixaban  5 mg Oral BID    aspirin  81 mg Oral Daily    atorvastatin  20 mg Oral Daily    finasteride  5 mg Oral Daily    tamsulosin  0.4 mg Oral Daily    [Held by provider] pantoprazole  40 mg Oral QAM AC    sennosides-docusate sodium  1 tablet Oral BID    gabapentin  300 mg Oral BID     Continuous Infusions:   furosemide (LASIX) 100 mg in sodium chloride 0.9 % 100 mL infusion 10 mg/hr (25 0921)    sodium chloride      dextrose       Objective   Vitals  Patient Vitals for the past 8 hrs:   BP Temp Temp src Pulse Resp SpO2 Weight   25 0850 (!) 142/67 97 °F (36.1 °C) Oral 89 16 96 % --   25 0745 -- -- -- -- -- -- (!) 151.1 kg (333 lb 1.8 oz)   25 0604 -- -- -- 78 -- -- --       Intake/Output Summary (Last 24 hours) at 2025 1319  Last data filed at 2025 0744  Gross per 24 hour   Intake 35 ml   Output 200 ml   Net -165 ml       ROS: A 10 point review of systems was conducted and significant findings are noted in the interval history.    Physical Exam  Constitutional:       General: He is not in acute distress.     Appearance: He is obese.      Comments: Chronically ill-appearing   HENT:      Head: Normocephalic.      Mouth/Throat:      Mouth: Mucous membranes are moist.      Pharynx: Oropharynx is clear.   Eyes:      Conjunctiva/sclera: Conjunctivae normal.      Pupils: Pupils are  movement or signs of blood in stool   Has history of SY, with recent OP labs backing this up.  - Venofer 300 mg every 24 hours   -Guaiac from stool   -Haptoglobin      DVT history   Eliquis      T2DM  Most recent HbA1c 7.% in 04/2025.  On glargine 21 units nightly, SSI at home.  - increased today Lantus to 25  units nightly for now, SSI     Polyneuropathy  Continue home gabapentin, dose adjusted for renal function    Code Status: Full Code   FEN: ADULT DIET; Regular; Low Potassium (Less than 3000 mg/day)   PPX: SCDs  DISPO: IP    Crissy Beard MD, PGY-1  Internal Medicine Resident  Contact via [employer]   Patient seen and examined, labs and imaging studies reviewed, agree with assessment and plan as outlined above.  Continue with current care and plan.  Discussed case with patients nurse, discussed case with care team, discussed plan.  I have seen, examined and evaluated the patient as did the resident physician, on . We have discussed the plan of care and decisions made during that discussion were incorporated into this note. I have reviewed the resident physician's note and agree with the assessment and plan of care as documented. D/w dr. Herrera and dr. Carpenter, this is a difficult situation, the patient likely has pathological process related to hernia, however, if there is not an acute process may empirically treat w steroids given worsening crp kidney function and hx of seronegative arthropathy d/w son at bedside all questions answered.     Shahdi Yarbrough MD FACP

## 2025-05-14 NOTE — PLAN OF CARE
Problem: Safety - Adult  Goal: Free from fall injury  Outcome: Progressing  Flowsheets (Taken 5/13/2025 2053)  Free From Fall Injury:   Instruct family/caregiver on patient safety   Based on caregiver fall risk screen, instruct family/caregiver to ask for assistance with transferring infant if caregiver noted to have fall risk factors     Problem: Chronic Conditions and Co-morbidities  Goal: Patient's chronic conditions and co-morbidity symptoms are monitored and maintained or improved  Outcome: Progressing  Flowsheets (Taken 5/13/2025 2053)  Care Plan - Patient's Chronic Conditions and Co-Morbidity Symptoms are Monitored and Maintained or Improved:   Monitor and assess patient's chronic conditions and comorbid symptoms for stability, deterioration, or improvement   Collaborate with multidisciplinary team to address chronic and comorbid conditions and prevent exacerbation or deterioration   Update acute care plan with appropriate goals if chronic or comorbid symptoms are exacerbated and prevent overall improvement and discharge     Problem: Discharge Planning  Goal: Discharge to home or other facility with appropriate resources  Flowsheets (Taken 5/13/2025 2053)  Discharge to home or other facility with appropriate resources:   Identify discharge learning needs (meds, wound care, etc)   Refer to discharge planning if patient needs post-hospital services based on physician order or complex needs related to functional status, cognitive ability or social support system     Problem: Pain  Goal: Verbalizes/displays adequate comfort level or baseline comfort level  Outcome: Progressing  Flowsheets (Taken 5/13/2025 2053)  Verbalizes/displays adequate comfort level or baseline comfort level:   Consider cultural and social influences on pain and pain management   Administer analgesics based on type and severity of pain and evaluate response   Encourage patient to monitor pain and request assistance

## 2025-05-14 NOTE — PLAN OF CARE
Pt no urine output since the start of the shift, no recorded output during the days,Bladder scan and yields 37ml.Notified APRN

## 2025-05-14 NOTE — CARE COORDINATION
Patient from home with wife.  Patient is A&OX4. IPTA using walker at baseline.  Patient has LBKA with prosthetic.  R tibial and fibular fracture.  Patient is active with PCP.  Wife transports patient to appointments.  Plan is to return home.  Wife to transport. Continues on 2L O2 and Ortho, Radiology nd Cardiology are following. CM will continue to follow patient until discharge.  Electronically signed by Rosalia Naranjo RN on 5/14/2025 at 12:51 PM

## 2025-05-14 NOTE — PROGRESS NOTES
Pharmacy Note - Renal Dosing and Extended Infusion Beta-Lactam Adjustment    Cefepime 1000mg Q8h for treatment of Skin and soft tissue infection. Per [facility] Renal Dose Adjustment Policy and Extended Infusion Beta-Lactam Policy, cefepime will be changed to 2000mg load followed by 2000mg Q24h extended infusion    Estimated Creatinine Clearance: Estimated Creatinine Clearance: 30 mL/min (A) (based on SCr of 3.9 mg/dL (H)).    Dialysis Status, HUNTER, CKD: HUNTER on CKD    BMI: Body mass index is 41.64 kg/m².    Rationale for Adjustment: Agent is renally eliminated.    Pharmacy will continue to monitor renal function, cultures and sensitivities (where available) and adjust dose as necessary.      Please call with any questions.    Thank you,    Baylee Salas, PharmD  5/14/2025 4:58 PM

## 2025-05-14 NOTE — PLAN OF CARE
Radiology:    Consultation for ultrasound of the right inguinal canal    The ultrasound was canceled.    Review the CAT scan of April 15, 2025 demonstrates the presence of gas-filled bowel in noted hydrocele.    Further evaluation of the inguinal canal with ultrasound would not obtain satisfactory imaging results for further clarification of an acute strangulated inguinal hernia    Computed tomography of the pelvis is recommended without and with contrast

## 2025-05-14 NOTE — PROGRESS NOTES
Nephrology Progress Note                                                                                                                                                                                                                                                                                                                                                               Office : [phone]     Fax :[phone]    Patient's Name: Ty Meyer  8:31 AM  5/14/2025    Reason for Consult:  HUNTER on CKD 3  Requesting Physician:  Jose Chew MD  Chief Complaint:    Chief Complaint   Patient presents with    Altered Mental Status       Assessment/Plan     # HUNTER on CKD 3  # Possible CRS  - Creatinine worsening now up to 3.9 with poor UOP  - Urine studies pending   - NASRA on 5/13 unremarkable   - Closely monitor UOP --> change to Lasix gtt & give Diuril x 1. Monitor response   - Avoid nephrotoxins  - Closely monitor volume status and renal labs. Monitor need for RRT   - on AC so can't do renal biopsy   - Urine is bland     # HFpEF  - Echo with EF 50-55%, grade II DD with increased LAP  - BNP worse ~11.5K  - Diuresis as above     # HTN  - BP's improved   - Diuresis as above   - Monitor     # Acid- base/ Electrolyte imbalance   # Hyperkalemia  - S/p Lokelma   - Continue low K diet  - Monitor   # Hyponatremia  - Should improve with diuresis  - Monitor     # DM 2  - Management per primary  - Check UACR    # Syncope vs seizure  - Event monitor at discharge  - Cardiology on board    # Right bimalleolar ankle fx  - Ortho on board  - Will need outpatient follow-up       History of Present Ilness:    Ty Meyer is a 64 y.o. male with a PMH of HTN, DM2, osteomyelitis s/p LLE BKA, CKD 3, who presented after a fall and syncopal episode. We are being consulted for HUNTER on CKD 3.    Interval hx:    Feeling OK this AM  Creatinine trending up  Minimal UOP  Sodium better  Potassium better  BP's controlled      Past Medical  have seen the patient independently from the PA/NP .I discussed the care with the PA/NP . I personally reviewed the HPI, PH, FH, SH, ROS and medications. I repeated pertinent portions of the examination and reviewed the relevant imaging and laboratory data. I agree with the findings, assessment and plan as documented, with the following addendum:        Ki Stewart MD

## 2025-05-14 NOTE — PROGRESS NOTES
[facility]  Cardiology Inpatient Consult Service  Daily Progress Note        Admit Date:  5/10/2025    Referring Physician: Shahid Yarbrough MD      Assessment & Plan:     Altered mental status-improved  Morbid obesity  History of DVT on Eliquis prior to admission-currently on hold  CKD 3, HUNTER  Acute on chronic diastolic heart failure,  Syncope versus seizure    Continue diuretics per renal.  Supportive measures.  Not a candidate for MRA/SGLT2 inhibitor at the present time due to renal failure.  Discussed with primary team.  Cardiology will follow peripherally.        Subjective:   Interval history:  Patient awake.  Denies any chest pain.  No increasing shortness of breath.  Medications:   insulin glargine  25 Units SubCUTAneous Nightly    insulin lispro  0-16 Units SubCUTAneous 4x Daily AC & HS    sodium chloride flush  5-40 mL IntraVENous 2 times per day    amitriptyline  25 mg Oral Nightly    apixaban  5 mg Oral BID    aspirin  81 mg Oral Daily    atorvastatin  20 mg Oral Daily    finasteride  5 mg Oral Daily    tamsulosin  0.4 mg Oral Daily    [Held by provider] pantoprazole  40 mg Oral QAM AC    sennosides-docusate sodium  1 tablet Oral BID    gabapentin  300 mg Oral BID       IV drips:   furosemide (LASIX) 100 mg in sodium chloride 0.9 % 100 mL infusion 10 mg/hr (05/14/25 0921)    sodium chloride      dextrose         PRN:  sodium chloride flush, sodium chloride, ondansetron **OR** ondansetron, polyethylene glycol, acetaminophen **OR** acetaminophen, glucose, dextrose bolus **OR** dextrose bolus, glucagon (rDNA), dextrose, melatonin, oxyCODONE **OR** oxyCODONE      Objective:     Vitals:    05/14/25 0304 05/14/25 0604 05/14/25 0745 05/14/25 0850   BP: (!) 143/68   (!) 142/67   Pulse: 80 78  89   Resp: 18   16   Temp: 98.5 °F (36.9 °C)   97 °F (36.1 °C)   TempSrc: Axillary   Oral   SpO2: 96%   96%   Weight:   (!) 151.1 kg (333 lb 1.8 oz)    Height:           Intake/Output Summary  (Last 24 hours) at 5/14/2025 1113  Last data filed at 5/14/2025 0744  Gross per 24 hour   Intake 35 ml   Output 280 ml   Net -245 ml     I/O last 3 completed shifts:  In: 35 [P.O.:35]  Out: 850 [Urine:850]  Wt Readings from Last 3 Encounters:   05/14/25 (!) 151.1 kg (333 lb 1.8 oz)   04/24/25 (!) 146.1 kg (322 lb)   04/14/25 (!) 146.3 kg (322 lb 8 oz)       Admit Wt: Weight - Scale: (!) 151 kg (332 lb 12.8 oz)   Todays Wt: Weight - Scale: (!) 151.1 kg (333 lb 1.8 oz)    TELEMETRY: Personally interpreted Sinus     Physical Exam:     General: Somnolent  Skin:  Warm and dry  Neck:  No JVD  Chest: Bilateral equal chest movements  Cardiovascular:  RRR S1S2 normal  Extremities: Left BKA        Labs: Potassium is 5.1.  Creatinine is increased to 3.9  Recent Labs     05/12/25  0513 05/13/25  0454 05/14/25  0605   * 129* 132*   K 5.2* 5.2* 5.1   BUN 48* 54* 61*   CREATININE 2.9* 3.2* 3.9*    99 99   CO2 21 21 21   GLUCOSE 230* 284* 119*   CALCIUM 8.6 8.6 8.4   MG 1.98 2.02 1.96     Recent Labs     05/12/25  0513 05/13/25  0454 05/14/25  0605   WBC 8.6 8.9 8.4   HGB 8.3* 8.5* 8.0*   HCT 25.3* 25.8* 24.4*    146 155   MCV 90.6 90.1 90.7           Lab Results   Component Value Date/Time    TROPHS 41 05/11/2025 05:45 AM       Imaging:         All questions and concerns were addressed to the patient/family. Alternatives to my treatment were discussed.  I have personally reviewed the reports and images of labs, radiological studies, cardiac studies including ECG's and telemetry, current and old medical records.     I would like to thank you for providing me the opportunity to participate in the care of your patient. If you have any questions, please do not hesitate to contact me.     Be Freeman MD,  [facility] - [address] [address]  Ph: [phone]  Fax: [phone]

## 2025-05-15 ENCOUNTER — APPOINTMENT (OUTPATIENT)
Dept: ULTRASOUND IMAGING | Age: 65
DRG: 291 | End: 2025-05-15
Payer: COMMERCIAL

## 2025-05-15 LAB
ALBUMIN SERPL-MCNC: 3 G/DL (ref 3.4–5)
ALBUMIN SERPL-MCNC: 3.4 G/DL (ref 3.4–5)
ANION GAP SERPL CALCULATED.3IONS-SCNC: 13 MMOL/L (ref 3–16)
ANION GAP SERPL CALCULATED.3IONS-SCNC: 15 MMOL/L (ref 3–16)
BASE EXCESS BLDA CALC-SCNC: -3.3 MMOL/L (ref -3–3)
BASOPHILS # BLD: 0 K/UL (ref 0–0.2)
BASOPHILS # BLD: 0 K/UL (ref 0–0.2)
BASOPHILS NFR BLD: 0.4 %
BASOPHILS NFR BLD: 0.6 %
BUN SERPL-MCNC: 65 MG/DL (ref 7–20)
BUN SERPL-MCNC: 67 MG/DL (ref 7–20)
CALCIUM SERPL-MCNC: 8.4 MG/DL (ref 8.3–10.6)
CALCIUM SERPL-MCNC: 8.5 MG/DL (ref 8.3–10.6)
CCP IGG SERPL-ACNC: <0.5 U/ML (ref 0–2.9)
CHLORIDE SERPL-SCNC: 98 MMOL/L (ref 99–110)
CHLORIDE SERPL-SCNC: 99 MMOL/L (ref 99–110)
CO2 BLDA-SCNC: 25 MMOL/L
CO2 SERPL-SCNC: 20 MMOL/L (ref 21–32)
CO2 SERPL-SCNC: 22 MMOL/L (ref 21–32)
COHGB MFR BLDA: 1.5 % (ref 0–1.5)
CREAT SERPL-MCNC: 4 MG/DL (ref 0.8–1.3)
CREAT SERPL-MCNC: 4 MG/DL (ref 0.8–1.3)
CREAT UR-MCNC: 41.4 MG/DL (ref 39–259)
DEPRECATED RDW RBC AUTO: 15.1 % (ref 12.4–15.4)
DEPRECATED RDW RBC AUTO: 15.6 % (ref 12.4–15.4)
EKG ATRIAL RATE: 86 BPM
EKG DIAGNOSIS: NORMAL
EKG P AXIS: 46 DEGREES
EKG P-R INTERVAL: 146 MS
EKG Q-T INTERVAL: 414 MS
EKG QRS DURATION: 168 MS
EKG QTC CALCULATION (BAZETT): 495 MS
EKG R AXIS: 149 DEGREES
EKG T AXIS: 0 DEGREES
EKG VENTRICULAR RATE: 86 BPM
EOSINOPHIL # BLD: 0.3 K/UL (ref 0–0.6)
EOSINOPHIL # BLD: 0.5 K/UL (ref 0–0.6)
EOSINOPHIL NFR BLD: 3.9 %
EOSINOPHIL NFR BLD: 6.1 %
GFR SERPLBLD CREATININE-BSD FMLA CKD-EPI: 16 ML/MIN/{1.73_M2}
GFR SERPLBLD CREATININE-BSD FMLA CKD-EPI: 16 ML/MIN/{1.73_M2}
GLUCOSE BLD-MCNC: 256 MG/DL (ref 70–99)
GLUCOSE BLD-MCNC: 267 MG/DL (ref 70–99)
GLUCOSE BLD-MCNC: 353 MG/DL (ref 70–99)
GLUCOSE BLD-MCNC: 363 MG/DL (ref 70–99)
GLUCOSE BLD-MCNC: 377 MG/DL (ref 70–99)
GLUCOSE BLD-MCNC: 391 MG/DL (ref 70–99)
GLUCOSE SERPL-MCNC: 281 MG/DL (ref 70–99)
GLUCOSE SERPL-MCNC: 355 MG/DL (ref 70–99)
HCO3 BLDA-SCNC: 23 MMOL/L (ref 21–29)
HCT VFR BLD AUTO: 25.8 % (ref 40.5–52.5)
HCT VFR BLD AUTO: 27.3 % (ref 40.5–52.5)
HGB BLD-MCNC: 8.5 G/DL (ref 13.5–17.5)
HGB BLD-MCNC: 9 G/DL (ref 13.5–17.5)
HGB BLDA-MCNC: 9.4 G/DL
LYMPHOCYTES # BLD: 0.2 K/UL (ref 1–5.1)
LYMPHOCYTES # BLD: 0.3 K/UL (ref 1–5.1)
LYMPHOCYTES NFR BLD: 3 %
LYMPHOCYTES NFR BLD: 3.9 %
MAGNESIUM SERPL-MCNC: 1.96 MG/DL (ref 1.8–2.4)
MAGNESIUM SERPL-MCNC: 2.01 MG/DL (ref 1.8–2.4)
MCH RBC QN AUTO: 29.5 PG (ref 26–34)
MCH RBC QN AUTO: 29.9 PG (ref 26–34)
MCHC RBC AUTO-ENTMCNC: 32.8 G/DL (ref 31–36)
MCHC RBC AUTO-ENTMCNC: 33 G/DL (ref 31–36)
MCV RBC AUTO: 89.8 FL (ref 80–100)
MCV RBC AUTO: 90.6 FL (ref 80–100)
METHGB MFR BLDA: 0 % (ref 0–1.4)
MONOCYTES # BLD: 0.8 K/UL (ref 0–1.3)
MONOCYTES # BLD: 1 K/UL (ref 0–1.3)
MONOCYTES NFR BLD: 10.1 %
MONOCYTES NFR BLD: 13 %
NEUTROPHILS # BLD: 5.9 K/UL (ref 1.7–7.7)
NEUTROPHILS # BLD: 6.7 K/UL (ref 1.7–7.7)
NEUTROPHILS NFR BLD: 77.3 %
NEUTROPHILS NFR BLD: 81.7 %
NT-PROBNP SERPL-MCNC: ABNORMAL PG/ML (ref 0–124)
PCO2 BLDA: 46.8 MMHG (ref 35–45)
PERFORMED ON: ABNORMAL
PH BLDA: 7.3 [PH] (ref 7.35–7.45)
PHOSPHATE SERPL-MCNC: 5.1 MG/DL (ref 2.5–4.9)
PHOSPHATE SERPL-MCNC: 5.3 MG/DL (ref 2.5–4.9)
PLATELET # BLD AUTO: 172 K/UL (ref 135–450)
PLATELET # BLD AUTO: 182 K/UL (ref 135–450)
PMV BLD AUTO: 7.7 FL (ref 5–10.5)
PMV BLD AUTO: 8.1 FL (ref 5–10.5)
PO2 BLDA: 116 MMHG (ref 75–108)
POTASSIUM SERPL-SCNC: 4.7 MMOL/L (ref 3.5–5.1)
POTASSIUM SERPL-SCNC: 5 MMOL/L (ref 3.5–5.1)
POTASSIUM SERPL-SCNC: NORMAL MMOL/L (ref 3.5–5.1)
PROT UR-MCNC: 17.5 MG/DL
PROT/CREAT UR-RTO: 0.4 MG/DL
RBC # BLD AUTO: 2.85 M/UL (ref 4.2–5.9)
RBC # BLD AUTO: 3.04 M/UL (ref 4.2–5.9)
SAO2 % BLDA: 99 % (ref 93–100)
SODIUM SERPL-SCNC: 133 MMOL/L (ref 136–145)
SODIUM SERPL-SCNC: 134 MMOL/L (ref 136–145)
VANCOMYCIN SERPL-MCNC: 24.8 UG/ML
WBC # BLD AUTO: 7.7 K/UL (ref 4–11)
WBC # BLD AUTO: 8.2 K/UL (ref 4–11)

## 2025-05-15 PROCEDURE — 99233 SBSQ HOSP IP/OBS HIGH 50: CPT | Performed by: SURGERY

## 2025-05-15 PROCEDURE — 83880 ASSAY OF NATRIURETIC PEPTIDE: CPT

## 2025-05-15 PROCEDURE — 2700000000 HC OXYGEN THERAPY PER DAY

## 2025-05-15 PROCEDURE — 76705 ECHO EXAM OF ABDOMEN: CPT

## 2025-05-15 PROCEDURE — 80202 ASSAY OF VANCOMYCIN: CPT

## 2025-05-15 PROCEDURE — 82570 ASSAY OF URINE CREATININE: CPT

## 2025-05-15 PROCEDURE — 36600 WITHDRAWAL OF ARTERIAL BLOOD: CPT

## 2025-05-15 PROCEDURE — 6360000002 HC RX W HCPCS: Performed by: PHYSICIAN ASSISTANT

## 2025-05-15 PROCEDURE — 6370000000 HC RX 637 (ALT 250 FOR IP)

## 2025-05-15 PROCEDURE — 93005 ELECTROCARDIOGRAM TRACING: CPT

## 2025-05-15 PROCEDURE — 82803 BLOOD GASES ANY COMBINATION: CPT

## 2025-05-15 PROCEDURE — 36415 COLL VENOUS BLD VENIPUNCTURE: CPT

## 2025-05-15 PROCEDURE — 84156 ASSAY OF PROTEIN URINE: CPT

## 2025-05-15 PROCEDURE — 6360000002 HC RX W HCPCS: Performed by: INTERNAL MEDICINE

## 2025-05-15 PROCEDURE — 94761 N-INVAS EAR/PLS OXIMETRY MLT: CPT

## 2025-05-15 PROCEDURE — 2580000003 HC RX 258: Performed by: INTERNAL MEDICINE

## 2025-05-15 PROCEDURE — 51798 US URINE CAPACITY MEASURE: CPT

## 2025-05-15 PROCEDURE — 83735 ASSAY OF MAGNESIUM: CPT

## 2025-05-15 PROCEDURE — 99231 SBSQ HOSP IP/OBS SF/LOW 25: CPT | Performed by: INTERNAL MEDICINE

## 2025-05-15 PROCEDURE — 93010 ELECTROCARDIOGRAM REPORT: CPT | Performed by: INTERNAL MEDICINE

## 2025-05-15 PROCEDURE — 84132 ASSAY OF SERUM POTASSIUM: CPT

## 2025-05-15 PROCEDURE — 80069 RENAL FUNCTION PANEL: CPT

## 2025-05-15 PROCEDURE — 85025 COMPLETE CBC W/AUTO DIFF WBC: CPT

## 2025-05-15 PROCEDURE — 99233 SBSQ HOSP IP/OBS HIGH 50: CPT | Performed by: INTERNAL MEDICINE

## 2025-05-15 PROCEDURE — 2580000003 HC RX 258: Performed by: PHYSICIAN ASSISTANT

## 2025-05-15 PROCEDURE — 1200000000 HC SEMI PRIVATE

## 2025-05-15 RX ORDER — DEXTROSE MONOHYDRATE 100 MG/ML
INJECTION, SOLUTION INTRAVENOUS CONTINUOUS PRN
Status: DISCONTINUED | OUTPATIENT
Start: 2025-05-15 | End: 2025-05-16 | Stop reason: SDUPTHER

## 2025-05-15 RX ORDER — INSULIN LISPRO 100 [IU]/ML
3 INJECTION, SOLUTION INTRAVENOUS; SUBCUTANEOUS
Status: DISCONTINUED | OUTPATIENT
Start: 2025-05-15 | End: 2025-05-16

## 2025-05-15 RX ORDER — CALCIUM GLUCONATE 20 MG/ML
1000 INJECTION, SOLUTION INTRAVENOUS ONCE
Status: DISCONTINUED | OUTPATIENT
Start: 2025-05-15 | End: 2025-05-18

## 2025-05-15 RX ORDER — INSULIN GLARGINE 100 [IU]/ML
30 INJECTION, SOLUTION SUBCUTANEOUS NIGHTLY
Status: DISCONTINUED | OUTPATIENT
Start: 2025-05-15 | End: 2025-05-16

## 2025-05-15 RX ORDER — GLUCAGON 1 MG/ML
1 KIT INJECTION PRN
Status: DISCONTINUED | OUTPATIENT
Start: 2025-05-15 | End: 2025-05-16 | Stop reason: SDUPTHER

## 2025-05-15 RX ADMIN — GABAPENTIN 300 MG: 300 CAPSULE ORAL at 19:59

## 2025-05-15 RX ADMIN — INSULIN LISPRO 3 UNITS: 100 INJECTION, SOLUTION INTRAVENOUS; SUBCUTANEOUS at 12:15

## 2025-05-15 RX ADMIN — FUROSEMIDE 10 MG/HR: 10 INJECTION, SOLUTION INTRAMUSCULAR; INTRAVENOUS at 13:57

## 2025-05-15 RX ADMIN — ASPIRIN 81 MG: 81 TABLET, COATED ORAL at 09:35

## 2025-05-15 RX ADMIN — FUROSEMIDE 10 MG/HR: 10 INJECTION, SOLUTION INTRAMUSCULAR; INTRAVENOUS at 04:35

## 2025-05-15 RX ADMIN — AMITRIPTYLINE HYDROCHLORIDE 25 MG: 50 TABLET ORAL at 19:59

## 2025-05-15 RX ADMIN — GABAPENTIN 300 MG: 300 CAPSULE ORAL at 09:35

## 2025-05-15 RX ADMIN — SENNOSIDES AND DOCUSATE SODIUM 1 TABLET: 50; 8.6 TABLET ORAL at 20:00

## 2025-05-15 RX ADMIN — INSULIN LISPRO 16 UNITS: 100 INJECTION, SOLUTION INTRAVENOUS; SUBCUTANEOUS at 09:38

## 2025-05-15 RX ADMIN — INSULIN LISPRO 16 UNITS: 100 INJECTION, SOLUTION INTRAVENOUS; SUBCUTANEOUS at 00:26

## 2025-05-15 RX ADMIN — VANCOMYCIN HYDROCHLORIDE 500 MG: 10 INJECTION, POWDER, LYOPHILIZED, FOR SOLUTION INTRAVENOUS at 18:07

## 2025-05-15 RX ADMIN — INSULIN LISPRO 8 UNITS: 100 INJECTION, SOLUTION INTRAVENOUS; SUBCUTANEOUS at 20:03

## 2025-05-15 RX ADMIN — CHLOROTHIAZIDE SODIUM 125 MG: 500 INJECTION, POWDER, LYOPHILIZED, FOR SOLUTION INTRAVENOUS at 10:31

## 2025-05-15 RX ADMIN — INSULIN LISPRO 3 UNITS: 100 INJECTION, SOLUTION INTRAVENOUS; SUBCUTANEOUS at 18:07

## 2025-05-15 RX ADMIN — INSULIN GLARGINE 30 UNITS: 100 INJECTION, SOLUTION SUBCUTANEOUS at 20:03

## 2025-05-15 RX ADMIN — VANCOMYCIN HYDROCHLORIDE 2500 MG: 10 INJECTION, POWDER, LYOPHILIZED, FOR SOLUTION INTRAVENOUS at 00:12

## 2025-05-15 RX ADMIN — SENNOSIDES AND DOCUSATE SODIUM 1 TABLET: 50; 8.6 TABLET ORAL at 09:36

## 2025-05-15 RX ADMIN — CEFEPIME 2000 MG: 2 INJECTION, POWDER, FOR SOLUTION INTRAVENOUS at 17:58

## 2025-05-15 RX ADMIN — FINASTERIDE 5 MG: 5 TABLET, FILM COATED ORAL at 09:35

## 2025-05-15 RX ADMIN — TAMSULOSIN HYDROCHLORIDE 0.4 MG: 0.4 CAPSULE ORAL at 09:35

## 2025-05-15 RX ADMIN — INSULIN LISPRO 8 UNITS: 100 INJECTION, SOLUTION INTRAVENOUS; SUBCUTANEOUS at 18:07

## 2025-05-15 RX ADMIN — OXYCODONE 5 MG: 5 TABLET ORAL at 00:21

## 2025-05-15 RX ADMIN — INSULIN LISPRO 16 UNITS: 100 INJECTION, SOLUTION INTRAVENOUS; SUBCUTANEOUS at 12:15

## 2025-05-15 RX ADMIN — ATORVASTATIN CALCIUM 20 MG: 20 TABLET, FILM COATED ORAL at 20:02

## 2025-05-15 ASSESSMENT — PAIN DESCRIPTION - DESCRIPTORS: DESCRIPTORS: ACHING;DISCOMFORT

## 2025-05-15 ASSESSMENT — PAIN DESCRIPTION - ORIENTATION: ORIENTATION: RIGHT

## 2025-05-15 ASSESSMENT — PAIN SCALES - GENERAL
PAINLEVEL_OUTOF10: 0
PAINLEVEL_OUTOF10: 8

## 2025-05-15 ASSESSMENT — PAIN - FUNCTIONAL ASSESSMENT: PAIN_FUNCTIONAL_ASSESSMENT: PREVENTS OR INTERFERES SOME ACTIVE ACTIVITIES AND ADLS

## 2025-05-15 ASSESSMENT — PAIN DESCRIPTION - LOCATION: LOCATION: LEG

## 2025-05-15 NOTE — PROGRESS NOTES
[facility]  Cardiology Inpatient Consult Service  Daily Progress Note        Admit Date:  5/10/2025    Referring Physician: Shahid Yarbrough MD      Assessment & Plan:     Altered mental status-clinically worsening  Morbid obesity  History of DVT on Eliquis prior to admission-currently on hold  CKD 3, HUNTER  Acute on chronic diastolic heart failure,  Syncope versus seizure    Has warm extremities.  No clinical evidence of low cardiac output state.  No new cardiac recommendations.  Cardiology will sign off at the present time.  Please call if there is any change in cardiac status      Subjective:   Interval history:  Somnolent  Medications:   calcium gluconate  1,000 mg IntraVENous Once    insulin regular  10 Units IntraVENous Once    And    dextrose bolus  250 mL IntraVENous Once    sodium zirconium cyclosilicate  10 g Oral Once    vancomycin  500 mg IntraVENous Once    insulin glargine  30 Units SubCUTAneous Nightly    insulin lispro  3 Units SubCUTAneous TID WC    cefepime  2,000 mg IntraVENous Q24H    vancomycin (VANCOCIN) intermittent dosing (placeholder)   Other RX Placeholder    insulin lispro  0-16 Units SubCUTAneous 4x Daily AC & HS    sodium chloride flush  5-40 mL IntraVENous 2 times per day    amitriptyline  25 mg Oral Nightly    [Held by provider] apixaban  5 mg Oral BID    aspirin  81 mg Oral Daily    atorvastatin  20 mg Oral Daily    finasteride  5 mg Oral Daily    tamsulosin  0.4 mg Oral Daily    [Held by provider] pantoprazole  40 mg Oral QAM AC    sennosides-docusate sodium  1 tablet Oral BID    gabapentin  300 mg Oral BID       IV drips:   dextrose      furosemide (LASIX) 100 mg in sodium chloride 0.9 % 100 mL infusion 10 mg/hr (05/15/25 0435)    sodium chloride      dextrose         PRN:  glucose, dextrose bolus **OR** dextrose bolus, glucagon (rDNA), dextrose, sodium chloride flush, sodium chloride, ondansetron **OR** ondansetron, polyethylene glycol, acetaminophen

## 2025-05-15 NOTE — PROGRESS NOTES
Bariatric Surgery   Daily Progress Note  Patient: Ty Meyer      CC: bilateral inguinal hernias    SUBJECTIVE:   Re-engaged yesterday for concern of strangulated inguinal hernia. Patient reports tolerating diet without nausea/vomiting. Having bowel movements.     OBJECTIVE:    PHYSICAL EXAM:    Vitals:    05/14/25 2017 05/15/25 0014 05/15/25 0051 05/15/25 0417   BP: 139/78 (!) 148/80  (!) 156/90   Pulse: 96 (!) 102  94   Resp: 16 18 18 17   Temp: 97.8 °F (36.6 °C) 97.9 °F (36.6 °C)  97.8 °F (36.6 °C)   TempSrc: Oral Oral  Oral   SpO2: 98% 98%  96%   Weight:    (!) 150.3 kg (331 lb 5.6 oz)   Height:           General appearance: alert, no acute distress  Eyes: no  scleral icterus  Neck: trachea midline  Chest/Lungs: normal effort, no adventitious breathing, no accessory muscle use, on RA  Cardiovascular: RRR  Abdomen:  obese, soft, +large R inguinal hernia without skin changes, reduced at bediside without difficulty. Moderate L inguinal hernia, without skin changes, reduced at bedside without difficulty, scrotum with marked swelling, elevated with Kerlix gauze rolls.    Skin: warm and dry, no rashes  Extremities: L BKA     LABS:   Recent Labs     05/14/25  0605 05/15/25  0230   WBC 8.4 8.2   HGB 8.0* 9.0*   HCT 24.4* 27.3*   MCV 90.7 89.8    182        Recent Labs     05/14/25  1856 05/15/25  0138 05/15/25  0230   *  --  133*   K 5.3* see below 5.0   CL 98*  --  98*   CO2 17*  --  20*   PHOS 5.1*  --  5.3*   BUN 62*  --  65*   CREATININE 4.0*  --  4.0*      No results for input(s): \"AST\", \"ALT\", \"BILIDIR\", \"BILITOT\", \"ALKPHOS\" in the last 72 hours.    Invalid input(s): \"ALB\"   No results for input(s): \"LIPASE\", \"AMYLASE\" in the last 72 hours.     Recent Labs     05/14/25  1216   INR 1.96*        Recent Labs     05/14/25  1216   CKTOTAL 81         ASSESSMENT & PLAN:   This is a 64 y.o. male with Hx of CAD, CKD, DM, HTN, DVT (eliquis), GERD and hiatal hernia associated with Cuauhtemoc ulcers, lumbar  postlaminectomy syndrome, and chronic bilateral hernias, who presents s/p fall due to syncopal episode. General surgery consulted for bilateral hernias.    - Repeat CT abd/pelvis 5/14 without evidence of no significant wall thickening or mesenteric stranding/fluid; no evidence of bowel obstruction. Extensive scrotal edema  - No acute surgical intervention at this time; easily reducible bilateral inguinal hernias  - Okay for diet, continue bowel regimen  - Continue scrotal elevation with Kerlix gauze underneath scrotum to help alleviate scrotal edema  - Please call with questions/concerns  - Rest of care per primary      Maria D Cartwright DO, [place]  PGY-3, General Surgery  05/15/25  6:07 AM  [place]

## 2025-05-15 NOTE — CARE COORDINATION
Plan is to return home.  Wife to transport. Continues on 3L O2 and Ortho, Radiology nd Cardiology are following. Admitted with R ankle fracture and BL inguinal hernias, now with concern for scrotal cellulitis. CM will continue to follow patient until discharge. Electronically signed by Rosalia Naranjo RN on 5/15/2025 at 1:16 PM

## 2025-05-15 NOTE — PROGRESS NOTES
[facility] -  Clinical Pharmacy Note    Vancomycin - Management by Pharmacy    Consult Date(s): 5/14/25  Consulting Provider(s): Shahid Yarbrough MD     Assessment / Plan  1)  Scrotal Cellulitis - Vancomycin  Concurrent Antimicrobials: cefepime  Day of Vanc Therapy / Ordered Duration: 2 of tbd  Current Dosing Method: Intermittent Dosing by Levels  Therapeutic Goal: Trough ~ 15 mg/L  Current Dose / Plan:   Pt with HUNTER on CKD; baseline SCr unclear, but seems to be ~2.3-2.5.  SCr 4.0 today.  Will dose vancomycin intermittently based on levels.  Loading dose of 2500mg ordered last evening, but wasn't given until overnight.  Level drawn at 2:30am was drawn while dose was infusing - not a usable level.  Will give additional 500mg IV x1 later this afternoon to complete loading.   Will check random level in AM tomorrow to determine timing of next dose.  Will continue to monitor clinical condition and make adjustments to regimen as appropriate.    Please call with questions--  Thanks--  Tamiko Strong, PharmD, BCPS, BCGP  [id number] ([facility])   5/15/2025 7:20 AM        Interval update:  Vanc / Cefepime started yesterday for scrotal swelling / cellulitis.    Subjective/Objective:   Ty Meyer is a 64 y.o. male with a PMHx significant for DVT on Eliquis, HTN, T2DM on insulin, seizure disorder off of AEDs for years, osteomyelitis s/p LLE BKA, CAD, CKD 3b who is admitted with R ankle fracture and BL inguinal hernias, now with concern for scrotal cellulitis.     Pharmacy is consulted to manage vancomycin.    Ht Readings from Last 1 Encounters:   05/12/25 1.905 m (6' 3\")     Wt Readings from Last 1 Encounters:   05/15/25 (!) 150.3 kg (331 lb 5.6 oz)     Current & Prior Antimicrobial Regimen(s):  Cefepime (5/14 - current)  Vancomycin - Pharmacy to dose  Intermittent dosing (5/14-current)    Date Vanc Level Vanc Dose   5/14 -- Ordered, not given until 5/15   5/15   24.8 mcg/mL (02:30) 2500mg x1 (00:12)

## 2025-05-15 NOTE — CONSULTS
Urology Attending Consult Note      Reason for Consultation: RETENTION, INABILITY TO PLACE PERLA    History: 63 YO WITH MULTIPLE MEDICAL ISSUES. NEPHROLOGY WAS CONSULTED FOR HUNTER WITH CR OF 3.9. BLADDER SCAN SHOWS HE IS RETAINING AND NURSES ARE UNABLE TO PLACE CATHETER    Family History, Social History, Review of Systems:  Reviewed and agreed to as per chart    Vitals:  /77   Pulse 86   Temp 97.5 °F (36.4 °C) (Oral)   Resp 18   Ht 1.905 m (6' 3\")   Wt (!) 150.3 kg (331 lb 5.6 oz)   SpO2 100%   BMI 41.42 kg/m²   Temp  Av.7 °F (36.5 °C)  Min: 97.5 °F (36.4 °C)  Max: 97.9 °F (36.6 °C)    Intake/Output Summary (Last 24 hours) at 5/15/2025 1656  Last data filed at 5/15/2025 1623  Gross per 24 hour   Intake --   Output 3050 ml   Net -3050 ml         Physical:  Well developed, well nourished in no acute distress  Mood indicates no abnormalities. Pt doesn’t appear depressed  Orientated to time and place  Neck is supple, trachea is midline  Respiratory effort is normal  Cardiovascular show no extremity swelling  Abdomen no masses or hernias are palpated, there is no tenderness. Liver and Spleen appear normal.  Skin show no abnormal lesions  Lymph nodes are not palpated in the inguinal, neck, or axillary area.     Male :  Penis appears BURIED D/T OBESITY    Labs:  WBC:    Lab Results   Component Value Date/Time    WBC 8.2 05/15/2025 02:30 AM     Hemoglobin/Hematocrit:    Lab Results   Component Value Date/Time    HGB 9.0 05/15/2025 02:30 AM    HCT 27.3 05/15/2025 02:30 AM     BMP:    Lab Results   Component Value Date/Time     05/15/2025 02:30 AM    K 5.0 05/15/2025 02:30 AM    K 4.9 05/10/2025 11:46 PM    CL 98 05/15/2025 02:30 AM    CO2 20 05/15/2025 02:30 AM    BUN 65 05/15/2025 02:30 AM    CREATININE 4.0 05/15/2025 02:30 AM    CALCIUM 8.4 05/15/2025 02:30 AM    GFRAA >60 2022 06:08 AM    GFRAA 60 10/10/2011 04:25 AM    LABGLOM 16 05/15/2025 02:30 AM    LABGLOM 48 10/19/2022 09:47 AM

## 2025-05-15 NOTE — FLOWSHEET NOTE
05/15/25 1214   Vital Signs   Temp 97.6 °F (36.4 °C)   Temp Source Oral   Pulse 89   Heart Rate Source Monitor   Respirations 18   /82   MAP (Calculated) 97   BP Location Right upper arm   BP Method Automatic   Patient Position Semi fowlers   Urine Assessment   Bladder Scan Volume (mL) 819 mL   $ Bladder scan $ Yes     Notified Resident Alisia Beard MD of amount noted in bladder scan, consult placed to urology due to difficulty placing michael, michael was placed by uorology and is patent and patient has no complaints of any pain at this time. Patient is very lethargic, but will respond to voice. MD at bedside and new labs ordered and resulted. Plan of care ongoing.

## 2025-05-15 NOTE — PROGRESS NOTES
Nephrology Progress Note                                                                                                                                                                                                                                                                                                                                                               Office : [phone]     Fax :[phone]    Patient's Name: Ty Meyer  8:46 AM  5/15/2025    Reason for Consult:  HUNTER on CKD 3  Requesting Physician:  Jose Chew MD  Chief Complaint:    Chief Complaint   Patient presents with    Altered Mental Status       Assessment/Plan     # HUNTER on CKD 3  # Possible CRS  - Creatinine worsening now up to 4.0 but STABLE today   - Urine studies pending   - NASRA on 5/13 unremarkable   - Closely monitor UOP --> continue Lasix gtt & give a dose of Diuril again today    - Avoid nephrotoxins  - Closely monitor volume status and renal labs. Monitor need for RRT   - On AC so can't do renal biopsy   - Urine is bland     # HFpEF  - Echo with EF 50-55%, grade II DD with increased LAP  - BNP worse ~11.5K --> 13K  - Diuresis as above     # HTN  - BP's overall improved   - Diuresis as above   - Monitor     # Acid- base/ Electrolyte imbalance   # Hyperkalemia  - S/p Lokelma   - Continue low K diet  - Monitor   # Hyponatremia  - Should improve with diuresis  - Monitor     # DM 2  - Management per primary  - Check UACR    # Syncope vs seizure  - Event monitor at discharge  - Cardiology on board    # Right bimalleolar ankle fx  - Ortho on board  - Will need outpatient follow-up       History of Present Ilness:    Ty Meyer is a 64 y.o. male with a PMH of HTN, DM2, osteomyelitis s/p LLE BKA, CKD 3, who presented after a fall and syncopal episode. We are being consulted for HUNTRE on CKD 3.    Interval hx:    Resting quietly in bed this AM  D/w his daughter at bedside   Creatinine stable today at  injection 5-40 mL, 2 times per day  sodium chloride flush 0.9 % injection 5-40 mL, PRN  0.9 % sodium chloride infusion, PRN  ondansetron (ZOFRAN-ODT) disintegrating tablet 4 mg, Q8H PRN   Or  ondansetron (ZOFRAN) injection 4 mg, Q6H PRN  polyethylene glycol (GLYCOLAX) packet 17 g, Daily PRN  acetaminophen (TYLENOL) tablet 650 mg, Q6H PRN   Or  acetaminophen (TYLENOL) suppository 650 mg, Q6H PRN  glucose chewable tablet 16 g, PRN  dextrose bolus 10% 125 mL, PRN   Or  dextrose bolus 10% 250 mL, PRN  glucagon injection 1 mg, PRN  dextrose 10 % infusion, Continuous PRN  amitriptyline (ELAVIL) tablet 25 mg, Nightly  [Held by provider] apixaban (ELIQUIS) tablet 5 mg, BID  aspirin EC tablet 81 mg, Daily  atorvastatin (LIPITOR) tablet 20 mg, Daily  finasteride (PROSCAR) tablet 5 mg, Daily  melatonin tablet 3 mg, Nightly PRN  tamsulosin (FLOMAX) capsule 0.4 mg, Daily  [Held by provider] pantoprazole (PROTONIX) tablet 40 mg, QAM AC  sennosides-docusate sodium (SENOKOT-S) 8.6-50 MG tablet 1 tablet, BID  gabapentin (NEURONTIN) capsule 300 mg, BID  oxyCODONE (ROXICODONE) immediate release tablet 5 mg, Q4H PRN   Or  oxyCODONE (ROXICODONE) immediate release tablet 10 mg, Q4H PRN      Physical exam:     Vitals:  BP (!) 156/90   Pulse 94   Temp 97.8 °F (36.6 °C) (Oral)   Resp 17   Ht 1.905 m (6' 3\")   Wt (!) 150.3 kg (331 lb 5.6 oz)   SpO2 96%   BMI 41.42 kg/m²   Constitutional:  Alert, NAD  Skin: no rash, turgor wnl  Heent:  eomi, mmm  Neck: no bruits or jvd noted  Cardiovascular:  S1, S2 without m/r/g  Respiratory: CTA B without w/r/r  Abdomen:  soft, nt, nd  Ext:  lower extremity edema Yes  Psychiatric: mood and affect flat   Musculoskeletal:  Rom, muscular strength intact    Data:   Labs:  CBC:   Recent Labs     05/13/25  0454 05/14/25  0605 05/15/25  0230   WBC 8.9 8.4 8.2   HGB 8.5* 8.0* 9.0*    155 182     BMP:    Recent Labs     05/14/25  0605 05/14/25  1856 05/15/25  0138 05/15/25  0230   * 131*  --

## 2025-05-15 NOTE — CONSULTS
[facility]  HEART FAILURE PROGRAM      NAME:  Ty Meyer  MEDICAL RECORD NUMBER:  [id number]  AGE: 64 y.o.   GENDER: male  : 1960  ADMIT DATE: 5/10/2025  TODAY'S DATE:  2025    Subjective:     VISIT TYPE: evaluation     ADMITTING PHYSICIAN:  John Paul Larios MD    Code Status: Full Code    PAST MEDICAL HISTORY        Diagnosis Date    Abdominal hernia     Back pain     CAD (coronary artery disease)     CKD (chronic kidney disease) stage 4, GFR 15-29 ml/min ([facility])     Dr. Long    Diabetes mellitus (HCC)     Hyperlipidemia     Hypertension     MRSA (methicillin resistant staph aureus) culture positive 2017    foot       SOCIAL HISTORY    Social History     Tobacco Use    Smoking status: Never    Smokeless tobacco: Never   Vaping Use    Vaping status: Never Used   Substance Use Topics    Alcohol use: No    Drug use: No         MEDICATIONS  Scheduled Meds:   vancomycin  2,500 mg IntraVENous Once    [START ON 5/15/2025] cefepime  2,000 mg IntraVENous Q24H    vancomycin (VANCOCIN) intermittent dosing (placeholder)   Other RX Placeholder    insulin glargine  25 Units SubCUTAneous Nightly    insulin lispro  0-16 Units SubCUTAneous 4x Daily AC & HS    sodium chloride flush  5-40 mL IntraVENous 2 times per day    amitriptyline  25 mg Oral Nightly    [Held by provider] apixaban  5 mg Oral BID    aspirin  81 mg Oral Daily    atorvastatin  20 mg Oral Daily    finasteride  5 mg Oral Daily    tamsulosin  0.4 mg Oral Daily    [Held by provider] pantoprazole  40 mg Oral QAM AC    sennosides-docusate sodium  1 tablet Oral BID    gabapentin  300 mg Oral BID         Objective:     ADMISSION DIAGNOSIS:   Syncope and collapse [R55]  Acute pulmonary edema (HCC) [J81.0]  Closed fracture of right ankle, initial encounter [L25.155A]  Acute congestive heart failure, unspecified heart failure type (HCC) [I50.9]     /78   Pulse 96   Temp 97.8 °F (36.6 °C) (Oral)   Resp 16   Ht 1.905 m  (6' 3\")   Wt (!) 151.1 kg (333 lb 1.8 oz)   SpO2 98%   BMI 41.64 kg/m²     ADMIT:  Weight - Scale: (!) 151 kg (332 lb 12.8 oz)    TODAY: Weight - Scale: (!) 151.1 kg (333 lb 1.8 oz)    Wt Readings from Last 5 Encounters:   05/14/25 (!) 151.1 kg (333 lb 1.8 oz)   04/24/25 (!) 146.1 kg (322 lb)   04/14/25 (!) 146.3 kg (322 lb 8 oz)   05/18/24 134.1 kg (295 lb 10.2 oz)   08/11/22 126.1 kg (278 lb)          Intake/Output Summary (Last 24 hours) at 5/14/2025 2322  Last data filed at 5/14/2025 1423  Gross per 24 hour   Intake 100 ml   Output 100 ml   Net 0 ml       Lab Results   Component Value Date/Time    PROBNP 13,133 05/14/2025 12:16 PM       ECHOCARDIOGRAM: ***          Assessment:     CONSULTS:   IP CONSULT TO ORTHOPEDIC SURGERY  IP CONSULT TO HEART FAILURE NURSE/COORDINATOR  IP CONSULT TO GENERAL SURGERY  IP CONSULT TO CARDIOLOGY  IP CONSULT TO NEPHROLOGY  IP CONSULT TO PHARMACY    PATIENT'S OUTPT CARDIOLOGIST: Dr Freeman (new)    SCALE AVAILABLE:  Yes -- but pt is L BKA and R tib/fib fx    EDUCATION PROVIDED: Patient and Patient's wife  [x]  What is HF & risk factors/causes pertinent to pt.    [x]  Common tests performed.    [x]  HF S&S.    [x]  Importance of doing & recording daily wts, 3/5 rule, HF Zones, who and when to call.     [x]  Low Na diet.    [x]  Fluid restriction.    [x]  Recommendations on activity and exercise.     [x]  HF medications.    []  Palliative Care & Advanced Care Planning.    [x]  Importance of attending follow ups.    [x]  Other: HF RN consult received from       CURRENT DIET: ADULT DIET; Regular; Low Potassium (Less than 3000 mg/day)    EDUCATIONAL PACKETS PROVIDED:   Titles and material given:  Yes   [x]  HF Pt Education Booklet given and reviewed  [x]  HF Zones & Weight log provided   [x]  Additional dietary material  [x]  HF Keymar Iphone / Android eduardo QR code / information on download  [x]  AHA HF Interactive workbook hyperlink  []  Smoking Cessation  [x]  Other:HF and sleep

## 2025-05-15 NOTE — PROGRESS NOTES
Internal Medicine Progress Note    Admit Date: 5/10/2025  Hospital Day: 6   Patient name: Ty Meyer   : 1960     CC:   Altered Mental Status     Interval history:   Patient state that he continue to feel tired   His appetite is normal , he was able to eat his breakfast in the morning  He also noticed continue swollen on arms       Medications   Scheduled Meds:   calcium gluconate  1,000 mg IntraVENous Once    insulin regular  10 Units IntraVENous Once    And    dextrose bolus  250 mL IntraVENous Once    sodium zirconium cyclosilicate  10 g Oral Once    vancomycin  500 mg IntraVENous Once    insulin glargine  30 Units SubCUTAneous Nightly    insulin lispro  3 Units SubCUTAneous TID WC    cefepime  2,000 mg IntraVENous Q24H    vancomycin (VANCOCIN) intermittent dosing (placeholder)   Other RX Placeholder    insulin lispro  0-16 Units SubCUTAneous 4x Daily AC & HS    sodium chloride flush  5-40 mL IntraVENous 2 times per day    amitriptyline  25 mg Oral Nightly    [Held by provider] apixaban  5 mg Oral BID    aspirin  81 mg Oral Daily    atorvastatin  20 mg Oral Daily    finasteride  5 mg Oral Daily    tamsulosin  0.4 mg Oral Daily    [Held by provider] pantoprazole  40 mg Oral QAM AC    sennosides-docusate sodium  1 tablet Oral BID    gabapentin  300 mg Oral BID     Continuous Infusions:   dextrose      [Held by provider] furosemide (LASIX) 100 mg in sodium chloride 0.9 % 100 mL infusion 10 mg/hr (05/15/25 1357)    sodium chloride      dextrose       Objective   Vitals  Patient Vitals for the past 8 hrs:   BP Temp Temp src Pulse Resp SpO2   05/15/25 1310 -- -- -- -- 18 99 %   05/15/25 1214 128/82 97.6 °F (36.4 °C) Oral 89 18 99 %   05/15/25 0930 (!) 171/90 97.7 °F (36.5 °C) Oral 91 18 97 %       Intake/Output Summary (Last 24 hours) at 5/15/2025 1454  Last data filed at 5/15/2025 1423  Gross per 24 hour   Intake --   Output 2300 ml   Net -2300 ml       ROS: A 10 point review of systems was conducted

## 2025-05-16 LAB
ALBUMIN SERPL-MCNC: 3.1 G/DL (ref 3.4–5)
ALBUMIN SERPL-MCNC: 3.1 G/DL (ref 3.4–5)
ANION GAP SERPL CALCULATED.3IONS-SCNC: 13 MMOL/L (ref 3–16)
ANION GAP SERPL CALCULATED.3IONS-SCNC: 14 MMOL/L (ref 3–16)
BASOPHILS # BLD: 0 K/UL (ref 0–0.2)
BASOPHILS NFR BLD: 0.3 %
BUN SERPL-MCNC: 66 MG/DL (ref 7–20)
BUN SERPL-MCNC: 71 MG/DL (ref 7–20)
CALCIUM SERPL-MCNC: 8.6 MG/DL (ref 8.3–10.6)
CALCIUM SERPL-MCNC: 8.7 MG/DL (ref 8.3–10.6)
CHLORIDE SERPL-SCNC: 98 MMOL/L (ref 99–110)
CHLORIDE SERPL-SCNC: 99 MMOL/L (ref 99–110)
CO2 SERPL-SCNC: 20 MMOL/L (ref 21–32)
CO2 SERPL-SCNC: 23 MMOL/L (ref 21–32)
CREAT SERPL-MCNC: 3.6 MG/DL (ref 0.8–1.3)
CREAT SERPL-MCNC: 3.6 MG/DL (ref 0.8–1.3)
CRP SERPL-MCNC: 199 MG/L (ref 0–5.1)
DEPRECATED RDW RBC AUTO: 14.9 % (ref 12.4–15.4)
EOSINOPHIL # BLD: 0.8 K/UL (ref 0–0.6)
EOSINOPHIL NFR BLD: 11.4 %
GFR SERPLBLD CREATININE-BSD FMLA CKD-EPI: 18 ML/MIN/{1.73_M2}
GFR SERPLBLD CREATININE-BSD FMLA CKD-EPI: 18 ML/MIN/{1.73_M2}
GLUCOSE BLD-MCNC: 216 MG/DL (ref 70–99)
GLUCOSE BLD-MCNC: 235 MG/DL (ref 70–99)
GLUCOSE BLD-MCNC: 264 MG/DL (ref 70–99)
GLUCOSE BLD-MCNC: 274 MG/DL (ref 70–99)
GLUCOSE SERPL-MCNC: 200 MG/DL (ref 70–99)
GLUCOSE SERPL-MCNC: 257 MG/DL (ref 70–99)
HCT VFR BLD AUTO: 26.2 % (ref 40.5–52.5)
HGB BLD-MCNC: 8.7 G/DL (ref 13.5–17.5)
HLA-B27 QL FC: NEGATIVE
LYMPHOCYTES # BLD: 0.3 K/UL (ref 1–5.1)
LYMPHOCYTES NFR BLD: 4.2 %
MAGNESIUM SERPL-MCNC: 1.88 MG/DL (ref 1.8–2.4)
MAGNESIUM SERPL-MCNC: 1.9 MG/DL (ref 1.8–2.4)
MCH RBC QN AUTO: 29.7 PG (ref 26–34)
MCHC RBC AUTO-ENTMCNC: 33.1 G/DL (ref 31–36)
MCV RBC AUTO: 89.7 FL (ref 80–100)
MONOCYTES # BLD: 0.8 K/UL (ref 0–1.3)
MONOCYTES NFR BLD: 11.1 %
NEUTROPHILS # BLD: 5.2 K/UL (ref 1.7–7.7)
NEUTROPHILS NFR BLD: 73 %
PERFORMED ON: ABNORMAL
PHOSPHATE SERPL-MCNC: 4.7 MG/DL (ref 2.5–4.9)
PHOSPHATE SERPL-MCNC: 5.1 MG/DL (ref 2.5–4.9)
PLATELET # BLD AUTO: 176 K/UL (ref 135–450)
PMV BLD AUTO: 8 FL (ref 5–10.5)
POTASSIUM SERPL-SCNC: 4.4 MMOL/L (ref 3.5–5.1)
POTASSIUM SERPL-SCNC: 4.6 MMOL/L (ref 3.5–5.1)
RBC # BLD AUTO: 2.92 M/UL (ref 4.2–5.9)
SODIUM SERPL-SCNC: 132 MMOL/L (ref 136–145)
SODIUM SERPL-SCNC: 135 MMOL/L (ref 136–145)
VANCOMYCIN SERPL-MCNC: 16.3 UG/ML
WBC # BLD AUTO: 7.1 K/UL (ref 4–11)

## 2025-05-16 PROCEDURE — 99233 SBSQ HOSP IP/OBS HIGH 50: CPT | Performed by: INTERNAL MEDICINE

## 2025-05-16 PROCEDURE — 2580000003 HC RX 258: Performed by: INTERNAL MEDICINE

## 2025-05-16 PROCEDURE — 97530 THERAPEUTIC ACTIVITIES: CPT

## 2025-05-16 PROCEDURE — 6370000000 HC RX 637 (ALT 250 FOR IP)

## 2025-05-16 PROCEDURE — 6360000002 HC RX W HCPCS: Performed by: INTERNAL MEDICINE

## 2025-05-16 PROCEDURE — 6360000002 HC RX W HCPCS

## 2025-05-16 PROCEDURE — P9047 ALBUMIN (HUMAN), 25%, 50ML: HCPCS | Performed by: INTERNAL MEDICINE

## 2025-05-16 PROCEDURE — 97110 THERAPEUTIC EXERCISES: CPT

## 2025-05-16 PROCEDURE — 80202 ASSAY OF VANCOMYCIN: CPT

## 2025-05-16 PROCEDURE — 1200000000 HC SEMI PRIVATE

## 2025-05-16 PROCEDURE — 97163 PT EVAL HIGH COMPLEX 45 MIN: CPT

## 2025-05-16 PROCEDURE — 36415 COLL VENOUS BLD VENIPUNCTURE: CPT

## 2025-05-16 PROCEDURE — 83735 ASSAY OF MAGNESIUM: CPT

## 2025-05-16 PROCEDURE — 2580000003 HC RX 258

## 2025-05-16 PROCEDURE — 99232 SBSQ HOSP IP/OBS MODERATE 35: CPT | Performed by: SURGERY

## 2025-05-16 PROCEDURE — 80069 RENAL FUNCTION PANEL: CPT

## 2025-05-16 PROCEDURE — 86140 C-REACTIVE PROTEIN: CPT

## 2025-05-16 PROCEDURE — 97166 OT EVAL MOD COMPLEX 45 MIN: CPT

## 2025-05-16 PROCEDURE — 85025 COMPLETE CBC W/AUTO DIFF WBC: CPT

## 2025-05-16 RX ORDER — ALBUMIN (HUMAN) 12.5 G/50ML
25 SOLUTION INTRAVENOUS EVERY 8 HOURS
Status: DISCONTINUED | OUTPATIENT
Start: 2025-05-16 | End: 2025-05-16

## 2025-05-16 RX ORDER — ALBUMIN (HUMAN) 12.5 G/50ML
12.5 SOLUTION INTRAVENOUS EVERY 8 HOURS
Status: COMPLETED | OUTPATIENT
Start: 2025-05-16 | End: 2025-05-18

## 2025-05-16 RX ORDER — MAGNESIUM SULFATE IN WATER 40 MG/ML
2000 INJECTION, SOLUTION INTRAVENOUS ONCE
Status: COMPLETED | OUTPATIENT
Start: 2025-05-16 | End: 2025-05-16

## 2025-05-16 RX ORDER — INSULIN LISPRO 100 [IU]/ML
6 INJECTION, SOLUTION INTRAVENOUS; SUBCUTANEOUS
Status: DISCONTINUED | OUTPATIENT
Start: 2025-05-16 | End: 2025-05-18

## 2025-05-16 RX ORDER — INSULIN GLARGINE 100 [IU]/ML
35 INJECTION, SOLUTION SUBCUTANEOUS NIGHTLY
Status: DISCONTINUED | OUTPATIENT
Start: 2025-05-16 | End: 2025-05-23 | Stop reason: HOSPADM

## 2025-05-16 RX ADMIN — INSULIN LISPRO 8 UNITS: 100 INJECTION, SOLUTION INTRAVENOUS; SUBCUTANEOUS at 17:44

## 2025-05-16 RX ADMIN — OXYCODONE 10 MG: 5 TABLET ORAL at 10:27

## 2025-05-16 RX ADMIN — CEFEPIME 2000 MG: 2 INJECTION, POWDER, FOR SOLUTION INTRAVENOUS at 16:47

## 2025-05-16 RX ADMIN — SENNOSIDES AND DOCUSATE SODIUM 1 TABLET: 50; 8.6 TABLET ORAL at 20:03

## 2025-05-16 RX ADMIN — FINASTERIDE 5 MG: 5 TABLET, FILM COATED ORAL at 09:27

## 2025-05-16 RX ADMIN — GABAPENTIN 300 MG: 300 CAPSULE ORAL at 09:27

## 2025-05-16 RX ADMIN — INSULIN LISPRO 4 UNITS: 100 INJECTION, SOLUTION INTRAVENOUS; SUBCUTANEOUS at 20:03

## 2025-05-16 RX ADMIN — FUROSEMIDE 5 MG/HR: 10 INJECTION, SOLUTION INTRAMUSCULAR; INTRAVENOUS at 12:02

## 2025-05-16 RX ADMIN — INSULIN LISPRO 8 UNITS: 100 INJECTION, SOLUTION INTRAVENOUS; SUBCUTANEOUS at 12:14

## 2025-05-16 RX ADMIN — SODIUM CHLORIDE 1500 MG: 0.9 INJECTION, SOLUTION INTRAVENOUS at 09:33

## 2025-05-16 RX ADMIN — INSULIN GLARGINE 35 UNITS: 100 INJECTION, SOLUTION SUBCUTANEOUS at 20:03

## 2025-05-16 RX ADMIN — GABAPENTIN 300 MG: 300 CAPSULE ORAL at 20:03

## 2025-05-16 RX ADMIN — ALBUMIN (HUMAN) 12.5 G: 0.25 INJECTION, SOLUTION INTRAVENOUS at 20:03

## 2025-05-16 RX ADMIN — AMITRIPTYLINE HYDROCHLORIDE 25 MG: 50 TABLET ORAL at 20:03

## 2025-05-16 RX ADMIN — INSULIN LISPRO 6 UNITS: 100 INJECTION, SOLUTION INTRAVENOUS; SUBCUTANEOUS at 12:14

## 2025-05-16 RX ADMIN — TAMSULOSIN HYDROCHLORIDE 0.4 MG: 0.4 CAPSULE ORAL at 09:27

## 2025-05-16 RX ADMIN — ASPIRIN 81 MG: 81 TABLET, COATED ORAL at 09:26

## 2025-05-16 RX ADMIN — SODIUM CHLORIDE: 0.9 INJECTION, SOLUTION INTRAVENOUS at 09:31

## 2025-05-16 RX ADMIN — ALBUMIN (HUMAN) 25 G: 0.25 INJECTION, SOLUTION INTRAVENOUS at 12:10

## 2025-05-16 RX ADMIN — INSULIN LISPRO 4 UNITS: 100 INJECTION, SOLUTION INTRAVENOUS; SUBCUTANEOUS at 09:27

## 2025-05-16 RX ADMIN — MAGNESIUM SULFATE HEPTAHYDRATE 2000 MG: 40 INJECTION, SOLUTION INTRAVENOUS at 21:08

## 2025-05-16 RX ADMIN — INSULIN LISPRO 6 UNITS: 100 INJECTION, SOLUTION INTRAVENOUS; SUBCUTANEOUS at 17:45

## 2025-05-16 RX ADMIN — FUROSEMIDE 10 MG/HR: 10 INJECTION, SOLUTION INTRAMUSCULAR; INTRAVENOUS at 01:05

## 2025-05-16 RX ADMIN — SENNOSIDES AND DOCUSATE SODIUM 1 TABLET: 50; 8.6 TABLET ORAL at 09:26

## 2025-05-16 RX ADMIN — ATORVASTATIN CALCIUM 20 MG: 20 TABLET, FILM COATED ORAL at 09:27

## 2025-05-16 ASSESSMENT — PAIN DESCRIPTION - ONSET: ONSET: ON-GOING

## 2025-05-16 ASSESSMENT — PAIN DESCRIPTION - LOCATION: LOCATION: BACK

## 2025-05-16 ASSESSMENT — PAIN SCALES - GENERAL
PAINLEVEL_OUTOF10: 10
PAINLEVEL_OUTOF10: 4

## 2025-05-16 ASSESSMENT — PAIN - FUNCTIONAL ASSESSMENT: PAIN_FUNCTIONAL_ASSESSMENT: PREVENTS OR INTERFERES WITH MANY ACTIVE NOT PASSIVE ACTIVITIES

## 2025-05-16 ASSESSMENT — PAIN DESCRIPTION - ORIENTATION: ORIENTATION: LOWER

## 2025-05-16 ASSESSMENT — PAIN DESCRIPTION - FREQUENCY: FREQUENCY: CONTINUOUS

## 2025-05-16 ASSESSMENT — PAIN DESCRIPTION - PAIN TYPE: TYPE: CHRONIC PAIN

## 2025-05-16 ASSESSMENT — PAIN DESCRIPTION - DESCRIPTORS: DESCRIPTORS: ACHING;SORE

## 2025-05-16 NOTE — PLAN OF CARE
Michael placed yesterday by Dr. Murdock at bedside. UOP 2850 total. Keep michael in over the weekend, continue flomax/finasteride. We will see PRN. Please re-contact us on Monday to assess for voiding trial.

## 2025-05-16 NOTE — CARE COORDINATION
Talked to wife at bedside of patient. Sent referrals to the following SNFs:   [facility]   Electronically signed by Rosalia Naranjo RN on 5/16/2025 at 5:55 PM

## 2025-05-16 NOTE — CARE COORDINATION
Spoke to patient regarding therapy evals suggesting he go to SNF for rehab. Patient says he does not want to go to a SNF, but he knows his wife wants him to go to a SNF. This CM may call his wife about it and patient says he would talk to her as well and come to a decision. I explained that he is staying over the weekend and need a decision by Monday morning. Electronically signed by Rosalia Naranjo RN on 5/16/2025 at 12:05 PM

## 2025-05-16 NOTE — PROGRESS NOTES
Nephrology Progress Note                                                                                                                                                                                                                                                                                                                                                               Office : [phone]     Fax :[phone]    Patient's Name: Ty Meyer  7:07 PM  5/16/2025    Reason for Consult:  HUNTER on CKD 3  Requesting Physician:  Jose Chew MD  Chief Complaint:    Chief Complaint   Patient presents with    Altered Mental Status       Assessment/Plan     # HUNTER on CKD 3 sec to ATN   # volume overloaded   - Creatinine up to 4.0 --> now improving 3.6 today  - Urine studies without significant proteinuria   - NASRA on 5/13 unremarkable   - Closely monitor UOP. Lockett placed 5/15 2/2 retention   - dec Lasix gtt  - albumin IV per primary team   - Avoid nephrotoxins  - Closely monitor volume status and renal labs  - Urine is bland     # HFpEF  - Echo with EF 50-55%, grade II DD with increased LAP  - BNP worse ~11.5K --> 13K  - Diuresis as above     # HTN  - BP's overall improved   - Diuresis as above   - Monitor     # Acid- base/ Electrolyte imbalance   # Hyperkalemia  - S/p Lokelma   - Continue low K diet  - Monitor   # Hyponatremia  - Should improve with diuresis  - Monitor     # DM 2  - Management per primary    # Scrotal cellulitis  - abx     # Right bimalleolar ankle fx  - Ortho on board  - Will need outpatient follow-up       History of Present Ilness:    Ty Meyer is a 64 y.o. male with a PMH of HTN, DM2, osteomyelitis s/p LLE BKA, CKD 3, who presented after a fall and syncopal episode. We are being consulted for HUNTER on CKD 3.    Interval hx:    Lockett placed yesterday  Creatinine improving  Electrolytes stable   Good UOP  Continues on Lasix gtt       Past Medical History:   Diagnosis Date    Abdominal

## 2025-05-16 NOTE — PROGRESS NOTES
[facility] -  Clinical Pharmacy Note    Vancomycin - Management by Pharmacy    Consult Date(s): 5/14/25  Consulting Provider(s): Shahid Yarbrough MD     Assessment / Plan  1)  Scrotal Cellulitis - Vancomycin  Concurrent Antimicrobials: cefepime  Day of Vanc Therapy / Ordered Duration: 3 of tbd  Current Dosing Method: Intermittent Dosing by Levels  Therapeutic Goal: Trough ~ 15 mg/L  Current Dose / Plan:   Pt with HUNTER on CKD; baseline SCr unclear, but seems to be ~2.3-2.5.  SCr 3.6 today.  UOP documented as 1 mL/kg/hr last 24 hrs.  Will dose vancomycin intermittently based on levels.  Random level this AM = 16.3 mcg/mL.  Will give 1500mg IV x1 today.  Will check random level in AM tomorrow to determine timing of next dose.  Will continue to monitor clinical condition and make adjustments to regimen as appropriate.    Please call with questions--  Thanks--  Tamiko Strong, PharmD, BCPS, BCGP  [id number] ([facility])   5/16/2025 8:49 AM        Interval update:  Continues on Vanc / Cefepime for scrotal swelling / cellulitis.   placed michael last evening.  SCr improved some from 4.0-->3.6 today.  Lasix drip continues at 10mg/hr (-7900mL since admission).    Subjective/Objective:   Ty Meyer is a 64 y.o. male with a PMHx significant for DVT on Eliquis, HTN, T2DM on insulin, seizure disorder off of AEDs for years, osteomyelitis s/p LLE BKA, CAD, CKD 3b who is admitted with syncope, acute HFpEF exacerbation, HUNTER on CKD, anasarca, and R ankle fracture.  Pt now with concern for scrotal edema / cellulitis 0 broad spectrum antibiotics started 4/14 PM.     Pharmacy is consulted to manage vancomycin.    Ht Readings from Last 1 Encounters:   05/12/25 1.905 m (6' 3\")     Wt Readings from Last 1 Encounters:   05/15/25 (!) 150.3 kg (331 lb 5.6 oz)     Current & Prior Antimicrobial Regimen(s):  Cefepime (5/14 - current)  Vancomycin - Pharmacy to dose  Intermittent dosing (5/14-current)    Date Vanc Level Vanc Dose   5/14

## 2025-05-16 NOTE — PROGRESS NOTES
Bariatric Surgery   Daily Progress Note  Patient: Ty Meyer      CC: bilateral inguinal hernias    SUBJECTIVE:    Patient reports tolerating diet without nausea/vomiting. Having bowel movements. No complains of pain to inguinal hernias.     OBJECTIVE:    PHYSICAL EXAM:    Vitals:    05/15/25 1616 05/15/25 1947 05/16/25 0035 05/16/25 0423   BP: 136/77 (!) 148/76 133/77 (!) 143/77   Pulse: 86 90 87 93   Resp: 18 17 18 18   Temp: 97.5 °F (36.4 °C) 97.6 °F (36.4 °C) 97.7 °F (36.5 °C) 97.7 °F (36.5 °C)   TempSrc: Oral Oral Axillary Axillary   SpO2: 100% 100% 99% 97%   Weight:       Height:           General appearance: alert, no acute distress  Eyes: no  scleral icterus  Neck: trachea midline  Chest/Lungs: normal effort, no adventitious breathing, no accessory muscle use, on RA  Cardiovascular: RRR  Abdomen:  obese, soft, +large R inguinal hernia without skin changes, reducible at bediside without difficulty. Moderate L inguinal hernia, without skin changes, reducible at bedside without difficulty, scrotum with marked swelling- improving, elevated with Kerlix gauze rolls.    Extremities: L BKA     LABS:   Recent Labs     05/15/25  1705 05/16/25  0430   WBC 7.7 7.1   HGB 8.5* 8.7*   HCT 25.8* 26.2*   MCV 90.6 89.7    176        Recent Labs     05/15/25  0230 05/15/25  1705   * 134*   K 5.0 4.7   CL 98* 99   CO2 20* 22   PHOS 5.3* 5.1*   BUN 65* 67*   CREATININE 4.0* 4.0*      No results for input(s): \"AST\", \"ALT\", \"BILIDIR\", \"BILITOT\", \"ALKPHOS\" in the last 72 hours.    Invalid input(s): \"ALB\"   No results for input(s): \"LIPASE\", \"AMYLASE\" in the last 72 hours.     Recent Labs     05/14/25  1216   INR 1.96*        Recent Labs     05/14/25  1216   CKTOTAL 81         ASSESSMENT & PLAN:   This is a 64 y.o. male with Hx of CAD, CKD, DM, HTN, DVT (eliquis), GERD and hiatal hernia associated with Cuauhtemoc ulcers, lumbar postlaminectomy syndrome, and chronic bilateral hernias, who presents s/p fall due to

## 2025-05-16 NOTE — PLAN OF CARE
Problem: Safety - Adult  Goal: Free from fall injury  Outcome: Progressing  Flowsheets (Taken 5/13/2025 2053 by John Monsalve RN)  Free From Fall Injury:   Instruct family/caregiver on patient safety   Based on caregiver fall risk screen, instruct family/caregiver to ask for assistance with transferring infant if caregiver noted to have fall risk factors     Problem: Chronic Conditions and Co-morbidities  Goal: Patient's chronic conditions and co-morbidity symptoms are monitored and maintained or improved  Outcome: Progressing  Flowsheets (Taken 5/13/2025 2053 by John Monsalve RN)  Care Plan - Patient's Chronic Conditions and Co-Morbidity Symptoms are Monitored and Maintained or Improved:   Monitor and assess patient's chronic conditions and comorbid symptoms for stability, deterioration, or improvement   Collaborate with multidisciplinary team to address chronic and comorbid conditions and prevent exacerbation or deterioration   Update acute care plan with appropriate goals if chronic or comorbid symptoms are exacerbated and prevent overall improvement and discharge

## 2025-05-16 NOTE — PROGRESS NOTES
Internal Medicine Progress Note    Admit Date: 5/10/2025  Hospital Day: 7   Patient name: Ty Meyer   : 1960     CC:   Altered Mental Status     Interval history:   NAEON.       Intake/Output Summary (Last 24 hours) at 2025 1019  Last data filed at 2025 0923  Gross per 24 hour   Intake --   Output 5025 ml   Net -5025 ml   Diuresing very well. Only bed scales obtained.    Cr improving. All rheumatologic labs negative thus far.       Medications   Scheduled Meds:   vancomycin  1,500 mg IntraVENous Once    insulin lispro  6 Units SubCUTAneous TID WC    insulin glargine  35 Units SubCUTAneous Nightly    calcium gluconate  1,000 mg IntraVENous Once    insulin regular  10 Units IntraVENous Once    sodium zirconium cyclosilicate  10 g Oral Once    cefepime  2,000 mg IntraVENous Q24H    vancomycin (VANCOCIN) intermittent dosing (placeholder)   Other RX Placeholder    insulin lispro  0-16 Units SubCUTAneous 4x Daily AC & HS    sodium chloride flush  5-40 mL IntraVENous 2 times per day    amitriptyline  25 mg Oral Nightly    [Held by provider] apixaban  5 mg Oral BID    aspirin  81 mg Oral Daily    atorvastatin  20 mg Oral Daily    finasteride  5 mg Oral Daily    tamsulosin  0.4 mg Oral Daily    [Held by provider] pantoprazole  40 mg Oral QAM AC    sennosides-docusate sodium  1 tablet Oral BID    gabapentin  300 mg Oral BID     Continuous Infusions:   furosemide (LASIX) 100 mg in sodium chloride 0.9 % 100 mL infusion 10 mg/hr (25 0105)    sodium chloride 10 mL/hr at 25 0931    dextrose       Objective   Vitals  Patient Vitals for the past 8 hrs:   BP Temp Temp src Pulse Resp SpO2   25 0935 119/74 98.1 °F (36.7 °C) Oral 100 18 94 %   25 0423 (!) 143/77 97.7 °F (36.5 °C) Axillary 93 18 97 %       Intake/Output Summary (Last 24 hours) at 2025 1019  Last data filed at 2025 0923  Gross per 24 hour   Intake --   Output 5025 ml   Net -5025 ml       ROS: A 10 point review  Initial SW/CM Assessment/Plan of Care Note     Baseline Assessment  67 year old admitted 7/6/2021 as Inpatient with a diagnosis of COPD EXACERBATION.   Prior to admission patient was living with Alone and residing at Apartment. . Patient’s Primary Care Provider is No Pcp.     Medical History  History reviewed. No pertinent past medical history.    Prior to Admission Status  Functional Status  Ambulation: Independent/Self  Bathing: Independent/Self  Dressing: Independent/Self  Toileting: Independent/Self  Meal Preparation: Independent/Self  Shopping: Independent/Self  Medication Preparation: Independent/Self  Medication Administration: Independent/Self  Housekeeping: Independent/Self  Laundry: Independent/Self  Transportation: Independent/Self    Agency/Support  Type of Services Prior to Hospitalization: None  Support Systems: Children  Home Devices/Equipment: None  Mobility Assist Devices: None  Sensory Support Devices: None    Current Status  PT Ambulation Tips:    PT Transfer Tips:     OT Bathing Tips:    OT Dressing Tips:    OT Toileting Tips:    OT Feeding Tips:    SLP Swallow/Feeding Tips:    SLP Comm/Cog Tips:    Current Mental Status: Alert, Oriented to Person, Oriented to Place, Oriented to Reason for Hospitalization, Oriented to Time  Stressors:      Insurance  Primary: [employer]  Secondary: N/A    Barriers to Discharge  Identified Barriers to Discharge/Transition Planning:      Progress Note  PATIENT LIVES AT HOME ALONE. INDEPENDENT WITH ADLS AND DRIVES. DENIES HAVING RECEIVED HOME HEALTH, HOMEMAKER SERVICES OR HX OF [facility]EMENT. USES [facility] FOR MEDICATIONS WHEN NEEDED. IF HOME HEALTH IS RECOMMENDED PATIENT IS AGREEABLE AND WILLING TO USE [facility] FOR REFERRAL.(LM)    Plan  SW/CM - Recommendations for Discharge:     PT - Recommendations for Discharge:      OT - Recommendations for Discharge:      SLP - Recommendations for Discharge:     Anticipate patient will need post-hospital  services. Necessary services are available.  Anticipate patient can return to the environment from which patient entered the hospital.   Anticipate patient can provide self-care at discharge.    Refer to / Flowsheet for Goals and objective data.

## 2025-05-16 NOTE — PROGRESS NOTES
Physical Therapy  Facility/Department: [address]  Physical Therapy Initial Assessment / Treatment    Name: Ty Meyer  : 1960  MRN: [id number]  Date of Service: 2025    Discharge Recommendations:  Subacute/Skilled Nursing Facility   PT Equipment Recommendations  Equipment Needed: No      Patient Diagnosis(es): The primary encounter diagnosis was Acute pulmonary edema (HCC). Diagnoses of Closed fracture of right ankle, initial encounter, Syncope and collapse, and Acute congestive heart failure, unspecified heart failure type (HCC) were also pertinent to this visit.  Past Medical History:  has a past medical history of Abdominal hernia, Back pain, CAD (coronary artery disease), CKD (chronic kidney disease) stage 4, GFR 15-29 ml/min (HCC), Diabetes mellitus (HCC), Hyperlipidemia, Hypertension, and MRSA (methicillin resistant staph aureus) culture positive.  Past Surgical History:  has a past surgical history that includes Leg amputation below knee; Foot surgery (Left, 2017); other surgical history (Right, 2017); and back surgery ().    Assessment  Body Structures, Functions, Activity Limitations Requiring Skilled Therapeutic Intervention: Decreased functional mobility   Assessment: Pt is 64 y.o. male admit with syncope, (+) R ankle fx and is now touch down WB to R LE. Pt with  h/o L BKA and wears a prosthesis along with R \"foot brace\" although neither are here today. Pt is typically indep with w/c transfer and amb of short distances at home with walker. Pt unable to practice transfers safely today due to lack of L LE prosthesis and pt now with limited WB through R LE. Anticipate transfers and amb will be difficult with new WB limitations. Pt is hopeful to return home but states his wife wants him to d/c to facility. Rec SNF at this time. Instructed pt/dtr to have L LE prosthesis brought to hospital. Will attempt transfers and further mobility when appropriate.  Treatment

## 2025-05-16 NOTE — PROGRESS NOTES
Occupational Therapy  Facility/Department: [address]  Occupational Therapy Initial Assessment    Name: Ty Meyer  : 1960  MRN: [id number]  Date of Service: 2025    Discharge Recommendations:  Subacute/Skilled Nursing Facility          Patient Diagnosis(es): The primary encounter diagnosis was Acute pulmonary edema (HCC). Diagnoses of Closed fracture of right ankle, initial encounter, Syncope and collapse, and Acute congestive heart failure, unspecified heart failure type (HCC) were also pertinent to this visit.  Past Medical History:  has a past medical history of Abdominal hernia, Back pain, CAD (coronary artery disease), CKD (chronic kidney disease) stage 4, GFR 15-29 ml/min (HCC), Diabetes mellitus (HCC), Hyperlipidemia, Hypertension, and MRSA (methicillin resistant staph aureus) culture positive.  Past Surgical History:  has a past surgical history that includes Leg amputation below knee; Foot surgery (Left, 2017); other surgical history (Right, 2017); and back surgery ().    Treatment Diagnosis: imp mob, tf, ADL      Assessment  Performance deficits / Impairments: Decreased functional mobility ;Decreased ADL status;Decreased endurance  Assessment: From home. Pt completing bed mobility with Ax2. Unable to transfer this date  no prosthetic for LLE present and TTWB to RLE.  Pt would benefit from cont therapies while in acute care. Rec cont inpt care at [place]. Cont POC.  Treatment Diagnosis: imp mob, tf, ADL  Decision Making: Medium Complexity  REQUIRES OT FOLLOW-UP: Yes  Activity Tolerance  Activity Tolerance: Patient Tolerated treatment well     Plan  Occupational Therapy Plan  Times Per Week: 2-5  Current Treatment Recommendations: Strengthening, ROM, Balance training, Functional mobility training, Endurance training, Safety education & training, Patient/Caregiver education & training, Self-Care / ADL,

## 2025-05-17 LAB
ALBUMIN SERPL-MCNC: 3.2 G/DL (ref 3.4–5)
ANION GAP SERPL CALCULATED.3IONS-SCNC: 13 MMOL/L (ref 3–16)
BASOPHILS # BLD: 0 K/UL (ref 0–0.2)
BASOPHILS NFR BLD: 0.6 %
BUN SERPL-MCNC: 70 MG/DL (ref 7–20)
CALCIUM SERPL-MCNC: 8.6 MG/DL (ref 8.3–10.6)
CHLORIDE SERPL-SCNC: 100 MMOL/L (ref 99–110)
CO2 SERPL-SCNC: 24 MMOL/L (ref 21–32)
CREAT SERPL-MCNC: 3.2 MG/DL (ref 0.8–1.3)
DEPRECATED RDW RBC AUTO: 15.2 % (ref 12.4–15.4)
EOSINOPHIL # BLD: 0.6 K/UL (ref 0–0.6)
EOSINOPHIL NFR BLD: 10.6 %
GFR SERPLBLD CREATININE-BSD FMLA CKD-EPI: 21 ML/MIN/{1.73_M2}
GLUCOSE BLD-MCNC: 157 MG/DL (ref 70–99)
GLUCOSE BLD-MCNC: 160 MG/DL (ref 70–99)
GLUCOSE BLD-MCNC: 173 MG/DL (ref 70–99)
GLUCOSE BLD-MCNC: 187 MG/DL (ref 70–99)
GLUCOSE SERPL-MCNC: 173 MG/DL (ref 70–99)
HCT VFR BLD AUTO: 24.3 % (ref 40.5–52.5)
HGB BLD-MCNC: 8.2 G/DL (ref 13.5–17.5)
LYMPHOCYTES # BLD: 0.3 K/UL (ref 1–5.1)
LYMPHOCYTES NFR BLD: 5.9 %
MAGNESIUM SERPL-MCNC: 1.83 MG/DL (ref 1.8–2.4)
MAGNESIUM SERPL-MCNC: 1.98 MG/DL (ref 1.8–2.4)
MCH RBC QN AUTO: 29.9 PG (ref 26–34)
MCHC RBC AUTO-ENTMCNC: 33.6 G/DL (ref 31–36)
MCV RBC AUTO: 89 FL (ref 80–100)
MONOCYTES # BLD: 0.8 K/UL (ref 0–1.3)
MONOCYTES NFR BLD: 13.7 %
NEUTROPHILS # BLD: 3.9 K/UL (ref 1.7–7.7)
NEUTROPHILS NFR BLD: 69.2 %
NT-PROBNP SERPL-MCNC: ABNORMAL PG/ML (ref 0–124)
PERFORMED ON: ABNORMAL
PHOSPHATE SERPL-MCNC: 4.6 MG/DL (ref 2.5–4.9)
PLATELET # BLD AUTO: 177 K/UL (ref 135–450)
PMV BLD AUTO: 7.9 FL (ref 5–10.5)
POTASSIUM SERPL-SCNC: 4.2 MMOL/L (ref 3.5–5.1)
RBC # BLD AUTO: 2.73 M/UL (ref 4.2–5.9)
SODIUM SERPL-SCNC: 137 MMOL/L (ref 136–145)
VANCOMYCIN SERPL-MCNC: 21.9 UG/ML
WBC # BLD AUTO: 5.7 K/UL (ref 4–11)

## 2025-05-17 PROCEDURE — 83880 ASSAY OF NATRIURETIC PEPTIDE: CPT

## 2025-05-17 PROCEDURE — 6360000002 HC RX W HCPCS: Performed by: INTERNAL MEDICINE

## 2025-05-17 PROCEDURE — 2580000003 HC RX 258: Performed by: INTERNAL MEDICINE

## 2025-05-17 PROCEDURE — 6360000002 HC RX W HCPCS

## 2025-05-17 PROCEDURE — P9047 ALBUMIN (HUMAN), 25%, 50ML: HCPCS | Performed by: INTERNAL MEDICINE

## 2025-05-17 PROCEDURE — 99233 SBSQ HOSP IP/OBS HIGH 50: CPT | Performed by: HOSPITALIST

## 2025-05-17 PROCEDURE — 85025 COMPLETE CBC W/AUTO DIFF WBC: CPT

## 2025-05-17 PROCEDURE — 80069 RENAL FUNCTION PANEL: CPT

## 2025-05-17 PROCEDURE — 83735 ASSAY OF MAGNESIUM: CPT

## 2025-05-17 PROCEDURE — 94761 N-INVAS EAR/PLS OXIMETRY MLT: CPT

## 2025-05-17 PROCEDURE — 1200000000 HC SEMI PRIVATE

## 2025-05-17 PROCEDURE — 36415 COLL VENOUS BLD VENIPUNCTURE: CPT

## 2025-05-17 PROCEDURE — 80202 ASSAY OF VANCOMYCIN: CPT

## 2025-05-17 PROCEDURE — 6370000000 HC RX 637 (ALT 250 FOR IP)

## 2025-05-17 PROCEDURE — 2700000000 HC OXYGEN THERAPY PER DAY

## 2025-05-17 PROCEDURE — 2500000003 HC RX 250 WO HCPCS

## 2025-05-17 RX ADMIN — INSULIN LISPRO 4 UNITS: 100 INJECTION, SOLUTION INTRAVENOUS; SUBCUTANEOUS at 08:41

## 2025-05-17 RX ADMIN — TAMSULOSIN HYDROCHLORIDE 0.4 MG: 0.4 CAPSULE ORAL at 08:38

## 2025-05-17 RX ADMIN — INSULIN LISPRO 6 UNITS: 100 INJECTION, SOLUTION INTRAVENOUS; SUBCUTANEOUS at 12:48

## 2025-05-17 RX ADMIN — ENOXAPARIN SODIUM 180 MG: 100 INJECTION SUBCUTANEOUS at 14:15

## 2025-05-17 RX ADMIN — ASPIRIN 81 MG: 81 TABLET, COATED ORAL at 08:38

## 2025-05-17 RX ADMIN — INSULIN LISPRO 6 UNITS: 100 INJECTION, SOLUTION INTRAVENOUS; SUBCUTANEOUS at 17:53

## 2025-05-17 RX ADMIN — CEFEPIME 2000 MG: 2 INJECTION, POWDER, FOR SOLUTION INTRAVENOUS at 19:33

## 2025-05-17 RX ADMIN — ALBUMIN (HUMAN) 12.5 G: 0.25 INJECTION, SOLUTION INTRAVENOUS at 12:54

## 2025-05-17 RX ADMIN — SENNOSIDES AND DOCUSATE SODIUM 1 TABLET: 50; 8.6 TABLET ORAL at 08:38

## 2025-05-17 RX ADMIN — ALBUMIN (HUMAN) 12.5 G: 0.25 INJECTION, SOLUTION INTRAVENOUS at 03:43

## 2025-05-17 RX ADMIN — INSULIN GLARGINE 35 UNITS: 100 INJECTION, SOLUTION SUBCUTANEOUS at 19:35

## 2025-05-17 RX ADMIN — GABAPENTIN 300 MG: 300 CAPSULE ORAL at 08:38

## 2025-05-17 RX ADMIN — ALBUMIN (HUMAN) 12.5 G: 0.25 INJECTION, SOLUTION INTRAVENOUS at 19:34

## 2025-05-17 RX ADMIN — GABAPENTIN 300 MG: 300 CAPSULE ORAL at 19:34

## 2025-05-17 RX ADMIN — SODIUM CHLORIDE, PRESERVATIVE FREE 10 ML: 5 INJECTION INTRAVENOUS at 08:47

## 2025-05-17 RX ADMIN — INSULIN LISPRO 6 UNITS: 100 INJECTION, SOLUTION INTRAVENOUS; SUBCUTANEOUS at 08:41

## 2025-05-17 RX ADMIN — FINASTERIDE 5 MG: 5 TABLET, FILM COATED ORAL at 08:38

## 2025-05-17 RX ADMIN — CEFEPIME 2000 MG: 2 INJECTION, POWDER, FOR SOLUTION INTRAVENOUS at 08:47

## 2025-05-17 RX ADMIN — ATORVASTATIN CALCIUM 20 MG: 20 TABLET, FILM COATED ORAL at 08:38

## 2025-05-17 RX ADMIN — AMITRIPTYLINE HYDROCHLORIDE 25 MG: 50 TABLET ORAL at 19:34

## 2025-05-17 RX ADMIN — ENOXAPARIN SODIUM 180 MG: 100 INJECTION SUBCUTANEOUS at 21:41

## 2025-05-17 ASSESSMENT — PAIN SCALES - GENERAL: PAINLEVEL_OUTOF10: 0

## 2025-05-17 NOTE — PROGRESS NOTES
[facility] - Clinical Pharmacy Note - Renal Dosing    Cefepime ordered for treatment of cellulitis. Per [facility] Renal Dose Adjustment Policy, cefepime 2000 mg IV q24h will be changed to cefepime 2000 mg IV q12h.     Estimated Creatinine Clearance: Estimated Creatinine Clearance: 40 mL/min (A) (based on SCr of 3.2 mg/dL (H)).  Dialysis Status, HUNTER, CKD: HUNTER on CKD  BMI: Body mass index is 48.16 kg/m².    Rationale for Adjustment: Agent is renally eliminated and demonstrates time-dependent effect on bacterial eradication. Extended-infusion dosing strategy aims to enhance microbiologic and clinical efficacy.    Pharmacy will continue to monitor renal function and adjust dose as necessary.      Please call with any questions.    Selam Jean, PharmD, BCPS  Clinical Pharmacist  [id number]

## 2025-05-17 NOTE — PROGRESS NOTES
Internal Medicine Progress Note    Admit Date: 5/10/2025  Hospital Day: 8   Patient name: Ty Meyer   : 1960     CC:   Altered Mental Status     Interval history:   NAEON.       Intake/Output Summary (Last 24 hours) at 2025  Last data filed at 2025 1113  Gross per 24 hour   Intake 300 ml   Output 5950 ml   Net -5650 ml   Diuresing very well. Only bed scales obtained.    Cr improving. All rheumatologic labs negative thus far.       Medications   Scheduled Meds:   cefepime  2,000 mg IntraVENous Q12H    enoxaparin  1 mg/kg SubCUTAneous BID    insulin lispro  6 Units SubCUTAneous TID WC    insulin glargine  35 Units SubCUTAneous Nightly    albumin human 25%  12.5 g IntraVENous Q8H    calcium gluconate  1,000 mg IntraVENous Once    insulin regular  10 Units IntraVENous Once    sodium zirconium cyclosilicate  10 g Oral Once    vancomycin (VANCOCIN) intermittent dosing (placeholder)   Other RX Placeholder    insulin lispro  0-16 Units SubCUTAneous 4x Daily AC & HS    sodium chloride flush  5-40 mL IntraVENous 2 times per day    amitriptyline  25 mg Oral Nightly    [Held by provider] apixaban  5 mg Oral BID    aspirin  81 mg Oral Daily    atorvastatin  20 mg Oral Daily    finasteride  5 mg Oral Daily    tamsulosin  0.4 mg Oral Daily    [Held by provider] pantoprazole  40 mg Oral QAM AC    sennosides-docusate sodium  1 tablet Oral BID    gabapentin  300 mg Oral BID     Continuous Infusions:   furosemide (LASIX) 100 mg in sodium chloride 0.9 % 100 mL infusion 2 mg/hr (25)    sodium chloride 10 mL/hr at 25 0931    dextrose       Objective   Vitals  Patient Vitals for the past 8 hrs:   BP Temp Temp src Pulse Resp SpO2   25 1247 126/71 97.9 °F (36.6 °C) Oral 85 18 98 %   25 0830 138/83 98 °F (36.7 °C) Oral 83 18 96 %       Intake/Output Summary (Last 24 hours) at 2025 151  Last data filed at 2025 1113  Gross per 24 hour   Intake 300 ml   Output 5950 ml

## 2025-05-17 NOTE — PROGRESS NOTES
Nephrology Progress Note                                                                                                                                                                                                                                                                                                                                                               Office : [phone]     Fax :[phone]    Patient's Name: Ty Meyer  10:00 AM  5/17/2025    Reason for Consult:  HUNTER on CKD 3  Requesting Physician:  Jose Chew MD  Chief Complaint:    Chief Complaint   Patient presents with    Altered Mental Status       Assessment/Plan     # HUNTER on CKD 3 sec to ATN   # volume overloaded   - Creatinine up to 4.0 --> 3.6--> 3.2   - Lockett placed 5/15 2/2 retention   - Lasix gtt  - Urine studies without significant proteinuria   - NASRA on 5/13 unremarkable   - albumin IV per primary team   - Avoid nephrotoxins  - Closely monitor volume status and renal labs  - Urine is bland     # HFpEF  - Echo with EF 50-55%, grade II DD with increased LAP  - BNP worse ~11.5K --> 13K  - Diuresis as above     # HTN  - BP's overall improved   - Diuresis as above   - Monitor     # Acid- base/ Electrolyte imbalance   # Hyperkalemia  - S/p Lokelma   - Continue low K diet  - Monitor   # Hyponatremia  - Should improve with diuresis  - Monitor     # DM 2  - Management per primary    # Scrotal cellulitis  - abx     # Right bimalleolar ankle fx  - Ortho on board  - Will need outpatient follow-up       History of Present Ilness:    Ty Meyer is a 64 y.o. male with a PMH of HTN, DM2, osteomyelitis s/p LLE BKA, CKD 3, who presented after a fall and syncopal episode. We are being consulted for HUNTER on CKD 3.    Interval hx:    On Lasix gtt   Lockett placed  Creatinine improving  Electrolytes stable   Good UOP    Past Medical History:   Diagnosis Date    Abdominal hernia     Back pain     CAD (coronary artery disease)

## 2025-05-17 NOTE — PROGRESS NOTES
[facility] -  Clinical Pharmacy Note    Vancomycin - Management by Pharmacy    Consult Date(s): 5/14/25  Consulting Provider(s): Shahid Yarbrough MD     Assessment / Plan  1)  Scrotal Cellulitis - Vancomycin  Concurrent Antimicrobials: cefepime  Day of Vanc Therapy / Ordered Duration: 3 of tbd  Current Dosing Method: Intermittent Dosing by Levels  Therapeutic Goal: Trough ~ 15 mg/L  Current Dose / Plan:   Pt with HUNTER on CKD; baseline SCr unclear, but seems to be ~2.3-2.5.  SCr 3.2 today.  UOP documented as 1.5 mL/kg/hr last 24 hrs.  Overall, renal function seems to be slowly improving  Will continue to dose vancomycin intermittently based on levels.  Random level this AM = 21.9 mcg/mL.  Will hold off on repeat vancomycin dosing today   Will check random level in AM tomorrow to determine timing of next dose.  Will continue to monitor clinical condition and make adjustments to regimen as appropriate.    Please call with questions--  Thanks--  Rogelio RingD, BCPS  Clinical Pharmacist  [id number]        Interval update:  Continues on Vanc / Cefepime for scrotal swelling / cellulitis.   placed michael last evening.  SCr improved some today.  Lasix drip continues.    Subjective/Objective:   Ty Meyer is a 64 y.o. male with a PMHx significant for DVT on Eliquis, HTN, T2DM on insulin, seizure disorder off of AEDs for years, osteomyelitis s/p LLE BKA, CAD, CKD 3b who is admitted with syncope, acute HFpEF exacerbation, HUNTER on CKD, anasarca, and R ankle fracture.  Pt now with concern for scrotal edema / cellulitis 0 broad spectrum antibiotics started 4/14 PM.     Pharmacy is consulted to manage vancomycin.    Ht Readings from Last 1 Encounters:   05/12/25 1.905 m (6' 3\")     Wt Readings from Last 1 Encounters:   05/17/25 (!) 174.8 kg (385 lb 4.8 oz)     Current & Prior Antimicrobial Regimen(s):  Cefepime (5/14 - current)  Vancomycin - Pharmacy to dose  Intermittent dosing (5/14-current)    Date Vanc Level Vanc

## 2025-05-17 NOTE — PLAN OF CARE
Problem: Safety - Adult  Goal: Free from fall injury  Outcome: Progressing     Problem: Chronic Conditions and Co-morbidities  Goal: Patient's chronic conditions and co-morbidity symptoms are monitored and maintained or improved  Outcome: Progressing     Problem: Discharge Planning  Goal: Discharge to home or other facility with appropriate resources  Outcome: Progressing     Problem: Pain  Goal: Verbalizes/displays adequate comfort level or baseline comfort level  Outcome: Progressing     Problem: ABCDS Injury Assessment  Goal: Absence of physical injury  Outcome: Progressing     Problem: Skin/Tissue Integrity  Goal: Skin integrity remains intact  Description: 1.  Monitor for areas of redness and/or skin breakdown2.  Assess vascular access sites hourly3.  Every 4-6 hours minimum:  Change oxygen saturation probe site4.  Every 4-6 hours:  If on nasal continuous positive airway pressure, respiratory therapy assess nares and determine need for appliance change or resting period  Outcome: Progressing

## 2025-05-17 NOTE — PROGRESS NOTES
I have seen, examined and evaluated the patient as did the resident physician, on 5/17/25. We have discussed the plan of care and decisions made during that discussion were incorporated into this note. I have reviewed the resident physician's note and agree with the assessment and plan of care as documented.    64 y.o. male with MHx significant for DVT on Eliquis, HTN, T2DM on insulin with polyneuropathy, ?seizure disorder off of AEDs for years, osteomyelitis s/p LLE BKA and right foot debridement, MRSA infection, CAD, CKD 3b/4, SY presenting after a fall and syncopal episode.    04/14 -- seen in the ER for Abdominal Pain, RLQ abd pain, chronic hernia that started causing severe pain. CT demonstrates an strangulated hernia with the administration of some Versed surgery was able to reduce this hernia completely.   Patient is feeling much better after reduction, he was observed for several hours abdominal exam remains benign and at this time he is deemed stable for discharge home.  04/24 -- seen by Dr. Carpenter in the office  Was dyspnea with minimal exertion, noted to have severe dyspnea on even upon standing up in clinic    05/10/25 evening, presented to the ED with primary complains of   Fall and then concern for passing out when transitioned from wheel chair to the sofa    Possible vasovagal due to pain or arrhythmic etiology : could be related to HF and arrhythmia (right bundle branch and prolonged Qtc ) as previous EKG but patient had an episode of syncope during that time    Echocardiogram :   Cardiology following EF (50-55%)Moderately dilated LV ,Diastolic dysfunction (Grade II) elevated left atrial pressure (LAP) (HFpEF)Mitral Valve: Mild regurgitation.   Cardiology :following (event monitor at discharge)    Acute on chronic diastolic Congestive heart failure, with anasarca, scrotal edema, dyspnea  CT with bilateral effusions  On Lasix gtt 2 mg/hr    HUNTER on CKD3b (baseline close to 2.3), Hyperkalemia, Normal

## 2025-05-18 PROBLEM — K40.20 NON-RECURRENT BILATERAL INGUINAL HERNIA WITHOUT OBSTRUCTION OR GANGRENE: Status: ACTIVE | Noted: 2025-05-18

## 2025-05-18 PROBLEM — K40.90 SCROTAL HERNIA: Status: ACTIVE | Noted: 2025-05-18

## 2025-05-18 LAB
ALBUMIN SERPL-MCNC: 3.2 G/DL (ref 3.4–5)
ALBUMIN SERPL-MCNC: 3.3 G/DL (ref 3.4–5)
ALBUMIN SERPL-MCNC: 3.3 G/DL (ref 3.4–5)
ALP SERPL-CCNC: 183 U/L (ref 40–129)
ALT SERPL-CCNC: 26 U/L (ref 10–40)
ANION GAP SERPL CALCULATED.3IONS-SCNC: 11 MMOL/L (ref 3–16)
ANION GAP SERPL CALCULATED.3IONS-SCNC: 14 MMOL/L (ref 3–16)
AST SERPL-CCNC: 31 U/L (ref 15–37)
BASOPHILS # BLD: 0 K/UL (ref 0–0.2)
BASOPHILS NFR BLD: 0.8 %
BILIRUB DIRECT SERPL-MCNC: 0.2 MG/DL (ref 0–0.3)
BILIRUB INDIRECT SERPL-MCNC: 0.2 MG/DL (ref 0–1)
BILIRUB SERPL-MCNC: 0.4 MG/DL (ref 0–1)
BUN SERPL-MCNC: 63 MG/DL (ref 7–20)
BUN SERPL-MCNC: 66 MG/DL (ref 7–20)
CALCIUM SERPL-MCNC: 8.9 MG/DL (ref 8.3–10.6)
CALCIUM SERPL-MCNC: 9 MG/DL (ref 8.3–10.6)
CHLORIDE SERPL-SCNC: 101 MMOL/L (ref 99–110)
CHLORIDE SERPL-SCNC: 103 MMOL/L (ref 99–110)
CO2 SERPL-SCNC: 25 MMOL/L (ref 21–32)
CO2 SERPL-SCNC: 26 MMOL/L (ref 21–32)
CREAT SERPL-MCNC: 2.9 MG/DL (ref 0.8–1.3)
CREAT SERPL-MCNC: 2.9 MG/DL (ref 0.8–1.3)
CRP SERPL-MCNC: 122 MG/L (ref 0–5.1)
DEPRECATED RDW RBC AUTO: 15.2 % (ref 12.4–15.4)
EOSINOPHIL # BLD: 0.6 K/UL (ref 0–0.6)
EOSINOPHIL NFR BLD: 9.6 %
GFR SERPLBLD CREATININE-BSD FMLA CKD-EPI: 23 ML/MIN/{1.73_M2}
GFR SERPLBLD CREATININE-BSD FMLA CKD-EPI: 23 ML/MIN/{1.73_M2}
GLUCOSE BLD-MCNC: 104 MG/DL (ref 70–99)
GLUCOSE BLD-MCNC: 133 MG/DL (ref 70–99)
GLUCOSE BLD-MCNC: 228 MG/DL (ref 70–99)
GLUCOSE BLD-MCNC: 234 MG/DL (ref 70–99)
GLUCOSE BLD-MCNC: 280 MG/DL (ref 70–99)
GLUCOSE SERPL-MCNC: 115 MG/DL (ref 70–99)
GLUCOSE SERPL-MCNC: 241 MG/DL (ref 70–99)
HCT VFR BLD AUTO: 24.8 % (ref 40.5–52.5)
HGB BLD-MCNC: 8.3 G/DL (ref 13.5–17.5)
LYMPHOCYTES # BLD: 0.4 K/UL (ref 1–5.1)
LYMPHOCYTES NFR BLD: 6.4 %
MAGNESIUM SERPL-MCNC: 1.71 MG/DL (ref 1.8–2.4)
MAGNESIUM SERPL-MCNC: 1.81 MG/DL (ref 1.8–2.4)
MCH RBC QN AUTO: 29.9 PG (ref 26–34)
MCHC RBC AUTO-ENTMCNC: 33.7 G/DL (ref 31–36)
MCV RBC AUTO: 88.7 FL (ref 80–100)
MONOCYTES # BLD: 0.7 K/UL (ref 0–1.3)
MONOCYTES NFR BLD: 11.8 %
NEUTROPHILS # BLD: 4.1 K/UL (ref 1.7–7.7)
NEUTROPHILS NFR BLD: 71.4 %
PERFORMED ON: ABNORMAL
PHOSPHATE SERPL-MCNC: 3.3 MG/DL (ref 2.5–4.9)
PHOSPHATE SERPL-MCNC: 3.6 MG/DL (ref 2.5–4.9)
PLATELET # BLD AUTO: 192 K/UL (ref 135–450)
PMV BLD AUTO: 7.5 FL (ref 5–10.5)
POTASSIUM SERPL-SCNC: 3.9 MMOL/L (ref 3.5–5.1)
POTASSIUM SERPL-SCNC: 4 MMOL/L (ref 3.5–5.1)
PROT SERPL-MCNC: 6.7 G/DL (ref 6.4–8.2)
RBC # BLD AUTO: 2.79 M/UL (ref 4.2–5.9)
SODIUM SERPL-SCNC: 140 MMOL/L (ref 136–145)
SODIUM SERPL-SCNC: 140 MMOL/L (ref 136–145)
VANCOMYCIN SERPL-MCNC: 18.7 UG/ML
WBC # BLD AUTO: 5.8 K/UL (ref 4–11)

## 2025-05-18 PROCEDURE — 36415 COLL VENOUS BLD VENIPUNCTURE: CPT

## 2025-05-18 PROCEDURE — 2700000000 HC OXYGEN THERAPY PER DAY

## 2025-05-18 PROCEDURE — 1200000000 HC SEMI PRIVATE

## 2025-05-18 PROCEDURE — 80069 RENAL FUNCTION PANEL: CPT

## 2025-05-18 PROCEDURE — 2580000003 HC RX 258

## 2025-05-18 PROCEDURE — 2580000003 HC RX 258: Performed by: INTERNAL MEDICINE

## 2025-05-18 PROCEDURE — 6360000002 HC RX W HCPCS: Performed by: INTERNAL MEDICINE

## 2025-05-18 PROCEDURE — 6370000000 HC RX 637 (ALT 250 FOR IP)

## 2025-05-18 PROCEDURE — P9047 ALBUMIN (HUMAN), 25%, 50ML: HCPCS | Performed by: INTERNAL MEDICINE

## 2025-05-18 PROCEDURE — 2500000003 HC RX 250 WO HCPCS

## 2025-05-18 PROCEDURE — 94761 N-INVAS EAR/PLS OXIMETRY MLT: CPT

## 2025-05-18 PROCEDURE — 99233 SBSQ HOSP IP/OBS HIGH 50: CPT | Performed by: HOSPITALIST

## 2025-05-18 PROCEDURE — 83735 ASSAY OF MAGNESIUM: CPT

## 2025-05-18 PROCEDURE — 86140 C-REACTIVE PROTEIN: CPT

## 2025-05-18 PROCEDURE — 85025 COMPLETE CBC W/AUTO DIFF WBC: CPT

## 2025-05-18 PROCEDURE — 6360000002 HC RX W HCPCS

## 2025-05-18 PROCEDURE — 80076 HEPATIC FUNCTION PANEL: CPT

## 2025-05-18 PROCEDURE — 80202 ASSAY OF VANCOMYCIN: CPT

## 2025-05-18 RX ADMIN — CEFEPIME 2000 MG: 2 INJECTION, POWDER, FOR SOLUTION INTRAVENOUS at 23:13

## 2025-05-18 RX ADMIN — GABAPENTIN 300 MG: 300 CAPSULE ORAL at 23:15

## 2025-05-18 RX ADMIN — SODIUM CHLORIDE: 0.9 INJECTION, SOLUTION INTRAVENOUS at 23:12

## 2025-05-18 RX ADMIN — ALBUMIN (HUMAN) 12.5 G: 0.25 INJECTION, SOLUTION INTRAVENOUS at 04:35

## 2025-05-18 RX ADMIN — FINASTERIDE 5 MG: 5 TABLET, FILM COATED ORAL at 08:36

## 2025-05-18 RX ADMIN — TAMSULOSIN HYDROCHLORIDE 0.4 MG: 0.4 CAPSULE ORAL at 08:36

## 2025-05-18 RX ADMIN — INSULIN GLARGINE 35 UNITS: 100 INJECTION, SOLUTION SUBCUTANEOUS at 23:13

## 2025-05-18 RX ADMIN — ENOXAPARIN SODIUM 180 MG: 100 INJECTION SUBCUTANEOUS at 23:30

## 2025-05-18 RX ADMIN — SODIUM CHLORIDE, PRESERVATIVE FREE 10 ML: 5 INJECTION INTRAVENOUS at 23:15

## 2025-05-18 RX ADMIN — SODIUM CHLORIDE 1500 MG: 0.9 INJECTION, SOLUTION INTRAVENOUS at 08:50

## 2025-05-18 RX ADMIN — ENOXAPARIN SODIUM 180 MG: 100 INJECTION SUBCUTANEOUS at 08:57

## 2025-05-18 RX ADMIN — ALBUMIN (HUMAN) 12.5 G: 0.25 INJECTION, SOLUTION INTRAVENOUS at 13:16

## 2025-05-18 RX ADMIN — ASPIRIN 81 MG: 81 TABLET, COATED ORAL at 08:36

## 2025-05-18 RX ADMIN — SENNOSIDES AND DOCUSATE SODIUM 1 TABLET: 50; 8.6 TABLET ORAL at 23:14

## 2025-05-18 RX ADMIN — SENNOSIDES AND DOCUSATE SODIUM 1 TABLET: 50; 8.6 TABLET ORAL at 08:36

## 2025-05-18 RX ADMIN — AMITRIPTYLINE HYDROCHLORIDE 25 MG: 50 TABLET ORAL at 23:14

## 2025-05-18 RX ADMIN — ATORVASTATIN CALCIUM 20 MG: 20 TABLET, FILM COATED ORAL at 08:36

## 2025-05-18 RX ADMIN — GABAPENTIN 300 MG: 300 CAPSULE ORAL at 08:36

## 2025-05-18 RX ADMIN — INSULIN LISPRO 2 UNITS: 100 INJECTION, SOLUTION INTRAVENOUS; SUBCUTANEOUS at 17:19

## 2025-05-18 RX ADMIN — INSULIN LISPRO 8 UNITS: 100 INJECTION, SOLUTION INTRAVENOUS; SUBCUTANEOUS at 23:29

## 2025-05-18 RX ADMIN — CEFEPIME 2000 MG: 2 INJECTION, POWDER, FOR SOLUTION INTRAVENOUS at 08:57

## 2025-05-18 NOTE — PROGRESS NOTES
Internal Medicine Progress Note    Admit Date: 5/10/2025  Hospital Day: 9   Patient name: Ty Meyer   : 1960     CC:   Altered Mental Status     Interval history:   NAEON.       Intake/Output Summary (Last 24 hours) at 2025 0811  Last data filed at 2025 0655  Gross per 24 hour   Intake 120 ml   Output 5000 ml   Net -4880 ml   Diuresing very well. Only bed scales obtained.    Cr improving. All rheumatologic labs negative thus far.     No fever since admission   HR 83 , BP mild elevated this morning 145/69 oxygen saturation of 99% on 3 litters   Creatinine : 2.9 <3.2   CBC: Hb 8.3   Glucose 115 : 35 lantus and high dose corrective insulin   U/O 5000 in 24 hours   Continue on vanc and cefepime   Stop Lasix today     Medications   Scheduled Meds:   vancomycin  1,500 mg IntraVENous Once    cefepime  2,000 mg IntraVENous Q12H    enoxaparin  1 mg/kg SubCUTAneous BID    insulin lispro  6 Units SubCUTAneous TID WC    insulin glargine  35 Units SubCUTAneous Nightly    albumin human 25%  12.5 g IntraVENous Q8H    vancomycin (VANCOCIN) intermittent dosing (placeholder)   Other RX Placeholder    insulin lispro  0-16 Units SubCUTAneous 4x Daily AC & HS    sodium chloride flush  5-40 mL IntraVENous 2 times per day    amitriptyline  25 mg Oral Nightly    [Held by provider] apixaban  5 mg Oral BID    aspirin  81 mg Oral Daily    atorvastatin  20 mg Oral Daily    finasteride  5 mg Oral Daily    tamsulosin  0.4 mg Oral Daily    [Held by provider] pantoprazole  40 mg Oral QAM AC    sennosides-docusate sodium  1 tablet Oral BID    gabapentin  300 mg Oral BID     Continuous Infusions:   furosemide (LASIX) 100 mg in sodium chloride 0.9 % 100 mL infusion 2 mg/hr (25)    sodium chloride 10 mL/hr at 25 0931    dextrose       Objective   Vitals  Patient Vitals for the past 8 hrs:   BP Temp Temp src Pulse Resp SpO2   25 0438 (!) 145/69 98.2 °F (36.8 °C) Oral 83 16 99 %       Intake/Output  confusion)  Echocardiogram :   Cardiology following EF (50-55%)Moderately dilated LV ,Diastolic dysfunction (Grade II) elevated left atrial pressure (LAP) (HFpEF)Mitral Valve: Mild regurgitation.   Cardiology :following (event monitor at discharge)    HFpEF  Acute hypoxic respiratory failure  Grade II diastolic dysfunction   Patient presenting with worsening SOB,   CT chest :small Bilateral pleural effusion   Echocardiogram :  EF (50-55%)Moderately dilated LV ,Diastolic dysfunction (Grade II) elevated left atrial pressure (LAP) (HFpEF)  Cardiology following.  -Started lasix drip (5/13) decreases dosis yesterday   U/O improving     HUNTER on CKD 3b  Hyperkalemia  Non anion gap metabolic acidosis  Baseline Cr is hard to pin down as it has slowly been rising since 2023. 08/2024 it was 1.89, then 2.22 on 04/29, now 2.4>2.9 on admission.creatinine,Ur:46.7 Urea nitrogen:290 low ,FE Urea(37.5%)pending sodium ur   Most likely due to cardiorenal, given fluid overload and acute CHF.  - Check urine urea nitrogen, urine creatinine to calculate FEUrea  - lasix drip   - Avoid nephrotoxins, renally dose medications  - Daily RFP/Mg  -Hold home spironolactone (hold)  -nephrology consulted     Anasarca  Patient present generalized edema, no pitting  He state that he had it chronically  -ALEJANDRA, Hepatitis panel , C4,C3 (normal)  C-reactive protein , Sed rate (high)    Right ankle fracture  Occurred after suffering a fall.  Xray shows fractures of the distal tibia and fibula with extension of the tibial fracture into the ankle joint space.  Has good distal pulses, able to move toes.  - Orthopedic surgery consulted  - Pain control  - Non-weight bearing on that leg  - holding AC     Bilateral inguinal hernias  Has chronic bilateral inguinal hernias.  Recently his right had concern for incarceration, was seen in our ED, had it reduced. Since then it has fallen back into its herniated place, with increased pain and swelling. Still having BMs

## 2025-05-18 NOTE — PLAN OF CARE
Problem: Safety - Adult  Goal: Free from fall injury  5/17/2025 1621 by Ariane Jules, RN  Outcome: Progressing

## 2025-05-18 NOTE — PROGRESS NOTES
[facility] -  Clinical Pharmacy Note    Vancomycin - Management by Pharmacy    Consult Date(s): 5/14/25  Consulting Provider(s): Shahid Yarbrough MD     Assessment / Plan  1)  Scrotal Cellulitis - Vancomycin  Concurrent Antimicrobials: cefepime  Day of Vanc Therapy / Ordered Duration: 4 of tbd  Current Dosing Method: Intermittent Dosing by Levels  Therapeutic Goal: Trough ~ 15 mg/L  Current Dose / Plan:   Pt with HUNTER on CKD; baseline SCr unclear, but seems to be ~2.3-2.5.  SCr 2.9 today. UOP documented as 1.2 mL/kg/hr last 24 hrs.  Overall, renal function seems to be slowly improving  Will continue to dose vancomycin intermittently based on levels.  Random level this AM = 18.7 mcg/mL.  Will give vancomycin 1500 mg IV once today given improvement in renal function and level < 20   Will check random level in AM tomorrow to determine timing of next dose.  Will continue to monitor clinical condition and make adjustments to regimen as appropriate.    Please call with questions--  Thanks--  Selam Jean, PharmD, BCPS  Clinical Pharmacist  [id number]        Interval update:  Continues on Vanc / Cefepime for scrotal swelling / cellulitis.  Remains with michael. Scr improving slowly.  Lasix gtt remains, 2 mg/hr per MAR.     Subjective/Objective:   Ty Meyer is a 64 y.o. male with a PMHx significant for DVT on Eliquis, HTN, T2DM on insulin, seizure disorder off of AEDs for years, osteomyelitis s/p LLE BKA, CAD, CKD 3b who is admitted with syncope, acute HFpEF exacerbation, HUNTER on CKD, anasarca, and R ankle fracture.  Pt now with concern for scrotal edema / cellulitis 0 broad spectrum antibiotics started 4/14 PM.     Pharmacy is consulted to manage vancomycin.    Ht Readings from Last 1 Encounters:   05/12/25 1.905 m (6' 3\")     Wt Readings from Last 1 Encounters:   05/17/25 (!) 174.8 kg (385 lb 4.8 oz)     Current & Prior Antimicrobial Regimen(s):  Cefepime (5/14 - current)  Vancomycin - Pharmacy to dose  Intermittent

## 2025-05-18 NOTE — PROGRESS NOTES
Nephrology Progress Note                                                                                                                                                                                                                                                                                                                                                               Office : [phone]     Fax :[phone]    Patient's Name: Ty Meyre  10:26 AM  5/18/2025    Reason for Consult:  HUNTER on CKD 3  Requesting Physician:  Jose Chew MD  Chief Complaint:    Chief Complaint   Patient presents with    Altered Mental Status       Assessment/Plan     # HUNTER on CKD 3 sec to ATN   # Volume overloaded   - Volume and creatinine MUCH BETTER   - Creatinine up to 4.0 --> 3.6--> 3.2 --> 2.9  - Lockett placed 5/15 2/2 retention   - Lasix gtt stopped 5/18  - Urine studies without significant proteinuria   - NASRA on 5/13 unremarkable   - Avoid nephrotoxins  - Closely monitor volume status and renal labs  - Urine is bland     # HFpEF  - Echo with EF 50-55%, grade II DD with increased LAP  - BNP worse ~11.5K --> 13K  - Diuresis as needed    # HTN  - BP's overall improved   - Diuresis as above   - Monitor     # Acid- base/ Electrolyte imbalance   # Hyperkalemia  - S/p Lokelma   - Continue low K diet  - Monitor     # Hyponatremia  - Resolved   - Should improve with diuresis  - Monitor     # DM 2  - Management per primary    # Scrotal cellulitis  - abx     # Right bimalleolar ankle fx  - Ortho on board  - Will need outpatient follow-up       History of Present Ilness:    Ty Meyer is a 64 y.o. male with a PMH of HTN, DM2, osteomyelitis s/p LLE BKA, CKD 3, who presented after a fall and syncopal episode. We are being consulted for HUNTER on CKD 3.    Interval hx:    On Lasix gtt, stopped now   Lockett placed  Creatinine improving  Electrolytes stable   Good UOP    Past Medical History:   Diagnosis Date    Abdominal hernia   input(s): \"CHOL\", \"TRIG\", \"HDL\" in the last 72 hours.    Invalid input(s): \"LDLCALC\", \"LABVLDL\"    ABGs:   Recent Labs     05/15/25  1316   PHART 7.301*   PO2ART 116.0*   STL2KUG 46.8*     INR:   No results for input(s): \"INR\" in the last 72 hours.    UA:  No results for input(s): \"COLORU\", \"CLARITYU\", \"GLUCOSEU\", \"BILIRUBINUR\", \"KETUA\", \"SPECGRAV\", \"BLOODU\", \"PHUR\", \"PROTEINU\", \"UROBILINOGEN\", \"NITRU\", \"LEUKOCYTESUR\", \"URINETYPE\" in the last 72 hours.    Invalid input(s): \"LABMICR\"       Urine Microscopic:   No results for input(s): \"LABCAST\", \"BACTERIA\", \"COMU\", \"HYALCAST\", \"WBCUA\", \"RBCUA\" in the last 72 hours.    Invalid input(s): \"EPIU\"      Urine Culture:   No results for input(s): \"LABURIN\" in the last 72 hours.    Urine Chemistry:   No results for input(s): \"CLUR\", \"LABCREA\", \"PROTEINUR\", \"NAUR\" in the last 72 hours.        IMAGING:  US LIVER   Final Result   1. Normal right upper quadrant abdominal ultrasound with no sonographic   abnormality of the liver detected.      Electronically signed by Haim Rapp      CT ABDOMEN PELVIS WO CONTRAST Additional Contrast? None   Final Result      1.  Limited exam.   2.  Large right angle hernia containing small bowel and the dome of the bladder.   Moderate size left inguinal hernia containing loop of small bowel. No   significant wall thickening, and no mesenteric stranding or fluid. No evidence   of bowel obstruction.   3.  Extensive scrotal edema.         Electronically signed by Lc Hobson      US RENAL COMPLETE   Final Result      1.  Increased renal echogenicity suggestive of medical renal disease.   2.  No hydronephrosis.      Electronically signed by Henry Franco MD      XR TIBIA FIBULA RIGHT (2 VIEWS)   Final Result   1. Distal tibial and fibular fractures as previously described. The proximal tibia and fibula are intact.      Electronically signed by Lalito Chacon      XR ANKLE RIGHT (MIN 3 VIEWS)   Final Result   1. Fractures of the distal tibia and fibula  Bi-Rhombic Flap Text: The defect edges were debeveled with a #15 scalpel blade.  Given the location of the defect and the proximity to free margins a bi-rhombic flap was deemed most appropriate.  Using a sterile surgical marker, an appropriate rhombic flap was drawn incorporating the defect. The area thus outlined was incised deep to adipose tissue with a #15 scalpel blade.  The skin margins were undermined to an appropriate distance in all directions utilizing iris scissors.

## 2025-05-19 LAB
ALBUMIN SERPL-MCNC: 3 G/DL (ref 3.4–5)
ALBUMIN SERPL-MCNC: 3.2 G/DL (ref 3.4–5)
ANION GAP SERPL CALCULATED.3IONS-SCNC: 11 MMOL/L (ref 3–16)
ANION GAP SERPL CALCULATED.3IONS-SCNC: 14 MMOL/L (ref 3–16)
BASOPHILS # BLD: 0.1 K/UL (ref 0–0.2)
BASOPHILS NFR BLD: 0.8 %
BUN SERPL-MCNC: 56 MG/DL (ref 7–20)
BUN SERPL-MCNC: 59 MG/DL (ref 7–20)
CALCIUM SERPL-MCNC: 8.9 MG/DL (ref 8.3–10.6)
CALCIUM SERPL-MCNC: 9 MG/DL (ref 8.3–10.6)
CHLORIDE SERPL-SCNC: 100 MMOL/L (ref 99–110)
CHLORIDE SERPL-SCNC: 101 MMOL/L (ref 99–110)
CO2 SERPL-SCNC: 24 MMOL/L (ref 21–32)
CO2 SERPL-SCNC: 27 MMOL/L (ref 21–32)
CREAT SERPL-MCNC: 2.5 MG/DL (ref 0.8–1.3)
CREAT SERPL-MCNC: 2.7 MG/DL (ref 0.8–1.3)
DEPRECATED RDW RBC AUTO: 15.3 % (ref 12.4–15.4)
EOSINOPHIL # BLD: 0.5 K/UL (ref 0–0.6)
EOSINOPHIL NFR BLD: 7.4 %
GFR SERPLBLD CREATININE-BSD FMLA CKD-EPI: 25 ML/MIN/{1.73_M2}
GFR SERPLBLD CREATININE-BSD FMLA CKD-EPI: 28 ML/MIN/{1.73_M2}
GLUCOSE BLD-MCNC: 169 MG/DL (ref 70–99)
GLUCOSE BLD-MCNC: 180 MG/DL (ref 70–99)
GLUCOSE BLD-MCNC: 228 MG/DL (ref 70–99)
GLUCOSE BLD-MCNC: 262 MG/DL (ref 70–99)
GLUCOSE SERPL-MCNC: 205 MG/DL (ref 70–99)
GLUCOSE SERPL-MCNC: 256 MG/DL (ref 70–99)
HCT VFR BLD AUTO: 26.5 % (ref 40.5–52.5)
HGB BLD-MCNC: 8.7 G/DL (ref 13.5–17.5)
LYMPHOCYTES # BLD: 0.6 K/UL (ref 1–5.1)
LYMPHOCYTES NFR BLD: 8.4 %
MAGNESIUM SERPL-MCNC: 1.67 MG/DL (ref 1.8–2.4)
MAGNESIUM SERPL-MCNC: 2.23 MG/DL (ref 1.8–2.4)
MCH RBC QN AUTO: 29.6 PG (ref 26–34)
MCHC RBC AUTO-ENTMCNC: 33 G/DL (ref 31–36)
MCV RBC AUTO: 89.7 FL (ref 80–100)
MONOCYTES # BLD: 1 K/UL (ref 0–1.3)
MONOCYTES NFR BLD: 15 %
NEUTROPHILS # BLD: 4.6 K/UL (ref 1.7–7.7)
NEUTROPHILS NFR BLD: 68.4 %
PERFORMED ON: ABNORMAL
PHOSPHATE SERPL-MCNC: 3 MG/DL (ref 2.5–4.9)
PHOSPHATE SERPL-MCNC: 3.1 MG/DL (ref 2.5–4.9)
PLATELET # BLD AUTO: 210 K/UL (ref 135–450)
PMV BLD AUTO: 7.3 FL (ref 5–10.5)
POTASSIUM SERPL-SCNC: 3.9 MMOL/L (ref 3.5–5.1)
POTASSIUM SERPL-SCNC: 3.9 MMOL/L (ref 3.5–5.1)
RBC # BLD AUTO: 2.95 M/UL (ref 4.2–5.9)
SODIUM SERPL-SCNC: 138 MMOL/L (ref 136–145)
SODIUM SERPL-SCNC: 139 MMOL/L (ref 136–145)
T4 SERPL-MCNC: 4.5 UG/DL (ref 4.5–10.9)
VANCOMYCIN SERPL-MCNC: 21.5 UG/ML
WBC # BLD AUTO: 6.7 K/UL (ref 4–11)

## 2025-05-19 PROCEDURE — 6360000002 HC RX W HCPCS: Performed by: INTERNAL MEDICINE

## 2025-05-19 PROCEDURE — 6360000002 HC RX W HCPCS

## 2025-05-19 PROCEDURE — 85025 COMPLETE CBC W/AUTO DIFF WBC: CPT

## 2025-05-19 PROCEDURE — 84436 ASSAY OF TOTAL THYROXINE: CPT

## 2025-05-19 PROCEDURE — 83735 ASSAY OF MAGNESIUM: CPT

## 2025-05-19 PROCEDURE — 80069 RENAL FUNCTION PANEL: CPT

## 2025-05-19 PROCEDURE — 6370000000 HC RX 637 (ALT 250 FOR IP)

## 2025-05-19 PROCEDURE — 36415 COLL VENOUS BLD VENIPUNCTURE: CPT

## 2025-05-19 PROCEDURE — 1200000000 HC SEMI PRIVATE

## 2025-05-19 PROCEDURE — 2500000003 HC RX 250 WO HCPCS

## 2025-05-19 PROCEDURE — 99233 SBSQ HOSP IP/OBS HIGH 50: CPT | Performed by: INTERNAL MEDICINE

## 2025-05-19 PROCEDURE — 80202 ASSAY OF VANCOMYCIN: CPT

## 2025-05-19 PROCEDURE — 2580000003 HC RX 258: Performed by: INTERNAL MEDICINE

## 2025-05-19 RX ORDER — MAGNESIUM SULFATE IN WATER 40 MG/ML
4000 INJECTION, SOLUTION INTRAVENOUS ONCE
Status: COMPLETED | OUTPATIENT
Start: 2025-05-19 | End: 2025-05-19

## 2025-05-19 RX ORDER — FUROSEMIDE 40 MG/1
40 TABLET ORAL DAILY
Status: DISCONTINUED | OUTPATIENT
Start: 2025-05-19 | End: 2025-05-23 | Stop reason: HOSPADM

## 2025-05-19 RX ADMIN — GABAPENTIN 300 MG: 300 CAPSULE ORAL at 09:14

## 2025-05-19 RX ADMIN — CEFEPIME 2000 MG: 2 INJECTION, POWDER, FOR SOLUTION INTRAVENOUS at 09:18

## 2025-05-19 RX ADMIN — INSULIN GLARGINE 35 UNITS: 100 INJECTION, SOLUTION SUBCUTANEOUS at 21:44

## 2025-05-19 RX ADMIN — CEFEPIME 2000 MG: 2 INJECTION, POWDER, FOR SOLUTION INTRAVENOUS at 21:42

## 2025-05-19 RX ADMIN — GABAPENTIN 300 MG: 300 CAPSULE ORAL at 21:43

## 2025-05-19 RX ADMIN — INSULIN LISPRO 4 UNITS: 100 INJECTION, SOLUTION INTRAVENOUS; SUBCUTANEOUS at 09:13

## 2025-05-19 RX ADMIN — OXYCODONE 10 MG: 5 TABLET ORAL at 09:13

## 2025-05-19 RX ADMIN — ENOXAPARIN SODIUM 180 MG: 100 INJECTION SUBCUTANEOUS at 14:32

## 2025-05-19 RX ADMIN — SODIUM CHLORIDE, PRESERVATIVE FREE 10 ML: 5 INJECTION INTRAVENOUS at 21:45

## 2025-05-19 RX ADMIN — SENNOSIDES AND DOCUSATE SODIUM 1 TABLET: 50; 8.6 TABLET ORAL at 09:14

## 2025-05-19 RX ADMIN — OXYCODONE 10 MG: 5 TABLET ORAL at 05:13

## 2025-05-19 RX ADMIN — ATORVASTATIN CALCIUM 20 MG: 20 TABLET, FILM COATED ORAL at 09:13

## 2025-05-19 RX ADMIN — SENNOSIDES AND DOCUSATE SODIUM 1 TABLET: 50; 8.6 TABLET ORAL at 21:43

## 2025-05-19 RX ADMIN — INSULIN LISPRO 8 UNITS: 100 INJECTION, SOLUTION INTRAVENOUS; SUBCUTANEOUS at 21:44

## 2025-05-19 RX ADMIN — TAMSULOSIN HYDROCHLORIDE 0.4 MG: 0.4 CAPSULE ORAL at 09:13

## 2025-05-19 RX ADMIN — AMITRIPTYLINE HYDROCHLORIDE 25 MG: 50 TABLET ORAL at 21:43

## 2025-05-19 RX ADMIN — MAGNESIUM SULFATE HEPTAHYDRATE 4000 MG: 40 INJECTION, SOLUTION INTRAVENOUS at 06:50

## 2025-05-19 RX ADMIN — ENOXAPARIN SODIUM 180 MG: 100 INJECTION SUBCUTANEOUS at 21:43

## 2025-05-19 RX ADMIN — ASPIRIN 81 MG: 81 TABLET, COATED ORAL at 09:12

## 2025-05-19 RX ADMIN — FUROSEMIDE 40 MG: 40 TABLET ORAL at 17:39

## 2025-05-19 RX ADMIN — FINASTERIDE 5 MG: 5 TABLET, FILM COATED ORAL at 09:14

## 2025-05-19 ASSESSMENT — PAIN DESCRIPTION - FREQUENCY
FREQUENCY: CONTINUOUS
FREQUENCY: CONTINUOUS

## 2025-05-19 ASSESSMENT — PAIN DESCRIPTION - LOCATION
LOCATION: BACK;HIP
LOCATION: GENERALIZED
LOCATION: GENERALIZED

## 2025-05-19 ASSESSMENT — PAIN SCALES - GENERAL
PAINLEVEL_OUTOF10: 5
PAINLEVEL_OUTOF10: 8

## 2025-05-19 ASSESSMENT — PAIN DESCRIPTION - ORIENTATION: ORIENTATION: RIGHT;LEFT;MID

## 2025-05-19 ASSESSMENT — PAIN DESCRIPTION - ONSET
ONSET: ON-GOING
ONSET: ON-GOING

## 2025-05-19 ASSESSMENT — PAIN DESCRIPTION - DESCRIPTORS
DESCRIPTORS: DISCOMFORT
DESCRIPTORS: ACHING

## 2025-05-19 ASSESSMENT — PAIN DESCRIPTION - PAIN TYPE
TYPE: CHRONIC PAIN
TYPE: CHRONIC PAIN

## 2025-05-19 ASSESSMENT — PAIN - FUNCTIONAL ASSESSMENT
PAIN_FUNCTIONAL_ASSESSMENT: PREVENTS OR INTERFERES SOME ACTIVE ACTIVITIES AND ADLS
PAIN_FUNCTIONAL_ASSESSMENT: ACTIVITIES ARE NOT PREVENTED

## 2025-05-19 NOTE — PLAN OF CARE
Problem: Safety - Adult  Goal: Free from fall injury  Outcome: Progressing  Note:  Remains free from falls, bed in low position, call light in reach, bed alarm monitoring for safety.     Problem: Pain  Goal: Verbalizes/displays adequate comfort level or baseline comfort level  Outcome: Progressing  Note:  PRN Oxycodone 10 mg PO at 0513 for c/o generalized \"all over\" pain per patient request.     Problem: Respiratory - Adult  Goal: Achieves optimal ventilation and oxygenation  Outcome: Progressing  Note:  O2 97% - 98% on 3L O2 per NC.     Problem: Cardiovascular - Adult  Goal: Maintains optimal cardiac output and hemodynamic stability  Outcome: Progressing  Note:  1) Sinus Rhythm with IVCD rate 95 at 2200; and, 2) Sinus Rhythm with IVCD & PVCs rate 90 at 0539.

## 2025-05-19 NOTE — PROGRESS NOTES
attempted visit for emotional distress consult. Pt was soundly asleep.  will visit again at a later time.

## 2025-05-19 NOTE — PROGRESS NOTES
Nephrology Progress Note                                                                                                                                                                                                                                                                                                                                                               Office : [phone]     Fax :[phone]    Patient's Name: Ty Meyer  8:29 AM  5/19/2025    Reason for Consult:  HUNTER on CKD 3  Requesting Physician:  Jose Chew MD  Chief Complaint:    Chief Complaint   Patient presents with    Altered Mental Status       Assessment/Plan     # HUNTER on CKD 3 sec to ATN   # Volume overloaded   - Volume and creatinine MUCH BETTER   - Creatinine up to 4.0 --> 3.6--> 3.2 --> 2.9 --> 2.7  - Lockett placed 5/15 2/2 retention   - S/p Lasix gtt; stopped 5/18  - Urine studies without significant proteinuria   - NASRA on 5/13 unremarkable   - Avoid nephrotoxins  - Closely monitor volume status and renal labs    # HFpEF  - Echo with EF 50-55%, grade II DD with increased LAP  - BNP worse ~11.5K --> 13K  - Diuresis as needed    # HTN  - BP's overall improved   - Monitor     # Acid- base/ Electrolyte imbalance   # Hyperkalemia  - S/p Lokelma   - Continue low K diet  - Monitor     # Hyponatremia - Resolved  - Improved with diuresis  - Monitor     # DM 2  - Management per primary    # Scrotal cellulitis  - Abx per primary     # Right bimalleolar ankle fx  - Ortho on board  - Will need outpatient follow-up       History of Present Ilness:    Ty Meyer is a 64 y.o. male with a PMH of HTN, DM2, osteomyelitis s/p LLE BKA, CKD 3, who presented after a fall and syncopal episode. We are being consulted for HUNTER on CKD 3.    Interval hx:    Resting in bed  Creatinine improved  Electrolytes stable - receiving magnesium this AM  Great UOP  BP's overall controlled       Past Medical History:   Diagnosis Date    Abdominal

## 2025-05-19 NOTE — PROGRESS NOTES
Physical Therapy/Occupational therapy  Attempt    Spoke with pt. Family brought L LE prosthesis in but not the shoe that goes with it. Not appropriate to attempt transfers without shoe. Pt will notify family to bring shoe in later today. Will f/u later date as appropriate.    Nickie Tanner, PT, DPT  [id number]  Minerva Hooker, MOT, OTR/L, CNS

## 2025-05-19 NOTE — PROGRESS NOTES
[facility] -  Clinical Pharmacy Note    Vancomycin - Management by Pharmacy    Consult Date(s): 5/14/25  Consulting Provider(s): Shahid Yarbrough MD     Assessment / Plan  1)  Scrotal Cellulitis - Vancomycin  Concurrent Antimicrobials: cefepime  Day of Vanc Therapy / Ordered Duration: 6 of tbd  Current Dosing Method: Intermittent Dosing by Levels  Therapeutic Goal: Trough ~ 15 mg/L  Current Dose / Plan:   Pt with HUNTER on CKD; baseline SCr unclear, but seems to be ~2.3-2.5.  SCr 2.7 today. UOP documented as 0.7 mL/kg/hr last 24 hrs.  Will continue to dose vancomycin intermittently based on levels.  Random level this AM =  21.4 mcg/mL.  Will not give any Vancomycin today.  Will check random level in AM tomorrow to determine further dosing.  Will continue to monitor clinical condition and make adjustments to regimen as appropriate.    Please call with questions--  Thanks--  Tamiko Strong, PharmD, BCPS, BCGP  [id number] ([facility])   5/19/2025 8:16 AM      Interval update:  Continues on Vanc / Cefepime for scrotal swelling / cellulitis.  Apixaban on hold; on Enoxaparin therapeutic dosing for now.  Furosemide drip d/c'd.    Subjective/Objective:   Ty Meyer is a 64 y.o. male with a PMHx significant for DVT on Eliquis, HTN, T2DM on insulin, seizure disorder off of AEDs for years, osteomyelitis s/p LLE BKA, CAD, CKD 3b who is admitted with syncope, acute HFpEF exacerbation, HUNTER on CKD, anasarca, and R ankle fracture.  Pt now with concern for scrotal edema / cellulitis 0 broad spectrum antibiotics started 5/14 PM.     Pharmacy is consulted to manage vancomycin.    Ht Readings from Last 1 Encounters:   05/12/25 1.905 m (6' 3\")     Wt Readings from Last 1 Encounters:   05/19/25 (!) 166.8 kg (367 lb 11.2 oz)     Current & Prior Antimicrobial Regimen(s):  Cefepime (5/14 - current)  Vancomycin - Pharmacy to dose  Intermittent dosing (5/14-current)    Date Vanc Level Vanc Dose   5/14 -- Ordered, not given until 5/15

## 2025-05-19 NOTE — PROGRESS NOTES
Internal Medicine Progress Note    Admit Date: 5/10/2025  Hospital Day: 10   Patient name: Ty Meyer   : 1960     CC:   Altered Mental Status     Interval history:   NAEON.       Intake/Output Summary (Last 24 hours) at 2025 1258  Last data filed at 2025 0918  Gross per 24 hour   Intake 360 ml   Output 2420 ml   Net -2060 ml   Diuresing very well. Only bed scales obtained.    Cr improving. All rheumatologic labs negative thus far.     No fever since admission   HR 83 , BP mild elevated this morning 145/69 oxygen saturation of 97% on 1 litters   Creatinine : 2.9 <3.2 >2.7   Glucose 115 : 35 lantus and high dose corrective insulin   U/O 2700 in 24 hours   Lasix stopped ()    Medications   Scheduled Meds:   cefepime  2,000 mg IntraVENous Q12H    enoxaparin  1 mg/kg SubCUTAneous BID    insulin glargine  35 Units SubCUTAneous Nightly    vancomycin (VANCOCIN) intermittent dosing (placeholder)   Other RX Placeholder    insulin lispro  0-16 Units SubCUTAneous 4x Daily AC & HS    sodium chloride flush  5-40 mL IntraVENous 2 times per day    amitriptyline  25 mg Oral Nightly    [Held by provider] apixaban  5 mg Oral BID    aspirin  81 mg Oral Daily    atorvastatin  20 mg Oral Daily    finasteride  5 mg Oral Daily    tamsulosin  0.4 mg Oral Daily    [Held by provider] pantoprazole  40 mg Oral QAM AC    sennosides-docusate sodium  1 tablet Oral BID    gabapentin  300 mg Oral BID     Continuous Infusions:   sodium chloride 5 mL/hr at 25 2312    dextrose       Objective   Vitals  Patient Vitals for the past 8 hrs:   BP Temp Temp src Pulse Resp SpO2   25 0900 120/76 98.4 °F (36.9 °C) Axillary 90 18 96 %   25 0539 -- -- -- 90 -- --   25 0513 -- -- -- -- 18 --       Intake/Output Summary (Last 24 hours) at 2025 1258  Last data filed at 2025 0918  Gross per 24 hour   Intake 360 ml   Output 2420 ml   Net -2060 ml       ROS: A 10 point review of systems was conducted  and significant findings are noted in the interval history.    Physical Exam  HENT:      Head: Normocephalic.      Nose: Nose normal.      Mouth/Throat:      Mouth: Mucous membranes are dry.   Eyes:      Pupils: Pupils are equal, round, and reactive to light.   Cardiovascular:      Rate and Rhythm: Normal rate and regular rhythm.   Pulmonary:      Comments: On 1 litter of oxygen   Abdominal:      General: Bowel sounds are normal. There is distension.      Tenderness: There is no abdominal tenderness.      Comments: Mild pain rated 2/10 in the right inguinal area    Genitourinary:     Comments: Swollen scrotum ,but showed improvement compared to previous exam  Redness is decreasing   No purulent discharge noted on the penis   Musculoskeletal:         General: Swelling present.      Comments: Bilateral leg and arm swelling improving    Skin:     General: Skin is warm.   Neurological:      General: No focal deficit present.      Mental Status: He is alert and oriented to person, place, and time.   Psychiatric:         Mood and Affect: Mood normal.         LABS  CBC:   Recent Labs     05/17/25  0527 05/18/25 0452 05/19/25  0514   WBC 5.7 5.8 6.7   HGB 8.2* 8.3* 8.7*   HCT 24.3* 24.8* 26.5*    192 210   MCV 89.0 88.7 89.7     Renal:    Recent Labs     05/18/25 0452 05/18/25 2129 05/19/25  0514    140 139   K 3.9 4.0 3.9    101 101   CO2 26 25 27   BUN 66* 63* 59*   CREATININE 2.9* 2.9* 2.7*   GLUCOSE 115* 241* 256*   CALCIUM 9.0 8.9 8.9   MG 1.81 1.71* 1.67*   PHOS 3.6 3.3 3.0   ANIONGAP 11 14 11     Hepatic:   Recent Labs     05/18/25 0452   AST 31   ALT 26   BILITOT 0.4   BILIDIR 0.2   ALKPHOS 183*         -----------------------------------------------------------------  Imaging:  US LIVER   Final Result   1. Normal right upper quadrant abdominal ultrasound with no sonographic   abnormality of the liver detected.      Electronically signed by Haim Rapp      CT ABDOMEN PELVIS WO CONTRAST  (LAP) (HFpEF)Mitral Valve: Mild regurgitation  Plan.   Cardiology :following (event monitor at discharge)      HFpEF  Acute hypoxic respiratory failure  Grade II diastolic dysfunction   Patient presenting with worsening SOB,   CT chest :small Bilateral pleural effusion   Echocardiogram :  EF (50-55%)Moderately dilated LV ,Diastolic dysfunction (Grade II) elevated left atrial pressure (LAP) (HFpEF)  Cardiology following.  -Started lasix drip (5/13) stopped 5/18   U/O improving     HUNTER on CKD 3b(improving)  Baseline Cr is hard to pin down as it has slowly been rising since 2023. 08/2024 it was 1.89, then 2.22 on 04/29, now 2.4>2.9 on admission.creatinine,Ur:46.7 Urea nitrogen:290 low ,FE Urea(37.5%)pending sodium ur   Most likely due to cardiorenal, given fluid overload and acute CHF and also urinary obstruction 2/2 big inguinal hernias, after michael placement (5/15) Cr is down trending   Plan  - lasix drip (stopped)5/18  - Avoid nephrotoxins, renally dose medications  - Daily RFP/Mg  -Flomax and finasteride   -Hold home spironolactone (hold)  -nephrology following  -Stric I/O    Cellulitis involving the scrotum and inguinal region  No fever, normal wbc   Redness and swelling improving   Patient on day number #4 of vancomycin and sozyn      Anasarca (improving)  Patient present generalized edema, no pitting  He state that he had it chronically  -ALEJANDRA, Hepatitis panel , C4,C3 (normal)  C-reactive protein , Sed rate (high) probably secondary to arthritis    Right ankle fracture  Occurred after suffering a fall.  Xray shows fractures of the distal tibia and fibula with extension of the tibial fracture into the ankle joint space.  Has good distal pulses, able to move toes.  - Orthopedic surgery consulted  - Pain control  - Non-weight bearing on that leg  -No surgery definitive plan as outpatient      Bilateral inguinal hernias  Has chronic bilateral inguinal hernias.  Recently his right had concern for incarceration, was

## 2025-05-19 NOTE — PROGRESS NOTES
attempted to visit pt again. Pt soundly asleep. Feeling very drowsy per RN.  will follow up tomorrow 5/20/2025.

## 2025-05-19 NOTE — PROGRESS NOTES
D:  Patient had 10 beats of V-tach at 0525, then returned to normal sinus rhythm rate 90.  Patient is asymptomatic.  Please advise if any new orders.  Thank you.  A:  Notified provider, Mac Nicolas MD.  R:  Per provider above, \"Will wait for magnesium labs to result and replace if needed.\"  A:  Notified provider, \"Magnesium labs have resulted at 1.64 with labs this morning, please provide order for replacement, there are no PRN orders.  Thank you.\"

## 2025-05-19 NOTE — CARE COORDINATION
Patient down to 1Liter O2. [facility] called and they will accept, but only have question about an anemia medication (not found on med list). CM will continue to follow patient until discharge. Electronically signed by Rosalia Naranjo RN on 5/19/2025 at 5:33 PM

## 2025-05-20 LAB
ALBUMIN SERPL-MCNC: 3.1 G/DL (ref 3.4–5)
ALBUMIN SERPL-MCNC: 3.2 G/DL (ref 3.4–5)
ANION GAP SERPL CALCULATED.3IONS-SCNC: 12 MMOL/L (ref 3–16)
ANION GAP SERPL CALCULATED.3IONS-SCNC: 12 MMOL/L (ref 3–16)
BASOPHILS # BLD: 0 K/UL (ref 0–0.2)
BASOPHILS NFR BLD: 0.5 %
BUN SERPL-MCNC: 50 MG/DL (ref 7–20)
BUN SERPL-MCNC: 53 MG/DL (ref 7–20)
CALCIUM SERPL-MCNC: 8.9 MG/DL (ref 8.3–10.6)
CALCIUM SERPL-MCNC: 9.2 MG/DL (ref 8.3–10.6)
CHLORIDE SERPL-SCNC: 101 MMOL/L (ref 99–110)
CHLORIDE SERPL-SCNC: 98 MMOL/L (ref 99–110)
CO2 SERPL-SCNC: 27 MMOL/L (ref 21–32)
CO2 SERPL-SCNC: 28 MMOL/L (ref 21–32)
CREAT SERPL-MCNC: 2.4 MG/DL (ref 0.8–1.3)
CREAT SERPL-MCNC: 2.5 MG/DL (ref 0.8–1.3)
DEPRECATED RDW RBC AUTO: 15.2 % (ref 12.4–15.4)
EOSINOPHIL # BLD: 0.8 K/UL (ref 0–0.6)
EOSINOPHIL NFR BLD: 10 %
GFR SERPLBLD CREATININE-BSD FMLA CKD-EPI: 28 ML/MIN/{1.73_M2}
GFR SERPLBLD CREATININE-BSD FMLA CKD-EPI: 29 ML/MIN/{1.73_M2}
GLUCOSE BLD-MCNC: 205 MG/DL (ref 70–99)
GLUCOSE BLD-MCNC: 209 MG/DL (ref 70–99)
GLUCOSE BLD-MCNC: 236 MG/DL (ref 70–99)
GLUCOSE BLD-MCNC: 265 MG/DL (ref 70–99)
GLUCOSE SERPL-MCNC: 192 MG/DL (ref 70–99)
GLUCOSE SERPL-MCNC: 241 MG/DL (ref 70–99)
HCT VFR BLD AUTO: 27.1 % (ref 40.5–52.5)
HGB BLD-MCNC: 9.1 G/DL (ref 13.5–17.5)
LYMPHOCYTES # BLD: 0.7 K/UL (ref 1–5.1)
LYMPHOCYTES NFR BLD: 9.4 %
MAGNESIUM SERPL-MCNC: 1.9 MG/DL (ref 1.8–2.4)
MAGNESIUM SERPL-MCNC: 2.14 MG/DL (ref 1.8–2.4)
MCH RBC QN AUTO: 29.8 PG (ref 26–34)
MCHC RBC AUTO-ENTMCNC: 33.4 G/DL (ref 31–36)
MCV RBC AUTO: 89.4 FL (ref 80–100)
MONOCYTES # BLD: 0.9 K/UL (ref 0–1.3)
MONOCYTES NFR BLD: 11.8 %
NEUTROPHILS # BLD: 5.2 K/UL (ref 1.7–7.7)
NEUTROPHILS NFR BLD: 68.3 %
NT-PROBNP SERPL-MCNC: ABNORMAL PG/ML (ref 0–124)
PERFORMED ON: ABNORMAL
PHOSPHATE SERPL-MCNC: 2.7 MG/DL (ref 2.5–4.9)
PHOSPHATE SERPL-MCNC: 2.9 MG/DL (ref 2.5–4.9)
PLATELET # BLD AUTO: 233 K/UL (ref 135–450)
PMV BLD AUTO: 8 FL (ref 5–10.5)
POTASSIUM SERPL-SCNC: 3.7 MMOL/L (ref 3.5–5.1)
POTASSIUM SERPL-SCNC: 3.8 MMOL/L (ref 3.5–5.1)
RBC # BLD AUTO: 3.03 M/UL (ref 4.2–5.9)
SODIUM SERPL-SCNC: 137 MMOL/L (ref 136–145)
SODIUM SERPL-SCNC: 141 MMOL/L (ref 136–145)
VANCOMYCIN SERPL-MCNC: 16.5 UG/ML
WBC # BLD AUTO: 7.6 K/UL (ref 4–11)

## 2025-05-20 PROCEDURE — 6360000002 HC RX W HCPCS: Performed by: INTERNAL MEDICINE

## 2025-05-20 PROCEDURE — 2500000003 HC RX 250 WO HCPCS

## 2025-05-20 PROCEDURE — 6370000000 HC RX 637 (ALT 250 FOR IP)

## 2025-05-20 PROCEDURE — 1200000000 HC SEMI PRIVATE

## 2025-05-20 PROCEDURE — 97530 THERAPEUTIC ACTIVITIES: CPT

## 2025-05-20 PROCEDURE — 2580000003 HC RX 258: Performed by: INTERNAL MEDICINE

## 2025-05-20 PROCEDURE — 36415 COLL VENOUS BLD VENIPUNCTURE: CPT

## 2025-05-20 PROCEDURE — 85025 COMPLETE CBC W/AUTO DIFF WBC: CPT

## 2025-05-20 PROCEDURE — 99233 SBSQ HOSP IP/OBS HIGH 50: CPT | Performed by: INTERNAL MEDICINE

## 2025-05-20 PROCEDURE — 83735 ASSAY OF MAGNESIUM: CPT

## 2025-05-20 PROCEDURE — 2580000003 HC RX 258

## 2025-05-20 PROCEDURE — 83880 ASSAY OF NATRIURETIC PEPTIDE: CPT

## 2025-05-20 PROCEDURE — 80069 RENAL FUNCTION PANEL: CPT

## 2025-05-20 PROCEDURE — 6360000002 HC RX W HCPCS

## 2025-05-20 PROCEDURE — 80202 ASSAY OF VANCOMYCIN: CPT

## 2025-05-20 RX ORDER — DOXYCYCLINE HYCLATE 100 MG
100 TABLET ORAL 2 TIMES DAILY
Qty: 10 TABLET | Refills: 0 | Status: SHIPPED | OUTPATIENT
Start: 2025-05-20 | End: 2025-05-22 | Stop reason: HOSPADM

## 2025-05-20 RX ORDER — INSULIN LISPRO 100 [IU]/ML
0-16 INJECTION, SOLUTION INTRAVENOUS; SUBCUTANEOUS
Qty: 10 EACH | Refills: 1 | Status: SHIPPED | OUTPATIENT
Start: 2025-05-20 | End: 2025-06-19

## 2025-05-20 RX ORDER — INSULIN GLARGINE 100 [IU]/ML
35 INJECTION, SOLUTION SUBCUTANEOUS NIGHTLY
Qty: 10 ML | Refills: 3 | Status: SHIPPED | OUTPATIENT
Start: 2025-05-20

## 2025-05-20 RX ORDER — FERROUS SULFATE 325(65) MG
325 TABLET, DELAYED RELEASE (ENTERIC COATED) ORAL
Status: CANCELLED | OUTPATIENT
Start: 2025-05-21

## 2025-05-20 RX ORDER — LEVOTHYROXINE SODIUM 25 UG/1
25 TABLET ORAL DAILY
Qty: 30 TABLET | Refills: 3 | Status: SHIPPED | OUTPATIENT
Start: 2025-05-20

## 2025-05-20 RX ORDER — FUROSEMIDE 40 MG/1
40 TABLET ORAL DAILY
Qty: 60 TABLET | Refills: 3 | Status: SHIPPED | OUTPATIENT
Start: 2025-05-21

## 2025-05-20 RX ORDER — LEVOTHYROXINE SODIUM 25 UG/1
25 TABLET ORAL DAILY
Status: DISCONTINUED | OUTPATIENT
Start: 2025-05-20 | End: 2025-05-23 | Stop reason: HOSPADM

## 2025-05-20 RX ORDER — POLYETHYLENE GLYCOL 3350 17 G/17G
17 POWDER, FOR SOLUTION ORAL DAILY PRN
Qty: 15 EACH | Refills: 1 | Status: SHIPPED | OUTPATIENT
Start: 2025-05-20 | End: 2025-06-19

## 2025-05-20 RX ORDER — AMOXICILLIN 875 MG/1
875 TABLET, COATED ORAL 2 TIMES DAILY
Qty: 10 TABLET | Refills: 0 | Status: SHIPPED | OUTPATIENT
Start: 2025-05-20 | End: 2025-05-22 | Stop reason: HOSPADM

## 2025-05-20 RX ADMIN — IRON SUCROSE 300 MG: 20 INJECTION, SOLUTION INTRAVENOUS at 13:00

## 2025-05-20 RX ADMIN — APIXABAN 5 MG: 5 TABLET, FILM COATED ORAL at 21:17

## 2025-05-20 RX ADMIN — LEVOTHYROXINE SODIUM 25 MCG: 0.03 TABLET ORAL at 12:40

## 2025-05-20 RX ADMIN — CEFEPIME 2000 MG: 2 INJECTION, POWDER, FOR SOLUTION INTRAVENOUS at 09:06

## 2025-05-20 RX ADMIN — INSULIN GLARGINE 35 UNITS: 100 INJECTION, SOLUTION SUBCUTANEOUS at 21:17

## 2025-05-20 RX ADMIN — GABAPENTIN 300 MG: 300 CAPSULE ORAL at 21:17

## 2025-05-20 RX ADMIN — FINASTERIDE 5 MG: 5 TABLET, FILM COATED ORAL at 09:09

## 2025-05-20 RX ADMIN — SENNOSIDES AND DOCUSATE SODIUM 1 TABLET: 50; 8.6 TABLET ORAL at 21:17

## 2025-05-20 RX ADMIN — CEFEPIME 2000 MG: 2 INJECTION, POWDER, FOR SOLUTION INTRAVENOUS at 21:15

## 2025-05-20 RX ADMIN — VANCOMYCIN HYDROCHLORIDE 1000 MG: 1 INJECTION, POWDER, LYOPHILIZED, FOR SOLUTION INTRAVENOUS at 11:05

## 2025-05-20 RX ADMIN — TAMSULOSIN HYDROCHLORIDE 0.4 MG: 0.4 CAPSULE ORAL at 09:09

## 2025-05-20 RX ADMIN — GABAPENTIN 300 MG: 300 CAPSULE ORAL at 09:09

## 2025-05-20 RX ADMIN — SENNOSIDES AND DOCUSATE SODIUM 1 TABLET: 50; 8.6 TABLET ORAL at 09:09

## 2025-05-20 RX ADMIN — FUROSEMIDE 40 MG: 40 TABLET ORAL at 09:09

## 2025-05-20 RX ADMIN — ATORVASTATIN CALCIUM 20 MG: 20 TABLET, FILM COATED ORAL at 09:09

## 2025-05-20 RX ADMIN — INSULIN LISPRO 4 UNITS: 100 INJECTION, SOLUTION INTRAVENOUS; SUBCUTANEOUS at 09:09

## 2025-05-20 RX ADMIN — SODIUM CHLORIDE, PRESERVATIVE FREE 10 ML: 5 INJECTION INTRAVENOUS at 09:13

## 2025-05-20 RX ADMIN — INSULIN LISPRO 8 UNITS: 100 INJECTION, SOLUTION INTRAVENOUS; SUBCUTANEOUS at 21:18

## 2025-05-20 RX ADMIN — AMITRIPTYLINE HYDROCHLORIDE 25 MG: 50 TABLET ORAL at 21:17

## 2025-05-20 RX ADMIN — SODIUM CHLORIDE, PRESERVATIVE FREE 10 ML: 5 INJECTION INTRAVENOUS at 21:16

## 2025-05-20 RX ADMIN — ASPIRIN 81 MG: 81 TABLET, COATED ORAL at 09:09

## 2025-05-20 RX ADMIN — ENOXAPARIN SODIUM 180 MG: 100 INJECTION SUBCUTANEOUS at 09:18

## 2025-05-20 RX ADMIN — INSULIN LISPRO 4 UNITS: 100 INJECTION, SOLUTION INTRAVENOUS; SUBCUTANEOUS at 12:40

## 2025-05-20 RX ADMIN — INSULIN LISPRO 4 UNITS: 100 INJECTION, SOLUTION INTRAVENOUS; SUBCUTANEOUS at 17:41

## 2025-05-20 NOTE — PROGRESS NOTES
Spiritual Health History and Assessment/Progress Note  [facility]    Spiritual/Emotional Needs,  ,  ,      Name: Ty Meyer MRN: [id number]    Age: 64 y.o.     Sex: male   Language: English   Caodaism: Non-Restorationist   Syncope and collapse     Date: 5/20/2025            Total Time Calculated: (P) 48 min              Spiritual Assessment continued in [address] TELEMETRY        Referral/Consult From: Nurse   Encounter Overview/Reason: Spiritual/Emotional Needs  Service Provided For: Patient    Anaya, Belief, Meaning:   Patient is connected with a anaya tradition or spiritual practice and has beliefs or practices that help with coping during difficult times  Family/Friends No family/friends present      Importance and Influence:  Patient has spiritual/personal beliefs that influence decisions regarding their health  Family/Friends No family/friends present    Community:  Patient feels well-supported. Support system includes: Spouse/Partner and Children  Family/Friends No family/friends present    Assessment and Plan of Care:   Patient Interventions include: Facilitated expression of thoughts and feelings, Explored spiritual coping/struggle/distress, Affirmed coping skills/support systems, Facilitated life review and/ or legacy, and Other:  acknowledged pt's feeling, tears, offered words of encouragement and prayed with pt at the end of visit.  Family/Friends Interventions include: No family/friends present    Patient Plan of Care: Other:  will continue to follow up as needed.  Family/Friends Plan of Care: No family/friends present    Electronically signed by Chaplain Fauzia on 5/20/2025 at 3:32 PM

## 2025-05-20 NOTE — PROGRESS NOTES
[facility] -  Clinical Pharmacy Note    Vancomycin - Management by Pharmacy    Consult Date(s): 5/14/25  Consulting Provider(s): Shahid Yarbrough MD     Assessment / Plan  1)  Scrotal Cellulitis - Vancomycin  Concurrent Antimicrobials: cefepime  Day of Vanc Therapy / Ordered Duration: 7 of tbd  Current Dosing Method: Intermittent Dosing by Levels  Therapeutic Goal: Trough ~ 15 mg/L  Current Dose / Plan:   Pt with HUNTER on CKD; baseline SCr unclear, but seems to be ~2.3-2.5.  SCr 2.5 today. UOP documented as 0.6 mL/kg/hr last 24 hrs.  Will continue to dose vancomycin intermittently based on levels.  Random level this AM =  16.5 mcg/mL.  Will give Vancomycin 1000mg IV x1 today.  Will check random level in AM tomorrow to determine further dosing.  Will continue to monitor clinical condition and make adjustments to regimen as appropriate.    Please call with questions--  Thanks--  Tamiko Strong, PharmD, BCPS, BCGP  [id number] ([facility])   5/20/2025 10:34 AM      Interval update:  Continues on Vanc / Cefepime for scrotal swelling / cellulitis.  Apixaban on hold; on Enoxaparin therapeutic dosing for now.  Now on oral furosemide.    Subjective/Objective:   Ty Meyer is a 64 y.o. male with a PMHx significant for DVT on Eliquis, HTN, T2DM on insulin, seizure disorder off of AEDs for years, osteomyelitis s/p LLE BKA, CAD, CKD 3b who is admitted with syncope, acute HFpEF exacerbation, HUNTER on CKD, anasarca, and R ankle fracture.  Pt now with concern for scrotal edema / cellulitis 0 broad spectrum antibiotics started 5/14 PM.     Pharmacy is consulted to manage vancomycin.    Ht Readings from Last 1 Encounters:   05/12/25 1.905 m (6' 3\")     Wt Readings from Last 1 Encounters:   05/20/25 (!) 163.8 kg (361 lb 1.6 oz)     Current & Prior Antimicrobial Regimen(s):  Cefepime (5/14 - current)  Vancomycin - Pharmacy to dose  Intermittent dosing (5/14-current)    Date Vanc Level Vanc Dose   5/14 -- Ordered, not given

## 2025-05-20 NOTE — DISCHARGE SUMMARY
INTERNAL MEDICINE DEPARTMENT AT THE [facility]  DISCHARGE SUMMARY    Patient ID: Ty Meyer                                             Discharge Date: 5/20/2025   Patient's PCP: Jose Chew MD                                          Discharge Physician: Crissy Beard MD MD  Admit Date: 5/10/2025   Admitting Physician: John Paul Larios MD    DISCHARGE DIAGNOSES:  - Syncope  - HFpEF Grade II diastolic dysfunction   - HUNTER on CKD 3B (IMPROVING)  -Hyperkalemia(RESOLVED)  -Non anion gap metabolic acidosis(RESOLVED)  -Right ankle fracture  -Acute on chronic normocytic anemia  -Bilateral inguinal hernias    Hospital Course:    Mr. Ty Meyer is a 64 y.o. male with a MHx significant for DVT on Eliquis, HTN, T2DM on insulin, ?seizure disorder off of AEDs for years, osteomyelitis s/p LLE BKA, CAD, CKD 3b, SY presenting after a fall and syncopal episode.     History provided by patient, his wife, and son at bedside. Patient states that earlier yesterday while he was working in the yard, he attempted to get back into his wheelchair and fell. He states that as he was getting ready to lower himself down, the wheelchair moved out from under him. He felt pain and a \"popping sound\" from his right leg. Required EMS to be able to get him back up, declined medical evaluation at that time. Later on that same day, right after transferring himself from his wheelchair to recliner, his wife states he had an episode of loss of consciousness that lasted about 3 minutes. The patient denies feeling any symptoms prior to this event. His wife said he looked like he had passed out, then woke up 3 minutes later and was back to his neurologic baseline. The patient denies any chest pain, increased shortness of breath, lightheadedness, or other concerning symptoms after the event. He was brought to the ED by EMS.      He does endorse feeling dyspnea at rest and on exertion for the past few months. He has not had any  [address] [facility] [address]      Phone: [phone]   amoxicillin 875 MG tablet  doxycycline hyclate 100 MG tablet  furosemide 40 MG tablet  insulin glargine 100 UNIT/ML injection vial  insulin lispro 100 UNIT/ML Soln injection vial  levothyroxine 25 MCG tablet  polyethylene glycol 17 g packet       Activity: activity as tolerated  Diet: diabetic diet  Wound Care: none needed    Time Spent on discharge is more than 45 minutes    Signed:  Crissy Beard MD,  MD   5/20/2025

## 2025-05-20 NOTE — NURSE NAVIGATOR
2 week CAM monitor #  [id number] per order  Dr. Freeman .  Instructions given and questions answered.  Bedside nurse aware.    Follow up appts made

## 2025-05-20 NOTE — PROGRESS NOTES
Nephrology Progress Note                                                                                                                                                                                                                                                                                                                                                               Office : [phone]     Fax :[phone]    Patient's Name: Ty Meyer  8:57 AM  5/20/2025    Reason for Consult:  HUNTER on CKD 3  Requesting Physician:  Jose Chew MD  Chief Complaint:    Chief Complaint   Patient presents with    Altered Mental Status       Assessment/Plan     # HUNTER on CKD 3 sec to ATN   # Volume overloaded   - Volume and creatinine MUCH BETTER   - Creatinine up to 4.0 --> 3.6--> 3.2 --> 2.9 --> 2.7 --> 2.5 STABLE  - Lockett placed 5/15 2/2 retention   - S/p Lasix gtt; stopped 5/18 --> Lasix 40 mg daily   - Urine studies without significant proteinuria   - NASRA on 5/13 unremarkable   - Avoid nephrotoxins  - Closely monitor volume status and renal labs    # HFpEF  - Echo with EF 50-55%, grade II DD with increased LAP  - BNP worse ~11.5K --> 13K  - Now on Lasix 40 mg daily     # HTN  - BP's overall improved   - Monitor     # Acid- base/ Electrolyte imbalance   # Hyperkalemia  - S/p Lokelma   - Continue low K diet  - Monitor     # Hyponatremia - Resolved  - Improved with diuresis  - Monitor     # DM 2  - Management per primary    # Scrotal cellulitis  - Abx per primary     # Right bimalleolar ankle fx  - Ortho on board  - Will need outpatient follow-up       History of Present Ilness:    Ty Meyer is a 64 y.o. male with a PMH of HTN, DM2, osteomyelitis s/p LLE BKA, CKD 3, who presented after a fall and syncopal episode. We are being consulted for HUNTER on CKD 3.    Interval hx:    Currently on 1.5L supplemental O2  Creatinine improved and stable today at 2.5  Electrolytes stable  BP's acceptable  Good UOP      Past

## 2025-05-20 NOTE — PROGRESS NOTES
Wife is at bedside and states pt is having new spastic tremors in arms that have not happened before. MD notified via [employer] and came to assess pt at bedside. Will continue to monitor but no interventions at this time.     Patient is alert and oriented x4 with intermittent confusion/forgetfulness (pt wife states this has been occurring at home). VSS on 1.5L. Medications given per MAR, no side effects noted. No complaints of pain, continuing to monitor and manage per MAR. Voiding well via michael, pt had small BM this shift.    Patient is currently resting in bed with bed alarm on for safety. Call light within reach and all fall precautions in place. Plan of care continues. Wife at bedside.

## 2025-05-20 NOTE — DISCHARGE INSTR - COC
Continuity of Care Form    Patient Name: Ty Meyer   :  1960  MRN:  [id number]    Admit date:  5/10/2025  Discharge date:  ***    Code Status Order: Full Code   Advance Directives:     Admitting Physician:  John Paul Lraios MD  PCP: Jose Chew MD    Discharging Nurse: ***  Discharging Hospital Unit/Room#: 6321/6321-01  Discharging Unit Phone Number: ***    Emergency Contact:   Extended Emergency Contact Information  Primary Emergency Contact: Victoria Meyer  Address: [address] [place], [place] [address] [facility]  Home Phone: [phone]  Work Phone: [phone]  Relation: Spouse  Secondary Emergency Contact: Jaclyn Meyer  Home Phone: [phone]  Relation: Child    Past Surgical History:  Past Surgical History:   Procedure Laterality Date    BACK SURGERY      FOOT SURGERY Left 2017    LEG AMPUTATION BELOW KNEE      OTHER SURGICAL HISTORY Right 2017    RIGHT FOOT DEBRIDEMENT INCISION AND DRAINAGE, DELAYED PRIMARY       Immunization History:   Immunization History   Administered Date(s) Administered    COVID-19, PFIZER PURPLE top, DILUTE for use, (age 12 y+), 30mcg/0.3mL 2021, 2021    Influenza Vaccine, unspecified formulation 2018    Pneumococcal, PCV-13, PREVNAR 13, (age 6w+), IM, 0.5mL 2018    Pneumococcal, PPSV23, PNEUMOVAX 23, (age 2y+), SC/IM, 0.5mL 2014    TDaP, ADACEL (age 10y-64y), BOOSTRIX (age 10y+), IM, 0.5mL 10/11/2011    Td, unspecified formulation 10/14/2011       Active Problems:  Patient Active Problem List   Diagnosis Code    DVT (deep venous thrombosis) ([facility]) I82.409    Osteomyelitis of left foot ([facility]) M86.9    S/P unilateral BKA (below knee amputation) ([facility]) Z89.519    HUNTER (acute kidney injury) N17.9    HTN (hypertension) I10    DM (diabetes mellitus) ([facility]) E11.9    HLD (hyperlipidemia) E78.5    Back pain M54.9    Diabetic foot infection ([facility]) E11.628, L08.9    Osteomyelitis of foot ([facility]) M86.9    
Will check labs, CXR, EKG, re-eval and have [facility] eval pt this AM

## 2025-05-20 NOTE — PLAN OF CARE
Problem: Safety - Adult  Goal: Free from fall injury  Outcome: Progressing  Flowsheets (Taken 5/20/2025 1406)  Free From Fall Injury:   Instruct family/caregiver on patient safety   Based on caregiver fall risk screen, instruct family/caregiver to ask for assistance with transferring infant if caregiver noted to have fall risk factors  Note: Pt remains free from falls. Fall precautions in place. Bed locked and in lowest position. Bedside table and call light within reach. Bed alarm on. Bedside table and call light within reach. Pt educated on safety precautions and call light use. Pt demonstrates appropriate understanding.     Problem: Chronic Conditions and Co-morbidities  Goal: Patient's chronic conditions and co-morbidity symptoms are monitored and maintained or improved  Outcome: Progressing  Flowsheets (Taken 5/20/2025 1406)  Care Plan - Patient's Chronic Conditions and Co-Morbidity Symptoms are Monitored and Maintained or Improved:   Monitor and assess patient's chronic conditions and comorbid symptoms for stability, deterioration, or improvement   Collaborate with multidisciplinary team to address chronic and comorbid conditions and prevent exacerbation or deterioration   Update acute care plan with appropriate goals if chronic or comorbid symptoms are exacerbated and prevent overall improvement and discharge     Problem: Discharge Planning  Goal: Discharge to home or other facility with appropriate resources  Outcome: Progressing  Flowsheets (Taken 5/20/2025 1406)  Discharge to home or other facility with appropriate resources:   Arrange for needed discharge resources and transportation as appropriate   Identify barriers to discharge with patient and caregiver   Identify discharge learning needs (meds, wound care, etc)   Refer to discharge planning if patient needs post-hospital services based on physician order or complex needs related to functional status, cognitive ability or social support system

## 2025-05-20 NOTE — CARE COORDINATION
UPDATE:  Precert started for [facility] today.\  Electronically signed by ANNETTE Sandra, RN Case Manager on 5/20/2025 at 4:08 PM    DISCHARGE PLANNING:    Patient accepted at [facility].  Patient needs precert.  Waiting for updated PT and OT notes.  CM reached out to therapy and waiting for return call.  Will get precert started as soon as possible.    Electronically signed by ANNETTE Sandra, RN Case Manager on 5/20/2025 at 1:09 PM

## 2025-05-20 NOTE — PROGRESS NOTES
Occupational Therapy  Facility/Department: [address]  Occupational Therapy Treatment     Name: Ty Meyer  : 1960  MRN: [id number]  Date of Service: 2025    Discharge Recommendations:  Subacute/Skilled Nursing Facility          Patient Diagnosis(es): The primary encounter diagnosis was Acute pulmonary edema (HCC). Diagnoses of Closed fracture of right ankle, initial encounter, Syncope and collapse, Acute congestive heart failure, unspecified heart failure type (HCC), Acute diastolic congestive heart failure (HCC), and Syncope, unspecified syncope type were also pertinent to this visit.  Past Medical History:  has a past medical history of Abdominal hernia, Back pain, CAD (coronary artery disease), CKD (chronic kidney disease) stage 4, GFR 15-29 ml/min (HCC), Diabetes mellitus (HCC), Hyperlipidemia, Hypertension, and MRSA (methicillin resistant staph aureus) culture positive.  Past Surgical History:  has a past surgical history that includes Leg amputation below knee; Foot surgery (Left, 2017); other surgical history (Right, 2017); and back surgery ().    Treatment Diagnosis: imp mob, tf, ADL      Assessment  Performance deficits / Impairments: Decreased functional mobility ;Decreased ADL status;Decreased endurance  Assessment: From home with family. Pt completing bed mobility with Ax2. Unable to transfer this date  no shoe for prosthetic for LLE present and TTWB to RLE.  Seated EOB sipping on drink and completing LE therex. Pt would benefit from cont therapies while in acute care. Rec cont inpt care at [facility]. Cont POC.  Treatment Diagnosis: imp mob, tf, ADL  REQUIRES OT FOLLOW-UP: Yes  Activity Tolerance  Activity Tolerance: Patient limited by fatigue  Activity Tolerance Comments: muscle jerks, decreased/delayed responses. RN aware     Plan  Occupational Therapy Plan  Times Per Week: 2-5  Current Treatment Recommendations: Strengthening, ROM, Balance training, Functional

## 2025-05-20 NOTE — PROGRESS NOTES
Internal Medicine Progress Note    Admit Date: 5/10/2025  Hospital Day: 11   Patient name: Ty Meyer   : 1960     CC:   Altered Mental Status     Interval history:   No acute events overnight   Labs: Cr :2.5   Glucose :209  Hb:9.1   Vital signs   No fever, /83 mild elevated on nasal canula one litter  U/O:2370 in 24 hours  Net balance since admitting - 98345            Intake/Output Summary (Last 24 hours) at 2025 0639  Last data filed at 2025 0009  Gross per 24 hour   Intake --   Output 1570 ml   Net -1570 ml   Diuresing very well. Only bed scales obtained.            Medications   Scheduled Meds:   furosemide  40 mg Oral Daily    cefepime  2,000 mg IntraVENous Q12H    enoxaparin  1 mg/kg SubCUTAneous BID    insulin glargine  35 Units SubCUTAneous Nightly    vancomycin (VANCOCIN) intermittent dosing (placeholder)   Other RX Placeholder    insulin lispro  0-16 Units SubCUTAneous 4x Daily AC & HS    sodium chloride flush  5-40 mL IntraVENous 2 times per day    amitriptyline  25 mg Oral Nightly    [Held by provider] apixaban  5 mg Oral BID    aspirin  81 mg Oral Daily    atorvastatin  20 mg Oral Daily    finasteride  5 mg Oral Daily    tamsulosin  0.4 mg Oral Daily    [Held by provider] pantoprazole  40 mg Oral QAM AC    sennosides-docusate sodium  1 tablet Oral BID    gabapentin  300 mg Oral BID     Continuous Infusions:   sodium chloride 5 mL/hr at 25 2312    dextrose       Objective   Vitals  Patient Vitals for the past 8 hrs:   BP Temp Temp src Pulse Resp SpO2 Weight   25 0535 -- -- -- -- -- -- (!) 163.8 kg (361 lb 1.6 oz)   25 0416 137/77 97.7 °F (36.5 °C) Oral 86 20 92 % --   25 0009 (!) 143/83 97.7 °F (36.5 °C) Oral 90 20 97 % --       Intake/Output Summary (Last 24 hours) at 2025 0639  Last data filed at 2025 0009  Gross per 24 hour   Intake --   Output 1570 ml   Net -1570 ml       ROS: A 10 point review of systems was conducted and  detected.      Electronically signed by Haim Rapp      CT ABDOMEN PELVIS WO CONTRAST Additional Contrast? None   Final Result      1.  Limited exam.   2.  Large right angle hernia containing small bowel and the dome of the bladder.   Moderate size left inguinal hernia containing loop of small bowel. No   significant wall thickening, and no mesenteric stranding or fluid. No evidence   of bowel obstruction.   3.  Extensive scrotal edema.         Electronically signed by Lc Hobson      US RENAL COMPLETE   Final Result      1.  Increased renal echogenicity suggestive of medical renal disease.   2.  No hydronephrosis.      Electronically signed by Henry Franco MD      XR TIBIA FIBULA RIGHT (2 VIEWS)   Final Result   1. Distal tibial and fibular fractures as previously described. The proximal tibia and fibula are intact.      Electronically signed by [employer]      XR ANKLE RIGHT (MIN 3 VIEWS)   Final Result   1. Fractures of the distal tibia and fibula with extension of the tibial fracture into the ankle joint space.      Electronically signed by [employer]      XR CHEST PORTABLE   Final Result   1.  Pulmonary edema.   2.  Atelectasis or scarring of the mid lung zones bilaterally.      Electronically signed by [employer]ventions[employer]ha      CT Head W/O Contrast   Final Result   1.  No acute intracranial abnormality.       Electronically signed by [employer]          Assessment and Plan   64 y.o. male with MHx significant for DVT on Eliquis, HTN, T2DM on insulin with polyneuropathy, ?seizure disorder off of AEDs for years, osteomyelitis s/p LLE BKA, CAD, CKD 3b, SY presenting after a fall and syncopal episode.     Syncope  Cardiac syncope (most likely)  Sudden onset, no prodrome, and rapid recover no post ictal confusion --> typical of arrhythmic etiology : could be related to HF and arrhythmia (right bundle branch and prolonged Qtc ) as previous EKG but patient had an episode of syncope during that time   seizure(No

## 2025-05-20 NOTE — PROGRESS NOTES
Physical Therapy  Facility/Department: [address]  Physical Therapy Treatment    Name: Ty Meyer  : 1960  MRN: [id number]  Date of Service: 2025    Discharge Recommendations:  Subacute/Skilled Nursing Facility   PT Equipment Recommendations  Equipment Needed: No      Patient Diagnosis(es): The primary encounter diagnosis was Acute pulmonary edema (HCC). Diagnoses of Closed fracture of right ankle, initial encounter, Syncope and collapse, Acute congestive heart failure, unspecified heart failure type (HCC), Acute diastolic congestive heart failure (HCC), and Syncope, unspecified syncope type were also pertinent to this visit.  Past Medical History:  has a past medical history of Abdominal hernia, Back pain, CAD (coronary artery disease), CKD (chronic kidney disease) stage 4, GFR 15-29 ml/min (HCC), Diabetes mellitus (HCC), Hyperlipidemia, Hypertension, and MRSA (methicillin resistant staph aureus) culture positive.  Past Surgical History:  has a past surgical history that includes Leg amputation below knee; Foot surgery (Left, 2017); other surgical history (Right, 2017); and back surgery ().    Assessment  Body Structures, Functions, Activity Limitations Requiring Skilled Therapeutic Intervention: Decreased functional mobility   Assessment: Pt demos slightly increased confusion this date. Muscle jerking noted in all extremities while seated EOB - pt states chronic. RN notified.Not safe to try transfers today 2* shoe not available for L LE prosthesis and pt is touch down WB R LE. Anticipate significant difficulty with transfers due to new WB limitation for R LE. Pt demos generalized weakness. Rec SNF at d/c to maximize safety and independence. Will follow.  Treatment Diagnosis: impaired functional mobility  Therapy Prognosis: Good  Requires PT Follow-Up: Yes  Activity Tolerance  Activity Tolerance Comments: BP sitting EOB: 131/81    Plan  Physical Therapy Plan  General  Plan:  (2-5)  Current Treatment Recommendations: Balance training, Functional mobility training, Transfer training, Patient/Caregiver education & training, Home exercise program, Strengthening, Co-Treatment  Safety Devices  Type of Devices: Bed alarm in place, Call light within reach, Left in bed, Nurse notified    Restrictions  Restrictions/Precautions  Restrictions/Precautions: Seizure  Position Activity Restriction  Other Position/Activity Restrictions: touch down WB R LE     Subjective  General  Chart Reviewed: Yes  Additional Pertinent Hx: 64 y.o. male admit with syncope and fall, weakness; R ankle XR: (+) Fractures of the distal tibia and fibula with extension of the tibial fracture into the ankle joint space. (+) HUNTER; PMHx: back pain, CAD, CKD, L BKA, HTN, DM, R foot surgeries, DVT  Referring Practitioner: Jacki Beard MD  Diagnosis: syncope  Subjective  Subjective: Pt. semisupine in bed. Pt alert and agreeable to PT. Pt reports pain \"all over\". Pt reports he has received meds for pain. Denies pain to R ankle. Rn notified of pain.         Social/Functional History  Social/Functional History  Lives With: Spouse  Type of Home: House  Home Layout: One level, Able to Live on Main level with bedroom/bathroom  Home Access: Stairs to enter with rails, Ramped entrance  Entrance Stairs - Number of Steps: 3-4  Bathroom Shower/Tub: Tub/Shower unit  Bathroom Toilet: Standard  Bathroom Equipment: Shower chair, Grab bars in shower, Grab bars around toilet  Home Equipment: Walker - Rolling, Wheelchair - Manual, Electric scooter, Lift chair, Walker - 4-Wheeled (3-wheeled walker; sleeps in lift chair; L LE prosthesis (wears at all times, removes only for bathing); R \"foot brace\")  Has the patient had two or more falls in the past year or any fall with injury in the past year?: No  Receives Help From: Family  Prior Level of Assist for ADLs: Needs assistance (assist with bathing. indep with dressing)  Prior Level of Assist             AM-PAC - Mobility    AM-PAC Basic Mobility - Inpatient   How much help is needed turning from your back to your side while in a flat bed without using bedrails?: A Lot  How much help is needed moving from lying on your back to sitting on the side of a flat bed without using bedrails?: A Lot  How much help is needed moving to and from a bed to a chair?: Total  How much help is needed standing up from a chair using your arms?: Total  How much help is needed walking in hospital room?: Total  How much help is needed climbing 3-5 steps with a railing?: Total  AM-PAC Inpatient Mobility Raw Score : 8  AM-PAC Inpatient T-Scale Score : 28.52  Mobility Inpatient CMS 0-100% Score: 86.62  Mobility Inpatient CMS G-Code Modifier : CM         Tinneti Score       Goals  Short Term Goals  Time Frame for Short Term Goals: discharge  Short Term Goal 1: Pt will sit EOB x 5 min with supervision with good posture  ongoing  Short Term Goal 2: Pt will demo independence with LE HEP  ongoing  Patient Goals   Patient Goals : \"I want to go home but my wife thinks I need to go to rehab.\"       Education  Patient Education  Education Given To: Patient  Education Provided: Role of Therapy  Education Provided Comments: touch down WB R LE, LE HEP, d/c planning  Education Method: Verbal  Barriers to Learning: Cognition  Education Outcome: Continued education needed;Demonstrated understanding      Therapy Time   Individual Concurrent Group Co-treatment   Time In       1410   Time Out       1438   Minutes       28       [x]co-treatment indicated; patient seen for co-treatment due to: patient safety, complexity of condition, and need for the assistance of 2 skilled therapists.    PT addressing: [x] Sitting balance/LE WB, [] Standing balance/LE WB, []Transfer training, [x] BLE strength/endurance with functional tasks,  [] Other:  OT addressing: []ADL's, [] Pericare,  []clothing management, [] BU E strength/endurance with functional tasks,  []

## 2025-05-21 LAB
ALBUMIN SERPL-MCNC: 3.1 G/DL (ref 3.4–5)
AMMONIA PLAS-SCNC: 19 UMOL/L (ref 16–60)
ANION GAP SERPL CALCULATED.3IONS-SCNC: 9 MMOL/L (ref 3–16)
BASOPHILS # BLD: 0 K/UL (ref 0–0.2)
BASOPHILS NFR BLD: 0.5 %
BUN SERPL-MCNC: 48 MG/DL (ref 7–20)
CALCIUM SERPL-MCNC: 8.9 MG/DL (ref 8.3–10.6)
CHLORIDE SERPL-SCNC: 100 MMOL/L (ref 99–110)
CO2 SERPL-SCNC: 30 MMOL/L (ref 21–32)
CREAT SERPL-MCNC: 2.4 MG/DL (ref 0.8–1.3)
DEPRECATED RDW RBC AUTO: 15 % (ref 12.4–15.4)
EOSINOPHIL # BLD: 0.6 K/UL (ref 0–0.6)
EOSINOPHIL NFR BLD: 9.2 %
GFR SERPLBLD CREATININE-BSD FMLA CKD-EPI: 29 ML/MIN/{1.73_M2}
GLUCOSE BLD-MCNC: 137 MG/DL (ref 70–99)
GLUCOSE BLD-MCNC: 168 MG/DL (ref 70–99)
GLUCOSE BLD-MCNC: 190 MG/DL (ref 70–99)
GLUCOSE BLD-MCNC: 226 MG/DL (ref 70–99)
GLUCOSE SERPL-MCNC: 161 MG/DL (ref 70–99)
HCT VFR BLD AUTO: 26.8 % (ref 40.5–52.5)
HGB BLD-MCNC: 8.9 G/DL (ref 13.5–17.5)
LYMPHOCYTES # BLD: 0.7 K/UL (ref 1–5.1)
LYMPHOCYTES NFR BLD: 10.2 %
MAGNESIUM SERPL-MCNC: 1.71 MG/DL (ref 1.8–2.4)
MAGNESIUM SERPL-MCNC: 1.85 MG/DL (ref 1.8–2.4)
MCH RBC QN AUTO: 29.6 PG (ref 26–34)
MCHC RBC AUTO-ENTMCNC: 33.3 G/DL (ref 31–36)
MCV RBC AUTO: 88.8 FL (ref 80–100)
MONOCYTES # BLD: 1.1 K/UL (ref 0–1.3)
MONOCYTES NFR BLD: 15.2 %
NEUTROPHILS # BLD: 4.6 K/UL (ref 1.7–7.7)
NEUTROPHILS NFR BLD: 64.9 %
PERFORMED ON: ABNORMAL
PHOSPHATE SERPL-MCNC: 2.6 MG/DL (ref 2.5–4.9)
PLATELET # BLD AUTO: 229 K/UL (ref 135–450)
PMV BLD AUTO: 7.6 FL (ref 5–10.5)
POTASSIUM SERPL-SCNC: 3.5 MMOL/L (ref 3.5–5.1)
RBC # BLD AUTO: 3.02 M/UL (ref 4.2–5.9)
SODIUM SERPL-SCNC: 139 MMOL/L (ref 136–145)
VANCOMYCIN SERPL-MCNC: 18 UG/ML
WBC # BLD AUTO: 7.1 K/UL (ref 4–11)

## 2025-05-21 PROCEDURE — 80202 ASSAY OF VANCOMYCIN: CPT

## 2025-05-21 PROCEDURE — 2580000003 HC RX 258: Performed by: INTERNAL MEDICINE

## 2025-05-21 PROCEDURE — 2500000003 HC RX 250 WO HCPCS

## 2025-05-21 PROCEDURE — 82140 ASSAY OF AMMONIA: CPT

## 2025-05-21 PROCEDURE — 83735 ASSAY OF MAGNESIUM: CPT

## 2025-05-21 PROCEDURE — 36415 COLL VENOUS BLD VENIPUNCTURE: CPT

## 2025-05-21 PROCEDURE — 6360000002 HC RX W HCPCS: Performed by: STUDENT IN AN ORGANIZED HEALTH CARE EDUCATION/TRAINING PROGRAM

## 2025-05-21 PROCEDURE — 99233 SBSQ HOSP IP/OBS HIGH 50: CPT | Performed by: INTERNAL MEDICINE

## 2025-05-21 PROCEDURE — 6370000000 HC RX 637 (ALT 250 FOR IP)

## 2025-05-21 PROCEDURE — 1200000000 HC SEMI PRIVATE

## 2025-05-21 PROCEDURE — 80069 RENAL FUNCTION PANEL: CPT

## 2025-05-21 PROCEDURE — 85025 COMPLETE CBC W/AUTO DIFF WBC: CPT

## 2025-05-21 PROCEDURE — 6360000002 HC RX W HCPCS: Performed by: INTERNAL MEDICINE

## 2025-05-21 PROCEDURE — 6370000000 HC RX 637 (ALT 250 FOR IP): Performed by: INTERNAL MEDICINE

## 2025-05-21 RX ORDER — GABAPENTIN 300 MG/1
300 CAPSULE ORAL NIGHTLY
Status: DISCONTINUED | OUTPATIENT
Start: 2025-05-21 | End: 2025-05-23 | Stop reason: HOSPADM

## 2025-05-21 RX ORDER — ALPRAZOLAM 0.25 MG
0.25 TABLET ORAL 3 TIMES DAILY PRN
Status: DISCONTINUED | OUTPATIENT
Start: 2025-05-21 | End: 2025-05-23 | Stop reason: HOSPADM

## 2025-05-21 RX ORDER — ALPRAZOLAM 0.25 MG
0.25 TABLET ORAL 3 TIMES DAILY PRN
Qty: 9 TABLET | Refills: 0 | Status: SHIPPED | OUTPATIENT
Start: 2025-05-21 | End: 2025-05-23

## 2025-05-21 RX ORDER — MAGNESIUM SULFATE IN WATER 40 MG/ML
2000 INJECTION, SOLUTION INTRAVENOUS ONCE
Status: COMPLETED | OUTPATIENT
Start: 2025-05-21 | End: 2025-05-22

## 2025-05-21 RX ADMIN — AMITRIPTYLINE HYDROCHLORIDE 25 MG: 50 TABLET ORAL at 20:54

## 2025-05-21 RX ADMIN — GABAPENTIN 300 MG: 300 CAPSULE ORAL at 09:30

## 2025-05-21 RX ADMIN — APIXABAN 5 MG: 5 TABLET, FILM COATED ORAL at 20:54

## 2025-05-21 RX ADMIN — OXYCODONE 10 MG: 5 TABLET ORAL at 11:59

## 2025-05-21 RX ADMIN — GABAPENTIN 300 MG: 300 CAPSULE ORAL at 20:54

## 2025-05-21 RX ADMIN — SENNOSIDES AND DOCUSATE SODIUM 1 TABLET: 50; 8.6 TABLET ORAL at 09:30

## 2025-05-21 RX ADMIN — ATORVASTATIN CALCIUM 20 MG: 20 TABLET, FILM COATED ORAL at 09:30

## 2025-05-21 RX ADMIN — SODIUM CHLORIDE, PRESERVATIVE FREE 10 ML: 5 INJECTION INTRAVENOUS at 09:26

## 2025-05-21 RX ADMIN — ALPRAZOLAM 0.25 MG: 0.25 TABLET ORAL at 13:42

## 2025-05-21 RX ADMIN — SODIUM CHLORIDE, PRESERVATIVE FREE 10 ML: 5 INJECTION INTRAVENOUS at 20:54

## 2025-05-21 RX ADMIN — INSULIN GLARGINE 35 UNITS: 100 INJECTION, SOLUTION SUBCUTANEOUS at 20:52

## 2025-05-21 RX ADMIN — CEFEPIME 2000 MG: 2 INJECTION, POWDER, FOR SOLUTION INTRAVENOUS at 09:25

## 2025-05-21 RX ADMIN — SENNOSIDES AND DOCUSATE SODIUM 1 TABLET: 50; 8.6 TABLET ORAL at 20:54

## 2025-05-21 RX ADMIN — INSULIN LISPRO 4 UNITS: 100 INJECTION, SOLUTION INTRAVENOUS; SUBCUTANEOUS at 20:51

## 2025-05-21 RX ADMIN — MAGNESIUM SULFATE HEPTAHYDRATE 2000 MG: 40 INJECTION, SOLUTION INTRAVENOUS at 22:01

## 2025-05-21 RX ADMIN — FINASTERIDE 5 MG: 5 TABLET, FILM COATED ORAL at 09:30

## 2025-05-21 RX ADMIN — ASPIRIN 81 MG: 81 TABLET, COATED ORAL at 09:30

## 2025-05-21 RX ADMIN — LEVOTHYROXINE SODIUM 25 MCG: 0.03 TABLET ORAL at 05:25

## 2025-05-21 RX ADMIN — INSULIN LISPRO 4 UNITS: 100 INJECTION, SOLUTION INTRAVENOUS; SUBCUTANEOUS at 13:13

## 2025-05-21 RX ADMIN — TAMSULOSIN HYDROCHLORIDE 0.4 MG: 0.4 CAPSULE ORAL at 09:30

## 2025-05-21 RX ADMIN — APIXABAN 5 MG: 5 TABLET, FILM COATED ORAL at 09:30

## 2025-05-21 RX ADMIN — FUROSEMIDE 40 MG: 40 TABLET ORAL at 09:30

## 2025-05-21 ASSESSMENT — PAIN - FUNCTIONAL ASSESSMENT: PAIN_FUNCTIONAL_ASSESSMENT: PREVENTS OR INTERFERES SOME ACTIVE ACTIVITIES AND ADLS

## 2025-05-21 ASSESSMENT — PAIN DESCRIPTION - DESCRIPTORS: DESCRIPTORS: SHARP

## 2025-05-21 ASSESSMENT — PAIN DESCRIPTION - PAIN TYPE: TYPE: ACUTE PAIN

## 2025-05-21 ASSESSMENT — PAIN DESCRIPTION - DIRECTION: RADIATING_TOWARDS: LEG

## 2025-05-21 ASSESSMENT — PAIN DESCRIPTION - LOCATION: LOCATION: ANKLE;LEG

## 2025-05-21 ASSESSMENT — PAIN DESCRIPTION - ONSET: ONSET: GRADUAL

## 2025-05-21 ASSESSMENT — PAIN DESCRIPTION - FREQUENCY: FREQUENCY: INTERMITTENT

## 2025-05-21 ASSESSMENT — PAIN DESCRIPTION - ORIENTATION: ORIENTATION: RIGHT

## 2025-05-21 ASSESSMENT — PAIN SCALES - GENERAL: PAINLEVEL_OUTOF10: 9

## 2025-05-21 NOTE — PROGRESS NOTES
Nephrology Progress Note                                                                                                                                                                                                                                                                                                                                                               Office : [phone]     Fax :[phone]    Patient's Name: Ty Meyer  8:29 AM  5/21/2025    Reason for Consult:  HUNTER on CKD 3  Requesting Physician:  Jose Chew MD  Chief Complaint:    Chief Complaint   Patient presents with    Altered Mental Status       Assessment/Plan     # HUNTER on CKD 3 sec to ATN   # Volume overloaded   - Volume and creatinine MUCH BETTER   - Creatinine up to 4.0 --> 3.6--> 3.2 --> 2.9 --> 2.7 --> 2.5 --> 2.4  - Lockett placed 5/15 2/2 retention   - S/p Lasix gtt; stopped 5/18 --> Lasix 40 mg daily   - Urine studies without significant proteinuria   - NASRA on 5/13 unremarkable   - Avoid nephrotoxins  - Closely monitor volume status and renal labs    # HFpEF  - Echo with EF 50-55%, grade II DD with increased LAP  - BNP worse ~11.5K --> 13K  - Now on Lasix 40 mg daily     # HTN  - BP's overall improved   - Monitor     # Acid- base/ Electrolyte imbalance   # Hyperkalemia  - K now running on the lower end. Stop potassium restriction   - Monitor     # Hyponatremia - Resolved  - Improved with diuresis  - Monitor     # DM 2  - Management per primary    # Scrotal cellulitis  - Abx per primary     # Right bimalleolar ankle fx  - Ortho on board  - Will need outpatient follow-up       History of Present Ilness:    Ty Meyer is a 64 y.o. male with a PMH of HTN, DM2, osteomyelitis s/p LLE BKA, CKD 3, who presented after a fall and syncopal episode. We are being consulted for HUNTER on CKD 3.    Interval hx:    Resting quietly in bed  No complaints at this time   Creatinine slowly improving  Electrolytes stable - stopping

## 2025-05-21 NOTE — PLAN OF CARE
Problem: Safety - Adult  Goal: Free from fall injury  Outcome: Progressing  Flowsheets (Taken 5/21/2025 1647)  Free From Fall Injury:   Instruct family/caregiver on patient safety   Based on caregiver fall risk screen, instruct family/caregiver to ask for assistance with transferring infant if caregiver noted to have fall risk factors  Note: Pt remains free from falls. Fall precautions in place. Bed locked and in lowest position. Bed alarm on. Gripper socks on. Bedside table and call light within reach. Pt educated on safety precautions. Pt verbalizes understanding and is compliant. Alert and Ox3-4. Wife at bedside intermittently     Problem: Chronic Conditions and Co-morbidities  Goal: Patient's chronic conditions and co-morbidity symptoms are monitored and maintained or improved  Outcome: Progressing  Flowsheets (Taken 5/21/2025 1647)  Care Plan - Patient's Chronic Conditions and Co-Morbidity Symptoms are Monitored and Maintained or Improved:   Monitor and assess patient's chronic conditions and comorbid symptoms for stability, deterioration, or improvement   Collaborate with multidisciplinary team to address chronic and comorbid conditions and prevent exacerbation or deterioration   Update acute care plan with appropriate goals if chronic or comorbid symptoms are exacerbated and prevent overall improvement and discharge     Problem: Discharge Planning  Goal: Discharge to home or other facility with appropriate resources  Outcome: Progressing  Flowsheets (Taken 5/21/2025 1647)  Discharge to home or other facility with appropriate resources:   Identify barriers to discharge with patient and caregiver   Arrange for needed discharge resources and transportation as appropriate   Identify discharge learning needs (meds, wound care, etc)   Refer to discharge planning if patient needs post-hospital services based on physician order or complex needs related to functional status, cognitive ability or social support  kept clean and dry. Pt bathed with CHG and soap and water. Complete gown and linen change. Zinc paste applied to buttocks. Powder applied under folds. Pt refusing turns throughout shift. Pt on specialty bed.      Problem: Respiratory - Adult  Goal: Achieves optimal ventilation and oxygenation  Outcome: Progressing  Flowsheets (Taken 5/21/2025 1652)  Achieves optimal ventilation and oxygenation:   Assess for changes in respiratory status   Assess for changes in mentation and behavior   Position to facilitate oxygenation and minimize respiratory effort  Note: Attempted to wean pt off of O2 but pt dropped to 80s% spO2. Pt remains on 1.5L of O2. No c/o shortness of breath, etc. Will continue to monitor     Problem: Cardiovascular - Adult  Goal: Maintains optimal cardiac output and hemodynamic stability  Outcome: Progressing  Flowsheets (Taken 5/21/2025 1652)  Maintains optimal cardiac output and hemodynamic stability:   Monitor blood pressure and heart rate   Monitor urine output and notify Licensed Independent Practitioner for values outside of normal range   Assess for signs of decreased cardiac output  Goal: Absence of cardiac dysrhythmias or at baseline  Outcome: Progressing  Flowsheets (Taken 5/21/2025 1652)  Absence of cardiac dysrhythmias or at baseline:   Monitor cardiac rate and rhythm   Assess for signs of decreased cardiac output     Problem: Metabolic/Fluid and Electrolytes - Adult  Goal: Electrolytes maintained within normal limits  Outcome: Progressing  Flowsheets (Taken 5/21/2025 1652)  Electrolytes maintained within normal limits: Monitor labs and assess patient for signs and symptoms of electrolyte imbalances  Goal: Hemodynamic stability and optimal renal function maintained  Outcome: Progressing  Flowsheets (Taken 5/21/2025 1652)  Hemodynamic stability and optimal renal function maintained:   Monitor labs and assess for signs and symptoms of volume excess or deficit   Monitor intake, output and patient  weight   Encourage oral intake as appropriate

## 2025-05-21 NOTE — PROGRESS NOTES
[facility] -  Clinical Pharmacy Note    Vancomycin - Management by Pharmacy    Consult Date(s): 5/14/25  Consulting Provider(s): Shahid Yarbrough MD     Assessment / Plan  1)  Scrotal Cellulitis - Vancomycin  Concurrent Antimicrobials: cefepime  Day of Vanc Therapy / Ordered Duration: 8 of tbd  Current Dosing Method: Intermittent Dosing by Levels --> Bayesian-guided AUC dosing  Therapeutic Goal: Trough ~ 15 mg/L --> -600 mg/L*hr  Current Dose / Plan:   Pt with HUNTER on CKD; baseline SCr unclear, but seems to be ~2.3-2.5.  SCr 2.4 today; stable from yesterday.  Random level this AM = 18 mcg/mL.  As SCr is now stable at baseline, will schedule 1000mg IV q24h.   Regimen predicts an AUC = 545 with trough = 18 mcg/mL.  Plan is to change to oral abx at discharge; precert pending for SNF.  If renal function remains stable, repeat level may not be needed.  Will monitor and order level if indicated.  Will continue to monitor clinical condition and make adjustments to regimen as appropriate.    Please call with questions--  Thanks--  Tamiko Strong, PharmD, BCPS, BCGP  [id number] (hospital [facility])   5/21/2025 9:33 AM      Interval update:  Precert pending for SNF.      Subjective/Objective:   Ty Meyer is a 64 y.o. male with a PMHx significant for DVT on Eliquis, HTN, T2DM on insulin, seizure disorder off of AEDs for years, osteomyelitis s/p LLE BKA, CAD, CKD 3b who is admitted with syncope, acute HFpEF exacerbation, HUNTER on CKD, anasarca, and R ankle fracture.  Pt now with concern for scrotal edema / cellulitis 0 broad spectrum antibiotics started 5/14 PM.     Pharmacy is consulted to manage vancomycin.    Ht Readings from Last 1 Encounters:   05/12/25 1.905 m (6' 3\")     Wt Readings from Last 1 Encounters:   05/21/25 (!) 164.1 kg (361 lb 12.8 oz)     Current & Prior Antimicrobial Regimen(s):  Cefepime (5/14 - current)  Vancomycin - Pharmacy to dose  Intermittent dosing (5/14-current)    Date Vanc Level Vanc Dose

## 2025-05-21 NOTE — PROGRESS NOTES
Comprehensive Nutrition Assessment    RECOMMENDATIONS:  PO Diet: Regular diet, add CC4 restriction to aid in BG control  Nutrition Supplement: Monitor po adequacy and need for ONS  Nutrition Education: No recommendation at this time     NUTRITION ASSESSMENT:   Nutritional summary & status: LOS assessment. Patient admitted for syncope and colapse, cardiology following, Noted d/c order and per CM pt awaiting placement to SNF on d/c. PO has been varied throughout admit at times from pain per pt, restrictions r/t HUNTER now removed per nephro. RD will monitor need for ONS and continue to monitor while inpatient.   Admission // PMH: Syncope and Collapse // HTN, HLD, CKD3, DM2    MALNUTRITION ASSESSMENT  Context of Malnutrition: Acute Illness   Malnutrition Status: No malnutrition  Findings of the 6 clinical characteristics of malnutrition (Minimum of 2 out of 6 clinical characteristics is required to make the diagnosis of moderate or severe Protein Calorie Malnutrition based on AND/ASPEN Guidelines):  Energy Intake:  Mild decrease in energy intake  Weight Loss:  No weight loss     Body Fat Loss:  No body fat loss     Muscle Mass Loss:  No muscle mass loss    Fluid Accumulation:  No fluid accumulation     Strength:  Not Performed    NUTRITION DIAGNOSIS   Predicted inadequate energy intake related to acute injury/trauma as evidenced by variable po intake    Nutrition Monitoring and Evaluation:   Food/Nutrient Intake Outcomes:  Food and Nutrient Intake  Physical Signs/Symptoms Outcomes:  Biochemical Data, GI Status, Weight, Fluid Status or Edema     OBJECTIVE DATA: Significant to nutrition assessment  Nutrition Related Findings: +BM 5/20,   Wounds: None  Nutrition Goals: PO intake 75% or greater, prior to discharge     CURRENT NUTRITION THERAPIES  ADULT DIET; Regular  PO Intake: 26-50%, 51-75%, 1-25%   PO Supplement Intake:None Ordered  Additional Sources of Calories/IVF:n/a     COMPARATIVE STANDARDS  Energy

## 2025-05-21 NOTE — CARE COORDINATION
DISCHARGE PLANNING:    Patient accepted at [facility].  Precert pending.      HENS # [id number]      Electronically signed by ANNETTE Sandra, RN Case Manager on 5/21/2025 at 3:36 PM

## 2025-05-21 NOTE — PROGRESS NOTES
Internal Medicine Progress Note    Admit Date: 5/10/2025  Hospital Day: 12   Patient name: Ty Meyer   : 1960     CC:   Altered Mental Status     Interval history:   No acute events overnight   Labs: Cr :2.4 down trending    Glucose :137   Hb:9.1   K:lower side , k diet restriction was stopped   Vital signs   No fever, /81 mild elevated on nasal canula one litter  U/O:2750 in 24 hours  Net balance since admitting - 18955    Oxygen eval ?  amoxicillin and doxycycline for another 5 more   Cardiac monitor on placed  SNF    Intake/Output Summary (Last 24 hours) at 2025 0906  Last data filed at 2025 0545  Gross per 24 hour   Intake --   Output 2750 ml   Net -2750 ml   Diuresing very well. Only bed scales obtained.            Medications   Scheduled Meds:   levothyroxine  25 mcg Oral Daily    furosemide  40 mg Oral Daily    cefepime  2,000 mg IntraVENous Q12H    insulin glargine  35 Units SubCUTAneous Nightly    vancomycin (VANCOCIN) intermittent dosing (placeholder)   Other RX Placeholder    insulin lispro  0-16 Units SubCUTAneous 4x Daily AC & HS    sodium chloride flush  5-40 mL IntraVENous 2 times per day    amitriptyline  25 mg Oral Nightly    apixaban  5 mg Oral BID    aspirin  81 mg Oral Daily    atorvastatin  20 mg Oral Daily    finasteride  5 mg Oral Daily    tamsulosin  0.4 mg Oral Daily    [Held by provider] pantoprazole  40 mg Oral QAM AC    sennosides-docusate sodium  1 tablet Oral BID    gabapentin  300 mg Oral BID     Continuous Infusions:   sodium chloride 5 mL/hr at 25 2312    dextrose       Objective   Vitals  Patient Vitals for the past 8 hrs:   BP Temp Temp src Pulse Resp SpO2 Weight   25 0732 (!) 156/81 98.1 °F (36.7 °C) Oral 85 16 96 % --   25 0407 -- -- -- -- -- -- (!) 164.1 kg (361 lb 12.8 oz)   25 0403 131/79 98.8 °F (37.1 °C) Oral 86 18 95 % --       Intake/Output Summary (Last 24 hours) at 2025 0906  Last data filed at 2025  presenting after a fall and syncopal episode.    Weakness on bilateral arms   Patient presented brief involuntary interruption in arm posture during sustained extension, no associated tremors ,clonus or focal weakness symptoms correlate with negative myoclonus ,probably 2/2 neurotoxicity for cefepime or metabolic as patient is recovering for an HUNTER   -d/c Cefepime  -reduced gabapentin to 300 mg daily  -Monitor symptoms     Cellulitis involving the scrotum and inguinal region (improving)  No fever, normal wbc   Redness and swelling improving   Patient on day number #5 of vancomycin and cefepime  -d/c Cefepime   -continue antibiotic when discharge with amoxi and doxy    Syncope  Cardiac syncope (most likely)  Sudden onset, no prodrome, and rapid recover no post ictal confusion --> typical of arrhythmic etiology : could be related to HF and arrhythmia (right bundle branch and prolonged Qtc ) as previous EKG but patient had an episode of syncope during that time   seizure(No tonic-clonic movements, tongue biting, incontinence, or postictal confusion)  Echocardiogram :   Cardiology following EF (50-55%)Moderately dilated LV ,Diastolic dysfunction (Grade II) elevated left atrial pressure (LAP) (HFpEF)Mitral Valve: Mild regurgitation.   Cardiology :following (event monitor at discharge)on placed     HFpEF  Acute hypoxic respiratory failure  Grade II diastolic dysfunction   Patient presenting with worsening SOB,   CT chest :small Bilateral pleural effusion   Echocardiogram :  EF (50-55%)Moderately dilated LV ,Diastolic dysfunction (Grade II) elevated left atrial pressure (LAP) (HFpEF)  Cardiology following.  -Started lasix drip (5/13) decreases dosis yesterday   U/O improving     HUNTER on CKD 3b  Hyperkalemia(resolved)  Non anion gap metabolic acidosis(improving)  Baseline Cr is hard to pin down as it has slowly been rising since 2023. 08/2024 it was 1.89, then 2.22 on 04/29, now 2.4>2.9 on admission.creatinine,Ur:46.7 Urea

## 2025-05-21 NOTE — CONSULTS
Clinical Pharmacy Progress Note    Vancomycin has been discontinued. Will sign off pharmacy to dose Vancomycin consult.  If medication is restarted and pharmacy is to manage dosing, please re-consult at that time.    Please call with questions--  Thanks--  Rogelio OdonnellD, BCPS, BCGP  [id number] ([facility])   5/21/2025 1:47 PM

## 2025-05-21 NOTE — PLAN OF CARE
Problem: Safety - Adult  Goal: Free from fall injury  5/20/2025 2250 by Angelika Hinds RN  Outcome: Progressing    Problem: Chronic Conditions and Co-morbidities  Goal: Patient's chronic conditions and co-morbidity symptoms are monitored and maintained or improved  5/20/2025 2250 by Angelika Hinds RN  Outcome: Progressing    Problem: Discharge Planning  Goal: Discharge to home or other facility with appropriate resources  5/20/2025 2250 by Angelika Hinds RN  Outcome: Progressing     Problem: Pain  Goal: Verbalizes/displays adequate comfort level or baseline comfort level  5/20/2025 2250 by Angelika Hinds RN  Outcome: Progressing    Problem: ABCDS Injury Assessment  Goal: Absence of physical injury  5/20/2025 2250 by Angelika Hinds RN  Outcome: Progressing     Problem: Skin/Tissue Integrity  Goal: Skin integrity remains intact  Description: 1.  Monitor for areas of redness and/or skin breakdown2.  Assess vascular access sites hourly3.  Every 4-6 hours minimum:  Change oxygen saturation probe site4.  Every 4-6 hours:  If on nasal continuous positive airway pressure, respiratory therapy assess nares and determine need for appliance change or resting period  5/20/2025 2250 by Angelika Hinds RN  Outcome: Progressing     Problem: Respiratory - Adult  Goal: Achieves optimal ventilation and oxygenation  5/20/2025 2250 by Angelika Hinds RN  Outcome: Progressing     Problem: Cardiovascular - Adult  Goal: Maintains optimal cardiac output and hemodynamic stability  5/20/2025 2250 by Angelika Hinds RN  Outcome: Progressing  Goal: Absence of cardiac dysrhythmias or at baseline  5/20/2025 2250 by Angelika Hinds RN  Outcome: Progressing     Problem: Metabolic/Fluid and Electrolytes - Adult  Goal: Electrolytes maintained within normal limits  5/20/2025 2250 by Angelika Hinds RN  Outcome: Progressing  Goal: Hemodynamic stability and optimal renal function maintained  5/20/2025 2250 by Angelika Hinds RN  Outcome: Progressing

## 2025-05-22 LAB
ALBUMIN SERPL-MCNC: 3.1 G/DL (ref 3.4–5)
ANION GAP SERPL CALCULATED.3IONS-SCNC: 13 MMOL/L (ref 3–16)
BASOPHILS # BLD: 0 K/UL (ref 0–0.2)
BASOPHILS NFR BLD: 0.5 %
BUN SERPL-MCNC: 49 MG/DL (ref 7–20)
CALCIUM SERPL-MCNC: 9 MG/DL (ref 8.3–10.6)
CHLORIDE SERPL-SCNC: 98 MMOL/L (ref 99–110)
CO2 SERPL-SCNC: 26 MMOL/L (ref 21–32)
CREAT SERPL-MCNC: 2.5 MG/DL (ref 0.8–1.3)
DEPRECATED RDW RBC AUTO: 15.2 % (ref 12.4–15.4)
EOSINOPHIL # BLD: 0.7 K/UL (ref 0–0.6)
EOSINOPHIL NFR BLD: 8.6 %
GFR SERPLBLD CREATININE-BSD FMLA CKD-EPI: 28 ML/MIN/{1.73_M2}
GLUCOSE BLD-MCNC: 261 MG/DL (ref 70–99)
GLUCOSE BLD-MCNC: 270 MG/DL (ref 70–99)
GLUCOSE BLD-MCNC: 274 MG/DL (ref 70–99)
GLUCOSE BLD-MCNC: 305 MG/DL (ref 70–99)
GLUCOSE SERPL-MCNC: 288 MG/DL (ref 70–99)
HCT VFR BLD AUTO: 26.7 % (ref 40.5–52.5)
HGB BLD-MCNC: 8.9 G/DL (ref 13.5–17.5)
LYMPHOCYTES # BLD: 0.8 K/UL (ref 1–5.1)
LYMPHOCYTES NFR BLD: 10.2 %
MAGNESIUM SERPL-MCNC: 1.83 MG/DL (ref 1.8–2.4)
MAGNESIUM SERPL-MCNC: 1.89 MG/DL (ref 1.8–2.4)
MCH RBC QN AUTO: 29.6 PG (ref 26–34)
MCHC RBC AUTO-ENTMCNC: 33.2 G/DL (ref 31–36)
MCV RBC AUTO: 88.9 FL (ref 80–100)
MONOCYTES # BLD: 1.2 K/UL (ref 0–1.3)
MONOCYTES NFR BLD: 15.1 %
NEUTROPHILS # BLD: 5.1 K/UL (ref 1.7–7.7)
NEUTROPHILS NFR BLD: 65.6 %
PERFORMED ON: ABNORMAL
PHOSPHATE SERPL-MCNC: 3 MG/DL (ref 2.5–4.9)
PLATELET # BLD AUTO: 225 K/UL (ref 135–450)
PMV BLD AUTO: 7.7 FL (ref 5–10.5)
POTASSIUM SERPL-SCNC: 3.8 MMOL/L (ref 3.5–5.1)
RBC # BLD AUTO: 3.01 M/UL (ref 4.2–5.9)
SODIUM SERPL-SCNC: 137 MMOL/L (ref 136–145)
WBC # BLD AUTO: 7.8 K/UL (ref 4–11)

## 2025-05-22 PROCEDURE — 97530 THERAPEUTIC ACTIVITIES: CPT

## 2025-05-22 PROCEDURE — 6370000000 HC RX 637 (ALT 250 FOR IP): Performed by: INTERNAL MEDICINE

## 2025-05-22 PROCEDURE — 6370000000 HC RX 637 (ALT 250 FOR IP)

## 2025-05-22 PROCEDURE — 36415 COLL VENOUS BLD VENIPUNCTURE: CPT

## 2025-05-22 PROCEDURE — 97110 THERAPEUTIC EXERCISES: CPT

## 2025-05-22 PROCEDURE — 99233 SBSQ HOSP IP/OBS HIGH 50: CPT | Performed by: INTERNAL MEDICINE

## 2025-05-22 PROCEDURE — 80069 RENAL FUNCTION PANEL: CPT

## 2025-05-22 PROCEDURE — 83735 ASSAY OF MAGNESIUM: CPT

## 2025-05-22 PROCEDURE — 1200000000 HC SEMI PRIVATE

## 2025-05-22 PROCEDURE — 85025 COMPLETE CBC W/AUTO DIFF WBC: CPT

## 2025-05-22 PROCEDURE — 2500000003 HC RX 250 WO HCPCS

## 2025-05-22 RX ORDER — INSULIN LISPRO 100 [IU]/ML
3 INJECTION, SOLUTION INTRAVENOUS; SUBCUTANEOUS
Status: DISCONTINUED | OUTPATIENT
Start: 2025-05-22 | End: 2025-05-23 | Stop reason: HOSPADM

## 2025-05-22 RX ADMIN — PANTOPRAZOLE SODIUM 40 MG: 40 TABLET, DELAYED RELEASE ORAL at 06:21

## 2025-05-22 RX ADMIN — OXYCODONE 10 MG: 5 TABLET ORAL at 18:49

## 2025-05-22 RX ADMIN — ATORVASTATIN CALCIUM 20 MG: 20 TABLET, FILM COATED ORAL at 09:45

## 2025-05-22 RX ADMIN — INSULIN LISPRO 3 UNITS: 100 INJECTION, SOLUTION INTRAVENOUS; SUBCUTANEOUS at 12:55

## 2025-05-22 RX ADMIN — APIXABAN 5 MG: 5 TABLET, FILM COATED ORAL at 09:45

## 2025-05-22 RX ADMIN — SENNOSIDES AND DOCUSATE SODIUM 1 TABLET: 50; 8.6 TABLET ORAL at 21:56

## 2025-05-22 RX ADMIN — LEVOTHYROXINE SODIUM 25 MCG: 0.03 TABLET ORAL at 06:21

## 2025-05-22 RX ADMIN — INSULIN GLARGINE 35 UNITS: 100 INJECTION, SOLUTION SUBCUTANEOUS at 21:58

## 2025-05-22 RX ADMIN — SODIUM CHLORIDE, PRESERVATIVE FREE 10 ML: 5 INJECTION INTRAVENOUS at 09:51

## 2025-05-22 RX ADMIN — INSULIN LISPRO 3 UNITS: 100 INJECTION, SOLUTION INTRAVENOUS; SUBCUTANEOUS at 18:02

## 2025-05-22 RX ADMIN — SODIUM CHLORIDE, PRESERVATIVE FREE 10 ML: 5 INJECTION INTRAVENOUS at 21:57

## 2025-05-22 RX ADMIN — AMITRIPTYLINE HYDROCHLORIDE 25 MG: 50 TABLET ORAL at 21:56

## 2025-05-22 RX ADMIN — INSULIN LISPRO 8 UNITS: 100 INJECTION, SOLUTION INTRAVENOUS; SUBCUTANEOUS at 22:01

## 2025-05-22 RX ADMIN — GABAPENTIN 300 MG: 300 CAPSULE ORAL at 21:56

## 2025-05-22 RX ADMIN — FINASTERIDE 5 MG: 5 TABLET, FILM COATED ORAL at 09:45

## 2025-05-22 RX ADMIN — FUROSEMIDE 40 MG: 40 TABLET ORAL at 09:45

## 2025-05-22 RX ADMIN — APIXABAN 5 MG: 5 TABLET, FILM COATED ORAL at 21:56

## 2025-05-22 RX ADMIN — ASPIRIN 81 MG: 81 TABLET, COATED ORAL at 09:46

## 2025-05-22 RX ADMIN — INSULIN LISPRO 8 UNITS: 100 INJECTION, SOLUTION INTRAVENOUS; SUBCUTANEOUS at 18:01

## 2025-05-22 RX ADMIN — TAMSULOSIN HYDROCHLORIDE 0.4 MG: 0.4 CAPSULE ORAL at 09:46

## 2025-05-22 RX ADMIN — SENNOSIDES AND DOCUSATE SODIUM 1 TABLET: 50; 8.6 TABLET ORAL at 09:46

## 2025-05-22 RX ADMIN — INSULIN LISPRO 8 UNITS: 100 INJECTION, SOLUTION INTRAVENOUS; SUBCUTANEOUS at 09:46

## 2025-05-22 RX ADMIN — INSULIN LISPRO 12 UNITS: 100 INJECTION, SOLUTION INTRAVENOUS; SUBCUTANEOUS at 12:56

## 2025-05-22 ASSESSMENT — PAIN - FUNCTIONAL ASSESSMENT
PAIN_FUNCTIONAL_ASSESSMENT: ACTIVITIES ARE NOT PREVENTED
PAIN_FUNCTIONAL_ASSESSMENT: ACTIVITIES ARE NOT PREVENTED

## 2025-05-22 ASSESSMENT — PAIN DESCRIPTION - ORIENTATION
ORIENTATION: RIGHT;LEFT
ORIENTATION: RIGHT;LEFT

## 2025-05-22 ASSESSMENT — PAIN DESCRIPTION - DESCRIPTORS
DESCRIPTORS: STABBING;ACHING
DESCRIPTORS: STABBING;ACHING

## 2025-05-22 ASSESSMENT — PAIN SCALES - GENERAL
PAINLEVEL_OUTOF10: 9
PAINLEVEL_OUTOF10: 6

## 2025-05-22 ASSESSMENT — PAIN DESCRIPTION - LOCATION
LOCATION: LEG
LOCATION: LEG

## 2025-05-22 NOTE — PLAN OF CARE
Problem: Safety - Adult  Goal: Free from fall injury  5/21/2025 2307 by Rebecca Perez, RN  Outcome: Progressing  Flowsheets (Taken 5/21/2025 2307)  Free From Fall Injury:   Instruct family/caregiver on patient safety   Based on caregiver fall risk screen, instruct family/caregiver to ask for assistance with transferring infant if caregiver noted to have fall risk factors     Problem: Chronic Conditions and Co-morbidities  Goal: Patient's chronic conditions and co-morbidity symptoms are monitored and maintained or improved  5/21/2025 2307 by Rebecca Perez, RN  Outcome: Progressing  Flowsheets (Taken 5/21/2025 2307)  Care Plan - Patient's Chronic Conditions and Co-Morbidity Symptoms are Monitored and Maintained or Improved:   Monitor and assess patient's chronic conditions and comorbid symptoms for stability, deterioration, or improvement   Collaborate with multidisciplinary team to address chronic and comorbid conditions and prevent exacerbation or deterioration   Update acute care plan with appropriate goals if chronic or comorbid symptoms are exacerbated and prevent overall improvement and discharge     Problem: Discharge Planning  Goal: Discharge to home or other facility with appropriate resources  5/21/2025 2307 by Rebecca Perez, RN  Outcome: Progressing     Problem: Pain  Goal: Verbalizes/displays adequate comfort level or baseline comfort level  5/21/2025 2307 by Rebecca Perez RN  Outcome: Progressing  Flowsheets (Taken 5/21/2025 2307)  Verbalizes/displays adequate comfort level or baseline comfort level:   Encourage patient to monitor pain and request assistance   Assess pain using appropriate pain scale   Administer analgesics based on type and severity of pain and evaluate response   Implement non-pharmacological measures as appropriate and evaluate response   Notify Licensed Independent Practitioner if interventions unsuccessful or patient reports new pain   Consider cultural and social

## 2025-05-22 NOTE — PROGRESS NOTES
Nephrology Progress Note                                                                                                                                                                                                                                                                                                                                                               Office : [phone]     Fax :[phone]    Patient's Name: Ty Meyer  9:17 AM  5/22/2025    Reason for Consult:  HUNTER on CKD 3  Requesting Physician:  Jose Chew MD  Chief Complaint:    Chief Complaint   Patient presents with    Altered Mental Status       Assessment/Plan     # HUNTER on CKD 3 sec to ATN   # Volume overloaded   - Volume and creatinine MUCH BETTER   - Creatinine up to 4.0 --> 3.6--> 3.2 --> 2.9 --> 2.7 --> 2.5 --> 2.5  - Lockett placed 5/15 2/2 retention   - S/p Lasix gtt; stopped 5/18 --> Lasix 40 mg daily   - Urine studies without significant proteinuria   - NASRA on 5/13 unremarkable   - Avoid nephrotoxins  - Closely monitor volume status and renal labs    # HFpEF  - Echo with EF 50-55%, grade II DD with increased LAP  - BNP worse ~11.5K --> 13K  - Now on Lasix 40 mg daily     # HTN  - BP's overall improved   - Monitor     # Acid- base/ Electrolyte imbalance   # Hyperkalemia  - K now running on the lower end. Stop potassium restriction   - Monitor     # Hyponatremia - Resolved  - Improved with diuresis  - Monitor     # DM 2  - Management per primary    # Scrotal cellulitis  - Abx per primary     # Right bimalleolar ankle fx  - Ortho on board  - Will need outpatient follow-up       History of Present Ilness:    Ty Meyer is a 64 y.o. male with a PMH of HTN, DM2, osteomyelitis s/p LLE BKA, CKD 3, who presented after a fall and syncopal episode. We are being consulted for HUNTER on CKD 3.    Interval hx:    Resting quietly in bed  No complaints at this time   Creatinine stable   Electrolytes stable   Great UOP  BP's

## 2025-05-22 NOTE — PROGRESS NOTES
Physical Therapy  Facility/Department: [address]  Physical Therapy Treatment    Name: Ty Meyer  : 1960  MRN: [id number]  Date of Service: 2025    Discharge Recommendations:  Subacute/Skilled Nursing Facility   PT Equipment Recommendations  Other: defer      Patient Diagnosis(es): The primary encounter diagnosis was Acute pulmonary edema (HCC). Diagnoses of Closed fracture of right ankle, initial encounter, Syncope and collapse, Acute congestive heart failure, unspecified heart failure type (HCC), Acute diastolic congestive heart failure (HCC), Syncope, unspecified syncope type, and Acute anxiety were also pertinent to this visit.  Past Medical History:  has a past medical history of Abdominal hernia, Back pain, CAD (coronary artery disease), CKD (chronic kidney disease) stage 4, GFR 15-29 ml/min (HCC), Diabetes mellitus (HCC), Hyperlipidemia, Hypertension, and MRSA (methicillin resistant staph aureus) culture positive.  Past Surgical History:  has a past surgical history that includes Leg amputation below knee; Foot surgery (Left, 2017); other surgical history (Right, 2017); and back surgery ().    Assessment  Body Structures, Functions, Activity Limitations Requiring Skilled Therapeutic Intervention: Decreased functional mobility ;Decreased strength;Decreased cognition;Decreased endurance;Decreased balance;Decreased posture  Assessment: Pt continues to require assist x2 skilled therapists to maximize safety and outcomes.  Pt requiring assist x1-2 for bed mobility this session, and reported lightheadedness upon sitting EOB.  Pt unable to tolerate transfer attempt due to lightheadedness sitting EOB and decreasing ability to follow commands as drowsiness increased.  Safety concerns for d/c home due to pt requiring assistx2, pt unable to tolerate OOB mobility, making him a high fall risk.  Pt will continue to benefit from skilled therapy to maximize safety and

## 2025-05-22 NOTE — PROGRESS NOTES
Occupational Therapy  Facility/Department: [address]  Occupational Therapy Treatment     Name: Ty Meyer  : 1960  MRN: [id number]  Date of Service: 2025    Discharge Recommendations:  Subacute/Skilled Nursing Facility          Patient Diagnosis(es): The primary encounter diagnosis was Acute pulmonary edema (HCC). Diagnoses of Closed fracture of right ankle, initial encounter, Syncope and collapse, Acute congestive heart failure, unspecified heart failure type (HCC), Acute diastolic congestive heart failure (HCC), Syncope, unspecified syncope type, and Acute anxiety were also pertinent to this visit.  Past Medical History:  has a past medical history of Abdominal hernia, Back pain, CAD (coronary artery disease), CKD (chronic kidney disease) stage 4, GFR 15-29 ml/min (HCC), Diabetes mellitus (HCC), Hyperlipidemia, Hypertension, and MRSA (methicillin resistant staph aureus) culture positive.  Past Surgical History:  has a past surgical history that includes Leg amputation below knee; Foot surgery (Left, 2017); other surgical history (Right, 2017); and back surgery ().    Treatment Diagnosis: imp mob, tf, ADL      Assessment  Assessment: From home with family. Pt completing bed mobility with assist. Sitting EOB for therex. Limited by dizziness. BP normal. Pt would benefit from cont therapies while in acute care. Rec cont inpt care at [facility]. Cont POC.  REQUIRES OT FOLLOW-UP: Yes  Activity Tolerance  Activity Tolerance: Patient Tolerated treatment well;Patient limited by fatigue     Plan  Occupational Therapy Plan  Times Per Week: 2-5  Current Treatment Recommendations: Strengthening, ROM, Balance training, Functional mobility training, Endurance training, Safety education & training, Patient/Caregiver education & training, Self-Care / ADL, Co-Treatment    Restrictions  Restrictions/Precautions  Restrictions/Precautions: Seizure  Position Activity Restriction  Other  Position/Activity Restrictions: touch down WB R LE    Subjective  General  Chart Reviewed: Yes  Patient assessed for rehabilitation services?: Yes  Additional Pertinent Hx: 64 y.o. male with a PMHx significant for DVT on Eliquis, HTN, T2DM on insulin, seizure disorder off of AEDs for years, osteomyelitis s/p LLE BKA, CAD, CKD 3b who is admitted with R ankle fracture and BL inguinal hernias, now with concern for scrotal cellulitis. Fall d/t syncope.  Referring Practitioner: Crissy Aguilar MD  Diagnosis: syncope and collapse  Subjective  Subjective: In bed agreeable to session, no pain rating     Social/Functional History  Social/Functional History  Lives With: Spouse  Type of Home: House  Home Layout: One level, Able to Live on Main level with bedroom/bathroom  Home Access: Stairs to enter with rails, Ramped entrance  Entrance Stairs - Number of Steps: 3-4  Bathroom Shower/Tub: Tub/Shower unit  Bathroom Toilet: Standard  Bathroom Equipment: Shower chair, Grab bars in shower, Grab bars around toilet  Home Equipment: Walker - Rolling, Wheelchair - Manual, Electric scooter, Lift chair, Walker - 4-Wheeled (3-wheeled walker; sleeps in lift chair; L LE prosthesis (wears at all times, removes only for bathing); R \"foot brace\")  Has the patient had two or more falls in the past year or any fall with injury in the past year?: No  Receives Help From: Family  Prior Level of Assist for ADLs: Needs assistance (assist with bathing. indep with dressing)  Prior Level of Assist for Homemaking: Needs assistance (wife completes most; pt indep with microwave meals, washing dishes in standing position)  Prior Level of Assist for Ambulation: Independent in home with wheelchair and able to pivot transfer (uses 3 wheeled walker to amb to bathroom, uses manual w/c to kitchen; L LE prosthesis and R \"foot brace\" in place for all transfers and amb)  Prior Level of Assist for Transfers: Independent (uses various assistive devices but  can transfer to and from Mod I)  Active : Yes (\"occassionally\")  Leisure & Hobbies: watch TV, listen to music  Additional Comments: wife is gone for work during the day. Pt reports one fall which prompted this admission, denies other falls. Dtr present and assisting with PLOF/home setup.    Objective               Safety Devices  Type of Devices: All fall risk precautions in place;Bed alarm in place;Call light within reach;Left in bed;Nurse notified  Bed Mobility Training  Bed Mobility Training: Yes  Supine to Sit: Minimal assistance  Sit to Supine: Partial/Moderate assistance  Scootin Person assistance;Dependent/Total  Balance  Sitting: High guard (EOB for therex)              Activity Tolerance  Activity Tolerance: Patient limited by fatigue;Patient limited by endurance  Activity Tolerance Comments: Pt initially reporting lightheadedness sitting EOB, /67. Pt able to perform several exercises before becoming extremely drowsy and with poor command following, requiring return to supine.  BP in supine 121/71.  RN notified                           A/AROM Exercises: UB/LB therex EOB. shoulder flexion, punches fwd x10  Education Given To: Patient  Education Provided: Role of Therapy;Plan of Care;ADL Adaptive Strategies;Transfer Training  Education Method: Demonstration;Verbal  Barriers to Learning: None  Education Outcome: Verbalized understanding;Demonstrated understanding                     G-Code     OutComes Score                                                  AM-PAC - ADL  AM-PAC Daily Activity - Inpatient   How much help is needed for putting on and taking off regular lower body clothing?: A Lot  How much help is needed for bathing (which includes washing, rinsing, drying)?: A Lot  How much help is needed for toileting (which includes using toilet, bedpan, or urinal)?: A Lot  How much help is needed for putting on and taking off regular upper body clothing?: A Lot  How much help is needed for

## 2025-05-22 NOTE — PLAN OF CARE
Problem: Safety - Adult  Goal: Free from fall injury  Outcome: Progressing  Flowsheets (Taken 5/22/2025 1814)  Free From Fall Injury:   Instruct family/caregiver on patient safety   Based on caregiver fall risk screen, instruct family/caregiver to ask for assistance with transferring infant if caregiver noted to have fall risk factors     Problem: Chronic Conditions and Co-morbidities  Goal: Patient's chronic conditions and co-morbidity symptoms are monitored and maintained or improved  Outcome: Progressing  Flowsheets (Taken 5/22/2025 1814)  Care Plan - Patient's Chronic Conditions and Co-Morbidity Symptoms are Monitored and Maintained or Improved:   Monitor and assess patient's chronic conditions and comorbid symptoms for stability, deterioration, or improvement   Collaborate with multidisciplinary team to address chronic and comorbid conditions and prevent exacerbation or deterioration   Update acute care plan with appropriate goals if chronic or comorbid symptoms are exacerbated and prevent overall improvement and discharge     Problem: Discharge Planning  Goal: Discharge to home or other facility with appropriate resources  Outcome: Progressing  Flowsheets (Taken 5/22/2025 1814)  Discharge to home or other facility with appropriate resources:   Arrange for needed discharge resources and transportation as appropriate   Identify barriers to discharge with patient and caregiver   Identify discharge learning needs (meds, wound care, etc)   Refer to discharge planning if patient needs post-hospital services based on physician order or complex needs related to functional status, cognitive ability or social support system     Problem: Pain  Goal: Verbalizes/displays adequate comfort level or baseline comfort level  Outcome: Progressing  Flowsheets (Taken 5/22/2025 1814)  Verbalizes/displays adequate comfort level or baseline comfort level:   Encourage patient to monitor pain and request assistance   Assess pain  blood pressure and heart rate   Monitor urine output and notify Licensed Independent Practitioner for values outside of normal range  Goal: Absence of cardiac dysrhythmias or at baseline  Outcome: Progressing  Flowsheets (Taken 5/22/2025 1814)  Absence of cardiac dysrhythmias or at baseline:   Monitor cardiac rate and rhythm   Assess for signs of decreased cardiac output   Administer antiarrhythmia medication and electrolyte replacement as ordered     Problem: Metabolic/Fluid and Electrolytes - Adult  Goal: Electrolytes maintained within normal limits  Outcome: Progressing  Flowsheets (Taken 5/22/2025 1814)  Electrolytes maintained within normal limits:   Monitor labs and assess patient for signs and symptoms of electrolyte imbalances   Administer electrolyte replacement as ordered  Goal: Hemodynamic stability and optimal renal function maintained  Outcome: Progressing  Flowsheets (Taken 5/22/2025 1814)  Hemodynamic stability and optimal renal function maintained:   Monitor labs and assess for signs and symptoms of volume excess or deficit   Monitor intake, output and patient weight

## 2025-05-22 NOTE — PROGRESS NOTES
Mg latest result of 1.71 mg/dl, informed to on call Jasson Knight MD, ordered stat Mg IV replacement.

## 2025-05-22 NOTE — CARE COORDINATION
DISCHARGE PLANNING:    Patient accepted at [facility].  Precert still pending.      HENS # [id number]      Electronically signed by ANNETTE Sandra, RN Case Manager on 5/22/2025 at 1:14 PM

## 2025-05-22 NOTE — PROGRESS NOTES
Internal Medicine Progress Note    Admit Date: 5/10/2025  Hospital Day: 13   Patient name: Ty Meyer   : 1960     CC:   Altered Mental Status     Interval history:   No acute events overnight   Labs: Cr :2.5 down trending    Glucose :137   Hb:9.1   K:lower side , k diet restriction was stopped   Vital signs   No fever, /81 mild elevated on nasal canula one litter  U/O:3425 in 24 hours  Net balance since admitting - 66434    Cardiac monitor on placed  SNF pending precert     Intake/Output Summary (Last 24 hours) at 2025 1208  Last data filed at 2025 0911  Gross per 24 hour   Intake 550 ml   Output 3550 ml   Net -3000 ml   Diuresing very well. Only bed scales obtained.            Medications   Scheduled Meds:   insulin lispro  3 Units SubCUTAneous TID WC    gabapentin  300 mg Oral Nightly    levothyroxine  25 mcg Oral Daily    furosemide  40 mg Oral Daily    insulin glargine  35 Units SubCUTAneous Nightly    insulin lispro  0-16 Units SubCUTAneous 4x Daily AC & HS    sodium chloride flush  5-40 mL IntraVENous 2 times per day    amitriptyline  25 mg Oral Nightly    apixaban  5 mg Oral BID    aspirin  81 mg Oral Daily    atorvastatin  20 mg Oral Daily    finasteride  5 mg Oral Daily    tamsulosin  0.4 mg Oral Daily    pantoprazole  40 mg Oral QAM AC    sennosides-docusate sodium  1 tablet Oral BID     Continuous Infusions:   sodium chloride 5 mL/hr at 25 2312    dextrose       Objective   Vitals  Patient Vitals for the past 8 hrs:   BP Temp Temp src Pulse Resp SpO2 Weight   25 0743 (!) 141/81 97.7 °F (36.5 °C) Oral 88 16 96 % --   25 0501 -- -- -- -- -- -- (!) 163.1 kg (359 lb 8 oz)   25 0438 126/76 98.4 °F (36.9 °C) Axillary 86 18 97 % --       Intake/Output Summary (Last 24 hours) at 2025 1208  Last data filed at 2025 0911  Gross per 24 hour   Intake 550 ml   Output 3550 ml   Net -3000 ml       ROS: A 10 point review of systems was conducted and

## 2025-05-22 NOTE — PROGRESS NOTES
Pt refused for repositioning, he said he is already comfortable.  Explained to him the risk of developing bedsore, nut still insisting not to be repositioned. He is on specialty bed.

## 2025-05-23 VITALS
WEIGHT: 315 LBS | DIASTOLIC BLOOD PRESSURE: 78 MMHG | HEART RATE: 82 BPM | OXYGEN SATURATION: 94 % | SYSTOLIC BLOOD PRESSURE: 130 MMHG | TEMPERATURE: 98.1 F | HEIGHT: 75 IN | BODY MASS INDEX: 39.17 KG/M2 | RESPIRATION RATE: 18 BRPM

## 2025-05-23 LAB
ALBUMIN SERPL-MCNC: 3.1 G/DL (ref 3.4–5)
ANION GAP SERPL CALCULATED.3IONS-SCNC: 11 MMOL/L (ref 3–16)
BASOPHILS # BLD: 0.1 K/UL (ref 0–0.2)
BASOPHILS NFR BLD: 0.8 %
BUN SERPL-MCNC: 48 MG/DL (ref 7–20)
CALCIUM SERPL-MCNC: 8.9 MG/DL (ref 8.3–10.6)
CHLORIDE SERPL-SCNC: 98 MMOL/L (ref 99–110)
CO2 SERPL-SCNC: 28 MMOL/L (ref 21–32)
CREAT SERPL-MCNC: 2.6 MG/DL (ref 0.8–1.3)
DEPRECATED RDW RBC AUTO: 15.2 % (ref 12.4–15.4)
EOSINOPHIL # BLD: 0.6 K/UL (ref 0–0.6)
EOSINOPHIL NFR BLD: 8.4 %
GFR SERPLBLD CREATININE-BSD FMLA CKD-EPI: 27 ML/MIN/{1.73_M2}
GLUCOSE BLD-MCNC: 196 MG/DL (ref 70–99)
GLUCOSE BLD-MCNC: 219 MG/DL (ref 70–99)
GLUCOSE SERPL-MCNC: 214 MG/DL (ref 70–99)
HCT VFR BLD AUTO: 26.9 % (ref 40.5–52.5)
HGB BLD-MCNC: 8.9 G/DL (ref 13.5–17.5)
LYMPHOCYTES # BLD: 0.8 K/UL (ref 1–5.1)
LYMPHOCYTES NFR BLD: 9.8 %
MAGNESIUM SERPL-MCNC: 1.72 MG/DL (ref 1.8–2.4)
MCH RBC QN AUTO: 29.4 PG (ref 26–34)
MCHC RBC AUTO-ENTMCNC: 33 G/DL (ref 31–36)
MCV RBC AUTO: 89.1 FL (ref 80–100)
MONOCYTES # BLD: 0.9 K/UL (ref 0–1.3)
MONOCYTES NFR BLD: 12 %
NEUTROPHILS # BLD: 5.4 K/UL (ref 1.7–7.7)
NEUTROPHILS NFR BLD: 69 %
PERFORMED ON: ABNORMAL
PERFORMED ON: ABNORMAL
PHOSPHATE SERPL-MCNC: 2.9 MG/DL (ref 2.5–4.9)
PLATELET # BLD AUTO: 247 K/UL (ref 135–450)
PMV BLD AUTO: 7.6 FL (ref 5–10.5)
POTASSIUM SERPL-SCNC: 3.6 MMOL/L (ref 3.5–5.1)
RBC # BLD AUTO: 3.02 M/UL (ref 4.2–5.9)
SODIUM SERPL-SCNC: 137 MMOL/L (ref 136–145)
WBC # BLD AUTO: 7.8 K/UL (ref 4–11)

## 2025-05-23 PROCEDURE — 99233 SBSQ HOSP IP/OBS HIGH 50: CPT | Performed by: INTERNAL MEDICINE

## 2025-05-23 PROCEDURE — 6370000000 HC RX 637 (ALT 250 FOR IP)

## 2025-05-23 PROCEDURE — 36415 COLL VENOUS BLD VENIPUNCTURE: CPT

## 2025-05-23 PROCEDURE — 2500000003 HC RX 250 WO HCPCS

## 2025-05-23 PROCEDURE — 85025 COMPLETE CBC W/AUTO DIFF WBC: CPT

## 2025-05-23 PROCEDURE — 6370000000 HC RX 637 (ALT 250 FOR IP): Performed by: INTERNAL MEDICINE

## 2025-05-23 PROCEDURE — 83735 ASSAY OF MAGNESIUM: CPT

## 2025-05-23 PROCEDURE — 80069 RENAL FUNCTION PANEL: CPT

## 2025-05-23 RX ORDER — INSULIN LISPRO 100 [IU]/ML
3 INJECTION, SOLUTION INTRAVENOUS; SUBCUTANEOUS
Qty: 1 EACH | Refills: 2 | Status: SHIPPED | OUTPATIENT
Start: 2025-05-23 | End: 2025-05-23 | Stop reason: HOSPADM

## 2025-05-23 RX ORDER — GABAPENTIN 300 MG/1
300 CAPSULE ORAL NIGHTLY
Qty: 30 CAPSULE | Refills: 0 | Status: SHIPPED | OUTPATIENT
Start: 2025-05-23 | End: 2025-06-22

## 2025-05-23 RX ORDER — ALPRAZOLAM 0.25 MG
0.25 TABLET ORAL 3 TIMES DAILY PRN
Qty: 9 TABLET | Refills: 0 | Status: SHIPPED | OUTPATIENT
Start: 2025-05-23 | End: 2025-05-26

## 2025-05-23 RX ADMIN — INSULIN LISPRO 3 UNITS: 100 INJECTION, SOLUTION INTRAVENOUS; SUBCUTANEOUS at 09:12

## 2025-05-23 RX ADMIN — LEVOTHYROXINE SODIUM 25 MCG: 0.03 TABLET ORAL at 06:17

## 2025-05-23 RX ADMIN — INSULIN LISPRO 3 UNITS: 100 INJECTION, SOLUTION INTRAVENOUS; SUBCUTANEOUS at 13:01

## 2025-05-23 RX ADMIN — ATORVASTATIN CALCIUM 20 MG: 20 TABLET, FILM COATED ORAL at 09:11

## 2025-05-23 RX ADMIN — SENNOSIDES AND DOCUSATE SODIUM 1 TABLET: 50; 8.6 TABLET ORAL at 09:12

## 2025-05-23 RX ADMIN — FINASTERIDE 5 MG: 5 TABLET, FILM COATED ORAL at 09:12

## 2025-05-23 RX ADMIN — PANTOPRAZOLE SODIUM 40 MG: 40 TABLET, DELAYED RELEASE ORAL at 06:17

## 2025-05-23 RX ADMIN — TAMSULOSIN HYDROCHLORIDE 0.4 MG: 0.4 CAPSULE ORAL at 09:11

## 2025-05-23 RX ADMIN — INSULIN LISPRO 4 UNITS: 100 INJECTION, SOLUTION INTRAVENOUS; SUBCUTANEOUS at 13:01

## 2025-05-23 RX ADMIN — FUROSEMIDE 40 MG: 40 TABLET ORAL at 09:11

## 2025-05-23 RX ADMIN — SODIUM CHLORIDE, PRESERVATIVE FREE 10 ML: 5 INJECTION INTRAVENOUS at 09:13

## 2025-05-23 RX ADMIN — ASPIRIN 81 MG: 81 TABLET, COATED ORAL at 09:12

## 2025-05-23 RX ADMIN — INSULIN LISPRO 4 UNITS: 100 INJECTION, SOLUTION INTRAVENOUS; SUBCUTANEOUS at 09:12

## 2025-05-23 RX ADMIN — APIXABAN 5 MG: 5 TABLET, FILM COATED ORAL at 09:12

## 2025-05-23 NOTE — PROGRESS NOTES
PRN  glucagon injection 1 mg, PRN  dextrose 10 % infusion, Continuous PRN  amitriptyline (ELAVIL) tablet 25 mg, Nightly  apixaban (ELIQUIS) tablet 5 mg, BID  aspirin EC tablet 81 mg, Daily  atorvastatin (LIPITOR) tablet 20 mg, Daily  finasteride (PROSCAR) tablet 5 mg, Daily  melatonin tablet 3 mg, Nightly PRN  tamsulosin (FLOMAX) capsule 0.4 mg, Daily  pantoprazole (PROTONIX) tablet 40 mg, QAM AC  sennosides-docusate sodium (SENOKOT-S) 8.6-50 MG tablet 1 tablet, BID  oxyCODONE (ROXICODONE) immediate release tablet 5 mg, Q4H PRN   Or  oxyCODONE (ROXICODONE) immediate release tablet 10 mg, Q4H PRN      Physical exam:     Vitals:  /78   Pulse 82   Temp 98.1 °F (36.7 °C) (Oral)   Resp 18   Ht 1.905 m (6' 3\")   Wt (!) 163.3 kg (360 lb 1.6 oz)   SpO2 94%   BMI 45.01 kg/m²   Constitutional:  Alert, NAD  Skin: no rash, turgor wnl  Heent:  eomi, mmm  Neck: no bruits or jvd noted  Cardiovascular:  S1, S2 without m/r/g  Respiratory: CTA B without w/r/r  Abdomen:  soft, nt, nd  Ext:  lower extremity edema Yes  Psychiatric: mood and affect flat   Musculoskeletal:  Rom, muscular strength intact    Data:   Labs:  CBC:   Recent Labs     05/21/25  0553 05/22/25  0651 05/23/25  1056   WBC 7.1 7.8 7.8   HGB 8.9* 8.9* 8.9*    225 247     BMP:    Recent Labs     05/21/25  0553 05/22/25  0651 05/23/25  1056    137 137   K 3.5 3.8 3.6    98* 98*   CO2 30 26 28   BUN 48* 49* 48*   CREATININE 2.4* 2.5* 2.6*   GLUCOSE 161* 288* 214*     Ca/Mg/Phos:   Recent Labs     05/21/25  0553 05/21/25  1734 05/22/25  0651 05/22/25  1719 05/23/25  1056   CALCIUM 8.9  --  9.0  --  8.9   MG 1.85   < > 1.89 1.83 1.72*   PHOS 2.6  --  3.0  --  2.9    < > = values in this interval not displayed.     Hepatic:   No results for input(s): \"AST\", \"ALT\", \"BILITOT\", \"ALKPHOS\" in the last 72 hours.    Invalid input(s): \"ALB\"      Troponin: No results for input(s): \"TROPONINI\" in the last 72 hours.  BNP: No results for input(s): \"BNP\"  in the last 72 hours.  Lipids:   No results for input(s): \"CHOL\", \"TRIG\", \"HDL\" in the last 72 hours.    Invalid input(s): \"LDLCALC\", \"LABVLDL\"    ABGs:   No results for input(s): \"PHART\", \"PO2ART\", \"WQQ6LVQ\" in the last 72 hours.    INR:   No results for input(s): \"INR\" in the last 72 hours.    UA:  No results for input(s): \"COLORU\", \"CLARITYU\", \"GLUCOSEU\", \"BILIRUBINUR\", \"KETUA\", \"SPECGRAV\", \"BLOODU\", \"PHUR\", \"PROTEINU\", \"UROBILINOGEN\", \"NITRU\", \"LEUKOCYTESUR\", \"URINETYPE\" in the last 72 hours.    Invalid input(s): \"LABMICR\"       Urine Microscopic:   No results for input(s): \"LABCAST\", \"BACTERIA\", \"COMU\", \"HYALCAST\", \"WBCUA\", \"RBCUA\" in the last 72 hours.    Invalid input(s): \"EPIU\"      Urine Culture:   No results for input(s): \"LABURIN\" in the last 72 hours.    Urine Chemistry:   No results for input(s): \"CLUR\", \"LABCREA\", \"PROTEINUR\", \"NAUR\" in the last 72 hours.        IMAGING:  US LIVER   Final Result   1. Normal right upper quadrant abdominal ultrasound with no sonographic   abnormality of the liver detected.      Electronically signed by Haim Rapp      CT ABDOMEN PELVIS WO CONTRAST Additional Contrast? None   Final Result      1.  Limited exam.   2.  Large right angle hernia containing small bowel and the dome of the bladder.   Moderate size left inguinal hernia containing loop of small bowel. No   significant wall thickening, and no mesenteric stranding or fluid. No evidence   of bowel obstruction.   3.  Extensive scrotal edema.         Electronically signed by Lc Hobson      US RENAL COMPLETE   Final Result      1.  Increased renal echogenicity suggestive of medical renal disease.   2.  No hydronephrosis.      Electronically signed by Henry Franco MD      XR TIBIA FIBULA RIGHT (2 VIEWS)   Final Result   1. Distal tibial and fibular fractures as previously described. The proximal tibia and fibula are intact.      Electronically signed by Lalito Chacon      XR ANKLE RIGHT (MIN 3 VIEWS)   Final Result

## 2025-05-23 NOTE — CARE COORDINATION
Case Management Assessment            Discharge Note                    Date / Time of Note: 5/23/2025 9:52 AM                  Discharge Note Completed by: Keesha Travis    Patient Name: Ty Meyer   YOB: 1960  Diagnosis: Syncope and collapse [R55]  Acute pulmonary edema (HCC) [J81.0]  Closed fracture of right ankle, initial encounter [S82.894N]  Acute congestive heart failure, unspecified heart failure type (HCC) [I50.9]   Date / Time: 5/10/2025 10:51 PM    Current PCP: Jose Chew MD  Clinic patient: No    Hospitalization in the last 30 days: No       Advance Directives:  Code Status: Full Code  [place] DNR form completed and on chart: Not Indicated    Financial:  Payor: [employer] / Plan: [employer] / Product Type: *No Product type* /      Pharmacy:    [facility]/pharmacy #[id number] [place], [place] - [address] - P [phone] - F [phone]  [address] [facility] [address]  Phone: [phone] Fax: [phone]      Assistance purchasing medications?: Potential Assistance Purchasing Medications: No  Assistance provided by Case Management: None at this time    Does patient want to participate in local refill/ meds to beds program?: No    Meds To Beds General Rules:  1. Can ONLY be done Monday- Friday between 8:30am-5pm  2. Prescription(s) must be in pharmacy by 3pm to be filled same day  3.Copy of patient's insurance/ prescription drug card and patient face sheet must be sent along with the prescription(s)  4. Cost of Rx cannot be added to hospital bill. If financial assistance is needed, please contact unit  or ;  or  CANNOT provide pharmacy voucher for patients co-pays  5. Patients can then  the prescription on their way out of the hospital at discharge, or pharmacy can deliver to the bedside if staff is available. (payment due at time of pick-up or delivery - cash, check, or card  Transitions patient: Harleen Travis  [facility]  Case Management Department  Ph: [phone]  Fax: [phone]

## 2025-05-23 NOTE — DISCHARGE SUMMARY
INTERNAL MEDICINE DEPARTMENT AT THE [facility]  DISCHARGE SUMMARY    Patient ID: Ty Meyer                                             Discharge Date: 5/23/2025   Patient's PCP: Jose Chew MD                                          Discharge Physician: Crissy Beard MD MD  Admit Date: 5/10/2025   Admitting Physician: John Paul Larios MD    DISCHARGE DIAGNOSES:  - Syncope  - HFpEF Grade II diastolic dysfunction   - HUNTER on CKD 3B (IMPROVING)  -Hyperkalemia(RESOLVED)  -Non anion gap metabolic acidosis(RESOLVED)  -Right ankle fracture  -Acute on chronic normocytic anemia  -Bilateral inguinal hernias    Hospital Course:    Mr. Ty Meyer is a 64 y.o. male with a MHx significant for DVT on Eliquis, HTN, T2DM on insulin, ?seizure disorder off of AEDs for years, osteomyelitis s/p LLE BKA, CAD, CKD 3b, SY presenting after a fall and syncopal episode.     History provided by patient, his wife, and son at bedside. Patient states that earlier yesterday while he was working in the yard, he attempted to get back into his wheelchair and fell. He states that as he was getting ready to lower himself down, the wheelchair moved out from under him. He felt pain and a \"popping sound\" from his right leg. Required EMS to be able to get him back up, declined medical evaluation at that time. Later on that same day, right after transferring himself from his wheelchair to recliner, his wife states he had an episode of loss of consciousness that lasted about 3 minutes. The patient denies feeling any symptoms prior to this event. His wife said he looked like he had passed out, then woke up 3 minutes later and was back to his neurologic baseline. The patient denies any chest pain, increased shortness of breath, lightheadedness, or other concerning symptoms after the event. He was brought to the ED by EMS.      He does endorse feeling dyspnea at rest and on exertion for the past few months. He has not had any  much to take     * insulin lispro 100 UNIT/ML injection vial  Commonly known as: HUMALOG  What changed: Another medication with the same name was added. Make sure you understand how and when to take each.     * insulin lispro 100 UNIT/ML Soln injection vial  Commonly known as: HUMALOG,ADMELOG  Inject 0-16 Units into the skin 4 times daily (before meals and nightly)  What changed: You were already taking a medication with the same name, and this prescription was added. Make sure you understand how and when to take each.           * This list has 2 medication(s) that are the same as other medications prescribed for you. Read the directions carefully, and ask your doctor or other care provider to review them with you.                CONTINUE taking these medications      acetaminophen 500 MG tablet  Commonly known as: TYLENOL     amitriptyline 25 MG tablet  Commonly known as: ELAVIL     apixaban 5 MG Tabs tablet  Commonly known as: ELIQUIS     aspirin 81 MG EC tablet  Take 1 tablet by mouth daily     atorvastatin 20 MG tablet  Commonly known as: LIPITOR     ferrous sulfate 325 (65 Fe) MG tablet  Commonly known as: IRON 325     finasteride 5 MG tablet  Commonly known as: PROSCAR     folic acid 1 MG tablet  Commonly known as: FOLVITE     melatonin 3 MG Tabs tablet     MULTI COMPLETE PO     omeprazole 20 MG delayed release capsule  Commonly known as: PRILOSEC     senna-docusate 8.6-50 MG per tablet  Commonly known as: PERICOLACE     spironolactone 25 MG tablet  Commonly known as: ALDACTONE  Take 1 tablet by mouth daily     tamsulosin 0.4 MG capsule  Commonly known as: FLOMAX            STOP taking these medications      levETIRAcetam 500 MG tablet  Commonly known as: KEPPRA     pantoprazole 40 MG tablet  Commonly known as: PROTONIX               Where to Get Your Medications        These medications were sent to [facility]/pharmacy #[id number] - [place], [place] - [address] - P [phone] - F [phone] [address]  Unable to assess due to medical condition

## 2025-05-23 NOTE — PROGRESS NOTES
Patient discharged to [place]. Discharge instructions and medication instructions were discussed with handoff nurse jigna at [place]. She verbalized understanding. IV removed with no complications noted.

## 2025-05-23 NOTE — PLAN OF CARE
Problem: Safety - Adult  Goal: Free from fall injury  5/23/2025 0316 by Rebecca Perez RN  Outcome: Progressing  Flowsheets (Taken 5/23/2025 0316)  Free From Fall Injury:   Instruct family/caregiver on patient safety   Based on caregiver fall risk screen, instruct family/caregiver to ask for assistance with transferring infant if caregiver noted to have fall risk factors     Problem: Discharge Planning  Goal: Discharge to home or other facility with appropriate resources  Problem: Pain  Goal: Verbalizes/displays adequate comfort level or baseline comfort level  5/23/2025 0316 by Rebecca Perez, RN  Outcome: Progressing     Problem: ABCDS Injury Assessment  Goal: Absence of physical injury  5/23/2025 0316 by Rebecca Perez RN  Outcome: Progressing  Flowsheets (Taken 5/23/2025 0316)  Absence of Physical Injury: Implement safety measures based on patient assessment     Problem: Skin/Tissue Integrity  Goal: Skin integrity remains intact  Description: 1.  Monitor for areas of redness and/or skin breakdown2.  Assess vascular access sites hourly3.  Every 4-6 hours minimum:  Change oxygen saturation probe site4.  Every 4-6 hours:  If on nasal continuous positive airway pressure, respiratory therapy assess nares and determine need for appliance change or resting period  5/23/2025 0316 by Rebecca Perez RN  Outcome: Progressing  Flowsheets (Taken 5/23/2025 0316)  Skin Integrity Remains Intact: Monitor for areas of redness and/or skin breakdown     Problem: Respiratory - Adult  Goal: Achieves optimal ventilation and oxygenation  5/23/2025 0316 by Rebecca Perez RN  Outcome: Progressing  Flowsheets (Taken 5/23/2025 0316)  Achieves optimal ventilation and oxygenation:   Assess for changes in respiratory status   Assess for changes in mentation and behavior   Position to facilitate oxygenation and minimize respiratory effort   Oxygen supplementation based on oxygen saturation or arterial blood gases    Respiratory therapy support as indicated     Problem: Cardiovascular - Adult  Goal: Maintains optimal cardiac output and hemodynamic stability  5/23/2025 0316 by Rebecca Perez RN  Outcome: Progressing  Flowsheets (Taken 5/23/2025 0316)  Maintains optimal cardiac output and hemodynamic stability:   Monitor blood pressure and heart rate   Monitor urine output and notify Licensed Independent Practitioner for values outside of normal range   Assess for signs of decreased cardiac output     Problem: Cardiovascular - Adult  Goal: Absence of cardiac dysrhythmias or at baseline  5/23/2025 0316 by Rebecca Perez RN  Outcome: Progressing  Flowsheets (Taken 5/23/2025 0316)  Absence of cardiac dysrhythmias or at baseline:   Monitor cardiac rate and rhythm   Assess for signs of decreased cardiac output   Administer antiarrhythmia medication and electrolyte replacement as ordered     Problem: Metabolic/Fluid and Electrolytes - Adult  Goal: Electrolytes maintained within normal limits  5/23/2025 0316 by Rebecca Perez RN  Outcome: Progressing  Flowsheets (Taken 5/23/2025 0316)  Electrolytes maintained within normal limits:   Monitor labs and assess patient for signs and symptoms of electrolyte imbalances   Administer electrolyte replacement as ordered   Monitor response to electrolyte replacements, including repeat lab results as appropriate     Problem: Metabolic/Fluid and Electrolytes - Adult  Goal: Hemodynamic stability and optimal renal function maintained  5/23/2025 0316 by Rebecca Perez RN  Outcome: Progressing  Flowsheets (Taken 5/23/2025 0316)  Hemodynamic stability and optimal renal function maintained:   Monitor labs and assess for signs and symptoms of volume excess or deficit   Monitor intake, output and patient weight   Monitor urine specific gravity, serum osmolarity and serum sodium as indicated or ordered   Monitor response to interventions for patient's volume status, including labs, urine

## 2025-05-27 LAB — FRUCTOSAMINE SERPL-SCNC: 400 UMOL/L

## 2025-05-29 NOTE — PROGRESS NOTES
[place] [address]  [phone]    Primary Cardiologist: Dr Freeman      hospital follow up : CHF    HPI:  64 y.o. here for HFpEF (EF 50-55%, IIDD), CAD, HTN and HLD. He was recently admitted with AMS, hypoxemia, and fall with right ankle fracture. He was treated for HFpEF and a 14 day monitor placed on discharged.     He is feeling better. He notes weight fluctuations and a lack of appetite. Denies sob, orthopnea, abdominal bloating or increase right leg edema. No cp, LH/dizziness, palpitations, syncope or falls. No n/v/d, fever or GI/ bleeding.     Hopes to have hernia surgery soon. He will need ischemic work up prior and cautiously given IVF chuck-op.     Past Medical History:   Diagnosis Date    (HFpEF) heart failure with preserved ejection fraction (HCC)     Abdominal hernia     Back pain     CAD (coronary artery disease)     CKD (chronic kidney disease) stage 4, GFR 15-29 ml/min ([facility])     Dr. Long    Current long-term use of anticoagulant medication with history of deep venous thrombosis (DVT)     Eliquis    Diabetes mellitus (HCC)     Hyperlipidemia     Hypertension     MRSA (methicillin resistant staph aureus) culture positive 05/11/2017    foot     Past Surgical History:   Procedure Laterality Date    BACK SURGERY  2020    FOOT SURGERY Left 05/11/2017    LEG AMPUTATION BELOW KNEE      OTHER SURGICAL HISTORY Right 05/14/2017    RIGHT FOOT DEBRIDEMENT INCISION AND DRAINAGE, DELAYED PRIMARY     Family History   Problem Relation Age of Onset    Heart Disease Mother     High Blood Pressure Mother     Stroke Mother     Arthritis Mother     Diabetes Father     Arthritis Father     Diabetes Brother      Social History     Tobacco Use    Smoking status: Never    Smokeless tobacco: Never   Vaping Use    Vaping status: Never Used   Substance Use Topics    Alcohol use: No    Drug use: No       Tobacco use: No   Discussed smoking cessation:

## 2025-05-30 ENCOUNTER — OFFICE VISIT (OUTPATIENT)
Dept: CARDIOLOGY CLINIC | Age: 65
End: 2025-05-30
Payer: COMMERCIAL

## 2025-05-30 VITALS
DIASTOLIC BLOOD PRESSURE: 76 MMHG | HEART RATE: 88 BPM | SYSTOLIC BLOOD PRESSURE: 128 MMHG | WEIGHT: 285 LBS | OXYGEN SATURATION: 95 % | BODY MASS INDEX: 35.62 KG/M2

## 2025-05-30 DIAGNOSIS — I25.10 CAD IN NATIVE ARTERY: ICD-10-CM

## 2025-05-30 DIAGNOSIS — E78.2 MIXED HYPERLIPIDEMIA: ICD-10-CM

## 2025-05-30 DIAGNOSIS — Z91.81 HX OF FALL: Primary | ICD-10-CM

## 2025-05-30 DIAGNOSIS — Z79.01 CURRENT USE OF LONG TERM ANTICOAGULATION: ICD-10-CM

## 2025-05-30 DIAGNOSIS — I50.32 CHRONIC HEART FAILURE WITH PRESERVED EJECTION FRACTION (HCC): ICD-10-CM

## 2025-05-30 PROCEDURE — 3074F SYST BP LT 130 MM HG: CPT | Performed by: NURSE PRACTITIONER

## 2025-05-30 PROCEDURE — 99214 OFFICE O/P EST MOD 30 MIN: CPT | Performed by: NURSE PRACTITIONER

## 2025-05-30 PROCEDURE — 3017F COLORECTAL CA SCREEN DOC REV: CPT | Performed by: NURSE PRACTITIONER

## 2025-05-30 PROCEDURE — 1036F TOBACCO NON-USER: CPT | Performed by: NURSE PRACTITIONER

## 2025-05-30 PROCEDURE — 1111F DSCHRG MED/CURRENT MED MERGE: CPT | Performed by: NURSE PRACTITIONER

## 2025-05-30 PROCEDURE — 3078F DIAST BP <80 MM HG: CPT | Performed by: NURSE PRACTITIONER

## 2025-05-30 PROCEDURE — G8428 CUR MEDS NOT DOCUMENT: HCPCS | Performed by: NURSE PRACTITIONER

## 2025-05-30 PROCEDURE — G8417 CALC BMI ABV UP PARAM F/U: HCPCS | Performed by: NURSE PRACTITIONER

## 2025-06-11 NOTE — PROGRESS NOTES
tablet by mouth daily, Disp: , Rfl:     melatonin 3 MG TABS tablet, Take 1 tablet by mouth nightly as needed, Disp: , Rfl:     senna-docusate (PERICOLACE) 8.6-50 MG per tablet, Take 1 tablet by mouth 2 times daily, Disp: , Rfl:     ferrous sulfate (IRON 325) 325 (65 Fe) MG tablet, Take 1 tablet by mouth daily (with breakfast), Disp: , Rfl:     amitriptyline (ELAVIL) 25 MG tablet, Take 1 tablet by mouth nightly, Disp: , Rfl:     folic acid (FOLVITE) 1 MG tablet, Take 1 tablet by mouth daily, Disp: , Rfl:     insulin lispro (HUMALOG) 100 UNIT/ML injection vial, Inject 5-15 Units into the skin 3 times daily (before meals) Sliding scale with meals and at HS, Disp: , Rfl:     omeprazole (PRILOSEC) 20 MG capsule, Take 2 capsules by mouth Daily, Disp: , Rfl:       Review of Systems - History obtained from the patient  General ROS: negative  Psychological ROS: negative  Ophthalmic ROS: negative  Neurological ROS: negative  ENT ROS: negative  Allergy and Immunology ROS: negative  Hematological and Lymphatic ROS: negative  Endocrine ROS: negative  Respiratory ROS: negative  Cardiovascular ROS: negative  Gastrointestinal ROS: negative   Genito-Urinary ROS: negative  Musculoskeletal ROS: negative   Skin ROS: negative    Physical Exam   Abdominal: Soft. Normal appearance and bowel sounds are normal. Patient exhibits no distension, no abdominal bruit, no ascites and no mass. There is no hepatosplenomegaly. There is no tenderness. There is no rigidity, no rebound, no guarding and no CVA tenderness.   Hernia confirmed in the right groin area.   Musculoskeletal: Normal range of motion. Patient exhibits no edema or tenderness.   Neurological: Patient is alert and oriented to person, place, and time.Patient has normal strength. Coordination and gait normal. GCS eye subscore is 4. GCS verbal subscore is 5. GCS motor subscore is 6.   Skin: Skin is warm and dry. No abrasion and no rash noted. Patient  is not diaphoretic. No cyanosis or

## 2025-06-12 ENCOUNTER — OFFICE VISIT (OUTPATIENT)
Dept: BARIATRICS/WEIGHT MGMT | Age: 65
End: 2025-06-12
Payer: COMMERCIAL

## 2025-06-12 VITALS
HEART RATE: 89 BPM | WEIGHT: 287 LBS | BODY MASS INDEX: 36.83 KG/M2 | HEIGHT: 74 IN | DIASTOLIC BLOOD PRESSURE: 79 MMHG | SYSTOLIC BLOOD PRESSURE: 128 MMHG

## 2025-06-12 DIAGNOSIS — K40.20 NON-RECURRENT BILATERAL INGUINAL HERNIA WITHOUT OBSTRUCTION OR GANGRENE: Primary | ICD-10-CM

## 2025-06-12 PROCEDURE — 3017F COLORECTAL CA SCREEN DOC REV: CPT | Performed by: SURGERY

## 2025-06-12 PROCEDURE — 99214 OFFICE O/P EST MOD 30 MIN: CPT | Performed by: SURGERY

## 2025-06-12 PROCEDURE — 1036F TOBACCO NON-USER: CPT | Performed by: SURGERY

## 2025-06-12 PROCEDURE — G8427 DOCREV CUR MEDS BY ELIG CLIN: HCPCS | Performed by: SURGERY

## 2025-06-12 PROCEDURE — 3078F DIAST BP <80 MM HG: CPT | Performed by: SURGERY

## 2025-06-12 PROCEDURE — 3074F SYST BP LT 130 MM HG: CPT | Performed by: SURGERY

## 2025-06-12 PROCEDURE — 1111F DSCHRG MED/CURRENT MED MERGE: CPT | Performed by: SURGERY

## 2025-06-12 PROCEDURE — G8417 CALC BMI ABV UP PARAM F/U: HCPCS | Performed by: SURGERY

## 2025-06-25 ENCOUNTER — TELEPHONE (OUTPATIENT)
Dept: PULMONOLOGY | Age: 65
End: 2025-06-25

## 2025-06-25 NOTE — TELEPHONE ENCOUNTER
Called Ty, he is currently in a SNF due to a recent foot/ ankle fx.  He is wanting to hold off on his surgery/ making any appts for clearances until he is back on his feet.    I asked him to call us back when he is ready to be seen.

## 2025-06-25 NOTE — TELEPHONE ENCOUNTER
PFTs would be useful, but I don't know that patient will be able to do them given the size of his inguinal hernia.  If they need him seen quickly we could have him see Talita or Quentin.  It looks like he needs the surgery, so all we can really do is try to assess his risk.

## 2025-06-25 NOTE — TELEPHONE ENCOUNTER
Referral by Dr Carpenter- inguinal hernia repair surgery- needs pulm clearance    Hx- SOB was due to fluid build up in his lungs     Called patient, left message to call back    Date of surgery-- unknown    CXR-- 5/10/25  Chest CT- 5/9/2025    No recent PFT-    Does he need a PFT prior to being seen?    No current appt scheduled

## 2025-06-30 NOTE — PROGRESS NOTES
Ty Meyer (:  1960) is a 64 y.o. male,New patient, here for evaluation of the following chief complaint(s):  Pre-op clearance, risk assessment.         Assessment & Plan  Pre-op exam  From a pulmonary clearance standpoint; Mr Meyer is low risk (1.6% pulmonary complication rate).  The risk is not prohibitive; benefits outweigh risk of surgical procedure.  I do recommend post operative incentive spirometry along with out of bed to chair with ambulation as soon as possible.  Aggressive pulmonary hygiene is essential.  I also agree with and defer to cardiology regarding ischemic work up prior to procedure and cautious IVF chuck-op.    Orders:    XR CHEST (2 VW); Future  - previous chest xray done with fluid overload  - we discussed the need for incentive summary and aggressive pulmonary hygiene    No follow-ups on file.       Subjective   Mr Meyer is a 64 year old with past medical history significant for HFpEF (EF 50-55%, Grade II diastolic dysfunction), CAD, HTN, HLD, CKD 3, DM type 2, DVT on Eliquis, osteomyelitis s/p LLE BKA who is here for pulmonology clearance and risk assessment for upcoming hernia surgery.  On recent admission to [facility] (5/10/25-25); Mr. Meyer did endorsed dyspnea at rest and on exertion and was admitted with acute pulmonary edema after presenting after a fall and syncopal episode. He is not a smoker and no history of COPD or asthma.  CT chest did reveal small bilateral pleural effusions which are thought to be a component of heart failure and cardiorenal syndrome; improvement with goal directed therapy including diuresis.  He has followed up with his cardiologist and nephrologist since discharge with no complaints of shortness of breath.      Review of Systems   Constitutional:  Negative for activity change, chills and fever.   HENT:  Negative for congestion.    Respiratory:  Negative for cough, chest tightness and shortness of breath.    Cardiovascular:  Negative

## 2025-07-01 ENCOUNTER — OFFICE VISIT (OUTPATIENT)
Age: 65
End: 2025-07-01
Payer: COMMERCIAL

## 2025-07-01 ENCOUNTER — RESULTS FOLLOW-UP (OUTPATIENT)
Age: 65
End: 2025-07-01

## 2025-07-01 ENCOUNTER — HOSPITAL ENCOUNTER (OUTPATIENT)
Age: 65
Discharge: HOME | End: 2025-07-01
Payer: COMMERCIAL

## 2025-07-01 VITALS
SYSTOLIC BLOOD PRESSURE: 128 MMHG | HEART RATE: 97 BPM | HEIGHT: 74 IN | BODY MASS INDEX: 36.96 KG/M2 | OXYGEN SATURATION: 98 % | DIASTOLIC BLOOD PRESSURE: 70 MMHG | WEIGHT: 288 LBS

## 2025-07-01 DIAGNOSIS — Z01.818 PRE-OP EXAM: Primary | ICD-10-CM

## 2025-07-01 DIAGNOSIS — Z01.818 PRE-OP EXAM: ICD-10-CM

## 2025-07-01 PROCEDURE — 3074F SYST BP LT 130 MM HG: CPT | Performed by: NURSE PRACTITIONER

## 2025-07-01 PROCEDURE — 71046 X-RAY EXAM CHEST 2 VIEWS: CPT

## 2025-07-01 PROCEDURE — 3017F COLORECTAL CA SCREEN DOC REV: CPT | Performed by: NURSE PRACTITIONER

## 2025-07-01 PROCEDURE — G8427 DOCREV CUR MEDS BY ELIG CLIN: HCPCS | Performed by: NURSE PRACTITIONER

## 2025-07-01 PROCEDURE — 1036F TOBACCO NON-USER: CPT | Performed by: NURSE PRACTITIONER

## 2025-07-01 PROCEDURE — 3078F DIAST BP <80 MM HG: CPT | Performed by: NURSE PRACTITIONER

## 2025-07-01 PROCEDURE — 99202 OFFICE O/P NEW SF 15 MIN: CPT | Performed by: NURSE PRACTITIONER

## 2025-07-01 PROCEDURE — G8417 CALC BMI ABV UP PARAM F/U: HCPCS | Performed by: NURSE PRACTITIONER

## 2025-07-01 RX ORDER — SIMVASTATIN 40 MG
40 TABLET ORAL
COMMUNITY
Start: 2025-05-20

## 2025-07-01 RX ORDER — FLASH GLUCOSE SENSOR
KIT MISCELLANEOUS
COMMUNITY
Start: 2025-06-06

## 2025-07-01 RX ORDER — INSULIN ASPART 100 [IU]/ML
INJECTION, SOLUTION INTRAVENOUS; SUBCUTANEOUS
COMMUNITY
Start: 2025-04-25

## 2025-07-01 RX ORDER — OXYBUTYNIN CHLORIDE 15 MG/1
15 TABLET, EXTENDED RELEASE ORAL DAILY
COMMUNITY
Start: 2025-04-04

## 2025-07-01 RX ORDER — MIRABEGRON 25 MG/1
25 TABLET, FILM COATED, EXTENDED RELEASE ORAL DAILY
COMMUNITY
Start: 2024-10-31

## 2025-07-01 RX ORDER — LOSARTAN POTASSIUM 25 MG/1
TABLET ORAL
COMMUNITY

## 2025-07-01 ASSESSMENT — ENCOUNTER SYMPTOMS
SHORTNESS OF BREATH: 0
ABDOMINAL PAIN: 0
CHEST TIGHTNESS: 0
COUGH: 0

## 2025-07-25 ENCOUNTER — HOSPITAL ENCOUNTER (EMERGENCY)
Age: 65
Discharge: HOME | DRG: 871 | End: 2025-07-25
Attending: STUDENT IN AN ORGANIZED HEALTH CARE EDUCATION/TRAINING PROGRAM
Payer: COMMERCIAL

## 2025-07-25 ENCOUNTER — APPOINTMENT (OUTPATIENT)
Dept: GENERAL RADIOLOGY | Age: 65
DRG: 871 | End: 2025-07-25
Payer: COMMERCIAL

## 2025-07-25 VITALS
RESPIRATION RATE: 22 BRPM | HEIGHT: 74 IN | HEART RATE: 105 BPM | OXYGEN SATURATION: 99 % | SYSTOLIC BLOOD PRESSURE: 121 MMHG | BODY MASS INDEX: 39.53 KG/M2 | WEIGHT: 308 LBS | TEMPERATURE: 98.2 F | DIASTOLIC BLOOD PRESSURE: 74 MMHG

## 2025-07-25 DIAGNOSIS — S81.001A OPEN WOUND OF RIGHT KNEE, LEG, AND ANKLE, INITIAL ENCOUNTER: Primary | ICD-10-CM

## 2025-07-25 DIAGNOSIS — S81.801A OPEN WOUND OF RIGHT KNEE, LEG, AND ANKLE, INITIAL ENCOUNTER: Primary | ICD-10-CM

## 2025-07-25 DIAGNOSIS — S91.001A OPEN WOUND OF RIGHT KNEE, LEG, AND ANKLE, INITIAL ENCOUNTER: Primary | ICD-10-CM

## 2025-07-25 LAB
GLUCOSE BLD-MCNC: 280 MG/DL (ref 70–99)
PERFORMED ON: ABNORMAL

## 2025-07-25 PROCEDURE — 99283 EMERGENCY DEPT VISIT LOW MDM: CPT

## 2025-07-25 PROCEDURE — 73590 X-RAY EXAM OF LOWER LEG: CPT

## 2025-07-25 PROCEDURE — 73610 X-RAY EXAM OF ANKLE: CPT

## 2025-07-25 ASSESSMENT — PAIN DESCRIPTION - LOCATION: LOCATION: FOOT

## 2025-07-25 ASSESSMENT — PAIN DESCRIPTION - ORIENTATION: ORIENTATION: RIGHT

## 2025-07-25 ASSESSMENT — PAIN - FUNCTIONAL ASSESSMENT
PAIN_FUNCTIONAL_ASSESSMENT: 0-10
PAIN_FUNCTIONAL_ASSESSMENT: 0-10

## 2025-07-25 ASSESSMENT — PAIN SCALES - GENERAL
PAINLEVEL_OUTOF10: 8
PAINLEVEL_OUTOF10: 5

## 2025-07-25 NOTE — ED PROVIDER NOTES
THE [facility]  EMERGENCY DEPARTMENT ENCOUNTER          EM RESIDENT NOTE       Date of evaluation: 7/25/2025    Chief Complaint     Wound Check      History of Present Illness     Ty Meyer is a 64 y.o. male who presents with R ankle wound. Patient had a fall in May of this year and suffered a pilon fracture to his R ankle. Has been managing his fracture non-op with Crow boot. Today while he was cleaning his leg he noticed a small bleeding wound on the lateral aspect of his ankle. States he cleaned his leg yesterday and did not notice a wound or bleeding at that time. Denies any fevers, chills, chest pain, SOB, abd pain, N/V/D, dysuria.     MEDICAL DECISION MAKING / ASSESSMENT / PLAN     INITIAL VITALS: BP: 139/80, Temp: 98.2 °F (36.8 °C), Pulse: (!) 106, Respirations: 16, SpO2: 99 %    Ty Meyer is a 64 y.o. male w/history of R pilon fracture being handled non-op with Crow boot, CKD, HF, DVT on eliquis, neuropathy presenting with R ankle wound.  Patient presenting with one day of bleeding from R ankle wound. Does not appear grossly infected, draining red tinged serosanguinous fluid. No surrounding warmth, redness, swelling.  X-rays of the right lower extremity obtained which are similar to previous, show destruction of the distal tibia and fibula.  No soft tissue swelling noted, no joint space irregularity noted, no osteomyelitis present.  Discussed patient with orthopedic surgery who is partner to his orthopedic surgeon who recommended outpatient follow-up with his orthopedic surgeon next week as there is no evidence of infection.  Glucose 280.  Orthopedic surgery did recommend wet-to-dry dressings, I laced a wet-to-dry dressing and instructed patient and his wife on how to do them at home, supplies were given.  Otherwise no further workup necessary and patient is safe for discharge.  Return precautions given, follow-up instructions discussed, discharged in stable condition.    Medical Decision

## 2025-07-26 ENCOUNTER — HOSPITAL ENCOUNTER (INPATIENT)
Age: 65
LOS: 1 days | Discharge: NURSING, REHAB OR LONG-TERM CARE | DRG: 871 | End: 2025-07-27
Attending: EMERGENCY MEDICINE | Admitting: INTERNAL MEDICINE
Payer: COMMERCIAL

## 2025-07-26 ENCOUNTER — APPOINTMENT (OUTPATIENT)
Dept: GENERAL RADIOLOGY | Age: 65
DRG: 871 | End: 2025-07-26
Payer: COMMERCIAL

## 2025-07-26 DIAGNOSIS — R65.20 SEVERE SEPSIS (HCC): Primary | ICD-10-CM

## 2025-07-26 DIAGNOSIS — A41.9 SEVERE SEPSIS (HCC): Primary | ICD-10-CM

## 2025-07-26 LAB
ALBUMIN SERPL-MCNC: 3.2 G/DL (ref 3.4–5)
ALBUMIN/GLOB SERPL: 0.8 {RATIO} (ref 1.1–2.2)
ALP SERPL-CCNC: 171 U/L (ref 40–129)
ALT SERPL-CCNC: 12 U/L (ref 10–40)
ANION GAP SERPL CALCULATED.3IONS-SCNC: 13 MMOL/L (ref 3–16)
AST SERPL-CCNC: 15 U/L (ref 15–37)
BASE EXCESS BLDV CALC-SCNC: -5.6 MMOL/L (ref -2–3)
BASOPHILS # BLD: 0 K/UL (ref 0–0.2)
BASOPHILS NFR BLD: 0.1 %
BILIRUB SERPL-MCNC: 0.4 MG/DL (ref 0–1)
BUN SERPL-MCNC: 47 MG/DL (ref 7–20)
CALCIUM SERPL-MCNC: 8.4 MG/DL (ref 8.3–10.6)
CHLORIDE SERPL-SCNC: 105 MMOL/L (ref 99–110)
CO2 BLDV-SCNC: 20 MMOL/L
CO2 SERPL-SCNC: 16 MMOL/L (ref 21–32)
COHGB MFR BLDV: 1.2 % (ref 0–1.5)
CREAT SERPL-MCNC: 3.2 MG/DL (ref 0.8–1.3)
DEPRECATED RDW RBC AUTO: 17.7 % (ref 12.4–15.4)
DO-HGB MFR BLDV: 14.9 %
EOSINOPHIL # BLD: 0.9 K/UL (ref 0–0.6)
EOSINOPHIL NFR BLD: 6.4 %
GFR SERPLBLD CREATININE-BSD FMLA CKD-EPI: 21 ML/MIN/{1.73_M2}
GLUCOSE SERPL-MCNC: 246 MG/DL (ref 70–99)
HCO3 BLDV-SCNC: 19.3 MMOL/L (ref 24–28)
HCT VFR BLD AUTO: 24.2 % (ref 40.5–52.5)
HGB BLD-MCNC: 8 G/DL (ref 13.5–17.5)
LACTATE BLDV-SCNC: 2.1 MMOL/L (ref 0.4–1.9)
LYMPHOCYTES # BLD: 0.5 K/UL (ref 1–5.1)
LYMPHOCYTES NFR BLD: 3.6 %
MCH RBC QN AUTO: 29.8 PG (ref 26–34)
MCHC RBC AUTO-ENTMCNC: 33.2 G/DL (ref 31–36)
MCV RBC AUTO: 89.7 FL (ref 80–100)
METHGB MFR BLDV: 0.1 % (ref 0–1.5)
MONOCYTES # BLD: 0.7 K/UL (ref 0–1.3)
MONOCYTES NFR BLD: 5.2 %
NEUTROPHILS # BLD: 11.6 K/UL (ref 1.7–7.7)
NEUTROPHILS NFR BLD: 84.7 %
PCO2 BLDV: 34 MMHG (ref 41–51)
PH BLDV: 7.36 [PH] (ref 7.35–7.45)
PLATELET # BLD AUTO: 408 K/UL (ref 135–450)
PMV BLD AUTO: 7.2 FL (ref 5–10.5)
PO2 BLDV: 49 MMHG (ref 25–40)
POTASSIUM SERPL-SCNC: 5.9 MMOL/L (ref 3.5–5.1)
PROCALCITONIN SERPL IA-MCNC: 0.17 NG/ML (ref 0–0.15)
PROT SERPL-MCNC: 7.1 G/DL (ref 6.4–8.2)
RBC # BLD AUTO: 2.7 M/UL (ref 4.2–5.9)
SAO2 % BLDV: 85 %
SODIUM SERPL-SCNC: 134 MMOL/L (ref 136–145)
WBC # BLD AUTO: 13.7 K/UL (ref 4–11)

## 2025-07-26 PROCEDURE — 6370000000 HC RX 637 (ALT 250 FOR IP)

## 2025-07-26 PROCEDURE — 82803 BLOOD GASES ANY COMBINATION: CPT

## 2025-07-26 PROCEDURE — 84145 PROCALCITONIN (PCT): CPT

## 2025-07-26 PROCEDURE — 83605 ASSAY OF LACTIC ACID: CPT

## 2025-07-26 PROCEDURE — 87186 SC STD MICRODIL/AGAR DIL: CPT

## 2025-07-26 PROCEDURE — 2580000003 HC RX 258

## 2025-07-26 PROCEDURE — 71045 X-RAY EXAM CHEST 1 VIEW: CPT

## 2025-07-26 PROCEDURE — 6360000002 HC RX W HCPCS

## 2025-07-26 PROCEDURE — 87040 BLOOD CULTURE FOR BACTERIA: CPT

## 2025-07-26 PROCEDURE — 80053 COMPREHEN METABOLIC PANEL: CPT

## 2025-07-26 PROCEDURE — 93005 ELECTROCARDIOGRAM TRACING: CPT

## 2025-07-26 PROCEDURE — 87150 DNA/RNA AMPLIFIED PROBE: CPT

## 2025-07-26 PROCEDURE — 85025 COMPLETE CBC W/AUTO DIFF WBC: CPT

## 2025-07-26 RX ORDER — SODIUM CHLORIDE, SODIUM LACTATE, POTASSIUM CHLORIDE, AND CALCIUM CHLORIDE .6; .31; .03; .02 G/100ML; G/100ML; G/100ML; G/100ML
30 INJECTION, SOLUTION INTRAVENOUS ONCE
Status: COMPLETED | OUTPATIENT
Start: 2025-07-26 | End: 2025-07-26

## 2025-07-26 RX ORDER — ACETAMINOPHEN 500 MG
1000 TABLET ORAL
Status: COMPLETED | OUTPATIENT
Start: 2025-07-26 | End: 2025-07-26

## 2025-07-26 RX ADMIN — SODIUM CHLORIDE, SODIUM LACTATE, POTASSIUM CHLORIDE, AND CALCIUM CHLORIDE 2466 ML: .6; .31; .03; .02 INJECTION, SOLUTION INTRAVENOUS at 22:05

## 2025-07-26 RX ADMIN — PIPERACILLIN AND TAZOBACTAM 4500 MG: 4; .5 INJECTION, POWDER, LYOPHILIZED, FOR SOLUTION INTRAVENOUS at 23:59

## 2025-07-26 RX ADMIN — ACETAMINOPHEN 1000 MG: 500 TABLET ORAL at 23:50

## 2025-07-26 ASSESSMENT — PAIN - FUNCTIONAL ASSESSMENT: PAIN_FUNCTIONAL_ASSESSMENT: 0-10

## 2025-07-26 ASSESSMENT — PAIN SCALES - GENERAL: PAINLEVEL_OUTOF10: 8

## 2025-07-26 NOTE — ED PROVIDER NOTES
ED Attending Attestation Note     Date of evaluation: 7/25/2025    This patient was seen by the resident.  I have seen and examined the patient, agree with the workup, evaluation, management and diagnosis. The care plan has been discussed.  My assessment reveals a chronically ill-appearing male with previous left BKA presenting for open wound over his right lateral ankle with note right distal tib-fib fracture being managed conservatively by Dr. Wilder with [facility].    He has no obvious purulent discharge from the wound and has neuropathy of his lower extremity therefore has decreased sensation and tenderness to palpation status post right fifth toe amputation.  There is no significant erythema or fluctuance concerning for significant infection however will obtain x-ray of the tib-fib and ankle and determine need for possible orthopedic consultation.    X-ray of the tibia and fibula as well as the right ankle demonstrate significant destruction of the distal tib-fib.  There is no obvious fluid collection or purulent discharge on examination and I have low concern for any new or worsening infection.  Discussed the x-ray findings with [facility] provider who reviewed the images and states that they will scheduled to see the patient in the clinic shortly but he does not require acute orthopedic evaluation at this time.    Lalito Olvera MD  [facility] Emergency Medicine       Lalito Olvera MD  07/25/25 1221

## 2025-07-26 NOTE — ED NOTES
Patient prepared for and ready to be discharged. Dressed in clothes and given belongings.  Patient discharged at this time in no acute distress after pt verbalized understanding of discharge instructions.   Reviewed medications, and when to return to the ED with patient. Encouraged follow up with [facility] to take patient home.      Escuadra, Marylee, ROSIBEL  07/25/25 8205

## 2025-07-26 NOTE — DISCHARGE INSTRUCTIONS
You were seen in the ED for a wound to your R ankle. We performed an evaluation and determined that you were safe for discharge. We would like you to apply the dressing we showed you daily to your wound and see your orthopedic surgeon in the next week. Please return to the ED if the wound gets very red, warm, swollen, or starts draining yellow/white fluid or if you have any fevers, chest pain, shortness of breath, vomiting with inability to eat or drink, or any other concerning symptoms.

## 2025-07-27 ENCOUNTER — HOSPITAL ENCOUNTER (INPATIENT)
Age: 65
LOS: 9 days | Discharge: NURSING, REHAB OR LONG-TERM CARE | DRG: 853 | End: 2025-08-05
Payer: COMMERCIAL

## 2025-07-27 ENCOUNTER — APPOINTMENT (OUTPATIENT)
Dept: MRI IMAGING | Age: 65
DRG: 871 | End: 2025-07-27
Payer: COMMERCIAL

## 2025-07-27 VITALS
DIASTOLIC BLOOD PRESSURE: 67 MMHG | WEIGHT: 315 LBS | SYSTOLIC BLOOD PRESSURE: 111 MMHG | HEIGHT: 74 IN | RESPIRATION RATE: 17 BRPM | TEMPERATURE: 100.6 F | HEART RATE: 105 BPM | BODY MASS INDEX: 40.43 KG/M2 | OXYGEN SATURATION: 92 %

## 2025-07-27 DIAGNOSIS — S82.871A CLOSED DISPLACED PILON FRACTURE OF RIGHT TIBIA, INITIAL ENCOUNTER: ICD-10-CM

## 2025-07-27 DIAGNOSIS — R78.81 MSSA BACTEREMIA: Primary | ICD-10-CM

## 2025-07-27 DIAGNOSIS — B95.61 MSSA BACTEREMIA: Primary | ICD-10-CM

## 2025-07-27 DIAGNOSIS — M86.171 ACUTE OSTEOMYELITIS OF RIGHT ANKLE OR FOOT (HCC): ICD-10-CM

## 2025-07-27 PROBLEM — R65.20 SEVERE SEPSIS (HCC): Status: ACTIVE | Noted: 2025-07-27

## 2025-07-27 PROBLEM — A41.9 SEVERE SEPSIS (HCC): Status: ACTIVE | Noted: 2025-07-27

## 2025-07-27 PROBLEM — S82.899C: Status: ACTIVE | Noted: 2025-07-27

## 2025-07-27 PROBLEM — E87.8 DISORDERS OF FLUID, ELECTROLYTE, AND ACID-BASE BALANCE: Status: ACTIVE | Noted: 2025-07-27

## 2025-07-27 PROBLEM — I73.9 PAD (PERIPHERAL ARTERY DISEASE): Status: ACTIVE | Noted: 2025-07-27

## 2025-07-27 LAB
ALBUMIN SERPL-MCNC: 2.7 G/DL (ref 3.4–5)
ALBUMIN SERPL-MCNC: 2.8 G/DL (ref 3.4–5)
ANION GAP SERPL CALCULATED.3IONS-SCNC: 12 MMOL/L (ref 3–16)
ANION GAP SERPL CALCULATED.3IONS-SCNC: 13 MMOL/L (ref 3–16)
ANION GAP SERPL CALCULATED.3IONS-SCNC: 13 MMOL/L (ref 3–16)
BUN SERPL-MCNC: 48 MG/DL (ref 7–20)
BUN SERPL-MCNC: 49 MG/DL (ref 7–20)
BUN SERPL-MCNC: 49 MG/DL (ref 7–20)
CALCIUM SERPL-MCNC: 8.1 MG/DL (ref 8.3–10.6)
CALCIUM SERPL-MCNC: 8.4 MG/DL (ref 8.3–10.6)
CALCIUM SERPL-MCNC: 8.6 MG/DL (ref 8.3–10.6)
CHLORIDE SERPL-SCNC: 102 MMOL/L (ref 99–110)
CHLORIDE SERPL-SCNC: 102 MMOL/L (ref 99–110)
CHLORIDE SERPL-SCNC: 104 MMOL/L (ref 99–110)
CO2 SERPL-SCNC: 16 MMOL/L (ref 21–32)
CREAT SERPL-MCNC: 3.4 MG/DL (ref 0.8–1.3)
GFR SERPLBLD CREATININE-BSD FMLA CKD-EPI: 19 ML/MIN/{1.73_M2}
GLUCOSE BLD-MCNC: 279 MG/DL (ref 70–99)
GLUCOSE BLD-MCNC: 290 MG/DL (ref 70–99)
GLUCOSE BLD-MCNC: 312 MG/DL (ref 70–99)
GLUCOSE BLD-MCNC: 346 MG/DL (ref 70–99)
GLUCOSE BLD-MCNC: 349 MG/DL (ref 70–99)
GLUCOSE BLD-MCNC: 390 MG/DL (ref 70–99)
GLUCOSE BLD-MCNC: 414 MG/DL (ref 70–99)
GLUCOSE SERPL-MCNC: 234 MG/DL (ref 70–99)
GLUCOSE SERPL-MCNC: 294 MG/DL (ref 70–99)
GLUCOSE SERPL-MCNC: 317 MG/DL (ref 70–99)
LACTATE BLDV-SCNC: 1.9 MMOL/L (ref 0.4–1.9)
PERFORMED ON: ABNORMAL
PHOSPHATE SERPL-MCNC: 2.8 MG/DL (ref 2.5–4.9)
PHOSPHATE SERPL-MCNC: 3.4 MG/DL (ref 2.5–4.9)
POTASSIUM SERPL-SCNC: 5.7 MMOL/L (ref 3.5–5.1)
POTASSIUM SERPL-SCNC: 6.2 MMOL/L (ref 3.5–5.1)
POTASSIUM SERPL-SCNC: 6.8 MMOL/L (ref 3.5–5.1)
REPORT: NORMAL
SODIUM SERPL-SCNC: 131 MMOL/L (ref 136–145)
SODIUM SERPL-SCNC: 131 MMOL/L (ref 136–145)
SODIUM SERPL-SCNC: 132 MMOL/L (ref 136–145)
VANCOMYCIN SERPL-MCNC: 25.5 UG/ML

## 2025-07-27 PROCEDURE — 6360000002 HC RX W HCPCS

## 2025-07-27 PROCEDURE — 83605 ASSAY OF LACTIC ACID: CPT

## 2025-07-27 PROCEDURE — 2580000003 HC RX 258: Performed by: HOSPITALIST

## 2025-07-27 PROCEDURE — 2060000000 HC ICU INTERMEDIATE R&B

## 2025-07-27 PROCEDURE — 6370000000 HC RX 637 (ALT 250 FOR IP): Performed by: STUDENT IN AN ORGANIZED HEALTH CARE EDUCATION/TRAINING PROGRAM

## 2025-07-27 PROCEDURE — 73721 MRI JNT OF LWR EXTRE W/O DYE: CPT

## 2025-07-27 PROCEDURE — 2580000003 HC RX 258: Performed by: STUDENT IN AN ORGANIZED HEALTH CARE EDUCATION/TRAINING PROGRAM

## 2025-07-27 PROCEDURE — 2500000003 HC RX 250 WO HCPCS: Performed by: INTERNAL MEDICINE

## 2025-07-27 PROCEDURE — 80202 ASSAY OF VANCOMYCIN: CPT

## 2025-07-27 PROCEDURE — 2580000003 HC RX 258

## 2025-07-27 PROCEDURE — 80069 RENAL FUNCTION PANEL: CPT

## 2025-07-27 PROCEDURE — 2500000003 HC RX 250 WO HCPCS: Performed by: HOSPITALIST

## 2025-07-27 PROCEDURE — 2580000003 HC RX 258: Performed by: INTERNAL MEDICINE

## 2025-07-27 PROCEDURE — 36415 COLL VENOUS BLD VENIPUNCTURE: CPT

## 2025-07-27 PROCEDURE — 1200000000 HC SEMI PRIVATE

## 2025-07-27 PROCEDURE — 6370000000 HC RX 637 (ALT 250 FOR IP): Performed by: HOSPITALIST

## 2025-07-27 PROCEDURE — 51798 US URINE CAPACITY MEASURE: CPT

## 2025-07-27 PROCEDURE — 6360000002 HC RX W HCPCS: Performed by: HOSPITALIST

## 2025-07-27 PROCEDURE — 6370000000 HC RX 637 (ALT 250 FOR IP): Performed by: INTERNAL MEDICINE

## 2025-07-27 RX ORDER — LOSARTAN POTASSIUM 25 MG/1
25 TABLET ORAL DAILY
Status: DISCONTINUED | OUTPATIENT
Start: 2025-07-27 | End: 2025-07-27 | Stop reason: HOSPADM

## 2025-07-27 RX ORDER — ACETAMINOPHEN 650 MG/1
650 SUPPOSITORY RECTAL EVERY 6 HOURS PRN
Status: DISCONTINUED | OUTPATIENT
Start: 2025-07-27 | End: 2025-07-27 | Stop reason: HOSPADM

## 2025-07-27 RX ORDER — FUROSEMIDE 40 MG/1
40 TABLET ORAL DAILY
Status: DISCONTINUED | OUTPATIENT
Start: 2025-07-27 | End: 2025-07-27

## 2025-07-27 RX ORDER — INSULIN LISPRO 100 [IU]/ML
0-16 INJECTION, SOLUTION INTRAVENOUS; SUBCUTANEOUS
Status: DISCONTINUED | OUTPATIENT
Start: 2025-07-27 | End: 2025-07-27 | Stop reason: HOSPADM

## 2025-07-27 RX ORDER — FERROUS SULFATE 325(65) MG
325 TABLET ORAL
Status: DISCONTINUED | OUTPATIENT
Start: 2025-07-27 | End: 2025-07-27 | Stop reason: HOSPADM

## 2025-07-27 RX ORDER — MECOBALAMIN 5000 MCG
10 TABLET,DISINTEGRATING ORAL NIGHTLY PRN
Status: DISCONTINUED | OUTPATIENT
Start: 2025-07-27 | End: 2025-07-27 | Stop reason: HOSPADM

## 2025-07-27 RX ORDER — FOLIC ACID 1 MG/1
1 TABLET ORAL DAILY
Status: DISCONTINUED | OUTPATIENT
Start: 2025-07-27 | End: 2025-07-27 | Stop reason: HOSPADM

## 2025-07-27 RX ORDER — CALCIUM GLUCONATE 20 MG/ML
1000 INJECTION, SOLUTION INTRAVENOUS ONCE
Status: DISCONTINUED | OUTPATIENT
Start: 2025-07-27 | End: 2025-07-27 | Stop reason: DRUGHIGH

## 2025-07-27 RX ORDER — HYDROCODONE BITARTRATE AND ACETAMINOPHEN 10; 325 MG/1; MG/1
1 TABLET ORAL EVERY 4 HOURS PRN
Refills: 0 | Status: DISCONTINUED | OUTPATIENT
Start: 2025-07-27 | End: 2025-07-27 | Stop reason: HOSPADM

## 2025-07-27 RX ORDER — ASPIRIN 81 MG/1
81 TABLET ORAL DAILY
Status: DISCONTINUED | OUTPATIENT
Start: 2025-07-27 | End: 2025-07-27 | Stop reason: HOSPADM

## 2025-07-27 RX ORDER — INSULIN LISPRO 100 [IU]/ML
0-8 INJECTION, SOLUTION INTRAVENOUS; SUBCUTANEOUS EVERY 6 HOURS SCHEDULED
Status: DISCONTINUED | OUTPATIENT
Start: 2025-07-27 | End: 2025-07-27

## 2025-07-27 RX ORDER — FUROSEMIDE 10 MG/ML
40 INJECTION INTRAMUSCULAR; INTRAVENOUS ONCE
Status: COMPLETED | OUTPATIENT
Start: 2025-07-27 | End: 2025-07-27

## 2025-07-27 RX ORDER — POTASSIUM CHLORIDE 7.45 MG/ML
10 INJECTION INTRAVENOUS PRN
Status: DISCONTINUED | OUTPATIENT
Start: 2025-07-27 | End: 2025-07-27

## 2025-07-27 RX ORDER — ACETAMINOPHEN 325 MG/1
650 TABLET ORAL EVERY 6 HOURS PRN
Status: DISCONTINUED | OUTPATIENT
Start: 2025-07-27 | End: 2025-07-27 | Stop reason: HOSPADM

## 2025-07-27 RX ORDER — ONDANSETRON 2 MG/ML
4 INJECTION INTRAMUSCULAR; INTRAVENOUS EVERY 6 HOURS PRN
Status: DISCONTINUED | OUTPATIENT
Start: 2025-07-27 | End: 2025-07-27 | Stop reason: HOSPADM

## 2025-07-27 RX ORDER — ENOXAPARIN SODIUM 100 MG/ML
30 INJECTION SUBCUTANEOUS 2 TIMES DAILY
Status: DISCONTINUED | OUTPATIENT
Start: 2025-07-27 | End: 2025-07-27

## 2025-07-27 RX ORDER — GLUCAGON 1 MG/ML
1 KIT INJECTION PRN
Status: DISCONTINUED | OUTPATIENT
Start: 2025-07-27 | End: 2025-07-27 | Stop reason: HOSPADM

## 2025-07-27 RX ORDER — INSULIN LISPRO 100 [IU]/ML
10 INJECTION, SOLUTION INTRAVENOUS; SUBCUTANEOUS
Status: DISCONTINUED | OUTPATIENT
Start: 2025-07-27 | End: 2025-07-27 | Stop reason: HOSPADM

## 2025-07-27 RX ORDER — ATORVASTATIN CALCIUM 20 MG/1
20 TABLET, FILM COATED ORAL DAILY
Status: DISCONTINUED | OUTPATIENT
Start: 2025-07-27 | End: 2025-07-27 | Stop reason: SDUPTHER

## 2025-07-27 RX ORDER — MAGNESIUM SULFATE IN WATER 40 MG/ML
2000 INJECTION, SOLUTION INTRAVENOUS PRN
Status: DISCONTINUED | OUTPATIENT
Start: 2025-07-27 | End: 2025-07-27 | Stop reason: HOSPADM

## 2025-07-27 RX ORDER — FUROSEMIDE 40 MG/1
40 TABLET ORAL DAILY
Status: DISCONTINUED | OUTPATIENT
Start: 2025-07-28 | End: 2025-07-27 | Stop reason: HOSPADM

## 2025-07-27 RX ORDER — OXYBUTYNIN CHLORIDE 5 MG/1
15 TABLET, EXTENDED RELEASE ORAL DAILY
Status: DISCONTINUED | OUTPATIENT
Start: 2025-07-27 | End: 2025-07-27 | Stop reason: HOSPADM

## 2025-07-27 RX ORDER — ATORVASTATIN CALCIUM 20 MG/1
20 TABLET, FILM COATED ORAL DAILY
Status: DISCONTINUED | OUTPATIENT
Start: 2025-07-27 | End: 2025-07-27 | Stop reason: HOSPADM

## 2025-07-27 RX ORDER — AMOXICILLIN 250 MG
1 CAPSULE ORAL 2 TIMES DAILY
Status: DISCONTINUED | OUTPATIENT
Start: 2025-07-27 | End: 2025-07-27

## 2025-07-27 RX ORDER — POTASSIUM CHLORIDE 1500 MG/1
40 TABLET, EXTENDED RELEASE ORAL PRN
Status: DISCONTINUED | OUTPATIENT
Start: 2025-07-27 | End: 2025-07-27

## 2025-07-27 RX ORDER — DEXTROSE MONOHYDRATE 100 MG/ML
INJECTION, SOLUTION INTRAVENOUS CONTINUOUS PRN
Status: DISCONTINUED | OUTPATIENT
Start: 2025-07-27 | End: 2025-07-27 | Stop reason: HOSPADM

## 2025-07-27 RX ORDER — SODIUM CHLORIDE 9 MG/ML
INJECTION, SOLUTION INTRAVENOUS PRN
Status: DISCONTINUED | OUTPATIENT
Start: 2025-07-27 | End: 2025-07-27 | Stop reason: HOSPADM

## 2025-07-27 RX ORDER — POLYETHYLENE GLYCOL 3350 17 G/17G
17 POWDER, FOR SOLUTION ORAL DAILY PRN
Status: DISCONTINUED | OUTPATIENT
Start: 2025-07-27 | End: 2025-07-27 | Stop reason: HOSPADM

## 2025-07-27 RX ORDER — PANTOPRAZOLE SODIUM 40 MG/1
40 TABLET, DELAYED RELEASE ORAL
Status: DISCONTINUED | OUTPATIENT
Start: 2025-07-27 | End: 2025-07-27 | Stop reason: HOSPADM

## 2025-07-27 RX ORDER — SODIUM CHLORIDE 0.9 % (FLUSH) 0.9 %
5-40 SYRINGE (ML) INJECTION EVERY 12 HOURS SCHEDULED
Status: DISCONTINUED | OUTPATIENT
Start: 2025-07-27 | End: 2025-07-27 | Stop reason: HOSPADM

## 2025-07-27 RX ORDER — TAMSULOSIN HYDROCHLORIDE 0.4 MG/1
0.4 CAPSULE ORAL DAILY
Status: DISCONTINUED | OUTPATIENT
Start: 2025-07-27 | End: 2025-07-27 | Stop reason: HOSPADM

## 2025-07-27 RX ORDER — SODIUM CHLORIDE 0.9 % (FLUSH) 0.9 %
5-40 SYRINGE (ML) INJECTION PRN
Status: DISCONTINUED | OUTPATIENT
Start: 2025-07-27 | End: 2025-07-27 | Stop reason: HOSPADM

## 2025-07-27 RX ORDER — INSULIN GLARGINE 100 [IU]/ML
35 INJECTION, SOLUTION SUBCUTANEOUS NIGHTLY
Status: DISCONTINUED | OUTPATIENT
Start: 2025-07-27 | End: 2025-07-27 | Stop reason: HOSPADM

## 2025-07-27 RX ORDER — FUROSEMIDE 10 MG/ML
200 INJECTION INTRAMUSCULAR; INTRAVENOUS ONCE
Status: COMPLETED | OUTPATIENT
Start: 2025-07-27 | End: 2025-07-27

## 2025-07-27 RX ORDER — ONDANSETRON 4 MG/1
4 TABLET, ORALLY DISINTEGRATING ORAL EVERY 8 HOURS PRN
Status: DISCONTINUED | OUTPATIENT
Start: 2025-07-27 | End: 2025-07-27 | Stop reason: HOSPADM

## 2025-07-27 RX ORDER — 0.9 % SODIUM CHLORIDE 0.9 %
1000 INTRAVENOUS SOLUTION INTRAVENOUS ONCE
Status: COMPLETED | OUTPATIENT
Start: 2025-07-27 | End: 2025-07-27

## 2025-07-27 RX ORDER — SODIUM CHLORIDE 9 MG/ML
INJECTION, SOLUTION INTRAVENOUS CONTINUOUS
Status: DISCONTINUED | OUTPATIENT
Start: 2025-07-27 | End: 2025-07-27

## 2025-07-27 RX ORDER — INSULIN GLARGINE 100 [IU]/ML
14 INJECTION, SOLUTION SUBCUTANEOUS NIGHTLY
Status: DISCONTINUED | OUTPATIENT
Start: 2025-07-27 | End: 2025-07-27 | Stop reason: SDUPTHER

## 2025-07-27 RX ORDER — SPIRONOLACTONE 25 MG/1
25 TABLET ORAL DAILY
Status: DISCONTINUED | OUTPATIENT
Start: 2025-07-27 | End: 2025-07-27 | Stop reason: HOSPADM

## 2025-07-27 RX ORDER — LEVOTHYROXINE SODIUM 25 UG/1
25 TABLET ORAL DAILY
Status: DISCONTINUED | OUTPATIENT
Start: 2025-07-27 | End: 2025-07-27 | Stop reason: HOSPADM

## 2025-07-27 RX ORDER — FINASTERIDE 5 MG/1
5 TABLET, FILM COATED ORAL DAILY
Status: DISCONTINUED | OUTPATIENT
Start: 2025-07-27 | End: 2025-07-27 | Stop reason: HOSPADM

## 2025-07-27 RX ADMIN — HYDROCODONE BITARTRATE AND ACETAMINOPHEN 1 TABLET: 10; 325 TABLET ORAL at 06:37

## 2025-07-27 RX ADMIN — INSULIN LISPRO 10 UNITS: 100 INJECTION, SOLUTION INTRAVENOUS; SUBCUTANEOUS at 16:42

## 2025-07-27 RX ADMIN — DEXTROSE MONOHYDRATE 250 ML: 100 INJECTION, SOLUTION INTRAVENOUS at 08:52

## 2025-07-27 RX ADMIN — FUROSEMIDE 200 MG: 10 INJECTION, SOLUTION INTRAMUSCULAR; INTRAVENOUS at 15:58

## 2025-07-27 RX ADMIN — INSULIN LISPRO 10 UNITS: 100 INJECTION, SOLUTION INTRAVENOUS; SUBCUTANEOUS at 14:04

## 2025-07-27 RX ADMIN — FERROUS SULFATE TAB 325 MG (65 MG ELEMENTAL FE) 325 MG: 325 (65 FE) TAB at 09:07

## 2025-07-27 RX ADMIN — SODIUM ZIRCONIUM CYCLOSILICATE 10 G: 10 POWDER, FOR SUSPENSION ORAL at 09:06

## 2025-07-27 RX ADMIN — INSULIN LISPRO 8 UNITS: 100 INJECTION, SOLUTION INTRAVENOUS; SUBCUTANEOUS at 22:06

## 2025-07-27 RX ADMIN — ACETAMINOPHEN 650 MG: 325 TABLET ORAL at 19:22

## 2025-07-27 RX ADMIN — INSULIN LISPRO 6 UNITS: 100 INJECTION, SOLUTION INTRAVENOUS; SUBCUTANEOUS at 14:04

## 2025-07-27 RX ADMIN — TAMSULOSIN HYDROCHLORIDE 0.4 MG: 0.4 CAPSULE ORAL at 09:37

## 2025-07-27 RX ADMIN — VANCOMYCIN HYDROCHLORIDE 2500 MG: 10 INJECTION, POWDER, LYOPHILIZED, FOR SOLUTION INTRAVENOUS at 00:06

## 2025-07-27 RX ADMIN — PANTOPRAZOLE SODIUM 40 MG: 40 TABLET, DELAYED RELEASE ORAL at 06:37

## 2025-07-27 RX ADMIN — ATORVASTATIN CALCIUM 20 MG: 20 TABLET, FILM COATED ORAL at 09:08

## 2025-07-27 RX ADMIN — FOLIC ACID 1 MG: 1 TABLET ORAL at 09:08

## 2025-07-27 RX ADMIN — SODIUM ZIRCONIUM CYCLOSILICATE 10 G: 10 POWDER, FOR SUSPENSION ORAL at 15:59

## 2025-07-27 RX ADMIN — SODIUM CHLORIDE, PRESERVATIVE FREE 10 ML: 5 INJECTION INTRAVENOUS at 08:47

## 2025-07-27 RX ADMIN — PIPERACILLIN AND TAZOBACTAM 3375 MG: 3; .375 INJECTION, POWDER, FOR SOLUTION INTRAVENOUS at 06:46

## 2025-07-27 RX ADMIN — INSULIN GLARGINE 35 UNITS: 100 INJECTION, SOLUTION SUBCUTANEOUS at 22:08

## 2025-07-27 RX ADMIN — INSULIN HUMAN 10 UNITS: 100 INJECTION, SOLUTION PARENTERAL at 09:00

## 2025-07-27 RX ADMIN — FINASTERIDE 5 MG: 5 TABLET, FILM COATED ORAL at 09:07

## 2025-07-27 RX ADMIN — SODIUM ZIRCONIUM CYCLOSILICATE 10 G: 10 POWDER, FOR SUSPENSION ORAL at 22:14

## 2025-07-27 RX ADMIN — HYDROCODONE BITARTRATE AND ACETAMINOPHEN 1 TABLET: 10; 325 TABLET ORAL at 20:13

## 2025-07-27 RX ADMIN — INSULIN LISPRO 4 UNITS: 100 INJECTION, SOLUTION INTRAVENOUS; SUBCUTANEOUS at 09:04

## 2025-07-27 RX ADMIN — CALCIUM GLUCONATE 2000 MG: 98 INJECTION INTRAVENOUS at 10:09

## 2025-07-27 RX ADMIN — SODIUM CHLORIDE: 0.9 INJECTION, SOLUTION INTRAVENOUS at 03:57

## 2025-07-27 RX ADMIN — LEVOTHYROXINE SODIUM 25 MCG: 0.03 TABLET ORAL at 06:37

## 2025-07-27 RX ADMIN — SODIUM BICARBONATE: 84 INJECTION, SOLUTION INTRAVENOUS at 09:45

## 2025-07-27 RX ADMIN — PIPERACILLIN AND TAZOBACTAM 4500 MG: 4; .5 INJECTION, POWDER, LYOPHILIZED, FOR SOLUTION INTRAVENOUS at 14:10

## 2025-07-27 RX ADMIN — OXYBUTYNIN CHLORIDE 15 MG: 5 TABLET, EXTENDED RELEASE ORAL at 09:07

## 2025-07-27 RX ADMIN — INSULIN LISPRO 8 UNITS: 100 INJECTION, SOLUTION INTRAVENOUS; SUBCUTANEOUS at 16:41

## 2025-07-27 RX ADMIN — AMITRIPTYLINE HYDROCHLORIDE 25 MG: 25 TABLET ORAL at 22:06

## 2025-07-27 RX ADMIN — SODIUM CHLORIDE 1000 ML: 0.9 INJECTION, SOLUTION INTRAVENOUS at 16:10

## 2025-07-27 RX ADMIN — FUROSEMIDE 40 MG: 10 INJECTION, SOLUTION INTRAMUSCULAR; INTRAVENOUS at 09:13

## 2025-07-27 ASSESSMENT — PAIN SCALES - GENERAL
PAINLEVEL_OUTOF10: 0
PAINLEVEL_OUTOF10: 3
PAINLEVEL_OUTOF10: 8
PAINLEVEL_OUTOF10: 8
PAINLEVEL_OUTOF10: 10

## 2025-07-27 ASSESSMENT — PAIN DESCRIPTION - ONSET: ONSET: ON-GOING

## 2025-07-27 ASSESSMENT — PAIN DESCRIPTION - ORIENTATION
ORIENTATION: RIGHT

## 2025-07-27 ASSESSMENT — PAIN DESCRIPTION - LOCATION
LOCATION: FOOT
LOCATION: FOOT
LOCATION: FOOT;GENERALIZED

## 2025-07-27 ASSESSMENT — PAIN DESCRIPTION - PAIN TYPE: TYPE: ACUTE PAIN

## 2025-07-27 ASSESSMENT — PAIN DESCRIPTION - FREQUENCY: FREQUENCY: CONTINUOUS

## 2025-07-27 ASSESSMENT — PAIN DESCRIPTION - DESCRIPTORS: DESCRIPTORS: ACHING;DISCOMFORT

## 2025-07-27 ASSESSMENT — PAIN - FUNCTIONAL ASSESSMENT: PAIN_FUNCTIONAL_ASSESSMENT: PREVENTS OR INTERFERES WITH MANY ACTIVE NOT PASSIVE ACTIVITIES

## 2025-07-27 NOTE — ED PROVIDER NOTES
ED Attending Attestation Note     Date of evaluation: 7/26/2025    This patient was seen by the resident.  I have seen and examined the patient, agree with the workup, evaluation, management and diagnosis. The care plan has been discussed.  I have reviewed the ECG and concur with the resident's interpretation.      Briefly, Ty Meyer is a 64 y.o. male with a PMH inclusive of diabetes, DVT on apixaban, diabetic foot infection with osteomyelitis with recent fracture of his right ankle who presents for evaluation of fever.  He also reports some ongoing bleeding from a wound over his right leg where he has a healing fracture.  He first noted this bleeding yesterday and presented to the ED at that time.    Notable exam findings include bleeding with continuous drip from  right lower extremity wound.  Instability at the right ankle consistent with nonhealing fracture.    Assessment/ Medical Decision Making:     Following blood cultures patient given broad-spectrum antibiotics including for Pseudomonas coverage given his history of diabetes as well as 30 cc/kg of ideal body weight fluids.  I applied Surgicel over the wound with a nonadherent dressing overlying this and then wrapped in a layer of Kerlix and Ace bandage.  No strikethrough on initial reassessment.  Infectious workup initiated and his orthopedic surgeon was consulted for evaluation who came to the bedside to evaluate him.     Critical Care:  Due to the immediate potential for life-threatening deterioration due to sepsis, I spent 32 minutes providing critical care.  This time excludes time spent performing procedures but includes time spent on direct patient care, history retrieval, review of the chart, and discussions with patient, family, and consultant(s).       Jose J Trejo MD  07/29/25 6821

## 2025-07-27 NOTE — CONSULTS
Consult received     K elevated   Stop ACE/ARB and MRA    Hyperkalemia protocol    Lokelma   Bicarb drip   Lasix   Low K diet    Repeat K

## 2025-07-27 NOTE — CONSULTS
Clinical Pharmacy Progress Note    Notification received from laboratory of positive blood culture results.    Organism(s) detected: Staph aureus  (MSSA) in 2 bottles  Applicable Antimicrobial Resistance Genes:  mecA gene not detected    Recommendation changing empiric antibiotics (Zosyn and Vancomycin) to: Cefazolin    Recommendations reviewed with Dr. Julián tejeda changed to Cefepime only (vancomycin / Zosyn discontinued)    Pharmacy will sign off on vancomycin dosing    If resumed, please re-consult Pharmacy     Please call with questions:  [phone] (Main Pharmacy)    Lena Kumari  Pharm.D. BCPS    7/27/2025 6:23 PM

## 2025-07-27 NOTE — PROGRESS NOTES
[facility] - Clinical Pharmacy Note - Renal Dosing and Extended Infusion Beta-Lactam Adjustment    Cefepime ordered for treatment of SSTI / blood stream infection staph aureus. Per [facility] Renal Dose Adjustment Policy and Extended Infusion Beta-Lactam Policy, dosing will be changed to 2000  mg IV q12h.     Estimated Creatinine Clearance: Estimated Creatinine Clearance: 33 mL/min (A) (based on SCr of 3.4 mg/dL (H)).     BMI: Body mass index is 40.56 kg/m².    Rationale for Adjustment: Agent is renally eliminated and demonstrates time-dependent effect on bacterial eradication. Extended-infusion dosing strategy aims to enhance microbiologic and clinical efficacy.    Pharmacy will continue to monitor renal function, cultures and sensitivities (where available) and adjust dose as necessary.      Please call with any questions    Lena Kumari  Pharm.D. BCPS  [phone] (Main Pharmacy)

## 2025-07-27 NOTE — CONSULTS
[facility] Orthopa[facility]ics [facility]  Attending : MD Patito   Consult Note        Reason for Consult:  right ankle wound, history of ankle  fracture   Requesting Physician: Jose J Trejo MD  Date of Service: 7/27/2025 12:41 AM    CHIEF COMPLAINT:  As Above    History Obtained From:  patient    HISTORY OF PRESENT ILLNESS:                The patient is a 64 y.o. male who presents with above chief complaint.  The patient has a history of diabetes mellitus and multiple medical comorbidities, history of DVT including on Eliquis  He also has history of left BKA and is treated by my partner Dr. Wilder most recently for a right ankle pilon fracture.  He has been fitted with a Crow boot and was last seen in the office approximately 3 weeks ago  He presented to the emergency department last night with a small wound overlying his lateral ankle with some bloody drainage.  He states that he may have bumped the outside of his ankle approximately 1 week ago.  Outpatient follow-up was arranged.  The patient continued to notice drainage and also was not feeling as well and also was noted to be febrile  He presented to the emergency department once again tonight and was noted to be febrile with elevated white blood cell count.  He states that currently he does not have any pain in his right lower extremity but typically has poor sensation in that foot and ankle.  He was started on empiric IV antibiotics in the emergency department with planned admission to hospitalist for further workup for sepsis  The patient states that he has no other new complaints currently    Past Medical History:        Diagnosis Date    (HFpEF) heart failure with preserved ejection fraction (HCC)     Abdominal hernia     Back pain     CAD (coronary artery disease)     CKD (chronic kidney disease) stage 4, GFR 15-29 ml/min ([facility], [facility])     Dr. Long    Current long-term use of anticoagulant medication with history of deep venous thrombosis (DVT)     Eliquis

## 2025-07-27 NOTE — PROGRESS NOTES
Nephrology Consult Note                                                                                                                                                                                                                                                                                                                                                               Office : [phone]     Fax :[phone]    Patient's Name: Ty Meyer    7/27/2025    Reason for Consult:  HUNTER on CKD, hyperkalemia   Requesting Physician:  Jose Chew MD  Chief Complaint:    Chief Complaint   Patient presents with    Fever       Assessment/Plan     # Acid- base/ Electrolyte imbalance   # Hyperkalemia   - elevated K  - Stop ACE/ARB and MRA  - Lokelma   - Bicarb gtt  - IV fluids  - Lasix   - low K diet  - repeat K     # HUNTER on CKD 3 2/2 to ATN  # Volume overload  - Creatinine 2.4--> 3.4  - Lasix  - monitor labs  - avoid nephrotoxins  - monitor UOP      # Right bimalleolar ankle fx  # R ankle wound  # Sepsis   - Following with ortho  - MRI IP  - BC IP   - Abx per primary   - Zosyn and Vanc  - Eliquis on hold    # HTN  - controlled  - losartan and spironolactone on hold    # T2DM  - A1c 7.1 5/25  - elevated BG  - management per primary      # Anemia  - not at goal but stable  - monitor   - transfuse per primary  - PO iron       # HFpEF  - Echo with EF 50-55%, grade II DD with increased LAP  - Diuretics  - Daily weights     History of Present Ilness:    Ty Meyer is a 64 y.o. male with PMHx significant for left BKA, CKD stage III, chronic anemia, chronic diastolic CHF, coronary artery disease, diabetes mellitus, hypertension, DVT, who presented to ED with a complaint of right ankle wound infection.  Patient came to the ER yesterday for evaluation of bleeding from the right ankle open wound.  Patient was noted to have serosanguineous discharge with no clinical evidence of infection.  Patient was discharged home.

## 2025-07-27 NOTE — PROGRESS NOTES
[facility] - Clinical Pharmacy Note - Extended Infusion Beta-Lactam Adjustment    Piperacillin/Tazobactam ordered for treatment of R foot cellulitis with concern of osteomyelitis. Per [facility] Extended Infusion Beta-Lactam Policy, Zosyn will be changed to 4500 mg IV q8h given BMI > 40.     Estimated Creatinine Clearance: Estimated Creatinine Clearance: 33 mL/min (A) (based on SCr of 3.4 mg/dL (H)).  Dialysis Status, HUNTER, CKD: HUNTER on CKD  BMI: Body mass index is 40.56 kg/m².    Rationale for Adjustment: Agent is renally eliminated and demonstrates time-dependent effect on bacterial eradication. Extended-infusion dosing strategy aims to enhance microbiologic and clinical efficacy.    Pharmacy will continue to monitor renal function and adjust dose as necessary.      Please call with questions--  Rogelio LinnD, BCPS  Wireless: [phone]   7/27/2025 9:35 AM

## 2025-07-27 NOTE — PLAN OF CARE
Problem: ABCDS Injury Assessment  Goal: Absence of physical injury  7/27/2025 0816 by Ty Carrillo, RN  Note: Pt sleepy, tired, temp 98.1 arouses to verbal stimulation , ,labs called to medical VENU castelan 6.8 , report received from night shift at bed side .   7/27/2025 0450 by Ambreen Dc, RN  Outcome: Progressing

## 2025-07-27 NOTE — PLAN OF CARE
Problem: ABCDS Injury Assessment  Goal: Absence of physical injury  7/27/2025 1751 by Ty Carrillo, RN  Note: Alert oriented times 4 , able to turn in bed , flatus no Bm , voiding , hazy yellow urine , per pure wick , no complaints of pain , dressing to right foot , CDI , on pillow .  Tolerated mri , today , passed swallow screen , diabetic  diet ordered ,   7/27/2025 0816 by Ty Carrillo, RN  Note: Pt sleepy, tired, temp 98.1 arouses to verbal stimulation , ,labs called to medical dr , K 6.8 , report received from night shift at bed side .   7/27/2025 0450 by Ambreen Dc, RN  Outcome: Progressing

## 2025-07-27 NOTE — PROGRESS NOTES
4 Eyes Skin Assessment     NAME:  Ty Meyer  YOB: 1960  MEDICAL RECORD NUMBER:  [id number]    The patient is being assessed for  Admission    I agree that at least one RN has performed a thorough Head to Toe Skin Assessment on the patient. ALL assessment sites listed below have been assessed.      Areas assessed by both nurses:    Head, Face, Ears, Shoulders, Back, Chest, Arms, Elbows, Hands, Sacrum. Buttock, Coccyx, Ischium, Legs. Feet and Heels, and Under Medical Devices         Does the Patient have a Wound? Yes wound(s) were present on assessment. LDA wound assessment was Initiated and completed by RN     Buttocks stage 2  Wound R foot JOSÉ, wrapped by ortho/podiatry  Blanchable Redness to R foot/heel-swelling   Scrotum swollen, redness   Nelida moisture/redness in folds   Stephen Prevention initiated by RN: Yes  Wound Care Orders initiated by RN: Yes    For hospital-acquired stage 1 & 2 and ALL Stage 3,4, Unstageable, DTI, NWPT, and Complex wounds: place order “IP Wound Care/Ostomy Nurse Eval and Treat” by RN under : yes    New Ostomies, if present place, Ostomy referral order under : No     Nurse 1 eSignature: Electronically signed by Ambreen Dc RN on 7/27/25 at 4:55 AM EDT    **SHARE this note so that the co-signing nurse can place an eSignature**    Nurse 2 eSignature: Electronically signed by Luz Maria Burris RN on 7/27/25 at 6:59 AM EDT

## 2025-07-27 NOTE — ED NOTES
Patient Name: Ty Meyer  : 1960 64 y.o.  MRN: [id number]  ED Room #: A05/A05-05     Chief complaint:   Chief Complaint   Patient presents with    Fever     Hospital Problem/Diagnosis:   Hospital Problems           Last Modified POA    * (Principal) Sepsis (HCC) 2025 Yes         O2 Flow Rate:    (if applicable)  Cardiac Rhythm:   (if applicable)  Active LDA's:   Peripheral IV 25 Posterior;Proximal;Right Forearm (Active)   Site Assessment Clean, dry & intact 25   Line Status Blood return noted;Normal saline locked;Flushed;Specimen collected 25       Peripheral IV 25 Distal;Left;Dorsal Forearm (Active)            How does patient ambulate? Wheelchair    2. How does patient take pills? Whole with Water    3. Is patient alert? Alert    4. Is patient oriented? To Person, To Place, To Time, and To Situation    5.   Patient arrived from:  home  Facility Name: ___________________________________________    6. If patient is disoriented or from a Skill Nursing Facility has family been notified of admission? No    7. Patient belongings? Belongings: Cell Phone and Clothing    Disposition of belongings? Kept with Patient     8. Any specific patient or family belongings/needs/dynamics?   a. NA    9. Miscellaneous comments/pending orders?  a. Admit for Sepsis. 2 iv's left wrist and right AC. AO x 4. Left below knee amputation. Tachycardic 107-112 bpm. Purewick in place but no urine. 2.4 Liters or LR fluid resuscitation given. Zosyn given and Vancomycin currently going. Possible right foot Osteomyelitis is cause? MRI in the morning of right foot ordered by Podiatrist who saw him in the ER at 0030 today.      If there are any additional questions please reach out to the Emergency Department.      To Berry, RN  25 [phone]

## 2025-07-27 NOTE — PROGRESS NOTES
Pt admitted to 5517. A&OX4. Swallow screen completed, however continue NPO per orders. BKA L leg. Swelling/wound present to RLE. Wound present on buttocks. 4 eyes completed. VSS on room air. Reported no UO to NP, Serena Chambers Marker. Bladder scan shows 0cc retained at this time. Standard safety measures in place. WCTM.

## 2025-07-27 NOTE — H&P
Hospital Medicine History & Physical      Date of Admission: 7/26/2025    Date of Service:  Pt seen/examined on 07/27/25     [x]Admitted to Inpatient with expected LOS greater than two midnights due to medical therapy.  []Placed in Observation status.    Chief Admission Complaint: Right ankle infection    Presenting Admission History:      64 y.o. male with PMHx significant for left BKA, CKD stage III, chronic anemia, chronic diastolic CHF, coronary artery disease, diabetes mellitus, hypertension, DVT, who presented to ED with a complaint of right ankle wound infection.  Patient came to the ER yesterday for evaluation of bleeding from the right ankle open wound.  Patient was noted to have serosanguineous discharge with no clinical evidence of infection.  Patient was discharged home.  Patient started having fever at home with temperature spike of 105 °F according to the patient which prompted him to come back to the ED for further evaluation.  Patient follows with orthopedic surgery for history of right ankle pillion fracture that he suffered about 2 months ago.  Labs in ED were remarkable for WBC 13.7, lactic acid 2.1, potassium 5.9, procalcitonin 0.067.  Patient given IV fluids per sepsis protocol.  Patient was given 1 dose of IV vancomycin and IV Zosyn.  Patient is currently admitted under inpatient status for further management and evaluation    ROS:     Review of 10 systems is negative except what is outlined in HPI.     Assessment:  Right ankle open wound with infection/cellulitis, rule out osteomyelitis.  Sepsis, secondary to above  History of right ankle pilon fracture  History of left BKA  Acute kidney injury on CKD stage IIIa.  Hyperkalemia.  Chronic diastolic CHF.  Coronary artery disease.  Type 2 diabetes mellitus.  Essential hypertension.  Hyperlipidemia  Anemia of chronic disease.  History of DVT on anticoagulation with Eliquis, now.    Plan:    Patient admitted under inpatient status.  Continue IV  tablet Take 1 tablet by mouth 2 times daily    Caro Aiken MD   ferrous sulfate (IRON 325) 325 (65 Fe) MG tablet Take 1 tablet by mouth daily (with breakfast)    Caro Aiken MD   amitriptyline (ELAVIL) 25 MG tablet Take 1 tablet by mouth nightly    Caro Aiken MD   folic acid (FOLVITE) 1 MG tablet Take 1 tablet by mouth daily    Caro Aiken MD   insulin lispro (HUMALOG) 100 UNIT/ML injection vial Inject 5-15 Units into the skin 3 times daily (before meals) Sliding scale with meals and at HS    Caro Aiken MD   omeprazole (PRILOSEC) 20 MG capsule Take 2 capsules by mouth Daily    Caro Aiken MD       Labs: Personally reviewed and interpreted for clinical significance.   Recent Labs     07/26/25 2203   WBC 13.7*   HGB 8.0*   HCT 24.2*        Recent Labs     07/26/25 2203   *   K 5.9*      CO2 16*   BUN 47*   CREATININE 3.2*   CALCIUM 8.4     No results for input(s): \"PROBNP\", \"TROPHS\" in the last 72 hours.  No results for input(s): \"LABA1C\" in the last 72 hours.  Recent Labs     07/26/25 2203   AST 15   ALT 12   BILITOT 0.4   ALKPHOS 171*     No results for input(s): \"INR\", \"LACTA\", \"TSH\" in the last 72 hours.     John Paul Larios MD

## 2025-07-27 NOTE — ED PROVIDER NOTES
[facility]  EMERGENCY DEPARTMENT ENCOUNTER          TJ RESIDENT NOTE       Date of evaluation: 7/26/2025    Chief Complaint     Fever      History of Present Illness     Ty Meyer is a 64 y.o. male who presents with fever. He was seen yesterday in the ED for an open wound to his right ankle. At that time, he was discharge home with plans for outpatient follow-up. Per patient and his wife, today he started experiencing chills and rigors. When his wife took his temperature at home, it registered 105, prompting them to call EMS. He denies any associated diaphoresis, nausea, vomiting, lightheadedness.    ASSESSMENT / PLAN  (MEDICAL DECISION MAKING)     INITIAL VITALS: BP: 120/67, Temp: (!) 102 °F (38.9 °C), Pulse: (!) 122, Respirations: 26, SpO2: 95 %     Ty Meyer is a 64 y.o. male with history of right tib-fib fracture with significant destruction, who presented with new onset fever. He was seen yesterday for an open wound to his right ankle draining serosanguinous fluid. At that time, there was no evidence of infection, and he was discharged home with outpatient follow-up. He returns today after experiencing chills and rigors at home with a measured temperature to 105 per his wife. He denies any other associated symptoms including diaphoresis, nausea, vomiting. Lab workup today notable for anemia to 8.0, leukocytosis to 13.7, hyperkalemia to 5.9, creatine 3.2 (baseline 2.5), lactate 2.1, and procal 0.17. He was given a sepsis bolus and started on empiric vancomycin and Zosyn. EKG was without acute changes. He was given a dose of Lokelma for his hyperkalemia. His wound was re-dressed with Surgicel to help with hemostasis. His case was discussed with the on-call orthopedic surgeon, Dr. Devon Caputo, who agreed with empiric antibiotic therapy. He was admitted for further management of his sepsis and evaluation by orthopedic surgery.    Is this patient to be included in the SEP-1 core measure? Yes  mouth nightly    SPIRONOLACTONE (ALDACTONE) 25 MG TABLET    Take 1 tablet by mouth daily    TAMSULOSIN (FLOMAX) 0.4 MG CAPSULE    Take 1 capsule by mouth daily       Allergies     He is allergic to codeine.    Physical Exam     INITIAL VITALS: BP: 120/67, Temp: (!) 102 °F (38.9 °C), Pulse: (!) 122, Respirations: 26, SpO2: 95 %   Physical Exam       Ty Sosa MD  Resident  07/27/25 0019

## 2025-07-27 NOTE — CONSULTS
[facility] -  Clinical Pharmacy Note    Vancomycin - Management by Pharmacy    Consult Date(s): 07/26/25  Consulting Provider(s): Dr. Sosa    Assessment / Plan  Diabetic Foot Infection - Vancomycin  Concurrent Antimicrobials:   Piperacillin/Tazobactam  Day of Vanc Therapy / Ordered Duration: Day 1 of unspecified  Current Dosing Method: Intermittent Dosing by Levels  Therapeutic Goal: Trough ~ 15 mg/L  Current Dose / Plan:   Patient RF CKD (sCr 3.2), elevated lactic acid, sepsis (2.1), leukocytosis (WBC 13.7)  Vancomycin 2500mg IV x 1 load dose   Will order additional doses based upon level results  Will continue to monitor clinical condition and make adjustments to regimen as appropriate.    Thank you for consulting pharmacy,    Josh Mendoza, [facility] ext [phone]        Interval update:  New start    Subjective/Objective:   Ty Meyer is a 64 y.o. male with a PMHx significant for R pilon fracture being handled non-op with Crow boot, CKD, HF, DVT on eliquis, neuropathy who is admitted for a wound check.     Pharmacy is consulted to dose vancomycin.    Ht Readings from Last 1 Encounters:   07/26/25 1.88 m (6' 2\")     Wt Readings from Last 1 Encounters:   07/26/25 (!) 141 kg (310 lb 12.8 oz)     Current & Prior Antimicrobial Regimen(s):  Piperacillin/Tazobactam - current  Vancomycin - current    Vancomycin Level(s) / Doses:    Date Time Dose Type of Level / Level Interpretation                 Note: Serum levels collected for AUC-based dosing may be high if collected in close proximity to the dose administered. This is not necessarily indicative of toxicity.    Cultures & Sensitivities:    Date Site Micro Susceptibility / Result                 Recent Labs     07/26/25 2203   CREATININE 3.2*   BUN 47*   WBC 13.7*       Estimated Creatinine Clearance: 35 mL/min (A) (based on SCr of 3.2 mg/dL (H)).    Additional Lab Values / Findings of Note:    Recent Labs     07/26/25 2203   PROCAL 0.17*

## 2025-07-27 NOTE — PROGRESS NOTES
[facility] -  Clinical Pharmacy Note    Vancomycin - Management by Pharmacy    Consult Date(s): 7/27/25  Consulting Provider(s): Ian    Assessment / Plan   R ankle wound - cellulitis vs osteomyelitis - Vancomycin  Concurrent Antimicrobials: Zosyn  Day of Vanc Therapy / Ordered Duration: 1 of 7  Current Dosing Method: Intermittent Dosing by Levels  Therapeutic Goal: ~ 15 mg/L  Current Dose / Plan:   Received loading dose of vancomycin 2500mg IV x1 overnight   Renal function: HUNTER on CKD.  SCr 3.2?3.4 today.   Baseline SCr ~2.4-2.6?  Given HUNTER on CKD, will dose vancomycin based on intermittent levels. Have entered placeholder  Level today = 25.5 mg/L  Will redose with vancomycin 750mg IV x1 later today - anticipate level to be closer to ~15-18 by then  Level ordered for tomorrow AM, Mon 7/28 to assess timing of next dose  Will continue to monitor clinical condition and make adjustments to regimen as appropriate.    Thank you for consulting pharmacy,    Please call with questions--  Elaine Valentin, PharmD, [facility]  Wireless: [phone]   7/27/2025 9:32 AM        Interval update:  Febrile to 102F overnight. Hyperkalemic this AM, HUNTER on CKD.    Subjective/Objective:   Ty Meyer is a 64 y.o. male with a PMHx significant for CKD3, HFpEF, CAD, DM, HTN, DVT, hx of L BKA who is admitted with R ankle infection with concern of osteomyelitis.     Pharmacy is consulted to dose Vancomycin.    Vancomycin Dosing History:  May 2025 Admitted with HUNTER on CKD - initially dosed vancomycin based on intermittent levels  As SCr approached baseline (SCr 2.4) - scheduled on 1000mg IV q24h     Ht Readings from Last 1 Encounters:   07/26/25 1.88 m (6' 2\")     Wt Readings from Last 1 Encounters:   07/27/25 (!) 143.3 kg (315 lb 14.7 oz)     Current & Prior Antimicrobial Regimen(s):  Zosyn (7/27-current)  Vancomycin - Pharmacy to dose  Intermittent dosing (7/27-current)    Vancomycin Level(s) / Doses:    Date Time Dose Type of Level /

## 2025-07-27 NOTE — PLAN OF CARE
Orthopedic plan of care note    Attempted to evaluate patient this morning  Patient off the floor for MRI of right ankle    IV antibiotics continued overnight.  I spoke with the patient's wife who was in the room and updated plan    For now continue IV antibiotics and will follow-up results of MRI to discuss further treatment options also with foot and ankle team.

## 2025-07-28 PROBLEM — M86.9 OSTEOMYELITIS (HCC): Status: ACTIVE | Noted: 2025-07-28

## 2025-07-28 PROBLEM — E87.5 HYPERKALEMIA: Status: ACTIVE | Noted: 2025-07-28

## 2025-07-28 LAB
ALBUMIN SERPL-MCNC: 2.7 G/DL (ref 3.4–5)
ANION GAP SERPL CALCULATED.3IONS-SCNC: 12 MMOL/L (ref 3–16)
ANION GAP SERPL CALCULATED.3IONS-SCNC: 9 MMOL/L (ref 3–16)
ANISOCYTOSIS BLD QL SMEAR: ABNORMAL
BACTERIA URNS QL MICRO: ABNORMAL /HPF
BASOPHILS # BLD: 0 K/UL (ref 0–0.2)
BASOPHILS NFR BLD: 0 %
BILIRUB UR QL STRIP.AUTO: NEGATIVE
BUN SERPL-MCNC: 49 MG/DL (ref 7–20)
BUN SERPL-MCNC: 49 MG/DL (ref 7–20)
CALCIUM SERPL-MCNC: 8.5 MG/DL (ref 8.3–10.6)
CALCIUM SERPL-MCNC: 8.6 MG/DL (ref 8.3–10.6)
CHLORIDE SERPL-SCNC: 101 MMOL/L (ref 99–110)
CHLORIDE SERPL-SCNC: 104 MMOL/L (ref 99–110)
CLARITY UR: CLEAR
CO2 SERPL-SCNC: 20 MMOL/L (ref 21–32)
CO2 SERPL-SCNC: 21 MMOL/L (ref 21–32)
COLOR UR: YELLOW
CREAT SERPL-MCNC: 3.6 MG/DL (ref 0.8–1.3)
CREAT SERPL-MCNC: 3.6 MG/DL (ref 0.8–1.3)
CREAT UR-MCNC: 58.5 MG/DL (ref 39–259)
DEPRECATED RDW RBC AUTO: 18.4 % (ref 12.4–15.4)
EKG ATRIAL RATE: 118 BPM
EKG DIAGNOSIS: NORMAL
EKG P AXIS: 51 DEGREES
EKG P-R INTERVAL: 128 MS
EKG Q-T INTERVAL: 366 MS
EKG QRS DURATION: 146 MS
EKG QTC CALCULATION (BAZETT): 513 MS
EKG R AXIS: 140 DEGREES
EKG T AXIS: -13 DEGREES
EKG VENTRICULAR RATE: 118 BPM
EOSINOPHIL # BLD: 0.3 K/UL (ref 0–0.6)
EOSINOPHIL NFR BLD: 2 %
EPI CELLS #/AREA URNS HPF: ABNORMAL /HPF (ref 0–5)
GFR SERPLBLD CREATININE-BSD FMLA CKD-EPI: 18 ML/MIN/{1.73_M2}
GFR SERPLBLD CREATININE-BSD FMLA CKD-EPI: 18 ML/MIN/{1.73_M2}
GLUCOSE BLD-MCNC: 136 MG/DL (ref 70–99)
GLUCOSE BLD-MCNC: 167 MG/DL (ref 70–99)
GLUCOSE BLD-MCNC: 274 MG/DL (ref 70–99)
GLUCOSE BLD-MCNC: 497 MG/DL (ref 70–99)
GLUCOSE BLD-MCNC: 72 MG/DL (ref 70–99)
GLUCOSE SERPL-MCNC: 61 MG/DL (ref 70–99)
GLUCOSE SERPL-MCNC: 68 MG/DL (ref 70–99)
GLUCOSE UR STRIP.AUTO-MCNC: NEGATIVE MG/DL
HCT VFR BLD AUTO: 23.1 % (ref 40.5–52.5)
HGB BLD-MCNC: 7.5 G/DL (ref 13.5–17.5)
HGB UR QL STRIP.AUTO: ABNORMAL
HYPOCHROMIA BLD QL SMEAR: ABNORMAL
KETONES UR STRIP.AUTO-MCNC: NEGATIVE MG/DL
LACTATE BLDV-SCNC: 1 MMOL/L (ref 0.4–2)
LEUKOCYTE ESTERASE UR QL STRIP.AUTO: ABNORMAL
LYMPHOCYTES # BLD: 0.5 K/UL (ref 1–5.1)
LYMPHOCYTES NFR BLD: 4 %
MACROCYTES BLD QL SMEAR: ABNORMAL
MAGNESIUM SERPL-MCNC: 1.2 MG/DL (ref 1.8–2.4)
MCH RBC QN AUTO: 29.8 PG (ref 26–34)
MCHC RBC AUTO-ENTMCNC: 32.3 G/DL (ref 31–36)
MCV RBC AUTO: 92.2 FL (ref 80–100)
MICROALBUMIN UR DL<=1MG/L-MCNC: 5.21 MG/DL
MICROALBUMIN/CREAT UR: 89.1 MG/G (ref 0–30)
MONOCYTES # BLD: 1.3 K/UL (ref 0–1.3)
MONOCYTES NFR BLD: 10 %
NEUTROPHILS # BLD: 11.1 K/UL (ref 1.7–7.7)
NEUTROPHILS NFR BLD: 73 %
NEUTS BAND NFR BLD MANUAL: 11 % (ref 0–7)
NITRITE UR QL STRIP.AUTO: NEGATIVE
OVALOCYTES BLD QL SMEAR: ABNORMAL
PATH INTERP BLD-IMP: YES
PERFORMED ON: ABNORMAL
PERFORMED ON: NORMAL
PH UR STRIP.AUTO: 5.5 [PH] (ref 5–8)
PHOSPHATE SERPL-MCNC: 3.2 MG/DL (ref 2.5–4.9)
PLATELET # BLD AUTO: 342 K/UL (ref 135–450)
PLATELET BLD QL SMEAR: ADEQUATE
PMV BLD AUTO: 7.2 FL (ref 5–10.5)
POIKILOCYTOSIS BLD QL SMEAR: ABNORMAL
POLYCHROMASIA BLD QL SMEAR: ABNORMAL
POTASSIUM SERPL-SCNC: 5.3 MMOL/L (ref 3.5–5.1)
POTASSIUM SERPL-SCNC: 5.3 MMOL/L (ref 3.5–5.1)
PROT UR STRIP.AUTO-MCNC: NEGATIVE MG/DL
PROT UR-MCNC: 16.8 MG/DL
PROT/CREAT UR-RTO: 0.3 MG/DL
RBC # BLD AUTO: 2.51 M/UL (ref 4.2–5.9)
RBC #/AREA URNS HPF: ABNORMAL /HPF (ref 0–4)
SLIDE REVIEW: ABNORMAL
SODIUM SERPL-SCNC: 133 MMOL/L (ref 136–145)
SODIUM SERPL-SCNC: 134 MMOL/L (ref 136–145)
SP GR UR STRIP.AUTO: 1.01 (ref 1–1.03)
UA COMPLETE W REFLEX CULTURE PNL UR: YES
UA DIPSTICK W REFLEX MICRO PNL UR: YES
URN SPEC COLLECT METH UR: ABNORMAL
UROBILINOGEN UR STRIP-ACNC: 0.2 E.U./DL
VANCOMYCIN SERPL-MCNC: 19.6 UG/ML
WBC # BLD AUTO: 13.2 K/UL (ref 4–11)
WBC #/AREA URNS HPF: ABNORMAL /HPF (ref 0–5)

## 2025-07-28 PROCEDURE — 36415 COLL VENOUS BLD VENIPUNCTURE: CPT

## 2025-07-28 PROCEDURE — 85025 COMPLETE CBC W/AUTO DIFF WBC: CPT

## 2025-07-28 PROCEDURE — 6370000000 HC RX 637 (ALT 250 FOR IP): Performed by: NURSE PRACTITIONER

## 2025-07-28 PROCEDURE — 2580000003 HC RX 258: Performed by: INTERNAL MEDICINE

## 2025-07-28 PROCEDURE — 83735 ASSAY OF MAGNESIUM: CPT

## 2025-07-28 PROCEDURE — 93010 ELECTROCARDIOGRAM REPORT: CPT | Performed by: INTERNAL MEDICINE

## 2025-07-28 PROCEDURE — 83605 ASSAY OF LACTIC ACID: CPT

## 2025-07-28 PROCEDURE — 84156 ASSAY OF PROTEIN URINE: CPT

## 2025-07-28 PROCEDURE — 81001 URINALYSIS AUTO W/SCOPE: CPT

## 2025-07-28 PROCEDURE — 6360000002 HC RX W HCPCS: Performed by: NURSE PRACTITIONER

## 2025-07-28 PROCEDURE — 82043 UR ALBUMIN QUANTITATIVE: CPT

## 2025-07-28 PROCEDURE — 6360000002 HC RX W HCPCS

## 2025-07-28 PROCEDURE — 2060000000 HC ICU INTERMEDIATE R&B

## 2025-07-28 PROCEDURE — 2580000003 HC RX 258: Performed by: NURSE PRACTITIONER

## 2025-07-28 PROCEDURE — 87086 URINE CULTURE/COLONY COUNT: CPT

## 2025-07-28 PROCEDURE — 80069 RENAL FUNCTION PANEL: CPT

## 2025-07-28 PROCEDURE — 80202 ASSAY OF VANCOMYCIN: CPT

## 2025-07-28 PROCEDURE — 82570 ASSAY OF URINE CREATININE: CPT

## 2025-07-28 PROCEDURE — 6360000002 HC RX W HCPCS: Performed by: INTERNAL MEDICINE

## 2025-07-28 PROCEDURE — 2500000003 HC RX 250 WO HCPCS: Performed by: NURSE PRACTITIONER

## 2025-07-28 PROCEDURE — 6370000000 HC RX 637 (ALT 250 FOR IP)

## 2025-07-28 PROCEDURE — 6360000002 HC RX W HCPCS: Performed by: STUDENT IN AN ORGANIZED HEALTH CARE EDUCATION/TRAINING PROGRAM

## 2025-07-28 PROCEDURE — 83036 HEMOGLOBIN GLYCOSYLATED A1C: CPT

## 2025-07-28 RX ORDER — LEVOTHYROXINE SODIUM 25 UG/1
25 TABLET ORAL DAILY
Status: DISCONTINUED | OUTPATIENT
Start: 2025-07-28 | End: 2025-08-05 | Stop reason: HOSPADM

## 2025-07-28 RX ORDER — ONDANSETRON 2 MG/ML
4 INJECTION INTRAMUSCULAR; INTRAVENOUS EVERY 6 HOURS PRN
Status: DISCONTINUED | OUTPATIENT
Start: 2025-07-28 | End: 2025-08-05 | Stop reason: HOSPADM

## 2025-07-28 RX ORDER — FUROSEMIDE 40 MG/1
40 TABLET ORAL DAILY
Status: DISCONTINUED | OUTPATIENT
Start: 2025-07-28 | End: 2025-08-05

## 2025-07-28 RX ORDER — SODIUM CHLORIDE 0.9 % (FLUSH) 0.9 %
5-40 SYRINGE (ML) INJECTION EVERY 12 HOURS SCHEDULED
Status: DISCONTINUED | OUTPATIENT
Start: 2025-07-28 | End: 2025-08-04 | Stop reason: SDUPTHER

## 2025-07-28 RX ORDER — OXYBUTYNIN CHLORIDE 5 MG/1
15 TABLET, EXTENDED RELEASE ORAL DAILY
Status: DISCONTINUED | OUTPATIENT
Start: 2025-07-28 | End: 2025-08-05 | Stop reason: HOSPADM

## 2025-07-28 RX ORDER — ATORVASTATIN CALCIUM 10 MG/1
20 TABLET, FILM COATED ORAL DAILY
Status: DISCONTINUED | OUTPATIENT
Start: 2025-07-28 | End: 2025-08-05 | Stop reason: HOSPADM

## 2025-07-28 RX ORDER — MECOBALAMIN 5000 MCG
10 TABLET,DISINTEGRATING ORAL NIGHTLY PRN
Status: DISCONTINUED | OUTPATIENT
Start: 2025-07-28 | End: 2025-08-05 | Stop reason: HOSPADM

## 2025-07-28 RX ORDER — HYDROCODONE BITARTRATE AND ACETAMINOPHEN 10; 325 MG/1; MG/1
1 TABLET ORAL EVERY 4 HOURS PRN
Refills: 0 | Status: DISCONTINUED | OUTPATIENT
Start: 2025-07-28 | End: 2025-07-29

## 2025-07-28 RX ORDER — FINASTERIDE 5 MG/1
5 TABLET, FILM COATED ORAL DAILY
Status: DISCONTINUED | OUTPATIENT
Start: 2025-07-28 | End: 2025-08-05 | Stop reason: HOSPADM

## 2025-07-28 RX ORDER — VANCOMYCIN 750 MG/150ML
750 INJECTION, SOLUTION INTRAVENOUS ONCE
Status: COMPLETED | OUTPATIENT
Start: 2025-07-28 | End: 2025-07-28

## 2025-07-28 RX ORDER — ATORVASTATIN CALCIUM 10 MG/1
20 TABLET, FILM COATED ORAL DAILY
Status: DISCONTINUED | OUTPATIENT
Start: 2025-07-28 | End: 2025-07-28

## 2025-07-28 RX ORDER — MAGNESIUM SULFATE IN WATER 40 MG/ML
4000 INJECTION, SOLUTION INTRAVENOUS ONCE
Status: COMPLETED | OUTPATIENT
Start: 2025-07-28 | End: 2025-07-28

## 2025-07-28 RX ORDER — FOLIC ACID 1 MG/1
1 TABLET ORAL DAILY
Status: DISCONTINUED | OUTPATIENT
Start: 2025-07-28 | End: 2025-08-05 | Stop reason: HOSPADM

## 2025-07-28 RX ORDER — TROSPIUM CHLORIDE 20 MG/1
20 TABLET, FILM COATED ORAL
Status: DISCONTINUED | OUTPATIENT
Start: 2025-07-28 | End: 2025-08-05 | Stop reason: HOSPADM

## 2025-07-28 RX ORDER — POLYETHYLENE GLYCOL 3350 17 G/17G
17 POWDER, FOR SOLUTION ORAL DAILY PRN
Status: DISCONTINUED | OUTPATIENT
Start: 2025-07-28 | End: 2025-08-05 | Stop reason: HOSPADM

## 2025-07-28 RX ORDER — SODIUM CHLORIDE 9 MG/ML
INJECTION, SOLUTION INTRAVENOUS PRN
Status: DISCONTINUED | OUTPATIENT
Start: 2025-07-28 | End: 2025-08-04 | Stop reason: SDUPTHER

## 2025-07-28 RX ORDER — ONDANSETRON 4 MG/1
4 TABLET, ORALLY DISINTEGRATING ORAL EVERY 8 HOURS PRN
Status: DISCONTINUED | OUTPATIENT
Start: 2025-07-28 | End: 2025-08-05 | Stop reason: HOSPADM

## 2025-07-28 RX ORDER — SODIUM CHLORIDE 0.9 % (FLUSH) 0.9 %
5-40 SYRINGE (ML) INJECTION PRN
Status: DISCONTINUED | OUTPATIENT
Start: 2025-07-28 | End: 2025-08-04 | Stop reason: SDUPTHER

## 2025-07-28 RX ORDER — PANTOPRAZOLE SODIUM 40 MG/1
40 TABLET, DELAYED RELEASE ORAL
Status: DISCONTINUED | OUTPATIENT
Start: 2025-07-28 | End: 2025-08-05 | Stop reason: HOSPADM

## 2025-07-28 RX ORDER — INSULIN GLARGINE 100 [IU]/ML
35 INJECTION, SOLUTION SUBCUTANEOUS NIGHTLY
Status: DISCONTINUED | OUTPATIENT
Start: 2025-07-28 | End: 2025-08-05

## 2025-07-28 RX ORDER — TRAMADOL HYDROCHLORIDE 50 MG/1
50 TABLET ORAL EVERY 6 HOURS PRN
Status: DISCONTINUED | OUTPATIENT
Start: 2025-07-28 | End: 2025-08-05 | Stop reason: HOSPADM

## 2025-07-28 RX ORDER — INSULIN LISPRO 100 [IU]/ML
0-8 INJECTION, SOLUTION INTRAVENOUS; SUBCUTANEOUS
Status: DISCONTINUED | OUTPATIENT
Start: 2025-07-28 | End: 2025-07-29

## 2025-07-28 RX ORDER — TAMSULOSIN HYDROCHLORIDE 0.4 MG/1
0.4 CAPSULE ORAL DAILY
Status: DISCONTINUED | OUTPATIENT
Start: 2025-07-28 | End: 2025-08-05 | Stop reason: HOSPADM

## 2025-07-28 RX ORDER — ACETAMINOPHEN 325 MG/1
650 TABLET ORAL EVERY 6 HOURS PRN
Status: DISCONTINUED | OUTPATIENT
Start: 2025-07-28 | End: 2025-08-05 | Stop reason: HOSPADM

## 2025-07-28 RX ORDER — ACETAMINOPHEN 650 MG/1
650 SUPPOSITORY RECTAL EVERY 6 HOURS PRN
Status: DISCONTINUED | OUTPATIENT
Start: 2025-07-28 | End: 2025-08-05 | Stop reason: HOSPADM

## 2025-07-28 RX ORDER — FERROUS SULFATE 325(65) MG
325 TABLET ORAL
Status: DISCONTINUED | OUTPATIENT
Start: 2025-07-28 | End: 2025-08-05 | Stop reason: HOSPADM

## 2025-07-28 RX ADMIN — PANTOPRAZOLE SODIUM 40 MG: 40 TABLET, DELAYED RELEASE ORAL at 05:33

## 2025-07-28 RX ADMIN — SODIUM CHLORIDE, PRESERVATIVE FREE 10 ML: 5 INJECTION INTRAVENOUS at 09:00

## 2025-07-28 RX ADMIN — PIPERACILLIN AND TAZOBACTAM 3375 MG: 3; .375 INJECTION, POWDER, FOR SOLUTION INTRAVENOUS at 05:42

## 2025-07-28 RX ADMIN — VANCOMYCIN 750 MG: 750 INJECTION, SOLUTION INTRAVENOUS at 11:17

## 2025-07-28 RX ADMIN — INSULIN LISPRO 8 UNITS: 100 INJECTION, SOLUTION INTRAVENOUS; SUBCUTANEOUS at 20:56

## 2025-07-28 RX ADMIN — FERROUS SULFATE TAB 325 MG (65 MG ELEMENTAL FE) 325 MG: 325 (65 FE) TAB at 08:59

## 2025-07-28 RX ADMIN — ATORVASTATIN CALCIUM 20 MG: 10 TABLET, FILM COATED ORAL at 08:59

## 2025-07-28 RX ADMIN — SODIUM CHLORIDE, PRESERVATIVE FREE 10 ML: 5 INJECTION INTRAVENOUS at 20:58

## 2025-07-28 RX ADMIN — FUROSEMIDE 40 MG: 40 TABLET ORAL at 08:59

## 2025-07-28 RX ADMIN — AMITRIPTYLINE HYDROCHLORIDE 25 MG: 25 TABLET, FILM COATED ORAL at 20:55

## 2025-07-28 RX ADMIN — TAMSULOSIN HYDROCHLORIDE 0.4 MG: 0.4 CAPSULE ORAL at 08:59

## 2025-07-28 RX ADMIN — OXYBUTYNIN CHLORIDE 15 MG: 5 TABLET, EXTENDED RELEASE ORAL at 08:59

## 2025-07-28 RX ADMIN — MAGNESIUM SULFATE HEPTAHYDRATE 4000 MG: 40 INJECTION, SOLUTION INTRAVENOUS at 11:58

## 2025-07-28 RX ADMIN — TROSPIUM CHLORIDE 20 MG: 20 TABLET, FILM COATED ORAL at 17:09

## 2025-07-28 RX ADMIN — CEFAZOLIN 3000 MG: 3 INJECTION, POWDER, FOR SOLUTION INTRAVENOUS at 14:40

## 2025-07-28 RX ADMIN — INSULIN LISPRO 4 UNITS: 100 INJECTION, SOLUTION INTRAVENOUS; SUBCUTANEOUS at 17:09

## 2025-07-28 RX ADMIN — FINASTERIDE 5 MG: 5 TABLET, FILM COATED ORAL at 08:59

## 2025-07-28 RX ADMIN — LEVOTHYROXINE SODIUM 25 MCG: 0.03 TABLET ORAL at 05:33

## 2025-07-28 RX ADMIN — TROSPIUM CHLORIDE 20 MG: 20 TABLET, FILM COATED ORAL at 05:33

## 2025-07-28 RX ADMIN — FOLIC ACID 1 MG: 1 TABLET ORAL at 08:59

## 2025-07-28 RX ADMIN — SODIUM ZIRCONIUM CYCLOSILICATE 10 G: 10 POWDER, FOR SUSPENSION ORAL at 11:58

## 2025-07-28 RX ADMIN — INSULIN GLARGINE 35 UNITS: 100 INJECTION, SOLUTION SUBCUTANEOUS at 20:55

## 2025-07-28 ASSESSMENT — PAIN SCALES - GENERAL: PAINLEVEL_OUTOF10: 0

## 2025-07-28 NOTE — PROGRESS NOTES
Report called to ROSIBEL Rivera at [facility]. VSS on room air. Pt drowsy. Report given to transport team. All belongings sent with patient. Wife and daughter aware of pt transfer.

## 2025-07-29 ENCOUNTER — APPOINTMENT (OUTPATIENT)
Age: 65
DRG: 853 | End: 2025-07-29
Attending: INTERNAL MEDICINE
Payer: COMMERCIAL

## 2025-07-29 LAB
ALBUMIN SERPL-MCNC: 2.6 G/DL (ref 3.4–5)
ANION GAP SERPL CALCULATED.3IONS-SCNC: 12 MMOL/L (ref 3–16)
BACTERIA BLD CULT ORG #2: ABNORMAL
BACTERIA BLD CULT ORG #2: ABNORMAL
BACTERIA BLD CULT: ABNORMAL
BACTERIA UR CULT: NORMAL
BASOPHILS # BLD: 0 K/UL (ref 0–0.2)
BASOPHILS NFR BLD: 0.4 %
BUN SERPL-MCNC: 50 MG/DL (ref 7–20)
CALCIUM SERPL-MCNC: 8.6 MG/DL (ref 8.3–10.6)
CHLORIDE SERPL-SCNC: 100 MMOL/L (ref 99–110)
CO2 SERPL-SCNC: 21 MMOL/L (ref 21–32)
CREAT SERPL-MCNC: 3.6 MG/DL (ref 0.8–1.3)
DEPRECATED RDW RBC AUTO: 18.7 % (ref 12.4–15.4)
ECHO AO ASC DIAM: 3.2 CM
ECHO AO ASCENDING AORTA INDEX: 1.21 CM/M2
ECHO AO ROOT DIAM: 3.7 CM
ECHO AO ROOT INDEX: 1.4 CM/M2
ECHO AV AREA PEAK VELOCITY: 2.5 CM2
ECHO AV AREA VTI: 2.5 CM2
ECHO AV AREA/BSA PEAK VELOCITY: 0.9 CM2/M2
ECHO AV AREA/BSA VTI: 0.9 CM2/M2
ECHO AV MEAN GRADIENT: 3 MMHG
ECHO AV MEAN VELOCITY: 0.8 M/S
ECHO AV PEAK GRADIENT: 6 MMHG
ECHO AV PEAK VELOCITY: 1.2 M/S
ECHO AV VELOCITY RATIO: 0.58
ECHO AV VTI: 20.7 CM
ECHO BSA: 2.75 M2
ECHO EST RA PRESSURE: 8 MMHG
ECHO LA AREA 2C: 29.2 CM2
ECHO LA AREA 4C: 23 CM2
ECHO LA DIAMETER INDEX: 2 CM/M2
ECHO LA DIAMETER: 5.3 CM
ECHO LA MAJOR AXIS: 5.9 CM
ECHO LA MINOR AXIS: 5.7 CM
ECHO LA TO AORTIC ROOT RATIO: 1.43
ECHO LA VOL BP: 91 ML (ref 18–58)
ECHO LA VOL MOD A2C: 125 ML (ref 18–58)
ECHO LA VOL MOD A4C: 68 ML (ref 18–58)
ECHO LA VOL/BSA BIPLANE: 34 ML/M2 (ref 16–34)
ECHO LA VOLUME INDEX MOD A2C: 47 ML/M2 (ref 16–34)
ECHO LA VOLUME INDEX MOD A4C: 26 ML/M2 (ref 16–34)
ECHO LV E' LATERAL VELOCITY: 7.94 CM/S
ECHO LV E' SEPTAL VELOCITY: 5.22 CM/S
ECHO LV EDV A2C: 296 ML
ECHO LV EDV A4C: 306 ML
ECHO LV EDV INDEX A4C: 115 ML/M2
ECHO LV EDV NDEX A2C: 112 ML/M2
ECHO LV EF PHYSICIAN: 35 %
ECHO LV EJECTION FRACTION A2C: 37 %
ECHO LV EJECTION FRACTION A4C: 36 %
ECHO LV EJECTION FRACTION BIPLANE: 36 % (ref 55–100)
ECHO LV ESV A2C: 186 ML
ECHO LV ESV A4C: 195 ML
ECHO LV ESV INDEX A2C: 70 ML/M2
ECHO LV ESV INDEX A4C: 74 ML/M2
ECHO LV FRACTIONAL SHORTENING: 19 % (ref 28–44)
ECHO LV INTERNAL DIMENSION DIASTOLE INDEX: 2.57 CM/M2
ECHO LV INTERNAL DIMENSION DIASTOLIC: 6.8 CM (ref 4.2–5.9)
ECHO LV INTERNAL DIMENSION SYSTOLIC INDEX: 2.08 CM/M2
ECHO LV INTERNAL DIMENSION SYSTOLIC: 5.5 CM
ECHO LV IVSD: 1.1 CM (ref 0.6–1)
ECHO LV MASS 2D: 345.5 G (ref 88–224)
ECHO LV MASS INDEX 2D: 130.4 G/M2 (ref 49–115)
ECHO LV POSTERIOR WALL DIASTOLIC: 1.1 CM (ref 0.6–1)
ECHO LV RELATIVE WALL THICKNESS RATIO: 0.32
ECHO LVOT AREA: 4.2 CM2
ECHO LVOT AV VTI INDEX: 0.61
ECHO LVOT DIAM: 2.3 CM
ECHO LVOT MEAN GRADIENT: 1 MMHG
ECHO LVOT PEAK GRADIENT: 2 MMHG
ECHO LVOT PEAK VELOCITY: 0.7 M/S
ECHO LVOT STROKE VOLUME INDEX: 19.7 ML/M2
ECHO LVOT SV: 52.3 ML
ECHO LVOT VTI: 12.6 CM
ECHO MV A VELOCITY: 1.08 M/S
ECHO MV E DECELERATION TIME (DT): 166 MS
ECHO MV E VELOCITY: 0.85 M/S
ECHO MV E/A RATIO: 0.79
ECHO MV E/E' LATERAL: 10.71
ECHO MV E/E' RATIO (AVERAGED): 13.49
ECHO MV E/E' SEPTAL: 16.28
ECHO PV MAX VELOCITY: 0.7 M/S
ECHO PV PEAK GRADIENT: 2 MMHG
ECHO PVEIN A VELOCITY: 0.4 M/S
ECHO PVEIN PEAK D VELOCITY: 0.4 M/S
ECHO PVEIN PEAK S VELOCITY: 0.5 M/S
ECHO PVEIN S/D RATIO: 1.3 NO UNITS
ECHO RA AREA 4C: 9.8 CM2
ECHO RA END SYSTOLIC VOLUME APICAL 4 CHAMBER INDEX BSA: 7 ML/M2
ECHO RA VOLUME: 18 ML
ECHO RIGHT VENTRICULAR SYSTOLIC PRESSURE (RVSP): 35 MMHG
ECHO RV BASAL DIMENSION: 4 CM
ECHO RV FREE WALL PEAK S': 12.7 CM/S
ECHO RV LONGITUDINAL DIMENSION: 9.3 CM
ECHO RV MID DIMENSION: 3 CM
ECHO RV TAPSE: 2.3 CM (ref 1.7–?)
ECHO TV REGURGITANT MAX VELOCITY: 2.61 M/S
ECHO TV REGURGITANT PEAK GRADIENT: 27 MMHG
EOSINOPHIL # BLD: 0.9 K/UL (ref 0–0.6)
EOSINOPHIL NFR BLD: 8.4 %
GFR SERPLBLD CREATININE-BSD FMLA CKD-EPI: 18 ML/MIN/{1.73_M2}
GLUCOSE BLD-MCNC: 170 MG/DL (ref 70–99)
GLUCOSE BLD-MCNC: 173 MG/DL (ref 70–99)
GLUCOSE BLD-MCNC: 191 MG/DL (ref 70–99)
GLUCOSE BLD-MCNC: 203 MG/DL (ref 70–99)
GLUCOSE BLD-MCNC: 248 MG/DL (ref 70–99)
GLUCOSE SERPL-MCNC: 203 MG/DL (ref 70–99)
HCT VFR BLD AUTO: 24 % (ref 40.5–52.5)
HGB BLD-MCNC: 7.8 G/DL (ref 13.5–17.5)
LYMPHOCYTES # BLD: 0.7 K/UL (ref 1–5.1)
LYMPHOCYTES NFR BLD: 6.6 %
MAGNESIUM SERPL-MCNC: 1.88 MG/DL (ref 1.8–2.4)
MCH RBC QN AUTO: 30.1 PG (ref 26–34)
MCHC RBC AUTO-ENTMCNC: 32.6 G/DL (ref 31–36)
MCV RBC AUTO: 92.2 FL (ref 80–100)
MONOCYTES # BLD: 1.1 K/UL (ref 0–1.3)
MONOCYTES NFR BLD: 10.5 %
NEUTROPHILS # BLD: 7.8 K/UL (ref 1.7–7.7)
NEUTROPHILS NFR BLD: 74.1 %
ORGANISM: ABNORMAL
PATH INTERP BLD-IMP: NORMAL
PERFORMED ON: ABNORMAL
PHOSPHATE SERPL-MCNC: 4.1 MG/DL (ref 2.5–4.9)
PLATELET # BLD AUTO: 353 K/UL (ref 135–450)
PMV BLD AUTO: 7.8 FL (ref 5–10.5)
POTASSIUM SERPL-SCNC: 4.9 MMOL/L (ref 3.5–5.1)
RBC # BLD AUTO: 2.6 M/UL (ref 4.2–5.9)
SODIUM SERPL-SCNC: 133 MMOL/L (ref 136–145)
WBC # BLD AUTO: 10.5 K/UL (ref 4–11)

## 2025-07-29 PROCEDURE — 85025 COMPLETE CBC W/AUTO DIFF WBC: CPT

## 2025-07-29 PROCEDURE — 6370000000 HC RX 637 (ALT 250 FOR IP): Performed by: STUDENT IN AN ORGANIZED HEALTH CARE EDUCATION/TRAINING PROGRAM

## 2025-07-29 PROCEDURE — 80069 RENAL FUNCTION PANEL: CPT

## 2025-07-29 PROCEDURE — 6360000004 HC RX CONTRAST MEDICATION: Performed by: INTERNAL MEDICINE

## 2025-07-29 PROCEDURE — 83735 ASSAY OF MAGNESIUM: CPT

## 2025-07-29 PROCEDURE — 99223 1ST HOSP IP/OBS HIGH 75: CPT | Performed by: INTERNAL MEDICINE

## 2025-07-29 PROCEDURE — 6370000000 HC RX 637 (ALT 250 FOR IP): Performed by: NURSE PRACTITIONER

## 2025-07-29 PROCEDURE — 36415 COLL VENOUS BLD VENIPUNCTURE: CPT

## 2025-07-29 PROCEDURE — 2500000003 HC RX 250 WO HCPCS: Performed by: NURSE PRACTITIONER

## 2025-07-29 PROCEDURE — 87040 BLOOD CULTURE FOR BACTERIA: CPT

## 2025-07-29 PROCEDURE — 2060000000 HC ICU INTERMEDIATE R&B

## 2025-07-29 PROCEDURE — 93306 TTE W/DOPPLER COMPLETE: CPT | Performed by: INTERNAL MEDICINE

## 2025-07-29 PROCEDURE — 6360000002 HC RX W HCPCS: Performed by: INTERNAL MEDICINE

## 2025-07-29 PROCEDURE — 2580000003 HC RX 258: Performed by: INTERNAL MEDICINE

## 2025-07-29 PROCEDURE — C8929 TTE W OR WO FOL WCON,DOPPLER: HCPCS

## 2025-07-29 RX ORDER — DEXTROSE MONOHYDRATE 100 MG/ML
INJECTION, SOLUTION INTRAVENOUS CONTINUOUS PRN
Status: DISCONTINUED | OUTPATIENT
Start: 2025-07-29 | End: 2025-08-05 | Stop reason: HOSPADM

## 2025-07-29 RX ORDER — INSULIN LISPRO 100 [IU]/ML
0.05 INJECTION, SOLUTION INTRAVENOUS; SUBCUTANEOUS
Status: DISCONTINUED | OUTPATIENT
Start: 2025-07-29 | End: 2025-08-05

## 2025-07-29 RX ORDER — HYDROCODONE BITARTRATE AND ACETAMINOPHEN 10; 325 MG/1; MG/1
1 TABLET ORAL EVERY 4 HOURS PRN
Refills: 0 | Status: DISCONTINUED | OUTPATIENT
Start: 2025-07-29 | End: 2025-08-05 | Stop reason: HOSPADM

## 2025-07-29 RX ORDER — INSULIN LISPRO 100 [IU]/ML
0-16 INJECTION, SOLUTION INTRAVENOUS; SUBCUTANEOUS
Status: DISCONTINUED | OUTPATIENT
Start: 2025-07-29 | End: 2025-08-05

## 2025-07-29 RX ORDER — GLUCAGON 1 MG/ML
1 KIT INJECTION PRN
Status: DISCONTINUED | OUTPATIENT
Start: 2025-07-29 | End: 2025-08-05 | Stop reason: HOSPADM

## 2025-07-29 RX ADMIN — FERROUS SULFATE TAB 325 MG (65 MG ELEMENTAL FE) 325 MG: 325 (65 FE) TAB at 08:32

## 2025-07-29 RX ADMIN — TAMSULOSIN HYDROCHLORIDE 0.4 MG: 0.4 CAPSULE ORAL at 08:32

## 2025-07-29 RX ADMIN — FOLIC ACID 1 MG: 1 TABLET ORAL at 08:32

## 2025-07-29 RX ADMIN — CEFAZOLIN 3000 MG: 3 INJECTION, POWDER, FOR SOLUTION INTRAVENOUS at 03:33

## 2025-07-29 RX ADMIN — ATORVASTATIN CALCIUM 20 MG: 10 TABLET, FILM COATED ORAL at 08:32

## 2025-07-29 RX ADMIN — LEVOTHYROXINE SODIUM 25 MCG: 0.03 TABLET ORAL at 05:42

## 2025-07-29 RX ADMIN — OXYBUTYNIN CHLORIDE 15 MG: 5 TABLET, EXTENDED RELEASE ORAL at 08:31

## 2025-07-29 RX ADMIN — SODIUM CHLORIDE, PRESERVATIVE FREE 10 ML: 5 INJECTION INTRAVENOUS at 20:55

## 2025-07-29 RX ADMIN — INSULIN LISPRO 7 UNITS: 100 INJECTION, SOLUTION INTRAVENOUS; SUBCUTANEOUS at 12:54

## 2025-07-29 RX ADMIN — FINASTERIDE 5 MG: 5 TABLET, FILM COATED ORAL at 08:32

## 2025-07-29 RX ADMIN — HYDROCODONE BITARTRATE AND ACETAMINOPHEN 1 TABLET: 10; 325 TABLET ORAL at 20:56

## 2025-07-29 RX ADMIN — Medication 3 MG: at 20:55

## 2025-07-29 RX ADMIN — SULFUR HEXAFLUORIDE 2 ML: 60.7; .19; .19 INJECTION, POWDER, LYOPHILIZED, FOR SUSPENSION INTRAVENOUS; INTRAVESICAL at 14:52

## 2025-07-29 RX ADMIN — PANTOPRAZOLE SODIUM 40 MG: 40 TABLET, DELAYED RELEASE ORAL at 05:42

## 2025-07-29 RX ADMIN — INSULIN GLARGINE 35 UNITS: 100 INJECTION, SOLUTION SUBCUTANEOUS at 20:56

## 2025-07-29 RX ADMIN — TROSPIUM CHLORIDE 20 MG: 20 TABLET, FILM COATED ORAL at 05:42

## 2025-07-29 RX ADMIN — TROSPIUM CHLORIDE 20 MG: 20 TABLET, FILM COATED ORAL at 16:02

## 2025-07-29 RX ADMIN — SODIUM CHLORIDE, PRESERVATIVE FREE 10 ML: 5 INJECTION INTRAVENOUS at 08:33

## 2025-07-29 RX ADMIN — Medication 10 ML: at 03:33

## 2025-07-29 RX ADMIN — INSULIN LISPRO 7 UNITS: 100 INJECTION, SOLUTION INTRAVENOUS; SUBCUTANEOUS at 18:06

## 2025-07-29 RX ADMIN — INSULIN LISPRO 4 UNITS: 100 INJECTION, SOLUTION INTRAVENOUS; SUBCUTANEOUS at 18:06

## 2025-07-29 RX ADMIN — INSULIN LISPRO 2 UNITS: 100 INJECTION, SOLUTION INTRAVENOUS; SUBCUTANEOUS at 08:30

## 2025-07-29 RX ADMIN — CEFAZOLIN 3000 MG: 3 INJECTION, POWDER, FOR SOLUTION INTRAVENOUS at 15:07

## 2025-07-29 RX ADMIN — AMITRIPTYLINE HYDROCHLORIDE 25 MG: 25 TABLET, FILM COATED ORAL at 20:57

## 2025-07-29 ASSESSMENT — PAIN DESCRIPTION - LOCATION
LOCATION: BACK;FOOT
LOCATION: BACK

## 2025-07-29 ASSESSMENT — PAIN SCALES - GENERAL
PAINLEVEL_OUTOF10: 6
PAINLEVEL_OUTOF10: 8
PAINLEVEL_OUTOF10: 6
PAINLEVEL_OUTOF10: 8
PAINLEVEL_OUTOF10: 4
PAINLEVEL_OUTOF10: 5

## 2025-07-29 ASSESSMENT — PAIN DESCRIPTION - ORIENTATION
ORIENTATION: LOWER;POSTERIOR
ORIENTATION: LOWER
ORIENTATION: LOWER

## 2025-07-29 ASSESSMENT — PAIN - FUNCTIONAL ASSESSMENT: PAIN_FUNCTIONAL_ASSESSMENT: PREVENTS OR INTERFERES SOME ACTIVE ACTIVITIES AND ADLS

## 2025-07-29 ASSESSMENT — PAIN DESCRIPTION - DESCRIPTORS
DESCRIPTORS: ACHING
DESCRIPTORS: ACHING
DESCRIPTORS: DISCOMFORT

## 2025-07-29 ASSESSMENT — PAIN DESCRIPTION - FREQUENCY: FREQUENCY: CONTINUOUS

## 2025-07-30 ENCOUNTER — ANESTHESIA EVENT (OUTPATIENT)
Dept: OPERATING ROOM | Age: 65
End: 2025-07-30
Payer: COMMERCIAL

## 2025-07-30 PROBLEM — M00.071 STAPHYLOCOCCAL ARTHRITIS OF RIGHT ANKLE (HCC): Status: ACTIVE | Noted: 2025-07-30

## 2025-07-30 LAB
ALBUMIN SERPL-MCNC: 2.6 G/DL (ref 3.4–5)
ANION GAP SERPL CALCULATED.3IONS-SCNC: 9 MMOL/L (ref 3–16)
BUN SERPL-MCNC: 48 MG/DL (ref 7–20)
CALCIUM SERPL-MCNC: 8 MG/DL (ref 8.3–10.6)
CHLORIDE SERPL-SCNC: 102 MMOL/L (ref 99–110)
CO2 SERPL-SCNC: 23 MMOL/L (ref 21–32)
CREAT SERPL-MCNC: 3.4 MG/DL (ref 0.8–1.3)
DEPRECATED RDW RBC AUTO: 17.9 % (ref 12.4–15.4)
EST. AVERAGE GLUCOSE BLD GHB EST-MCNC: 185.8 MG/DL
GFR SERPLBLD CREATININE-BSD FMLA CKD-EPI: 19 ML/MIN/{1.73_M2}
GLUCOSE BLD-MCNC: 125 MG/DL (ref 70–99)
GLUCOSE BLD-MCNC: 153 MG/DL (ref 70–99)
GLUCOSE BLD-MCNC: 82 MG/DL (ref 70–99)
GLUCOSE BLD-MCNC: 93 MG/DL (ref 70–99)
GLUCOSE SERPL-MCNC: 132 MG/DL (ref 70–99)
HBA1C MFR BLD: 8.1 %
HCT VFR BLD AUTO: 22.9 % (ref 40.5–52.5)
HGB BLD-MCNC: 7.5 G/DL (ref 13.5–17.5)
MAGNESIUM SERPL-MCNC: 1.67 MG/DL (ref 1.8–2.4)
MCH RBC QN AUTO: 30.2 PG (ref 26–34)
MCHC RBC AUTO-ENTMCNC: 33 G/DL (ref 31–36)
MCV RBC AUTO: 91.5 FL (ref 80–100)
PERFORMED ON: ABNORMAL
PERFORMED ON: ABNORMAL
PERFORMED ON: NORMAL
PERFORMED ON: NORMAL
PHOSPHATE SERPL-MCNC: 3.7 MG/DL (ref 2.5–4.9)
PLATELET # BLD AUTO: 394 K/UL (ref 135–450)
PMV BLD AUTO: 6.9 FL (ref 5–10.5)
POTASSIUM SERPL-SCNC: 4.5 MMOL/L (ref 3.5–5.1)
RBC # BLD AUTO: 2.5 M/UL (ref 4.2–5.9)
SODIUM SERPL-SCNC: 134 MMOL/L (ref 136–145)
WBC # BLD AUTO: 8.2 K/UL (ref 4–11)

## 2025-07-30 PROCEDURE — 6370000000 HC RX 637 (ALT 250 FOR IP): Performed by: NURSE PRACTITIONER

## 2025-07-30 PROCEDURE — 80069 RENAL FUNCTION PANEL: CPT

## 2025-07-30 PROCEDURE — 2060000000 HC ICU INTERMEDIATE R&B

## 2025-07-30 PROCEDURE — 6370000000 HC RX 637 (ALT 250 FOR IP): Performed by: STUDENT IN AN ORGANIZED HEALTH CARE EDUCATION/TRAINING PROGRAM

## 2025-07-30 PROCEDURE — 6360000002 HC RX W HCPCS: Performed by: INTERNAL MEDICINE

## 2025-07-30 PROCEDURE — 2500000003 HC RX 250 WO HCPCS: Performed by: NURSE PRACTITIONER

## 2025-07-30 PROCEDURE — 85027 COMPLETE CBC AUTOMATED: CPT

## 2025-07-30 PROCEDURE — 2580000003 HC RX 258: Performed by: INTERNAL MEDICINE

## 2025-07-30 PROCEDURE — 36415 COLL VENOUS BLD VENIPUNCTURE: CPT

## 2025-07-30 PROCEDURE — 99232 SBSQ HOSP IP/OBS MODERATE 35: CPT | Performed by: INTERNAL MEDICINE

## 2025-07-30 PROCEDURE — 83735 ASSAY OF MAGNESIUM: CPT

## 2025-07-30 RX ORDER — GABAPENTIN 300 MG/1
300 CAPSULE ORAL 3 TIMES DAILY
Status: DISCONTINUED | OUTPATIENT
Start: 2025-07-30 | End: 2025-08-05 | Stop reason: HOSPADM

## 2025-07-30 RX ADMIN — HYDROCODONE BITARTRATE AND ACETAMINOPHEN 1 TABLET: 10; 325 TABLET ORAL at 20:09

## 2025-07-30 RX ADMIN — ATORVASTATIN CALCIUM 20 MG: 10 TABLET, FILM COATED ORAL at 09:10

## 2025-07-30 RX ADMIN — LEVOTHYROXINE SODIUM 25 MCG: 0.03 TABLET ORAL at 05:07

## 2025-07-30 RX ADMIN — AMITRIPTYLINE HYDROCHLORIDE 25 MG: 25 TABLET, FILM COATED ORAL at 21:25

## 2025-07-30 RX ADMIN — CEFAZOLIN 3000 MG: 3 INJECTION, POWDER, FOR SOLUTION INTRAVENOUS at 14:38

## 2025-07-30 RX ADMIN — CEFAZOLIN 3000 MG: 3 INJECTION, POWDER, FOR SOLUTION INTRAVENOUS at 02:36

## 2025-07-30 RX ADMIN — GABAPENTIN 300 MG: 300 CAPSULE ORAL at 21:25

## 2025-07-30 RX ADMIN — SODIUM CHLORIDE, PRESERVATIVE FREE 5 ML: 5 INJECTION INTRAVENOUS at 20:51

## 2025-07-30 RX ADMIN — PANTOPRAZOLE SODIUM 40 MG: 40 TABLET, DELAYED RELEASE ORAL at 05:07

## 2025-07-30 RX ADMIN — HYDROCODONE BITARTRATE AND ACETAMINOPHEN 1 TABLET: 10; 325 TABLET ORAL at 05:07

## 2025-07-30 RX ADMIN — FINASTERIDE 5 MG: 5 TABLET, FILM COATED ORAL at 09:10

## 2025-07-30 RX ADMIN — INSULIN GLARGINE 35 UNITS: 100 INJECTION, SOLUTION SUBCUTANEOUS at 21:25

## 2025-07-30 RX ADMIN — TROSPIUM CHLORIDE 20 MG: 20 TABLET, FILM COATED ORAL at 05:07

## 2025-07-30 RX ADMIN — INSULIN LISPRO 7 UNITS: 100 INJECTION, SOLUTION INTRAVENOUS; SUBCUTANEOUS at 09:10

## 2025-07-30 RX ADMIN — FOLIC ACID 1 MG: 1 TABLET ORAL at 09:10

## 2025-07-30 RX ADMIN — FERROUS SULFATE TAB 325 MG (65 MG ELEMENTAL FE) 325 MG: 325 (65 FE) TAB at 09:10

## 2025-07-30 RX ADMIN — Medication 3 MG: at 21:25

## 2025-07-30 RX ADMIN — SODIUM CHLORIDE, PRESERVATIVE FREE 10 ML: 5 INJECTION INTRAVENOUS at 09:11

## 2025-07-30 RX ADMIN — GABAPENTIN 300 MG: 300 CAPSULE ORAL at 10:32

## 2025-07-30 RX ADMIN — TRAMADOL HYDROCHLORIDE 50 MG: 50 TABLET, COATED ORAL at 23:32

## 2025-07-30 RX ADMIN — GABAPENTIN 300 MG: 300 CAPSULE ORAL at 14:35

## 2025-07-30 RX ADMIN — INSULIN LISPRO 7 UNITS: 100 INJECTION, SOLUTION INTRAVENOUS; SUBCUTANEOUS at 13:00

## 2025-07-30 RX ADMIN — TROSPIUM CHLORIDE 20 MG: 20 TABLET, FILM COATED ORAL at 17:10

## 2025-07-30 RX ADMIN — OXYBUTYNIN CHLORIDE 15 MG: 5 TABLET, EXTENDED RELEASE ORAL at 09:10

## 2025-07-30 RX ADMIN — INSULIN LISPRO 7 UNITS: 100 INJECTION, SOLUTION INTRAVENOUS; SUBCUTANEOUS at 18:09

## 2025-07-30 RX ADMIN — TAMSULOSIN HYDROCHLORIDE 0.4 MG: 0.4 CAPSULE ORAL at 09:10

## 2025-07-30 ASSESSMENT — PAIN DESCRIPTION - LOCATION
LOCATION: BACK

## 2025-07-30 ASSESSMENT — PAIN SCALES - GENERAL
PAINLEVEL_OUTOF10: 8
PAINLEVEL_OUTOF10: 0
PAINLEVEL_OUTOF10: 8
PAINLEVEL_OUTOF10: 8

## 2025-07-30 ASSESSMENT — PAIN DESCRIPTION - DESCRIPTORS: DESCRIPTORS: ACHING

## 2025-07-30 ASSESSMENT — PAIN DESCRIPTION - ORIENTATION: ORIENTATION: LOWER

## 2025-07-30 ASSESSMENT — PAIN DESCRIPTION - PAIN TYPE: TYPE: CHRONIC PAIN

## 2025-07-31 ENCOUNTER — ANESTHESIA (OUTPATIENT)
Dept: OPERATING ROOM | Age: 65
End: 2025-07-31
Payer: COMMERCIAL

## 2025-07-31 LAB
ABO/RH: NORMAL
ABO/RH: NORMAL
ANION GAP SERPL CALCULATED.3IONS-SCNC: 8 MMOL/L (ref 3–16)
ANION GAP SERPL CALCULATED.3IONS-SCNC: 9 MMOL/L (ref 3–16)
ANTIBODY SCREEN: NORMAL
BUN SERPL-MCNC: 43 MG/DL (ref 7–20)
BUN SERPL-MCNC: 45 MG/DL (ref 7–20)
CALCIUM SERPL-MCNC: 8.1 MG/DL (ref 8.3–10.6)
CALCIUM SERPL-MCNC: 8.2 MG/DL (ref 8.3–10.6)
CHLORIDE SERPL-SCNC: 102 MMOL/L (ref 99–110)
CHLORIDE SERPL-SCNC: 104 MMOL/L (ref 99–110)
CO2 SERPL-SCNC: 22 MMOL/L (ref 21–32)
CO2 SERPL-SCNC: 22 MMOL/L (ref 21–32)
CREAT SERPL-MCNC: 2.9 MG/DL (ref 0.8–1.3)
CREAT SERPL-MCNC: 3 MG/DL (ref 0.8–1.3)
DEPRECATED RDW RBC AUTO: 18.2 % (ref 12.4–15.4)
GFR SERPLBLD CREATININE-BSD FMLA CKD-EPI: 22 ML/MIN/{1.73_M2}
GFR SERPLBLD CREATININE-BSD FMLA CKD-EPI: 23 ML/MIN/{1.73_M2}
GLUCOSE BLD-MCNC: 140 MG/DL (ref 70–99)
GLUCOSE BLD-MCNC: 182 MG/DL (ref 70–99)
GLUCOSE BLD-MCNC: 195 MG/DL (ref 70–99)
GLUCOSE SERPL-MCNC: 176 MG/DL (ref 70–99)
GLUCOSE SERPL-MCNC: 180 MG/DL (ref 70–99)
HCT VFR BLD AUTO: 24.5 % (ref 40.5–52.5)
HGB BLD-MCNC: 8.1 G/DL (ref 13.5–17.5)
MAGNESIUM SERPL-MCNC: 1.76 MG/DL (ref 1.8–2.4)
MCH RBC QN AUTO: 30.5 PG (ref 26–34)
MCHC RBC AUTO-ENTMCNC: 33 G/DL (ref 31–36)
MCV RBC AUTO: 92.3 FL (ref 80–100)
PERFORMED ON: ABNORMAL
PLATELET # BLD AUTO: 413 K/UL (ref 135–450)
PMV BLD AUTO: 7 FL (ref 5–10.5)
POTASSIUM SERPL-SCNC: 4.5 MMOL/L (ref 3.5–5.1)
POTASSIUM SERPL-SCNC: 4.7 MMOL/L (ref 3.5–5.1)
RBC # BLD AUTO: 2.66 M/UL (ref 4.2–5.9)
SODIUM SERPL-SCNC: 133 MMOL/L (ref 136–145)
SODIUM SERPL-SCNC: 134 MMOL/L (ref 136–145)
WBC # BLD AUTO: 7.7 K/UL (ref 4–11)

## 2025-07-31 PROCEDURE — 94761 N-INVAS EAR/PLS OXIMETRY MLT: CPT

## 2025-07-31 PROCEDURE — 6360000002 HC RX W HCPCS: Performed by: STUDENT IN AN ORGANIZED HEALTH CARE EDUCATION/TRAINING PROGRAM

## 2025-07-31 PROCEDURE — 6360000002 HC RX W HCPCS: Performed by: INTERNAL MEDICINE

## 2025-07-31 PROCEDURE — 82610 CYSTATIN C: CPT

## 2025-07-31 PROCEDURE — 88307 TISSUE EXAM BY PATHOLOGIST: CPT

## 2025-07-31 PROCEDURE — 6370000000 HC RX 637 (ALT 250 FOR IP): Performed by: PHYSICIAN ASSISTANT

## 2025-07-31 PROCEDURE — 36415 COLL VENOUS BLD VENIPUNCTURE: CPT

## 2025-07-31 PROCEDURE — 2500000003 HC RX 250 WO HCPCS: Performed by: ORTHOPAEDIC SURGERY

## 2025-07-31 PROCEDURE — 6370000000 HC RX 637 (ALT 250 FOR IP): Performed by: ORTHOPAEDIC SURGERY

## 2025-07-31 PROCEDURE — 2580000003 HC RX 258: Performed by: STUDENT IN AN ORGANIZED HEALTH CARE EDUCATION/TRAINING PROGRAM

## 2025-07-31 PROCEDURE — 86900 BLOOD TYPING SEROLOGIC ABO: CPT

## 2025-07-31 PROCEDURE — 86901 BLOOD TYPING SEROLOGIC RH(D): CPT

## 2025-07-31 PROCEDURE — 88311 DECALCIFY TISSUE: CPT

## 2025-07-31 PROCEDURE — 2700000000 HC OXYGEN THERAPY PER DAY

## 2025-07-31 PROCEDURE — 2580000003 HC RX 258: Performed by: INTERNAL MEDICINE

## 2025-07-31 PROCEDURE — 64447 NJX AA&/STRD FEMORAL NRV IMG: CPT | Performed by: STUDENT IN AN ORGANIZED HEALTH CARE EDUCATION/TRAINING PROGRAM

## 2025-07-31 PROCEDURE — 6370000000 HC RX 637 (ALT 250 FOR IP): Performed by: NURSE PRACTITIONER

## 2025-07-31 PROCEDURE — 64445 NJX AA&/STRD SCIATIC NRV IMG: CPT | Performed by: STUDENT IN AN ORGANIZED HEALTH CARE EDUCATION/TRAINING PROGRAM

## 2025-07-31 PROCEDURE — 83735 ASSAY OF MAGNESIUM: CPT

## 2025-07-31 PROCEDURE — 2500000003 HC RX 250 WO HCPCS

## 2025-07-31 PROCEDURE — 3700000001 HC ADD 15 MINUTES (ANESTHESIA): Performed by: ORTHOPAEDIC SURGERY

## 2025-07-31 PROCEDURE — 3600000014 HC SURGERY LEVEL 4 ADDTL 15MIN: Performed by: ORTHOPAEDIC SURGERY

## 2025-07-31 PROCEDURE — 85027 COMPLETE CBC AUTOMATED: CPT

## 2025-07-31 PROCEDURE — 2500000003 HC RX 250 WO HCPCS: Performed by: PHYSICIAN ASSISTANT

## 2025-07-31 PROCEDURE — 0Y6H0Z1 DETACHMENT AT RIGHT LOWER LEG, HIGH, OPEN APPROACH: ICD-10-PCS | Performed by: ORTHOPAEDIC SURGERY

## 2025-07-31 PROCEDURE — 6370000000 HC RX 637 (ALT 250 FOR IP): Performed by: STUDENT IN AN ORGANIZED HEALTH CARE EDUCATION/TRAINING PROGRAM

## 2025-07-31 PROCEDURE — 2500000003 HC RX 250 WO HCPCS: Performed by: NURSE PRACTITIONER

## 2025-07-31 PROCEDURE — 7100000000 HC PACU RECOVERY - FIRST 15 MIN: Performed by: ORTHOPAEDIC SURGERY

## 2025-07-31 PROCEDURE — 2709999900 HC NON-CHARGEABLE SUPPLY: Performed by: ORTHOPAEDIC SURGERY

## 2025-07-31 PROCEDURE — 2060000000 HC ICU INTERMEDIATE R&B

## 2025-07-31 PROCEDURE — 6360000002 HC RX W HCPCS: Performed by: ORTHOPAEDIC SURGERY

## 2025-07-31 PROCEDURE — 86850 RBC ANTIBODY SCREEN: CPT

## 2025-07-31 PROCEDURE — 3600000004 HC SURGERY LEVEL 4 BASE: Performed by: ORTHOPAEDIC SURGERY

## 2025-07-31 PROCEDURE — 3700000000 HC ANESTHESIA ATTENDED CARE: Performed by: ORTHOPAEDIC SURGERY

## 2025-07-31 PROCEDURE — 7100000001 HC PACU RECOVERY - ADDTL 15 MIN: Performed by: ORTHOPAEDIC SURGERY

## 2025-07-31 PROCEDURE — 80048 BASIC METABOLIC PNL TOTAL CA: CPT

## 2025-07-31 PROCEDURE — 6360000002 HC RX W HCPCS

## 2025-07-31 PROCEDURE — 3E0T3BZ INTRODUCTION OF ANESTHETIC AGENT INTO PERIPHERAL NERVES AND PLEXI, PERCUTANEOUS APPROACH: ICD-10-PCS | Performed by: STUDENT IN AN ORGANIZED HEALTH CARE EDUCATION/TRAINING PROGRAM

## 2025-07-31 RX ORDER — BUPIVACAINE HYDROCHLORIDE 5 MG/ML
INJECTION, SOLUTION EPIDURAL; INTRACAUDAL; PERINEURAL
Status: COMPLETED | OUTPATIENT
Start: 2025-07-31 | End: 2025-07-31

## 2025-07-31 RX ORDER — OXYCODONE HYDROCHLORIDE 5 MG/1
10 TABLET ORAL PRN
Status: DISCONTINUED | OUTPATIENT
Start: 2025-07-31 | End: 2025-07-31 | Stop reason: HOSPADM

## 2025-07-31 RX ORDER — ROCURONIUM BROMIDE 10 MG/ML
INJECTION, SOLUTION INTRAVENOUS
Status: DISCONTINUED | OUTPATIENT
Start: 2025-07-31 | End: 2025-07-31 | Stop reason: SDUPTHER

## 2025-07-31 RX ORDER — OXYCODONE HYDROCHLORIDE 5 MG/1
5 TABLET ORAL PRN
Status: DISCONTINUED | OUTPATIENT
Start: 2025-07-31 | End: 2025-07-31 | Stop reason: HOSPADM

## 2025-07-31 RX ORDER — PROPOFOL 10 MG/ML
INJECTION, EMULSION INTRAVENOUS
Status: DISCONTINUED | OUTPATIENT
Start: 2025-07-31 | End: 2025-07-31 | Stop reason: SDUPTHER

## 2025-07-31 RX ORDER — LIDOCAINE HYDROCHLORIDE 20 MG/ML
INJECTION, SOLUTION INFILTRATION; PERINEURAL
Status: DISCONTINUED | OUTPATIENT
Start: 2025-07-31 | End: 2025-07-31 | Stop reason: SDUPTHER

## 2025-07-31 RX ORDER — DEXAMETHASONE SODIUM PHOSPHATE 4 MG/ML
INJECTION, SOLUTION INTRA-ARTICULAR; INTRALESIONAL; INTRAMUSCULAR; INTRAVENOUS; SOFT TISSUE
Status: COMPLETED | OUTPATIENT
Start: 2025-07-31 | End: 2025-07-31

## 2025-07-31 RX ORDER — SODIUM CHLORIDE 0.9 % (FLUSH) 0.9 %
5-40 SYRINGE (ML) INJECTION EVERY 12 HOURS SCHEDULED
Status: DISCONTINUED | OUTPATIENT
Start: 2025-07-31 | End: 2025-07-31 | Stop reason: HOSPADM

## 2025-07-31 RX ORDER — MIDAZOLAM HYDROCHLORIDE 1 MG/ML
2 INJECTION, SOLUTION INTRAMUSCULAR; INTRAVENOUS
Status: DISCONTINUED | OUTPATIENT
Start: 2025-07-31 | End: 2025-07-31 | Stop reason: HOSPADM

## 2025-07-31 RX ORDER — LABETALOL HYDROCHLORIDE 5 MG/ML
10 INJECTION, SOLUTION INTRAVENOUS
Status: DISCONTINUED | OUTPATIENT
Start: 2025-07-31 | End: 2025-07-31 | Stop reason: HOSPADM

## 2025-07-31 RX ORDER — DEXAMETHASONE SODIUM PHOSPHATE 4 MG/ML
INJECTION, SOLUTION INTRA-ARTICULAR; INTRALESIONAL; INTRAMUSCULAR; INTRAVENOUS; SOFT TISSUE
Status: DISCONTINUED | OUTPATIENT
Start: 2025-07-31 | End: 2025-07-31 | Stop reason: SDUPTHER

## 2025-07-31 RX ORDER — SODIUM CHLORIDE 9 MG/ML
INJECTION, SOLUTION INTRAVENOUS PRN
Status: DISCONTINUED | OUTPATIENT
Start: 2025-07-31 | End: 2025-07-31 | Stop reason: HOSPADM

## 2025-07-31 RX ORDER — LIDOCAINE HYDROCHLORIDE 10 MG/ML
1 INJECTION, SOLUTION EPIDURAL; INFILTRATION; INTRACAUDAL; PERINEURAL
Status: DISCONTINUED | OUTPATIENT
Start: 2025-07-31 | End: 2025-07-31 | Stop reason: HOSPADM

## 2025-07-31 RX ORDER — PROCHLORPERAZINE EDISYLATE 5 MG/ML
5 INJECTION INTRAMUSCULAR; INTRAVENOUS
Status: DISCONTINUED | OUTPATIENT
Start: 2025-07-31 | End: 2025-07-31 | Stop reason: HOSPADM

## 2025-07-31 RX ORDER — ONDANSETRON 2 MG/ML
INJECTION INTRAMUSCULAR; INTRAVENOUS
Status: DISCONTINUED | OUTPATIENT
Start: 2025-07-31 | End: 2025-07-31 | Stop reason: SDUPTHER

## 2025-07-31 RX ORDER — MIDAZOLAM HYDROCHLORIDE 1 MG/ML
INJECTION, SOLUTION INTRAMUSCULAR; INTRAVENOUS
Status: COMPLETED | OUTPATIENT
Start: 2025-07-31 | End: 2025-07-31

## 2025-07-31 RX ORDER — DIPHENHYDRAMINE HYDROCHLORIDE 50 MG/ML
12.5 INJECTION, SOLUTION INTRAMUSCULAR; INTRAVENOUS
Status: DISCONTINUED | OUTPATIENT
Start: 2025-07-31 | End: 2025-07-31 | Stop reason: HOSPADM

## 2025-07-31 RX ORDER — DROPERIDOL 2.5 MG/ML
0.62 INJECTION, SOLUTION INTRAMUSCULAR; INTRAVENOUS
Status: DISCONTINUED | OUTPATIENT
Start: 2025-07-31 | End: 2025-07-31 | Stop reason: HOSPADM

## 2025-07-31 RX ORDER — SODIUM CHLORIDE 9 MG/ML
INJECTION, SOLUTION INTRAVENOUS PRN
Status: DISCONTINUED | OUTPATIENT
Start: 2025-07-31 | End: 2025-08-05 | Stop reason: HOSPADM

## 2025-07-31 RX ORDER — SUCCINYLCHOLINE CHLORIDE 20 MG/ML
INJECTION INTRAMUSCULAR; INTRAVENOUS
Status: DISCONTINUED | OUTPATIENT
Start: 2025-07-31 | End: 2025-07-31 | Stop reason: SDUPTHER

## 2025-07-31 RX ORDER — BACITRACIN ZINC AND POLYMYXIN B SULFATE 500; 1000 [USP'U]/G; [USP'U]/G
OINTMENT TOPICAL PRN
Status: DISCONTINUED | OUTPATIENT
Start: 2025-07-31 | End: 2025-07-31 | Stop reason: HOSPADM

## 2025-07-31 RX ORDER — IPRATROPIUM BROMIDE AND ALBUTEROL SULFATE 2.5; .5 MG/3ML; MG/3ML
1 SOLUTION RESPIRATORY (INHALATION)
Status: DISCONTINUED | OUTPATIENT
Start: 2025-07-31 | End: 2025-07-31 | Stop reason: HOSPADM

## 2025-07-31 RX ORDER — SODIUM CHLORIDE, SODIUM LACTATE, POTASSIUM CHLORIDE, CALCIUM CHLORIDE 600; 310; 30; 20 MG/100ML; MG/100ML; MG/100ML; MG/100ML
INJECTION, SOLUTION INTRAVENOUS CONTINUOUS
Status: DISCONTINUED | OUTPATIENT
Start: 2025-07-31 | End: 2025-07-31 | Stop reason: HOSPADM

## 2025-07-31 RX ORDER — SODIUM CHLORIDE 0.9 % (FLUSH) 0.9 %
5-40 SYRINGE (ML) INJECTION PRN
Status: DISCONTINUED | OUTPATIENT
Start: 2025-07-31 | End: 2025-08-05 | Stop reason: HOSPADM

## 2025-07-31 RX ORDER — SODIUM CHLORIDE 0.9 % (FLUSH) 0.9 %
5-40 SYRINGE (ML) INJECTION PRN
Status: DISCONTINUED | OUTPATIENT
Start: 2025-07-31 | End: 2025-07-31 | Stop reason: HOSPADM

## 2025-07-31 RX ORDER — SODIUM CHLORIDE 0.9 % (FLUSH) 0.9 %
5-40 SYRINGE (ML) INJECTION EVERY 12 HOURS SCHEDULED
Status: DISCONTINUED | OUTPATIENT
Start: 2025-07-31 | End: 2025-08-05 | Stop reason: HOSPADM

## 2025-07-31 RX ORDER — LIDOCAINE HYDROCHLORIDE 40 MG/ML
INJECTION, SOLUTION RETROBULBAR
Status: DISCONTINUED | OUTPATIENT
Start: 2025-07-31 | End: 2025-07-31 | Stop reason: SDUPTHER

## 2025-07-31 RX ADMIN — FINASTERIDE 5 MG: 5 TABLET, FILM COATED ORAL at 09:15

## 2025-07-31 RX ADMIN — INSULIN LISPRO 4 UNITS: 100 INJECTION, SOLUTION INTRAVENOUS; SUBCUTANEOUS at 09:15

## 2025-07-31 RX ADMIN — BUPIVACAINE HYDROCHLORIDE 20 ML: 5 INJECTION, SOLUTION EPIDURAL; INTRACAUDAL; PERINEURAL at 14:35

## 2025-07-31 RX ADMIN — ROCURONIUM BROMIDE 50 MG: 50 INJECTION, SOLUTION INTRAVENOUS at 15:00

## 2025-07-31 RX ADMIN — FERROUS SULFATE TAB 325 MG (65 MG ELEMENTAL FE) 325 MG: 325 (65 FE) TAB at 09:15

## 2025-07-31 RX ADMIN — DEXAMETHASONE SODIUM PHOSPHATE 4 MG: 4 INJECTION, SOLUTION INTRA-ARTICULAR; INTRALESIONAL; INTRAMUSCULAR; INTRAVENOUS; SOFT TISSUE at 14:35

## 2025-07-31 RX ADMIN — DEXAMETHASONE SODIUM PHOSPHATE 4 MG: 4 INJECTION, SOLUTION INTRAMUSCULAR; INTRAVENOUS at 15:05

## 2025-07-31 RX ADMIN — ROCURONIUM BROMIDE 25 MG: 50 INJECTION, SOLUTION INTRAVENOUS at 15:43

## 2025-07-31 RX ADMIN — GABAPENTIN 300 MG: 300 CAPSULE ORAL at 09:15

## 2025-07-31 RX ADMIN — GABAPENTIN 300 MG: 300 CAPSULE ORAL at 23:35

## 2025-07-31 RX ADMIN — PANTOPRAZOLE SODIUM 40 MG: 40 TABLET, DELAYED RELEASE ORAL at 06:19

## 2025-07-31 RX ADMIN — SODIUM CHLORIDE, PRESERVATIVE FREE 10 ML: 5 INJECTION INTRAVENOUS at 21:30

## 2025-07-31 RX ADMIN — BUPIVACAINE HYDROCHLORIDE 30 ML: 5 INJECTION, SOLUTION EPIDURAL; INTRACAUDAL; PERINEURAL at 14:30

## 2025-07-31 RX ADMIN — SODIUM CHLORIDE, PRESERVATIVE FREE 10 ML: 5 INJECTION INTRAVENOUS at 09:15

## 2025-07-31 RX ADMIN — MIDAZOLAM 2 MG: 1 INJECTION INTRAMUSCULAR; INTRAVENOUS at 14:30

## 2025-07-31 RX ADMIN — ONDANSETRON 4 MG: 2 INJECTION INTRAMUSCULAR; INTRAVENOUS at 15:05

## 2025-07-31 RX ADMIN — DEXAMETHASONE SODIUM PHOSPHATE 4 MG: 4 INJECTION, SOLUTION INTRAMUSCULAR; INTRAVENOUS at 14:30

## 2025-07-31 RX ADMIN — Medication 3 MG: at 23:34

## 2025-07-31 RX ADMIN — SUGAMMADEX 400 MG: 100 INJECTION, SOLUTION INTRAVENOUS at 16:34

## 2025-07-31 RX ADMIN — SODIUM CHLORIDE: 9 INJECTION, SOLUTION INTRAVENOUS at 14:54

## 2025-07-31 RX ADMIN — ATORVASTATIN CALCIUM 20 MG: 10 TABLET, FILM COATED ORAL at 09:15

## 2025-07-31 RX ADMIN — TAMSULOSIN HYDROCHLORIDE 0.4 MG: 0.4 CAPSULE ORAL at 09:15

## 2025-07-31 RX ADMIN — LEVOTHYROXINE SODIUM 25 MCG: 0.03 TABLET ORAL at 06:20

## 2025-07-31 RX ADMIN — SUCCINYLCHOLINE CHLORIDE 200 MG: 20 INJECTION, SOLUTION INTRAMUSCULAR; INTRAVENOUS at 14:58

## 2025-07-31 RX ADMIN — OXYBUTYNIN CHLORIDE 15 MG: 5 TABLET, EXTENDED RELEASE ORAL at 09:15

## 2025-07-31 RX ADMIN — PROPOFOL 200 MG: 10 INJECTION, EMULSION INTRAVENOUS at 14:58

## 2025-07-31 RX ADMIN — LIDOCAINE HYDROCHLORIDE 5 ML: 40 INJECTION, SOLUTION RETROBULBAR; TOPICAL at 14:59

## 2025-07-31 RX ADMIN — FOLIC ACID 1 MG: 1 TABLET ORAL at 09:15

## 2025-07-31 RX ADMIN — INSULIN GLARGINE 35 UNITS: 100 INJECTION, SOLUTION SUBCUTANEOUS at 21:40

## 2025-07-31 RX ADMIN — TROSPIUM CHLORIDE 20 MG: 20 TABLET, FILM COATED ORAL at 06:20

## 2025-07-31 RX ADMIN — SODIUM CHLORIDE, PRESERVATIVE FREE 10 ML: 5 INJECTION INTRAVENOUS at 21:32

## 2025-07-31 RX ADMIN — LIDOCAINE HYDROCHLORIDE 100 MG: 20 INJECTION, SOLUTION INFILTRATION; PERINEURAL at 14:58

## 2025-07-31 RX ADMIN — CEFAZOLIN 3000 MG: 3 INJECTION, POWDER, FOR SOLUTION INTRAVENOUS at 03:27

## 2025-07-31 RX ADMIN — AMITRIPTYLINE HYDROCHLORIDE 25 MG: 25 TABLET, FILM COATED ORAL at 23:34

## 2025-07-31 RX ADMIN — CEFAZOLIN 3000 MG: 3 INJECTION, POWDER, FOR SOLUTION INTRAVENOUS at 15:05

## 2025-07-31 ASSESSMENT — PAIN SCALES - GENERAL
PAINLEVEL_OUTOF10: 0
PAINLEVEL_OUTOF10: 0
PAINLEVEL_OUTOF10: 8
PAINLEVEL_OUTOF10: 0

## 2025-07-31 ASSESSMENT — PAIN DESCRIPTION - LOCATION: LOCATION: BACK;GENERALIZED

## 2025-07-31 ASSESSMENT — PAIN DESCRIPTION - ONSET: ONSET: ON-GOING

## 2025-07-31 ASSESSMENT — PAIN DESCRIPTION - ORIENTATION: ORIENTATION: LOWER

## 2025-07-31 ASSESSMENT — PAIN DESCRIPTION - PAIN TYPE: TYPE: CHRONIC PAIN

## 2025-07-31 ASSESSMENT — PAIN DESCRIPTION - DESCRIPTORS: DESCRIPTORS: ACHING

## 2025-07-31 ASSESSMENT — PAIN - FUNCTIONAL ASSESSMENT: PAIN_FUNCTIONAL_ASSESSMENT: PREVENTS OR INTERFERES SOME ACTIVE ACTIVITIES AND ADLS

## 2025-07-31 ASSESSMENT — PAIN DESCRIPTION - FREQUENCY: FREQUENCY: CONTINUOUS

## 2025-07-31 NOTE — ANESTHESIA POSTPROCEDURE EVALUATION
Department of Anesthesiology  Postprocedure Note    Patient: Ty Meyer  MRN: [id number]  YOB: 1960  Date of evaluation: 7/31/2025    Procedure Summary       Date: 07/31/25 Room / Location: [address]    Anesthesia Start: 1454 Anesthesia Stop: 1712    Procedure: RIGHT LEG BELOW THE KNEE AMPUTATION NOTE: BLOCK (Right: Leg Lower) Diagnosis:       Acute osteomyelitis of right ankle or foot (HCC)      Closed displaced pilon fracture of right tibia, initial encounter      (Acute osteomyelitis of right ankle or foot (HCC) [M86.171])      (Closed displaced pilon fracture of right tibia, initial encounter [S82.871A])    Surgeons: Quentin Wilder MD Responsible Provider: Quentin Renae MD    Anesthesia Type: general, regional ASA Status: 3            Anesthesia Type: No value filed.    Chetan Phase I: Chetan Score: 9    Chetan Phase II:      Anesthesia Post Evaluation    Comments: Postoperative Anesthesia Note    Name:    Ty Meyer  MRN:      [id number]    Patient Vitals in the past 12 hrs:  07/31/25 1740, BP:(!) 143/80, Pulse:(!) 102, SpO2:99 %  07/31/25 1735, BP:(!) 142/79, Pulse:(!) 102, SpO2:99 %  07/31/25 1730, BP:136/82, Pulse:(!) 103, SpO2:99 %  07/31/25 1725, BP:137/75, Temp:98.6 °F (37 °C), Temp src:Temporal, Pulse:(!) 103, Resp:16, SpO2:95 %  07/31/25 1720, BP:128/77, Pulse:(!) 103, SpO2:99 %  07/31/25 1715, Pulse:(!) 104, SpO2:92 %  07/31/25 1710, BP:136/74, Pulse:(!) 104, SpO2:98 %  07/31/25 1707, BP:136/74, Temp:98.6 °F (37 °C), Temp src:Temporal, Pulse:(!) 104, Resp:12, SpO2:100 %  07/31/25 1445, BP:(!) 137/90, Pulse:91, Resp:12, SpO2:100 %  07/31/25 1430, BP:131/83, Pulse:93, Resp:12, SpO2:100 %  07/31/25 1334, BP:120/76, Temp:98.2 °F (36.8 °C), Temp src:Oral, Pulse:92, Resp:16, SpO2:97 %  07/31/25 1300, BP:(!) 144/77, Temp:97.9 °F (36.6 °C), Temp src:Oral, Pulse:90, Resp:14, SpO2:97 %  07/31/25 0850, BP:(!) 143/88, Temp:97.4 °F (36.3 °C), Temp src:Oral,

## 2025-07-31 NOTE — ANESTHESIA PRE PROCEDURE
Department of Anesthesiology  Preprocedure Note       Name:  Ty Meyer   Age:  64 y.o.  :  1960                                          MRN:  [id number]         Date:  2025      Surgeon: Surgeon(s):  Quentin Wilder MD    Procedure: Procedure(s):  RIGHT LEG BELOW THE KNEE AMPUTATION NOTE: BLOCK    Medications prior to admission:   Prior to Admission medications    Medication Sig Start Date End Date Taking? Authorizing Provider   Continuous Glucose Sensor (FREESTYLE CANDY 14 DAY SENSOR) [facility] AS DIRECTED TO CHECK BLOOD SUGAR FOUR TIMES DAILY 25   Caro Aiken MD   NOVOLOG FLEXPEN 100 UNIT/ML injection pen PLEASE SEE ATTACHED FOR DETAILED DIRECTIONS 25   Caro Aiken MD   losartan (COZAAR) 25 MG tablet Take by mouth    Caro Aiken MD   mirabegron (MYRBETRIQ) 25 MG TB24 Take 1 tablet by mouth daily 10/31/24   Caro Aiken MD   oxyBUTYnin (DITROPAN XL) 15 MG extended release tablet Take 1 tablet by mouth daily 25   Crao Aiken MD   simvastatin (ZOCOR) 40 MG tablet Take 1 tablet by mouth nightly 25   Caro Aiken MD   HYDROcodone-acetaminophen (NORCO)  MG per tablet TAKE 1 TABLET BY MOUTH EVERY 8 HOURS AS NEEDED FOR PAIN FOR UP TO 30 DAYS **DUE 25   Caro Aiken MD   gabapentin (NEURONTIN) 300 MG capsule Take 1 capsule by mouth nightly for 30 days.  Patient taking differently: Take 1 capsule by mouth 3 times daily. 25  Crissy Aguilar MD   insulin glargine (LANTUS) 100 UNIT/ML injection vial Inject 35 Units into the skin nightly 25   Crissy Aguilar MD   insulin lispro (HUMALOG,ADMELOG) 100 UNIT/ML SOLN injection vial Inject 0-16 Units into the skin 4 times daily (before meals and nightly) 25  Crissy Aguilar MD   furosemide (LASIX) 40 MG tablet Take 1 tablet by mouth daily 25   Crissy Aguilar MD   levothyroxine (SYNTHROID) 25 MCG

## 2025-07-31 NOTE — ANESTHESIA PROCEDURE NOTES
Peripheral Block    Patient location during procedure: pre-op  Reason for block: post-op pain management and at surgeon's request  Start time: 7/31/2025 2:35 PM  End time: 7/31/2025 2:40 PM  Staffing  Performed: anesthesiologist   Anesthesiologist: Quentin Renae MD  Performed by: Quentin Renae MD  Authorized by: Quentin Renae MD    Preanesthetic Checklist  Completed: patient identified, IV checked, site marked, risks and benefits discussed, surgical/procedural consents, equipment checked, pre-op evaluation, timeout performed, anesthesia consent given, oxygen available, monitors applied/VS acknowledged, fire risk safety assessment completed and verbalized and blood product R/B/A discussed and consented  Peripheral Block   Patient position: supine  Prep: ChloraPrep  Provider prep: sterile gloves  Patient monitoring: cardiac monitor, continuous pulse ox, frequent blood pressure checks, IV access, oxygen and responsive to questions  Block type: Femoral  Adductor canal  Laterality: right  Injection technique: single-shot  Guidance: ultrasound guided  Local infiltration: lidocaine  Local infiltration: lidocaine    Needle   Needle type: insulated echogenic nerve stimulator needle   Needle gauge: 21 G  Needle localization: ultrasound guidance  Needle length: 10 cm  Assessment   Injection assessment: negative aspiration for heme, no paresthesia on injection, local visualized surrounding nerve on ultrasound and no intravascular symptoms  Slow fractionated injection: yes  Hemodynamics: stable  Outcomes: uncomplicated and patient tolerated procedure well    Medications Administered  BUPivacaine (MARCAINE) PF injection 0.5% - Perineural   20 mL - 7/31/2025 2:35:00 PM  dexAMETHasone (DECADRON) injection 4 mg/mL - Perineural   4 mg - 7/31/2025 2:35:00 PM

## 2025-07-31 NOTE — ANESTHESIA PROCEDURE NOTES
Peripheral Block    Patient location during procedure: pre-op  Reason for block: post-op pain management and at surgeon's request  Start time: 7/31/2025 2:30 PM  End time: 7/31/2025 2:35 PM  Staffing  Performed: anesthesiologist   Anesthesiologist: Quentin Renae MD  Performed by: Quentin Renae MD  Authorized by: Quentin Renae MD    Preanesthetic Checklist  Completed: patient identified, IV checked, site marked, risks and benefits discussed, surgical/procedural consents, equipment checked, pre-op evaluation, timeout performed, anesthesia consent given, oxygen available, monitors applied/VS acknowledged, fire risk safety assessment completed and verbalized and blood product R/B/A discussed and consented  Peripheral Block   Patient position: left lateral decubitus  Prep: ChloraPrep  Provider prep: sterile gloves  Patient monitoring: cardiac monitor, continuous pulse ox, frequent blood pressure checks, IV access, oxygen and responsive to questions  Block type: Sciatic  Popliteal  Laterality: right  Injection technique: single-shot  Guidance: ultrasound guided  Local infiltration: lidocaine  Infiltration strength: 1 %  Local infiltration: lidocaine  Dose: 3 mL    Needle   Needle type: insulated echogenic nerve stimulator needle   Needle gauge: 21 G  Needle localization: ultrasound guidance  Needle length: 10 cm  Assessment   Injection assessment: negative aspiration for heme, no paresthesia on injection, local visualized surrounding nerve on ultrasound and no intravascular symptoms  Slow fractionated injection: yes  Hemodynamics: stable  Outcomes: uncomplicated and patient tolerated procedure well    Medications Administered  midazolam (VERSED) injection 2 mg/2mL - IntraVENous   2 mg - 7/31/2025 2:30:00 PM  BUPivacaine (MARCAINE) PF injection 0.5% - Perineural   30 mL - 7/31/2025 2:30:00 PM  dexAMETHasone (DECADRON) injection 4 mg/mL - Perineural   4 mg - 7/31/2025 2:30:00 PM

## 2025-08-01 LAB
ANION GAP SERPL CALCULATED.3IONS-SCNC: 11 MMOL/L (ref 3–16)
BUN SERPL-MCNC: 47 MG/DL (ref 7–20)
CALCIUM SERPL-MCNC: 7.9 MG/DL (ref 8.3–10.6)
CHLORIDE SERPL-SCNC: 103 MMOL/L (ref 99–110)
CO2 SERPL-SCNC: 20 MMOL/L (ref 21–32)
CREAT SERPL-MCNC: 2.77 MG/DL (ref 0.69–1.22)
CREAT SERPL-MCNC: 2.8 MG/DL (ref 0.8–1.3)
CYSTATIN C: 2.75 MG/L (ref 0.61–0.95)
DEPRECATED RDW RBC AUTO: 17.9 % (ref 12.4–15.4)
EGFR BY CYSTATIN C: 22
GFR SERPLBLD CREATININE-BSD FMLA CKD-EPI: 24 ML/MIN/{1.73_M2}
GLUCOSE BLD-MCNC: 227 MG/DL (ref 70–99)
GLUCOSE BLD-MCNC: 349 MG/DL (ref 70–99)
GLUCOSE BLD-MCNC: 383 MG/DL (ref 70–99)
GLUCOSE BLD-MCNC: 398 MG/DL (ref 70–99)
GLUCOSE BLD-MCNC: 442 MG/DL (ref 70–99)
GLUCOSE SERPL-MCNC: 388 MG/DL (ref 70–99)
HCT VFR BLD AUTO: 27.1 % (ref 40.5–52.5)
HGB BLD-MCNC: 8.7 G/DL (ref 13.5–17.5)
MAGNESIUM SERPL-MCNC: 1.78 MG/DL (ref 1.8–2.4)
MCH RBC QN AUTO: 30.1 PG (ref 26–34)
MCHC RBC AUTO-ENTMCNC: 32.3 G/DL (ref 31–36)
MCV RBC AUTO: 93.1 FL (ref 80–100)
PERFORMED ON: ABNORMAL
PLATELET # BLD AUTO: 457 K/UL (ref 135–450)
PMV BLD AUTO: 7.3 FL (ref 5–10.5)
POTASSIUM SERPL-SCNC: 5.5 MMOL/L (ref 3.5–5.1)
RBC # BLD AUTO: 2.91 M/UL (ref 4.2–5.9)
SODIUM SERPL-SCNC: 134 MMOL/L (ref 136–145)
WBC # BLD AUTO: 10.1 K/UL (ref 4–11)

## 2025-08-01 PROCEDURE — 97530 THERAPEUTIC ACTIVITIES: CPT

## 2025-08-01 PROCEDURE — 36569 INSJ PICC 5 YR+ W/O IMAGING: CPT

## 2025-08-01 PROCEDURE — 6370000000 HC RX 637 (ALT 250 FOR IP): Performed by: STUDENT IN AN ORGANIZED HEALTH CARE EDUCATION/TRAINING PROGRAM

## 2025-08-01 PROCEDURE — 99232 SBSQ HOSP IP/OBS MODERATE 35: CPT | Performed by: INTERNAL MEDICINE

## 2025-08-01 PROCEDURE — 02HV33Z INSERTION OF INFUSION DEVICE INTO SUPERIOR VENA CAVA, PERCUTANEOUS APPROACH: ICD-10-PCS | Performed by: STUDENT IN AN ORGANIZED HEALTH CARE EDUCATION/TRAINING PROGRAM

## 2025-08-01 PROCEDURE — 97166 OT EVAL MOD COMPLEX 45 MIN: CPT

## 2025-08-01 PROCEDURE — 85027 COMPLETE CBC AUTOMATED: CPT

## 2025-08-01 PROCEDURE — 51798 US URINE CAPACITY MEASURE: CPT

## 2025-08-01 PROCEDURE — 2500000003 HC RX 250 WO HCPCS: Performed by: PHYSICIAN ASSISTANT

## 2025-08-01 PROCEDURE — 80048 BASIC METABOLIC PNL TOTAL CA: CPT

## 2025-08-01 PROCEDURE — 6370000000 HC RX 637 (ALT 250 FOR IP): Performed by: INTERNAL MEDICINE

## 2025-08-01 PROCEDURE — C1751 CATH, INF, PER/CENT/MIDLINE: HCPCS

## 2025-08-01 PROCEDURE — 6370000000 HC RX 637 (ALT 250 FOR IP): Performed by: PHYSICIAN ASSISTANT

## 2025-08-01 PROCEDURE — 83735 ASSAY OF MAGNESIUM: CPT

## 2025-08-01 PROCEDURE — 97162 PT EVAL MOD COMPLEX 30 MIN: CPT

## 2025-08-01 PROCEDURE — 2580000003 HC RX 258: Performed by: PHYSICIAN ASSISTANT

## 2025-08-01 PROCEDURE — 6360000002 HC RX W HCPCS: Performed by: PHYSICIAN ASSISTANT

## 2025-08-01 PROCEDURE — 82610 CYSTATIN C: CPT

## 2025-08-01 PROCEDURE — 2060000000 HC ICU INTERMEDIATE R&B

## 2025-08-01 PROCEDURE — 36415 COLL VENOUS BLD VENIPUNCTURE: CPT

## 2025-08-01 RX ORDER — SODIUM CHLORIDE 9 MG/ML
INJECTION, SOLUTION INTRAVENOUS PRN
Status: DISCONTINUED | OUTPATIENT
Start: 2025-08-01 | End: 2025-08-05 | Stop reason: HOSPADM

## 2025-08-01 RX ORDER — INSULIN LISPRO 100 [IU]/ML
0.15 INJECTION, SOLUTION INTRAVENOUS; SUBCUTANEOUS ONCE
Status: COMPLETED | OUTPATIENT
Start: 2025-08-01 | End: 2025-08-01

## 2025-08-01 RX ORDER — SODIUM CHLORIDE 0.9 % (FLUSH) 0.9 %
5-40 SYRINGE (ML) INJECTION PRN
Status: DISCONTINUED | OUTPATIENT
Start: 2025-08-01 | End: 2025-08-05 | Stop reason: HOSPADM

## 2025-08-01 RX ORDER — SODIUM CHLORIDE 0.9 % (FLUSH) 0.9 %
5-40 SYRINGE (ML) INJECTION EVERY 12 HOURS SCHEDULED
Status: DISCONTINUED | OUTPATIENT
Start: 2025-08-01 | End: 2025-08-05 | Stop reason: HOSPADM

## 2025-08-01 RX ADMIN — ATORVASTATIN CALCIUM 20 MG: 10 TABLET, FILM COATED ORAL at 09:04

## 2025-08-01 RX ADMIN — FERROUS SULFATE TAB 325 MG (65 MG ELEMENTAL FE) 325 MG: 325 (65 FE) TAB at 09:04

## 2025-08-01 RX ADMIN — TROSPIUM CHLORIDE 20 MG: 20 TABLET, FILM COATED ORAL at 05:42

## 2025-08-01 RX ADMIN — GABAPENTIN 300 MG: 300 CAPSULE ORAL at 14:24

## 2025-08-01 RX ADMIN — INSULIN LISPRO 7 UNITS: 100 INJECTION, SOLUTION INTRAVENOUS; SUBCUTANEOUS at 09:09

## 2025-08-01 RX ADMIN — CEFAZOLIN 3000 MG: 3 INJECTION, POWDER, FOR SOLUTION INTRAVENOUS at 14:42

## 2025-08-01 RX ADMIN — TROSPIUM CHLORIDE 20 MG: 20 TABLET, FILM COATED ORAL at 17:15

## 2025-08-01 RX ADMIN — GABAPENTIN 300 MG: 300 CAPSULE ORAL at 21:24

## 2025-08-01 RX ADMIN — AMITRIPTYLINE HYDROCHLORIDE 25 MG: 25 TABLET, FILM COATED ORAL at 21:24

## 2025-08-01 RX ADMIN — SODIUM CHLORIDE, PRESERVATIVE FREE 10 ML: 5 INJECTION INTRAVENOUS at 23:36

## 2025-08-01 RX ADMIN — HYDROCODONE BITARTRATE AND ACETAMINOPHEN 1 TABLET: 10; 325 TABLET ORAL at 06:24

## 2025-08-01 RX ADMIN — PANTOPRAZOLE SODIUM 40 MG: 40 TABLET, DELAYED RELEASE ORAL at 05:42

## 2025-08-01 RX ADMIN — SODIUM ZIRCONIUM CYCLOSILICATE 10 G: 10 POWDER, FOR SUSPENSION ORAL at 09:04

## 2025-08-01 RX ADMIN — INSULIN LISPRO 16 UNITS: 100 INJECTION, SOLUTION INTRAVENOUS; SUBCUTANEOUS at 12:08

## 2025-08-01 RX ADMIN — OXYBUTYNIN CHLORIDE 15 MG: 5 TABLET, EXTENDED RELEASE ORAL at 09:04

## 2025-08-01 RX ADMIN — FINASTERIDE 5 MG: 5 TABLET, FILM COATED ORAL at 09:04

## 2025-08-01 RX ADMIN — INSULIN LISPRO 7 UNITS: 100 INJECTION, SOLUTION INTRAVENOUS; SUBCUTANEOUS at 17:14

## 2025-08-01 RX ADMIN — INSULIN LISPRO 4 UNITS: 100 INJECTION, SOLUTION INTRAVENOUS; SUBCUTANEOUS at 21:25

## 2025-08-01 RX ADMIN — CEFAZOLIN 3000 MG: 3 INJECTION, POWDER, FOR SOLUTION INTRAVENOUS at 02:26

## 2025-08-01 RX ADMIN — TRAMADOL HYDROCHLORIDE 50 MG: 50 TABLET, COATED ORAL at 09:04

## 2025-08-01 RX ADMIN — TAMSULOSIN HYDROCHLORIDE 0.4 MG: 0.4 CAPSULE ORAL at 09:04

## 2025-08-01 RX ADMIN — HYDROCODONE BITARTRATE AND ACETAMINOPHEN 1 TABLET: 10; 325 TABLET ORAL at 12:06

## 2025-08-01 RX ADMIN — INSULIN LISPRO 12 UNITS: 100 INJECTION, SOLUTION INTRAVENOUS; SUBCUTANEOUS at 17:13

## 2025-08-01 RX ADMIN — INSULIN LISPRO 16 UNITS: 100 INJECTION, SOLUTION INTRAVENOUS; SUBCUTANEOUS at 09:04

## 2025-08-01 RX ADMIN — SODIUM CHLORIDE, PRESERVATIVE FREE 10 ML: 5 INJECTION INTRAVENOUS at 09:09

## 2025-08-01 RX ADMIN — GABAPENTIN 300 MG: 300 CAPSULE ORAL at 09:04

## 2025-08-01 RX ADMIN — LEVOTHYROXINE SODIUM 25 MCG: 0.03 TABLET ORAL at 05:42

## 2025-08-01 RX ADMIN — INSULIN GLARGINE 35 UNITS: 100 INJECTION, SOLUTION SUBCUTANEOUS at 21:24

## 2025-08-01 RX ADMIN — FOLIC ACID 1 MG: 1 TABLET ORAL at 09:04

## 2025-08-01 RX ADMIN — Medication 3 MG: at 21:24

## 2025-08-01 RX ADMIN — INSULIN LISPRO 7 UNITS: 100 INJECTION, SOLUTION INTRAVENOUS; SUBCUTANEOUS at 12:08

## 2025-08-01 RX ADMIN — INSULIN LISPRO 21 UNITS: 100 INJECTION, SOLUTION INTRAVENOUS; SUBCUTANEOUS at 14:46

## 2025-08-01 ASSESSMENT — PAIN DESCRIPTION - DESCRIPTORS
DESCRIPTORS: ACHING;DULL
DESCRIPTORS: ACHING;SHARP

## 2025-08-01 ASSESSMENT — PAIN DESCRIPTION - ORIENTATION
ORIENTATION: LOWER
ORIENTATION: LOWER;RIGHT

## 2025-08-01 ASSESSMENT — PAIN DESCRIPTION - LOCATION
LOCATION: BACK;LEG
LOCATION: BACK

## 2025-08-01 ASSESSMENT — PAIN - FUNCTIONAL ASSESSMENT
PAIN_FUNCTIONAL_ASSESSMENT: PREVENTS OR INTERFERES SOME ACTIVE ACTIVITIES AND ADLS
PAIN_FUNCTIONAL_ASSESSMENT: PREVENTS OR INTERFERES SOME ACTIVE ACTIVITIES AND ADLS

## 2025-08-01 ASSESSMENT — PAIN SCALES - GENERAL
PAINLEVEL_OUTOF10: 8
PAINLEVEL_OUTOF10: 10
PAINLEVEL_OUTOF10: 8

## 2025-08-02 LAB
ANION GAP SERPL CALCULATED.3IONS-SCNC: 10 MMOL/L (ref 3–16)
ANION GAP SERPL CALCULATED.3IONS-SCNC: 9 MMOL/L (ref 3–16)
BACTERIA BLD CULT ORG #2: NORMAL
BACTERIA BLD CULT: NORMAL
BUN SERPL-MCNC: 51 MG/DL (ref 7–20)
BUN SERPL-MCNC: 53 MG/DL (ref 7–20)
CALCIUM SERPL-MCNC: 7.9 MG/DL (ref 8.3–10.6)
CALCIUM SERPL-MCNC: 8.1 MG/DL (ref 8.3–10.6)
CHLORIDE SERPL-SCNC: 102 MMOL/L (ref 99–110)
CHLORIDE SERPL-SCNC: 103 MMOL/L (ref 99–110)
CO2 SERPL-SCNC: 22 MMOL/L (ref 21–32)
CO2 SERPL-SCNC: 23 MMOL/L (ref 21–32)
CREAT SERPL-MCNC: 2.6 MG/DL (ref 0.8–1.3)
CREAT SERPL-MCNC: 2.65 MG/DL (ref 0.69–1.22)
CREAT SERPL-MCNC: 2.7 MG/DL (ref 0.8–1.3)
CYSTATIN C: 2.67 MG/L (ref 0.61–0.95)
DEPRECATED RDW RBC AUTO: 18.2 % (ref 12.4–15.4)
EGFR BY CYSTATIN C: 23
GFR SERPLBLD CREATININE-BSD FMLA CKD-EPI: 25 ML/MIN/{1.73_M2}
GFR SERPLBLD CREATININE-BSD FMLA CKD-EPI: 27 ML/MIN/{1.73_M2}
GLUCOSE BLD-MCNC: 141 MG/DL (ref 70–99)
GLUCOSE BLD-MCNC: 211 MG/DL (ref 70–99)
GLUCOSE BLD-MCNC: 255 MG/DL (ref 70–99)
GLUCOSE BLD-MCNC: 309 MG/DL (ref 70–99)
GLUCOSE SERPL-MCNC: 203 MG/DL (ref 70–99)
GLUCOSE SERPL-MCNC: 228 MG/DL (ref 70–99)
HCT VFR BLD AUTO: 24.6 % (ref 40.5–52.5)
HGB BLD-MCNC: 7.9 G/DL (ref 13.5–17.5)
MAGNESIUM SERPL-MCNC: 1.71 MG/DL (ref 1.8–2.4)
MCH RBC QN AUTO: 29.8 PG (ref 26–34)
MCHC RBC AUTO-ENTMCNC: 32.4 G/DL (ref 31–36)
MCV RBC AUTO: 92 FL (ref 80–100)
PERFORMED ON: ABNORMAL
PLATELET # BLD AUTO: 448 K/UL (ref 135–450)
PMV BLD AUTO: 7.1 FL (ref 5–10.5)
POTASSIUM SERPL-SCNC: 4.6 MMOL/L (ref 3.5–5.1)
POTASSIUM SERPL-SCNC: 4.7 MMOL/L (ref 3.5–5.1)
RBC # BLD AUTO: 2.67 M/UL (ref 4.2–5.9)
SODIUM SERPL-SCNC: 134 MMOL/L (ref 136–145)
SODIUM SERPL-SCNC: 135 MMOL/L (ref 136–145)
WBC # BLD AUTO: 14 K/UL (ref 4–11)

## 2025-08-02 PROCEDURE — 97110 THERAPEUTIC EXERCISES: CPT

## 2025-08-02 PROCEDURE — 97530 THERAPEUTIC ACTIVITIES: CPT

## 2025-08-02 PROCEDURE — 6370000000 HC RX 637 (ALT 250 FOR IP): Performed by: PHYSICIAN ASSISTANT

## 2025-08-02 PROCEDURE — 6360000002 HC RX W HCPCS: Performed by: PHYSICIAN ASSISTANT

## 2025-08-02 PROCEDURE — 83735 ASSAY OF MAGNESIUM: CPT

## 2025-08-02 PROCEDURE — 6370000000 HC RX 637 (ALT 250 FOR IP): Performed by: INTERNAL MEDICINE

## 2025-08-02 PROCEDURE — 2580000003 HC RX 258: Performed by: PHYSICIAN ASSISTANT

## 2025-08-02 PROCEDURE — 2060000000 HC ICU INTERMEDIATE R&B

## 2025-08-02 PROCEDURE — 97535 SELF CARE MNGMENT TRAINING: CPT

## 2025-08-02 PROCEDURE — 2500000003 HC RX 250 WO HCPCS: Performed by: PHYSICIAN ASSISTANT

## 2025-08-02 PROCEDURE — 80048 BASIC METABOLIC PNL TOTAL CA: CPT

## 2025-08-02 PROCEDURE — 85027 COMPLETE CBC AUTOMATED: CPT

## 2025-08-02 RX ORDER — TORSEMIDE 20 MG/1
20 TABLET ORAL DAILY
Status: DISCONTINUED | OUTPATIENT
Start: 2025-08-02 | End: 2025-08-04

## 2025-08-02 RX ADMIN — FOLIC ACID 1 MG: 1 TABLET ORAL at 10:13

## 2025-08-02 RX ADMIN — PANTOPRAZOLE SODIUM 40 MG: 40 TABLET, DELAYED RELEASE ORAL at 05:48

## 2025-08-02 RX ADMIN — FINASTERIDE 5 MG: 5 TABLET, FILM COATED ORAL at 10:13

## 2025-08-02 RX ADMIN — GABAPENTIN 300 MG: 300 CAPSULE ORAL at 10:13

## 2025-08-02 RX ADMIN — INSULIN LISPRO 12 UNITS: 100 INJECTION, SOLUTION INTRAVENOUS; SUBCUTANEOUS at 13:51

## 2025-08-02 RX ADMIN — GABAPENTIN 300 MG: 300 CAPSULE ORAL at 20:14

## 2025-08-02 RX ADMIN — INSULIN LISPRO 8 UNITS: 100 INJECTION, SOLUTION INTRAVENOUS; SUBCUTANEOUS at 10:12

## 2025-08-02 RX ADMIN — SODIUM CHLORIDE, PRESERVATIVE FREE 10 ML: 5 INJECTION INTRAVENOUS at 20:14

## 2025-08-02 RX ADMIN — INSULIN GLARGINE 35 UNITS: 100 INJECTION, SOLUTION SUBCUTANEOUS at 20:16

## 2025-08-02 RX ADMIN — OXYBUTYNIN CHLORIDE 15 MG: 5 TABLET, EXTENDED RELEASE ORAL at 10:13

## 2025-08-02 RX ADMIN — TORSEMIDE 20 MG: 20 TABLET ORAL at 21:37

## 2025-08-02 RX ADMIN — LEVOTHYROXINE SODIUM 25 MCG: 0.03 TABLET ORAL at 05:48

## 2025-08-02 RX ADMIN — AMITRIPTYLINE HYDROCHLORIDE 25 MG: 25 TABLET, FILM COATED ORAL at 20:14

## 2025-08-02 RX ADMIN — CEFAZOLIN 3000 MG: 3 INJECTION, POWDER, FOR SOLUTION INTRAVENOUS at 15:07

## 2025-08-02 RX ADMIN — HYDROCODONE BITARTRATE AND ACETAMINOPHEN 1 TABLET: 10; 325 TABLET ORAL at 10:48

## 2025-08-02 RX ADMIN — GABAPENTIN 300 MG: 300 CAPSULE ORAL at 13:50

## 2025-08-02 RX ADMIN — INSULIN LISPRO 7 UNITS: 100 INJECTION, SOLUTION INTRAVENOUS; SUBCUTANEOUS at 18:09

## 2025-08-02 RX ADMIN — INSULIN LISPRO 7 UNITS: 100 INJECTION, SOLUTION INTRAVENOUS; SUBCUTANEOUS at 13:50

## 2025-08-02 RX ADMIN — FERROUS SULFATE TAB 325 MG (65 MG ELEMENTAL FE) 325 MG: 325 (65 FE) TAB at 10:13

## 2025-08-02 RX ADMIN — INSULIN LISPRO 4 UNITS: 100 INJECTION, SOLUTION INTRAVENOUS; SUBCUTANEOUS at 18:09

## 2025-08-02 RX ADMIN — CEFAZOLIN 3000 MG: 3 INJECTION, POWDER, FOR SOLUTION INTRAVENOUS at 02:20

## 2025-08-02 RX ADMIN — Medication 3 MG: at 20:14

## 2025-08-02 RX ADMIN — TAMSULOSIN HYDROCHLORIDE 0.4 MG: 0.4 CAPSULE ORAL at 10:13

## 2025-08-02 RX ADMIN — INSULIN LISPRO 7 UNITS: 100 INJECTION, SOLUTION INTRAVENOUS; SUBCUTANEOUS at 10:13

## 2025-08-02 RX ADMIN — TROSPIUM CHLORIDE 20 MG: 20 TABLET, FILM COATED ORAL at 15:03

## 2025-08-02 RX ADMIN — ATORVASTATIN CALCIUM 20 MG: 10 TABLET, FILM COATED ORAL at 10:13

## 2025-08-02 RX ADMIN — TROSPIUM CHLORIDE 20 MG: 20 TABLET, FILM COATED ORAL at 05:48

## 2025-08-02 ASSESSMENT — PAIN DESCRIPTION - DESCRIPTORS: DESCRIPTORS: ACHING

## 2025-08-02 ASSESSMENT — PAIN SCALES - GENERAL
PAINLEVEL_OUTOF10: 4
PAINLEVEL_OUTOF10: 10

## 2025-08-02 ASSESSMENT — PAIN DESCRIPTION - ORIENTATION: ORIENTATION: LOWER

## 2025-08-02 ASSESSMENT — PAIN DESCRIPTION - LOCATION
LOCATION: BACK
LOCATION: BACK;LEG

## 2025-08-03 LAB
ANION GAP SERPL CALCULATED.3IONS-SCNC: 10 MMOL/L (ref 3–16)
BUN SERPL-MCNC: 46 MG/DL (ref 7–20)
CALCIUM SERPL-MCNC: 8.2 MG/DL (ref 8.3–10.6)
CHLORIDE SERPL-SCNC: 103 MMOL/L (ref 99–110)
CO2 SERPL-SCNC: 24 MMOL/L (ref 21–32)
CREAT SERPL-MCNC: 2.4 MG/DL (ref 0.8–1.3)
DEPRECATED RDW RBC AUTO: 18.6 % (ref 12.4–15.4)
GFR SERPLBLD CREATININE-BSD FMLA CKD-EPI: 29 ML/MIN/{1.73_M2}
GLUCOSE BLD-MCNC: 111 MG/DL (ref 70–99)
GLUCOSE BLD-MCNC: 191 MG/DL (ref 70–99)
GLUCOSE BLD-MCNC: 211 MG/DL (ref 70–99)
GLUCOSE BLD-MCNC: 91 MG/DL (ref 70–99)
GLUCOSE SERPL-MCNC: 173 MG/DL (ref 70–99)
HCT VFR BLD AUTO: 25.8 % (ref 40.5–52.5)
HGB BLD-MCNC: 8.5 G/DL (ref 13.5–17.5)
MAGNESIUM SERPL-MCNC: 1.5 MG/DL (ref 1.8–2.4)
MAGNESIUM SERPL-MCNC: 1.59 MG/DL (ref 1.8–2.4)
MCH RBC QN AUTO: 29.9 PG (ref 26–34)
MCHC RBC AUTO-ENTMCNC: 32.9 G/DL (ref 31–36)
MCV RBC AUTO: 90.9 FL (ref 80–100)
PERFORMED ON: ABNORMAL
PERFORMED ON: NORMAL
PLATELET # BLD AUTO: 491 K/UL (ref 135–450)
PMV BLD AUTO: 6.9 FL (ref 5–10.5)
POTASSIUM SERPL-SCNC: 4.2 MMOL/L (ref 3.5–5.1)
RBC # BLD AUTO: 2.83 M/UL (ref 4.2–5.9)
SODIUM SERPL-SCNC: 137 MMOL/L (ref 136–145)
WBC # BLD AUTO: 9.7 K/UL (ref 4–11)

## 2025-08-03 PROCEDURE — 6360000002 HC RX W HCPCS: Performed by: INTERNAL MEDICINE

## 2025-08-03 PROCEDURE — 80048 BASIC METABOLIC PNL TOTAL CA: CPT

## 2025-08-03 PROCEDURE — 2500000003 HC RX 250 WO HCPCS: Performed by: PHYSICIAN ASSISTANT

## 2025-08-03 PROCEDURE — 2580000003 HC RX 258: Performed by: PHYSICIAN ASSISTANT

## 2025-08-03 PROCEDURE — 6360000002 HC RX W HCPCS: Performed by: PHYSICIAN ASSISTANT

## 2025-08-03 PROCEDURE — 94761 N-INVAS EAR/PLS OXIMETRY MLT: CPT

## 2025-08-03 PROCEDURE — 83735 ASSAY OF MAGNESIUM: CPT

## 2025-08-03 PROCEDURE — 2700000000 HC OXYGEN THERAPY PER DAY

## 2025-08-03 PROCEDURE — 6370000000 HC RX 637 (ALT 250 FOR IP): Performed by: INTERNAL MEDICINE

## 2025-08-03 PROCEDURE — 6360000002 HC RX W HCPCS: Performed by: STUDENT IN AN ORGANIZED HEALTH CARE EDUCATION/TRAINING PROGRAM

## 2025-08-03 PROCEDURE — 2580000003 HC RX 258: Performed by: INTERNAL MEDICINE

## 2025-08-03 PROCEDURE — 6370000000 HC RX 637 (ALT 250 FOR IP): Performed by: PHYSICIAN ASSISTANT

## 2025-08-03 PROCEDURE — 85027 COMPLETE CBC AUTOMATED: CPT

## 2025-08-03 PROCEDURE — 2060000000 HC ICU INTERMEDIATE R&B

## 2025-08-03 PROCEDURE — 2500000003 HC RX 250 WO HCPCS: Performed by: INTERNAL MEDICINE

## 2025-08-03 RX ORDER — MORPHINE SULFATE 4 MG/ML
4 INJECTION, SOLUTION INTRAMUSCULAR; INTRAVENOUS EVERY 4 HOURS PRN
Status: DISCONTINUED | OUTPATIENT
Start: 2025-08-03 | End: 2025-08-03

## 2025-08-03 RX ORDER — PROCHLORPERAZINE EDISYLATE 5 MG/ML
5 INJECTION INTRAMUSCULAR; INTRAVENOUS EVERY 6 HOURS PRN
Status: DISCONTINUED | OUTPATIENT
Start: 2025-08-03 | End: 2025-08-04

## 2025-08-03 RX ORDER — MORPHINE SULFATE 4 MG/ML
4 INJECTION, SOLUTION INTRAMUSCULAR; INTRAVENOUS ONCE
Status: COMPLETED | OUTPATIENT
Start: 2025-08-03 | End: 2025-08-03

## 2025-08-03 RX ADMIN — AMITRIPTYLINE HYDROCHLORIDE 25 MG: 25 TABLET, FILM COATED ORAL at 21:16

## 2025-08-03 RX ADMIN — OXYBUTYNIN CHLORIDE 15 MG: 5 TABLET, EXTENDED RELEASE ORAL at 08:31

## 2025-08-03 RX ADMIN — GABAPENTIN 300 MG: 300 CAPSULE ORAL at 08:32

## 2025-08-03 RX ADMIN — SODIUM CHLORIDE: 0.9 INJECTION, SOLUTION INTRAVENOUS at 02:10

## 2025-08-03 RX ADMIN — TROSPIUM CHLORIDE 20 MG: 20 TABLET, FILM COATED ORAL at 18:11

## 2025-08-03 RX ADMIN — FERROUS SULFATE TAB 325 MG (65 MG ELEMENTAL FE) 325 MG: 325 (65 FE) TAB at 08:32

## 2025-08-03 RX ADMIN — HYDROCODONE BITARTRATE AND ACETAMINOPHEN 1 TABLET: 10; 325 TABLET ORAL at 14:47

## 2025-08-03 RX ADMIN — ATORVASTATIN CALCIUM 20 MG: 10 TABLET, FILM COATED ORAL at 08:32

## 2025-08-03 RX ADMIN — FOLIC ACID 1 MG: 1 TABLET ORAL at 08:32

## 2025-08-03 RX ADMIN — SODIUM CHLORIDE, PRESERVATIVE FREE 10 ML: 5 INJECTION INTRAVENOUS at 19:55

## 2025-08-03 RX ADMIN — INSULIN LISPRO 4 UNITS: 100 INJECTION, SOLUTION INTRAVENOUS; SUBCUTANEOUS at 08:38

## 2025-08-03 RX ADMIN — Medication 3 MG: at 21:16

## 2025-08-03 RX ADMIN — HYDROCODONE BITARTRATE AND ACETAMINOPHEN 1 TABLET: 10; 325 TABLET ORAL at 10:19

## 2025-08-03 RX ADMIN — GABAPENTIN 300 MG: 300 CAPSULE ORAL at 14:47

## 2025-08-03 RX ADMIN — FINASTERIDE 5 MG: 5 TABLET, FILM COATED ORAL at 08:32

## 2025-08-03 RX ADMIN — TROSPIUM CHLORIDE 20 MG: 20 TABLET, FILM COATED ORAL at 05:18

## 2025-08-03 RX ADMIN — LEVOTHYROXINE SODIUM 25 MCG: 0.03 TABLET ORAL at 05:18

## 2025-08-03 RX ADMIN — CEFAZOLIN 3000 MG: 3 INJECTION, POWDER, FOR SOLUTION INTRAVENOUS at 02:12

## 2025-08-03 RX ADMIN — SODIUM CHLORIDE, PRESERVATIVE FREE 10 ML: 5 INJECTION INTRAVENOUS at 19:50

## 2025-08-03 RX ADMIN — PROCHLORPERAZINE EDISYLATE 5 MG: 5 INJECTION INTRAMUSCULAR; INTRAVENOUS at 19:52

## 2025-08-03 RX ADMIN — CEFAZOLIN 3000 MG: 3 INJECTION, POWDER, FOR SOLUTION INTRAVENOUS at 14:52

## 2025-08-03 RX ADMIN — GABAPENTIN 300 MG: 300 CAPSULE ORAL at 21:16

## 2025-08-03 RX ADMIN — ONDANSETRON 4 MG: 2 INJECTION, SOLUTION INTRAMUSCULAR; INTRAVENOUS at 17:35

## 2025-08-03 RX ADMIN — TAMSULOSIN HYDROCHLORIDE 0.4 MG: 0.4 CAPSULE ORAL at 08:31

## 2025-08-03 RX ADMIN — MORPHINE SULFATE 4 MG: 4 INJECTION, SOLUTION INTRAMUSCULAR; INTRAVENOUS at 12:11

## 2025-08-03 RX ADMIN — INSULIN LISPRO 7 UNITS: 100 INJECTION, SOLUTION INTRAVENOUS; SUBCUTANEOUS at 08:38

## 2025-08-03 RX ADMIN — INSULIN LISPRO 4 UNITS: 100 INJECTION, SOLUTION INTRAVENOUS; SUBCUTANEOUS at 12:12

## 2025-08-03 RX ADMIN — PANTOPRAZOLE SODIUM 40 MG: 40 TABLET, DELAYED RELEASE ORAL at 05:18

## 2025-08-03 RX ADMIN — SODIUM CHLORIDE, PRESERVATIVE FREE 10 ML: 5 INJECTION INTRAVENOUS at 21:18

## 2025-08-03 RX ADMIN — INSULIN LISPRO 7 UNITS: 100 INJECTION, SOLUTION INTRAVENOUS; SUBCUTANEOUS at 12:12

## 2025-08-03 RX ADMIN — TORSEMIDE 20 MG: 20 TABLET ORAL at 08:31

## 2025-08-03 ASSESSMENT — PAIN SCALES - GENERAL
PAINLEVEL_OUTOF10: 0
PAINLEVEL_OUTOF10: 8
PAINLEVEL_OUTOF10: 0
PAINLEVEL_OUTOF10: 10
PAINLEVEL_OUTOF10: 0
PAINLEVEL_OUTOF10: 8

## 2025-08-03 ASSESSMENT — PAIN DESCRIPTION - LOCATION: LOCATION: OTHER (COMMENT)

## 2025-08-03 ASSESSMENT — PAIN SCALES - WONG BAKER
WONGBAKER_NUMERICALRESPONSE: NO HURT

## 2025-08-03 ASSESSMENT — PAIN DESCRIPTION - ORIENTATION: ORIENTATION: OTHER (COMMENT)

## 2025-08-04 LAB
ANION GAP SERPL CALCULATED.3IONS-SCNC: 9 MMOL/L (ref 3–16)
BUN SERPL-MCNC: 50 MG/DL (ref 7–20)
CALCIUM SERPL-MCNC: 7.9 MG/DL (ref 8.3–10.6)
CHLORIDE SERPL-SCNC: 103 MMOL/L (ref 99–110)
CO2 SERPL-SCNC: 24 MMOL/L (ref 21–32)
CREAT SERPL-MCNC: 2.5 MG/DL (ref 0.8–1.3)
DEPRECATED RDW RBC AUTO: 17.9 % (ref 12.4–15.4)
GFR SERPLBLD CREATININE-BSD FMLA CKD-EPI: 28 ML/MIN/{1.73_M2}
GLUCOSE BLD-MCNC: 101 MG/DL (ref 70–99)
GLUCOSE BLD-MCNC: 112 MG/DL (ref 70–99)
GLUCOSE BLD-MCNC: 152 MG/DL (ref 70–99)
GLUCOSE BLD-MCNC: 162 MG/DL (ref 70–99)
GLUCOSE SERPL-MCNC: 146 MG/DL (ref 70–99)
HCT VFR BLD AUTO: 24.4 % (ref 40.5–52.5)
HGB BLD-MCNC: 7.9 G/DL (ref 13.5–17.5)
MAGNESIUM SERPL-MCNC: 1.64 MG/DL (ref 1.8–2.4)
MCH RBC QN AUTO: 29.5 PG (ref 26–34)
MCHC RBC AUTO-ENTMCNC: 32.5 G/DL (ref 31–36)
MCV RBC AUTO: 90.8 FL (ref 80–100)
PERFORMED ON: ABNORMAL
PLATELET # BLD AUTO: 436 K/UL (ref 135–450)
PMV BLD AUTO: 6.4 FL (ref 5–10.5)
POTASSIUM SERPL-SCNC: 4.5 MMOL/L (ref 3.5–5.1)
RBC # BLD AUTO: 2.69 M/UL (ref 4.2–5.9)
SODIUM SERPL-SCNC: 136 MMOL/L (ref 136–145)
WBC # BLD AUTO: 8.7 K/UL (ref 4–11)

## 2025-08-04 PROCEDURE — 85027 COMPLETE CBC AUTOMATED: CPT

## 2025-08-04 PROCEDURE — 92526 ORAL FUNCTION THERAPY: CPT

## 2025-08-04 PROCEDURE — 2700000000 HC OXYGEN THERAPY PER DAY

## 2025-08-04 PROCEDURE — 36415 COLL VENOUS BLD VENIPUNCTURE: CPT

## 2025-08-04 PROCEDURE — 80048 BASIC METABOLIC PNL TOTAL CA: CPT

## 2025-08-04 PROCEDURE — 6370000000 HC RX 637 (ALT 250 FOR IP): Performed by: PHYSICIAN ASSISTANT

## 2025-08-04 PROCEDURE — 83735 ASSAY OF MAGNESIUM: CPT

## 2025-08-04 PROCEDURE — 82610 CYSTATIN C: CPT

## 2025-08-04 PROCEDURE — 6360000002 HC RX W HCPCS: Performed by: STUDENT IN AN ORGANIZED HEALTH CARE EDUCATION/TRAINING PROGRAM

## 2025-08-04 PROCEDURE — 2580000003 HC RX 258: Performed by: INTERNAL MEDICINE

## 2025-08-04 PROCEDURE — 6360000002 HC RX W HCPCS: Performed by: PHYSICIAN ASSISTANT

## 2025-08-04 PROCEDURE — 6370000000 HC RX 637 (ALT 250 FOR IP): Performed by: INTERNAL MEDICINE

## 2025-08-04 PROCEDURE — 2500000003 HC RX 250 WO HCPCS: Performed by: INTERNAL MEDICINE

## 2025-08-04 PROCEDURE — 2580000003 HC RX 258: Performed by: PHYSICIAN ASSISTANT

## 2025-08-04 PROCEDURE — 94761 N-INVAS EAR/PLS OXIMETRY MLT: CPT

## 2025-08-04 PROCEDURE — 51798 US URINE CAPACITY MEASURE: CPT

## 2025-08-04 PROCEDURE — 2060000000 HC ICU INTERMEDIATE R&B

## 2025-08-04 PROCEDURE — 6360000002 HC RX W HCPCS: Performed by: NURSE PRACTITIONER

## 2025-08-04 PROCEDURE — 92610 EVALUATE SWALLOWING FUNCTION: CPT

## 2025-08-04 RX ORDER — MAGNESIUM SULFATE IN WATER 40 MG/ML
2000 INJECTION, SOLUTION INTRAVENOUS ONCE
Status: COMPLETED | OUTPATIENT
Start: 2025-08-04 | End: 2025-08-04

## 2025-08-04 RX ORDER — TORSEMIDE 20 MG/1
40 TABLET ORAL DAILY
Status: DISCONTINUED | OUTPATIENT
Start: 2025-08-05 | End: 2025-08-05 | Stop reason: HOSPADM

## 2025-08-04 RX ORDER — PROCHLORPERAZINE EDISYLATE 5 MG/ML
5 INJECTION INTRAMUSCULAR; INTRAVENOUS EVERY 6 HOURS PRN
Status: DISCONTINUED | OUTPATIENT
Start: 2025-08-04 | End: 2025-08-05 | Stop reason: HOSPADM

## 2025-08-04 RX ORDER — MAGNESIUM SULFATE 1 G/100ML
1000 INJECTION INTRAVENOUS ONCE
Status: COMPLETED | OUTPATIENT
Start: 2025-08-04 | End: 2025-08-04

## 2025-08-04 RX ADMIN — INSULIN GLARGINE 35 UNITS: 100 INJECTION, SOLUTION SUBCUTANEOUS at 20:09

## 2025-08-04 RX ADMIN — CEFAZOLIN 3000 MG: 3 INJECTION, POWDER, FOR SOLUTION INTRAVENOUS at 02:22

## 2025-08-04 RX ADMIN — INSULIN LISPRO 7 UNITS: 100 INJECTION, SOLUTION INTRAVENOUS; SUBCUTANEOUS at 09:08

## 2025-08-04 RX ADMIN — CEFAZOLIN 3000 MG: 3 INJECTION, POWDER, FOR SOLUTION INTRAVENOUS at 14:03

## 2025-08-04 RX ADMIN — SODIUM CHLORIDE, PRESERVATIVE FREE 10 ML: 5 INJECTION INTRAVENOUS at 20:09

## 2025-08-04 RX ADMIN — GABAPENTIN 300 MG: 300 CAPSULE ORAL at 20:09

## 2025-08-04 RX ADMIN — TROSPIUM CHLORIDE 20 MG: 20 TABLET, FILM COATED ORAL at 05:34

## 2025-08-04 RX ADMIN — PANTOPRAZOLE SODIUM 40 MG: 40 TABLET, DELAYED RELEASE ORAL at 05:34

## 2025-08-04 RX ADMIN — SODIUM CHLORIDE, PRESERVATIVE FREE 10 ML: 5 INJECTION INTRAVENOUS at 09:09

## 2025-08-04 RX ADMIN — OXYBUTYNIN CHLORIDE 15 MG: 5 TABLET, EXTENDED RELEASE ORAL at 09:08

## 2025-08-04 RX ADMIN — GABAPENTIN 300 MG: 300 CAPSULE ORAL at 14:00

## 2025-08-04 RX ADMIN — LEVOTHYROXINE SODIUM 25 MCG: 0.03 TABLET ORAL at 05:34

## 2025-08-04 RX ADMIN — TORSEMIDE 20 MG: 20 TABLET ORAL at 09:09

## 2025-08-04 RX ADMIN — MAGNESIUM SULFATE HEPTAHYDRATE 2000 MG: 40 INJECTION, SOLUTION INTRAVENOUS at 09:17

## 2025-08-04 RX ADMIN — HYDROCODONE BITARTRATE AND ACETAMINOPHEN 1 TABLET: 10; 325 TABLET ORAL at 18:48

## 2025-08-04 RX ADMIN — TAMSULOSIN HYDROCHLORIDE 0.4 MG: 0.4 CAPSULE ORAL at 09:09

## 2025-08-04 RX ADMIN — Medication 3 MG: at 20:09

## 2025-08-04 RX ADMIN — ATORVASTATIN CALCIUM 20 MG: 10 TABLET, FILM COATED ORAL at 09:08

## 2025-08-04 RX ADMIN — INSULIN LISPRO 7 UNITS: 100 INJECTION, SOLUTION INTRAVENOUS; SUBCUTANEOUS at 18:20

## 2025-08-04 RX ADMIN — SODIUM CHLORIDE: 0.9 INJECTION, SOLUTION INTRAVENOUS at 00:28

## 2025-08-04 RX ADMIN — MAGNESIUM SULFATE HEPTAHYDRATE 1000 MG: 1 INJECTION, SOLUTION INTRAVENOUS at 00:28

## 2025-08-04 RX ADMIN — HYDROCODONE BITARTRATE AND ACETAMINOPHEN 1 TABLET: 10; 325 TABLET ORAL at 09:09

## 2025-08-04 RX ADMIN — INSULIN LISPRO 7 UNITS: 100 INJECTION, SOLUTION INTRAVENOUS; SUBCUTANEOUS at 13:06

## 2025-08-04 RX ADMIN — FOLIC ACID 1 MG: 1 TABLET ORAL at 09:08

## 2025-08-04 RX ADMIN — FERROUS SULFATE TAB 325 MG (65 MG ELEMENTAL FE) 325 MG: 325 (65 FE) TAB at 09:09

## 2025-08-04 RX ADMIN — GABAPENTIN 300 MG: 300 CAPSULE ORAL at 09:09

## 2025-08-04 RX ADMIN — AMITRIPTYLINE HYDROCHLORIDE 25 MG: 25 TABLET, FILM COATED ORAL at 20:09

## 2025-08-04 RX ADMIN — SODIUM CHLORIDE: 0.9 INJECTION, SOLUTION INTRAVENOUS at 02:21

## 2025-08-04 RX ADMIN — FINASTERIDE 5 MG: 5 TABLET, FILM COATED ORAL at 09:09

## 2025-08-04 RX ADMIN — Medication 10 ML: at 02:20

## 2025-08-04 RX ADMIN — TROSPIUM CHLORIDE 20 MG: 20 TABLET, FILM COATED ORAL at 17:18

## 2025-08-04 ASSESSMENT — PAIN SCALES - GENERAL
PAINLEVEL_OUTOF10: 9
PAINLEVEL_OUTOF10: 10
PAINLEVEL_OUTOF10: 0
PAINLEVEL_OUTOF10: 8
PAINLEVEL_OUTOF10: 0

## 2025-08-04 ASSESSMENT — PAIN DESCRIPTION - ORIENTATION
ORIENTATION: RIGHT
ORIENTATION: LOWER

## 2025-08-04 ASSESSMENT — PAIN DESCRIPTION - DESCRIPTORS: DESCRIPTORS: ACHING

## 2025-08-04 ASSESSMENT — PAIN DESCRIPTION - FREQUENCY: FREQUENCY: CONTINUOUS

## 2025-08-04 ASSESSMENT — PAIN DESCRIPTION - LOCATION
LOCATION: BACK
LOCATION: LEG

## 2025-08-05 ENCOUNTER — HOSPITAL ENCOUNTER (INPATIENT)
Age: 65
LOS: 10 days | Discharge: ANOTHER HOSPITAL | DRG: 560 | End: 2025-08-15
Attending: STUDENT IN AN ORGANIZED HEALTH CARE EDUCATION/TRAINING PROGRAM | Admitting: STUDENT IN AN ORGANIZED HEALTH CARE EDUCATION/TRAINING PROGRAM
Payer: MEDICARE

## 2025-08-05 VITALS
RESPIRATION RATE: 18 BRPM | WEIGHT: 315 LBS | DIASTOLIC BLOOD PRESSURE: 74 MMHG | BODY MASS INDEX: 40.43 KG/M2 | HEART RATE: 101 BPM | OXYGEN SATURATION: 98 % | TEMPERATURE: 98.1 F | SYSTOLIC BLOOD PRESSURE: 111 MMHG | HEIGHT: 74 IN

## 2025-08-05 PROBLEM — Z89.511 S/P BKA (BELOW KNEE AMPUTATION) UNILATERAL, RIGHT (HCC): Status: ACTIVE | Noted: 2025-08-05

## 2025-08-05 LAB
ANION GAP SERPL CALCULATED.3IONS-SCNC: 7 MMOL/L (ref 3–16)
BUN SERPL-MCNC: 45 MG/DL (ref 7–20)
CALCIUM SERPL-MCNC: 8.2 MG/DL (ref 8.3–10.6)
CHLORIDE SERPL-SCNC: 103 MMOL/L (ref 99–110)
CO2 SERPL-SCNC: 26 MMOL/L (ref 21–32)
CREAT SERPL-MCNC: 2.44 MG/DL (ref 0.69–1.22)
CREAT SERPL-MCNC: 2.5 MG/DL (ref 0.8–1.3)
CYSTATIN C: 2.82 MG/L (ref 0.61–0.95)
DEPRECATED RDW RBC AUTO: 18.1 % (ref 12.4–15.4)
EGFR BY CYSTATIN C: 23
GFR SERPLBLD CREATININE-BSD FMLA CKD-EPI: 28 ML/MIN/{1.73_M2}
GLUCOSE BLD-MCNC: 123 MG/DL (ref 70–99)
GLUCOSE BLD-MCNC: 200 MG/DL (ref 70–99)
GLUCOSE BLD-MCNC: 207 MG/DL (ref 70–99)
GLUCOSE BLD-MCNC: 66 MG/DL (ref 70–99)
GLUCOSE BLD-MCNC: 79 MG/DL (ref 70–99)
GLUCOSE SERPL-MCNC: 64 MG/DL (ref 70–99)
HCT VFR BLD AUTO: 24.3 % (ref 40.5–52.5)
HGB BLD-MCNC: 8 G/DL (ref 13.5–17.5)
MAGNESIUM SERPL-MCNC: 1.69 MG/DL (ref 1.8–2.4)
MCH RBC QN AUTO: 29.5 PG (ref 26–34)
MCHC RBC AUTO-ENTMCNC: 32.7 G/DL (ref 31–36)
MCV RBC AUTO: 90 FL (ref 80–100)
PERFORMED ON: ABNORMAL
PERFORMED ON: NORMAL
PLATELET # BLD AUTO: 411 K/UL (ref 135–450)
PMV BLD AUTO: 6.4 FL (ref 5–10.5)
POTASSIUM SERPL-SCNC: 4.1 MMOL/L (ref 3.5–5.1)
RBC # BLD AUTO: 2.7 M/UL (ref 4.2–5.9)
SODIUM SERPL-SCNC: 136 MMOL/L (ref 136–145)
WBC # BLD AUTO: 9.9 K/UL (ref 4–11)

## 2025-08-05 PROCEDURE — 6370000000 HC RX 637 (ALT 250 FOR IP): Performed by: PHYSICIAN ASSISTANT

## 2025-08-05 PROCEDURE — 85027 COMPLETE CBC AUTOMATED: CPT

## 2025-08-05 PROCEDURE — 6370000000 HC RX 637 (ALT 250 FOR IP): Performed by: INTERNAL MEDICINE

## 2025-08-05 PROCEDURE — 97530 THERAPEUTIC ACTIVITIES: CPT

## 2025-08-05 PROCEDURE — 6370000000 HC RX 637 (ALT 250 FOR IP): Performed by: STUDENT IN AN ORGANIZED HEALTH CARE EDUCATION/TRAINING PROGRAM

## 2025-08-05 PROCEDURE — 83735 ASSAY OF MAGNESIUM: CPT

## 2025-08-05 PROCEDURE — 6360000002 HC RX W HCPCS: Performed by: STUDENT IN AN ORGANIZED HEALTH CARE EDUCATION/TRAINING PROGRAM

## 2025-08-05 PROCEDURE — 80048 BASIC METABOLIC PNL TOTAL CA: CPT

## 2025-08-05 PROCEDURE — 2500000003 HC RX 250 WO HCPCS: Performed by: INTERNAL MEDICINE

## 2025-08-05 PROCEDURE — 2500000003 HC RX 250 WO HCPCS: Performed by: STUDENT IN AN ORGANIZED HEALTH CARE EDUCATION/TRAINING PROGRAM

## 2025-08-05 PROCEDURE — 1280000000 HC REHAB R&B

## 2025-08-05 PROCEDURE — 6360000002 HC RX W HCPCS: Performed by: PHYSICIAN ASSISTANT

## 2025-08-05 PROCEDURE — 2580000003 HC RX 258: Performed by: PHYSICIAN ASSISTANT

## 2025-08-05 RX ORDER — SODIUM CHLORIDE 0.9 % (FLUSH) 0.9 %
5-40 SYRINGE (ML) INJECTION EVERY 12 HOURS SCHEDULED
Status: DISCONTINUED | OUTPATIENT
Start: 2025-08-05 | End: 2025-08-15 | Stop reason: HOSPADM

## 2025-08-05 RX ORDER — PANTOPRAZOLE SODIUM 40 MG/1
40 TABLET, DELAYED RELEASE ORAL
Status: DISCONTINUED | OUTPATIENT
Start: 2025-08-06 | End: 2025-08-15 | Stop reason: HOSPADM

## 2025-08-05 RX ORDER — SODIUM CHLORIDE 0.9 % (FLUSH) 0.9 %
5-40 SYRINGE (ML) INJECTION PRN
Status: CANCELLED | OUTPATIENT
Start: 2025-08-05

## 2025-08-05 RX ORDER — INSULIN GLARGINE 100 [IU]/ML
25 INJECTION, SOLUTION SUBCUTANEOUS NIGHTLY
Status: DISCONTINUED | OUTPATIENT
Start: 2025-08-05 | End: 2025-08-05 | Stop reason: HOSPADM

## 2025-08-05 RX ORDER — INSULIN GLARGINE 100 [IU]/ML
25 INJECTION, SOLUTION SUBCUTANEOUS NIGHTLY
Status: CANCELLED | OUTPATIENT
Start: 2025-08-05

## 2025-08-05 RX ORDER — POLYETHYLENE GLYCOL 3350 17 G/17G
17 POWDER, FOR SOLUTION ORAL DAILY PRN
Status: CANCELLED | OUTPATIENT
Start: 2025-08-05

## 2025-08-05 RX ORDER — FINASTERIDE 5 MG/1
5 TABLET, FILM COATED ORAL DAILY
Status: CANCELLED | OUTPATIENT
Start: 2025-08-06

## 2025-08-05 RX ORDER — SODIUM CHLORIDE 0.9 % (FLUSH) 0.9 %
5-40 SYRINGE (ML) INJECTION EVERY 12 HOURS SCHEDULED
Status: CANCELLED | OUTPATIENT
Start: 2025-08-05

## 2025-08-05 RX ORDER — HYDROCODONE BITARTRATE AND ACETAMINOPHEN 10; 325 MG/1; MG/1
1 TABLET ORAL EVERY 4 HOURS PRN
Refills: 0 | Status: CANCELLED | OUTPATIENT
Start: 2025-08-05

## 2025-08-05 RX ORDER — FERROUS SULFATE 325(65) MG
325 TABLET ORAL
Status: CANCELLED | OUTPATIENT
Start: 2025-08-06

## 2025-08-05 RX ORDER — ATORVASTATIN CALCIUM 10 MG/1
20 TABLET, FILM COATED ORAL DAILY
Status: DISCONTINUED | OUTPATIENT
Start: 2025-08-06 | End: 2025-08-15 | Stop reason: HOSPADM

## 2025-08-05 RX ORDER — TRAMADOL HYDROCHLORIDE 50 MG/1
50 TABLET ORAL EVERY 6 HOURS PRN
Status: CANCELLED | OUTPATIENT
Start: 2025-08-05

## 2025-08-05 RX ORDER — DEXTROSE MONOHYDRATE 100 MG/ML
INJECTION, SOLUTION INTRAVENOUS CONTINUOUS PRN
Status: CANCELLED | OUTPATIENT
Start: 2025-08-05

## 2025-08-05 RX ORDER — INSULIN LISPRO 100 [IU]/ML
2 INJECTION, SOLUTION INTRAVENOUS; SUBCUTANEOUS
Status: CANCELLED | OUTPATIENT
Start: 2025-08-05

## 2025-08-05 RX ORDER — LEVOTHYROXINE SODIUM 25 UG/1
25 TABLET ORAL DAILY
Status: DISCONTINUED | OUTPATIENT
Start: 2025-08-06 | End: 2025-08-15 | Stop reason: HOSPADM

## 2025-08-05 RX ORDER — TAMSULOSIN HYDROCHLORIDE 0.4 MG/1
0.4 CAPSULE ORAL DAILY
Status: CANCELLED | OUTPATIENT
Start: 2025-08-06

## 2025-08-05 RX ORDER — ACETAMINOPHEN 325 MG/1
650 TABLET ORAL EVERY 6 HOURS PRN
Status: DISCONTINUED | OUTPATIENT
Start: 2025-08-05 | End: 2025-08-15 | Stop reason: HOSPADM

## 2025-08-05 RX ORDER — GLUCAGON 1 MG/ML
1 KIT INJECTION PRN
Status: DISCONTINUED | OUTPATIENT
Start: 2025-08-05 | End: 2025-08-15 | Stop reason: HOSPADM

## 2025-08-05 RX ORDER — MAGNESIUM SULFATE IN WATER 40 MG/ML
4000 INJECTION, SOLUTION INTRAVENOUS ONCE
Status: COMPLETED | OUTPATIENT
Start: 2025-08-05 | End: 2025-08-05

## 2025-08-05 RX ORDER — TRAMADOL HYDROCHLORIDE 50 MG/1
50 TABLET ORAL EVERY 6 HOURS PRN
Status: DISCONTINUED | OUTPATIENT
Start: 2025-08-05 | End: 2025-08-15 | Stop reason: HOSPADM

## 2025-08-05 RX ORDER — ONDANSETRON 4 MG/1
4 TABLET, ORALLY DISINTEGRATING ORAL EVERY 8 HOURS PRN
Status: DISCONTINUED | OUTPATIENT
Start: 2025-08-05 | End: 2025-08-05 | Stop reason: ALTCHOICE

## 2025-08-05 RX ORDER — TROSPIUM CHLORIDE 20 MG/1
20 TABLET, FILM COATED ORAL
Status: CANCELLED | OUTPATIENT
Start: 2025-08-05

## 2025-08-05 RX ORDER — TROSPIUM CHLORIDE 20 MG/1
20 TABLET, FILM COATED ORAL
Status: DISCONTINUED | OUTPATIENT
Start: 2025-08-06 | End: 2025-08-14

## 2025-08-05 RX ORDER — FOLIC ACID 1 MG/1
1 TABLET ORAL DAILY
Status: CANCELLED | OUTPATIENT
Start: 2025-08-06

## 2025-08-05 RX ORDER — DEXTROSE MONOHYDRATE 100 MG/ML
INJECTION, SOLUTION INTRAVENOUS CONTINUOUS PRN
Status: DISCONTINUED | OUTPATIENT
Start: 2025-08-05 | End: 2025-08-15 | Stop reason: HOSPADM

## 2025-08-05 RX ORDER — INSULIN LISPRO 100 [IU]/ML
0-4 INJECTION, SOLUTION INTRAVENOUS; SUBCUTANEOUS
Status: CANCELLED | OUTPATIENT
Start: 2025-08-05

## 2025-08-05 RX ORDER — ACETAMINOPHEN 325 MG/1
650 TABLET ORAL EVERY 6 HOURS PRN
Status: CANCELLED | OUTPATIENT
Start: 2025-08-05

## 2025-08-05 RX ORDER — TORSEMIDE 20 MG/1
40 TABLET ORAL DAILY
Status: CANCELLED | OUTPATIENT
Start: 2025-08-06

## 2025-08-05 RX ORDER — ATORVASTATIN CALCIUM 10 MG/1
20 TABLET, FILM COATED ORAL DAILY
Status: CANCELLED | OUTPATIENT
Start: 2025-08-06

## 2025-08-05 RX ORDER — SODIUM CHLORIDE 0.9 % (FLUSH) 0.9 %
5-40 SYRINGE (ML) INJECTION PRN
Status: DISCONTINUED | OUTPATIENT
Start: 2025-08-05 | End: 2025-08-15 | Stop reason: HOSPADM

## 2025-08-05 RX ORDER — ACETAMINOPHEN 650 MG/1
650 SUPPOSITORY RECTAL EVERY 6 HOURS PRN
Status: DISCONTINUED | OUTPATIENT
Start: 2025-08-05 | End: 2025-08-15 | Stop reason: HOSPADM

## 2025-08-05 RX ORDER — INSULIN LISPRO 100 [IU]/ML
2 INJECTION, SOLUTION INTRAVENOUS; SUBCUTANEOUS
Status: DISCONTINUED | OUTPATIENT
Start: 2025-08-06 | End: 2025-08-15 | Stop reason: HOSPADM

## 2025-08-05 RX ORDER — INSULIN LISPRO 100 [IU]/ML
0-4 INJECTION, SOLUTION INTRAVENOUS; SUBCUTANEOUS
Status: DISCONTINUED | OUTPATIENT
Start: 2025-08-05 | End: 2025-08-05 | Stop reason: HOSPADM

## 2025-08-05 RX ORDER — INSULIN LISPRO 100 [IU]/ML
2 INJECTION, SOLUTION INTRAVENOUS; SUBCUTANEOUS
Status: DISCONTINUED | OUTPATIENT
Start: 2025-08-05 | End: 2025-08-05 | Stop reason: HOSPADM

## 2025-08-05 RX ORDER — FOLIC ACID 1 MG/1
1 TABLET ORAL DAILY
Status: DISCONTINUED | OUTPATIENT
Start: 2025-08-06 | End: 2025-08-15 | Stop reason: HOSPADM

## 2025-08-05 RX ORDER — POLYETHYLENE GLYCOL 3350 17 G/17G
17 POWDER, FOR SOLUTION ORAL DAILY PRN
Status: DISCONTINUED | OUTPATIENT
Start: 2025-08-05 | End: 2025-08-07

## 2025-08-05 RX ORDER — FERROUS SULFATE 325(65) MG
325 TABLET ORAL
Status: DISCONTINUED | OUTPATIENT
Start: 2025-08-06 | End: 2025-08-15 | Stop reason: HOSPADM

## 2025-08-05 RX ORDER — ONDANSETRON 4 MG/1
4 TABLET, ORALLY DISINTEGRATING ORAL EVERY 8 HOURS PRN
Status: CANCELLED | OUTPATIENT
Start: 2025-08-05

## 2025-08-05 RX ORDER — TAMSULOSIN HYDROCHLORIDE 0.4 MG/1
0.4 CAPSULE ORAL DAILY
Status: DISCONTINUED | OUTPATIENT
Start: 2025-08-06 | End: 2025-08-15 | Stop reason: HOSPADM

## 2025-08-05 RX ORDER — PANTOPRAZOLE SODIUM 40 MG/1
40 TABLET, DELAYED RELEASE ORAL
Status: CANCELLED | OUTPATIENT
Start: 2025-08-06

## 2025-08-05 RX ORDER — ONDANSETRON 2 MG/ML
4 INJECTION INTRAMUSCULAR; INTRAVENOUS EVERY 6 HOURS PRN
Status: DISCONTINUED | OUTPATIENT
Start: 2025-08-05 | End: 2025-08-05 | Stop reason: ALTCHOICE

## 2025-08-05 RX ORDER — PROCHLORPERAZINE EDISYLATE 5 MG/ML
10 INJECTION INTRAMUSCULAR; INTRAVENOUS EVERY 6 HOURS PRN
Status: DISCONTINUED | OUTPATIENT
Start: 2025-08-05 | End: 2025-08-15 | Stop reason: HOSPADM

## 2025-08-05 RX ORDER — TORSEMIDE 20 MG/1
40 TABLET ORAL DAILY
Status: DISCONTINUED | OUTPATIENT
Start: 2025-08-06 | End: 2025-08-07

## 2025-08-05 RX ORDER — GABAPENTIN 300 MG/1
300 CAPSULE ORAL 3 TIMES DAILY
Status: DISCONTINUED | OUTPATIENT
Start: 2025-08-05 | End: 2025-08-15 | Stop reason: HOSPADM

## 2025-08-05 RX ORDER — ACETAMINOPHEN 650 MG/1
650 SUPPOSITORY RECTAL EVERY 6 HOURS PRN
Status: CANCELLED | OUTPATIENT
Start: 2025-08-05

## 2025-08-05 RX ORDER — GABAPENTIN 300 MG/1
300 CAPSULE ORAL 3 TIMES DAILY
Status: CANCELLED | OUTPATIENT
Start: 2025-08-05

## 2025-08-05 RX ORDER — LEVOTHYROXINE SODIUM 25 UG/1
25 TABLET ORAL DAILY
Status: CANCELLED | OUTPATIENT
Start: 2025-08-06

## 2025-08-05 RX ORDER — SODIUM CHLORIDE 9 MG/ML
INJECTION, SOLUTION INTRAVENOUS PRN
Status: DISCONTINUED | OUTPATIENT
Start: 2025-08-05 | End: 2025-08-15 | Stop reason: HOSPADM

## 2025-08-05 RX ORDER — MECOBALAMIN 5000 MCG
10 TABLET,DISINTEGRATING ORAL NIGHTLY PRN
Status: DISCONTINUED | OUTPATIENT
Start: 2025-08-05 | End: 2025-08-15 | Stop reason: HOSPADM

## 2025-08-05 RX ORDER — ONDANSETRON 2 MG/ML
4 INJECTION INTRAMUSCULAR; INTRAVENOUS EVERY 6 HOURS PRN
Status: CANCELLED | OUTPATIENT
Start: 2025-08-05

## 2025-08-05 RX ORDER — OXYBUTYNIN CHLORIDE 5 MG/1
15 TABLET, EXTENDED RELEASE ORAL DAILY
Status: CANCELLED | OUTPATIENT
Start: 2025-08-06

## 2025-08-05 RX ORDER — BISACODYL 10 MG
10 SUPPOSITORY, RECTAL RECTAL DAILY PRN
Status: CANCELLED | OUTPATIENT
Start: 2025-08-05

## 2025-08-05 RX ORDER — INSULIN GLARGINE 100 [IU]/ML
25 INJECTION, SOLUTION SUBCUTANEOUS NIGHTLY
Status: DISCONTINUED | OUTPATIENT
Start: 2025-08-05 | End: 2025-08-09 | Stop reason: CLARIF

## 2025-08-05 RX ORDER — PROCHLORPERAZINE MALEATE 5 MG/1
10 TABLET ORAL EVERY 8 HOURS PRN
Status: DISCONTINUED | OUTPATIENT
Start: 2025-08-05 | End: 2025-08-15 | Stop reason: HOSPADM

## 2025-08-05 RX ORDER — MECOBALAMIN 5000 MCG
10 TABLET,DISINTEGRATING ORAL NIGHTLY PRN
Status: CANCELLED | OUTPATIENT
Start: 2025-08-05

## 2025-08-05 RX ORDER — OXYBUTYNIN CHLORIDE 5 MG/1
15 TABLET, EXTENDED RELEASE ORAL DAILY
Status: DISCONTINUED | OUTPATIENT
Start: 2025-08-06 | End: 2025-08-14

## 2025-08-05 RX ORDER — SODIUM CHLORIDE 9 MG/ML
INJECTION, SOLUTION INTRAVENOUS PRN
Status: CANCELLED | OUTPATIENT
Start: 2025-08-05

## 2025-08-05 RX ORDER — GLUCAGON 1 MG/ML
1 KIT INJECTION PRN
Status: CANCELLED | OUTPATIENT
Start: 2025-08-05

## 2025-08-05 RX ORDER — FINASTERIDE 5 MG/1
5 TABLET, FILM COATED ORAL DAILY
Status: DISCONTINUED | OUTPATIENT
Start: 2025-08-06 | End: 2025-08-15 | Stop reason: HOSPADM

## 2025-08-05 RX ORDER — BISACODYL 10 MG
10 SUPPOSITORY, RECTAL RECTAL DAILY PRN
Status: DISCONTINUED | OUTPATIENT
Start: 2025-08-05 | End: 2025-08-15 | Stop reason: HOSPADM

## 2025-08-05 RX ORDER — HYDROCODONE BITARTRATE AND ACETAMINOPHEN 10; 325 MG/1; MG/1
1 TABLET ORAL EVERY 4 HOURS PRN
Refills: 0 | Status: DISCONTINUED | OUTPATIENT
Start: 2025-08-05 | End: 2025-08-06

## 2025-08-05 RX ORDER — INSULIN LISPRO 100 [IU]/ML
0-4 INJECTION, SOLUTION INTRAVENOUS; SUBCUTANEOUS
Status: DISCONTINUED | OUTPATIENT
Start: 2025-08-06 | End: 2025-08-15 | Stop reason: HOSPADM

## 2025-08-05 RX ADMIN — GABAPENTIN 300 MG: 300 CAPSULE ORAL at 14:57

## 2025-08-05 RX ADMIN — FOLIC ACID 1 MG: 1 TABLET ORAL at 09:35

## 2025-08-05 RX ADMIN — SODIUM CHLORIDE, PRESERVATIVE FREE 10 ML: 5 INJECTION INTRAVENOUS at 21:14

## 2025-08-05 RX ADMIN — Medication 3 MG: at 21:10

## 2025-08-05 RX ADMIN — SODIUM CHLORIDE, PRESERVATIVE FREE 10 ML: 5 INJECTION INTRAVENOUS at 09:43

## 2025-08-05 RX ADMIN — OXYBUTYNIN CHLORIDE 15 MG: 5 TABLET, EXTENDED RELEASE ORAL at 09:34

## 2025-08-05 RX ADMIN — MAGNESIUM SULFATE HEPTAHYDRATE 4000 MG: 40 INJECTION, SOLUTION INTRAVENOUS at 09:51

## 2025-08-05 RX ADMIN — ATORVASTATIN CALCIUM 20 MG: 10 TABLET, FILM COATED ORAL at 09:34

## 2025-08-05 RX ADMIN — GABAPENTIN 300 MG: 300 CAPSULE ORAL at 21:10

## 2025-08-05 RX ADMIN — HYDROCODONE BITARTRATE AND ACETAMINOPHEN 1 TABLET: 10; 325 TABLET ORAL at 21:10

## 2025-08-05 RX ADMIN — FERROUS SULFATE TAB 325 MG (65 MG ELEMENTAL FE) 325 MG: 325 (65 FE) TAB at 09:35

## 2025-08-05 RX ADMIN — HYDROCODONE BITARTRATE AND ACETAMINOPHEN 1 TABLET: 10; 325 TABLET ORAL at 15:36

## 2025-08-05 RX ADMIN — CEFAZOLIN 3000 MG: 3 INJECTION, POWDER, FOR SOLUTION INTRAVENOUS at 15:08

## 2025-08-05 RX ADMIN — TORSEMIDE 40 MG: 20 TABLET ORAL at 09:35

## 2025-08-05 RX ADMIN — LEVOTHYROXINE SODIUM 25 MCG: 0.03 TABLET ORAL at 06:00

## 2025-08-05 RX ADMIN — TAMSULOSIN HYDROCHLORIDE 0.4 MG: 0.4 CAPSULE ORAL at 09:34

## 2025-08-05 RX ADMIN — GABAPENTIN 300 MG: 300 CAPSULE ORAL at 09:34

## 2025-08-05 RX ADMIN — HYDROCODONE BITARTRATE AND ACETAMINOPHEN 1 TABLET: 10; 325 TABLET ORAL at 11:45

## 2025-08-05 RX ADMIN — INSULIN GLARGINE 25 UNITS: 100 INJECTION, SOLUTION SUBCUTANEOUS at 21:15

## 2025-08-05 RX ADMIN — TROSPIUM CHLORIDE 20 MG: 20 TABLET, FILM COATED ORAL at 06:00

## 2025-08-05 RX ADMIN — FINASTERIDE 5 MG: 5 TABLET, FILM COATED ORAL at 09:35

## 2025-08-05 RX ADMIN — CEFAZOLIN 3000 MG: 3 INJECTION, POWDER, FOR SOLUTION INTRAVENOUS at 02:26

## 2025-08-05 RX ADMIN — PANTOPRAZOLE SODIUM 40 MG: 40 TABLET, DELAYED RELEASE ORAL at 06:00

## 2025-08-05 RX ADMIN — TROSPIUM CHLORIDE 20 MG: 20 TABLET, FILM COATED ORAL at 15:51

## 2025-08-05 RX ADMIN — AMITRIPTYLINE HYDROCHLORIDE 25 MG: 25 TABLET ORAL at 21:10

## 2025-08-05 RX ADMIN — INSULIN LISPRO 2 UNITS: 100 INJECTION, SOLUTION INTRAVENOUS; SUBCUTANEOUS at 17:15

## 2025-08-05 RX ADMIN — INSULIN LISPRO 1 UNITS: 100 INJECTION, SOLUTION INTRAVENOUS; SUBCUTANEOUS at 17:14

## 2025-08-05 ASSESSMENT — PAIN SCALES - GENERAL
PAINLEVEL_OUTOF10: 9
PAINLEVEL_OUTOF10: 8
PAINLEVEL_OUTOF10: 9
PAINLEVEL_OUTOF10: 9
PAINLEVEL_OUTOF10: 5
PAINLEVEL_OUTOF10: 9
PAINLEVEL_OUTOF10: 8
PAINLEVEL_OUTOF10: 0
PAINLEVEL_OUTOF10: 8
PAINLEVEL_OUTOF10: 8

## 2025-08-05 ASSESSMENT — PAIN DESCRIPTION - ORIENTATION
ORIENTATION: LOWER
ORIENTATION: MID;LOWER
ORIENTATION: LOWER
ORIENTATION: LOWER;MID

## 2025-08-05 ASSESSMENT — PAIN DESCRIPTION - LOCATION
LOCATION: BACK

## 2025-08-05 ASSESSMENT — PAIN DESCRIPTION - DESCRIPTORS
DESCRIPTORS: ACHING;DISCOMFORT
DESCRIPTORS: DISCOMFORT
DESCRIPTORS: ACHING;DISCOMFORT
DESCRIPTORS: ACHING;SHARP

## 2025-08-06 PROBLEM — N18.4 CKD (CHRONIC KIDNEY DISEASE) STAGE 4, GFR 15-29 ML/MIN (HCC): Status: ACTIVE | Noted: 2025-08-06

## 2025-08-06 LAB
ANION GAP SERPL CALCULATED.3IONS-SCNC: 8 MMOL/L (ref 3–16)
BASOPHILS # BLD: 0 K/UL (ref 0–0.2)
BASOPHILS NFR BLD: 0.4 %
BUN SERPL-MCNC: 43 MG/DL (ref 7–20)
CALCIUM SERPL-MCNC: 8.2 MG/DL (ref 8.3–10.6)
CHLORIDE SERPL-SCNC: 101 MMOL/L (ref 99–110)
CO2 SERPL-SCNC: 27 MMOL/L (ref 21–32)
CREAT SERPL-MCNC: 2.4 MG/DL (ref 0.8–1.3)
DEPRECATED RDW RBC AUTO: 18.4 % (ref 12.4–15.4)
EOSINOPHIL # BLD: 0.6 K/UL (ref 0–0.6)
EOSINOPHIL NFR BLD: 5.6 %
GFR SERPLBLD CREATININE-BSD FMLA CKD-EPI: 29 ML/MIN/{1.73_M2}
GLUCOSE BLD-MCNC: 128 MG/DL (ref 70–99)
GLUCOSE BLD-MCNC: 160 MG/DL (ref 70–99)
GLUCOSE BLD-MCNC: 169 MG/DL (ref 70–99)
GLUCOSE BLD-MCNC: 175 MG/DL (ref 70–99)
GLUCOSE SERPL-MCNC: 160 MG/DL (ref 70–99)
HCT VFR BLD AUTO: 24.3 % (ref 40.5–52.5)
HGB BLD-MCNC: 7.7 G/DL (ref 13.5–17.5)
LYMPHOCYTES # BLD: 1.1 K/UL (ref 1–5.1)
LYMPHOCYTES NFR BLD: 9.4 %
MCH RBC QN AUTO: 28.9 PG (ref 26–34)
MCHC RBC AUTO-ENTMCNC: 31.8 G/DL (ref 31–36)
MCV RBC AUTO: 91.1 FL (ref 80–100)
MONOCYTES # BLD: 0.9 K/UL (ref 0–1.3)
MONOCYTES NFR BLD: 8.3 %
NEUTROPHILS # BLD: 8.5 K/UL (ref 1.7–7.7)
NEUTROPHILS NFR BLD: 76.3 %
PERFORMED ON: ABNORMAL
PLATELET # BLD AUTO: 381 K/UL (ref 135–450)
PMV BLD AUTO: 6.9 FL (ref 5–10.5)
POTASSIUM SERPL-SCNC: 4.2 MMOL/L (ref 3.5–5.1)
RBC # BLD AUTO: 2.67 M/UL (ref 4.2–5.9)
SODIUM SERPL-SCNC: 136 MMOL/L (ref 136–145)
WBC # BLD AUTO: 11.1 K/UL (ref 4–11)

## 2025-08-06 PROCEDURE — 97162 PT EVAL MOD COMPLEX 30 MIN: CPT

## 2025-08-06 PROCEDURE — 6370000000 HC RX 637 (ALT 250 FOR IP): Performed by: STUDENT IN AN ORGANIZED HEALTH CARE EDUCATION/TRAINING PROGRAM

## 2025-08-06 PROCEDURE — 99232 SBSQ HOSP IP/OBS MODERATE 35: CPT | Performed by: INTERNAL MEDICINE

## 2025-08-06 PROCEDURE — 85025 COMPLETE CBC W/AUTO DIFF WBC: CPT

## 2025-08-06 PROCEDURE — 97535 SELF CARE MNGMENT TRAINING: CPT

## 2025-08-06 PROCEDURE — 97166 OT EVAL MOD COMPLEX 45 MIN: CPT

## 2025-08-06 PROCEDURE — 6360000002 HC RX W HCPCS: Performed by: STUDENT IN AN ORGANIZED HEALTH CARE EDUCATION/TRAINING PROGRAM

## 2025-08-06 PROCEDURE — 80048 BASIC METABOLIC PNL TOTAL CA: CPT

## 2025-08-06 PROCEDURE — 97530 THERAPEUTIC ACTIVITIES: CPT

## 2025-08-06 PROCEDURE — 2500000003 HC RX 250 WO HCPCS: Performed by: STUDENT IN AN ORGANIZED HEALTH CARE EDUCATION/TRAINING PROGRAM

## 2025-08-06 PROCEDURE — 1280000000 HC REHAB R&B

## 2025-08-06 PROCEDURE — 97110 THERAPEUTIC EXERCISES: CPT

## 2025-08-06 PROCEDURE — 2580000003 HC RX 258: Performed by: STUDENT IN AN ORGANIZED HEALTH CARE EDUCATION/TRAINING PROGRAM

## 2025-08-06 RX ORDER — HYDROCODONE BITARTRATE AND ACETAMINOPHEN 10; 325 MG/1; MG/1
1 TABLET ORAL EVERY 4 HOURS PRN
Refills: 0 | Status: DISCONTINUED | OUTPATIENT
Start: 2025-08-06 | End: 2025-08-15 | Stop reason: HOSPADM

## 2025-08-06 RX ORDER — HEPARIN SODIUM 5000 [USP'U]/ML
5000 INJECTION, SOLUTION INTRAVENOUS; SUBCUTANEOUS EVERY 8 HOURS SCHEDULED
Status: DISCONTINUED | OUTPATIENT
Start: 2025-08-06 | End: 2025-08-15 | Stop reason: HOSPADM

## 2025-08-06 RX ADMIN — OXYBUTYNIN CHLORIDE 15 MG: 5 TABLET, EXTENDED RELEASE ORAL at 07:56

## 2025-08-06 RX ADMIN — FERROUS SULFATE TAB 325 MG (65 MG ELEMENTAL FE) 325 MG: 325 (65 FE) TAB at 07:56

## 2025-08-06 RX ADMIN — SODIUM ZIRCONIUM CYCLOSILICATE 10 G: 10 POWDER, FOR SUSPENSION ORAL at 12:02

## 2025-08-06 RX ADMIN — HYDROCODONE BITARTRATE AND ACETAMINOPHEN 1 TABLET: 10; 325 TABLET ORAL at 07:56

## 2025-08-06 RX ADMIN — SODIUM CHLORIDE, PRESERVATIVE FREE 10 ML: 5 INJECTION INTRAVENOUS at 21:10

## 2025-08-06 RX ADMIN — AMITRIPTYLINE HYDROCHLORIDE 25 MG: 25 TABLET ORAL at 21:10

## 2025-08-06 RX ADMIN — TROSPIUM CHLORIDE 20 MG: 20 TABLET, FILM COATED ORAL at 16:57

## 2025-08-06 RX ADMIN — FOLIC ACID 1 MG: 1 TABLET ORAL at 07:56

## 2025-08-06 RX ADMIN — CEFAZOLIN 3000 MG: 3 INJECTION, POWDER, FOR SOLUTION INTRAVENOUS at 14:27

## 2025-08-06 RX ADMIN — Medication 3 MG: at 21:10

## 2025-08-06 RX ADMIN — HYDROCODONE BITARTRATE AND ACETAMINOPHEN 1 TABLET: 10; 325 TABLET ORAL at 12:00

## 2025-08-06 RX ADMIN — GABAPENTIN 300 MG: 300 CAPSULE ORAL at 14:31

## 2025-08-06 RX ADMIN — HYDROCODONE BITARTRATE AND ACETAMINOPHEN 1 TABLET: 10; 325 TABLET ORAL at 16:56

## 2025-08-06 RX ADMIN — PANTOPRAZOLE SODIUM 40 MG: 40 TABLET, DELAYED RELEASE ORAL at 06:22

## 2025-08-06 RX ADMIN — CEFAZOLIN 3000 MG: 3 INJECTION, POWDER, FOR SOLUTION INTRAVENOUS at 02:26

## 2025-08-06 RX ADMIN — GABAPENTIN 300 MG: 300 CAPSULE ORAL at 07:56

## 2025-08-06 RX ADMIN — GABAPENTIN 300 MG: 300 CAPSULE ORAL at 21:11

## 2025-08-06 RX ADMIN — TORSEMIDE 40 MG: 20 TABLET ORAL at 07:55

## 2025-08-06 RX ADMIN — INSULIN LISPRO 2 UNITS: 100 INJECTION, SOLUTION INTRAVENOUS; SUBCUTANEOUS at 12:00

## 2025-08-06 RX ADMIN — SODIUM CHLORIDE, PRESERVATIVE FREE 10 ML: 5 INJECTION INTRAVENOUS at 07:57

## 2025-08-06 RX ADMIN — INSULIN LISPRO 2 UNITS: 100 INJECTION, SOLUTION INTRAVENOUS; SUBCUTANEOUS at 16:57

## 2025-08-06 RX ADMIN — TROSPIUM CHLORIDE 20 MG: 20 TABLET, FILM COATED ORAL at 06:22

## 2025-08-06 RX ADMIN — INSULIN LISPRO 2 UNITS: 100 INJECTION, SOLUTION INTRAVENOUS; SUBCUTANEOUS at 07:55

## 2025-08-06 RX ADMIN — ATORVASTATIN CALCIUM 20 MG: 10 TABLET, FILM COATED ORAL at 07:56

## 2025-08-06 RX ADMIN — FINASTERIDE 5 MG: 5 TABLET, FILM COATED ORAL at 07:55

## 2025-08-06 RX ADMIN — TAMSULOSIN HYDROCHLORIDE 0.4 MG: 0.4 CAPSULE ORAL at 07:56

## 2025-08-06 RX ADMIN — INSULIN GLARGINE 25 UNITS: 100 INJECTION, SOLUTION SUBCUTANEOUS at 21:10

## 2025-08-06 RX ADMIN — LEVOTHYROXINE SODIUM 25 MCG: 0.03 TABLET ORAL at 06:22

## 2025-08-06 ASSESSMENT — PAIN DESCRIPTION - LOCATION: LOCATION: GENERALIZED;LEG;BACK

## 2025-08-06 ASSESSMENT — PAIN SCALES - GENERAL
PAINLEVEL_OUTOF10: 8
PAINLEVEL_OUTOF10: 6

## 2025-08-07 LAB
GLUCOSE BLD-MCNC: 111 MG/DL (ref 70–99)
GLUCOSE BLD-MCNC: 115 MG/DL (ref 70–99)
GLUCOSE BLD-MCNC: 131 MG/DL (ref 70–99)
GLUCOSE BLD-MCNC: 173 MG/DL (ref 70–99)
PERFORMED ON: ABNORMAL

## 2025-08-07 PROCEDURE — 97530 THERAPEUTIC ACTIVITIES: CPT

## 2025-08-07 PROCEDURE — 1280000000 HC REHAB R&B

## 2025-08-07 PROCEDURE — 2580000003 HC RX 258: Performed by: STUDENT IN AN ORGANIZED HEALTH CARE EDUCATION/TRAINING PROGRAM

## 2025-08-07 PROCEDURE — 97535 SELF CARE MNGMENT TRAINING: CPT

## 2025-08-07 PROCEDURE — 6370000000 HC RX 637 (ALT 250 FOR IP): Performed by: STUDENT IN AN ORGANIZED HEALTH CARE EDUCATION/TRAINING PROGRAM

## 2025-08-07 PROCEDURE — 99232 SBSQ HOSP IP/OBS MODERATE 35: CPT | Performed by: INTERNAL MEDICINE

## 2025-08-07 PROCEDURE — 97112 NEUROMUSCULAR REEDUCATION: CPT

## 2025-08-07 PROCEDURE — 97110 THERAPEUTIC EXERCISES: CPT

## 2025-08-07 PROCEDURE — 6360000002 HC RX W HCPCS: Performed by: STUDENT IN AN ORGANIZED HEALTH CARE EDUCATION/TRAINING PROGRAM

## 2025-08-07 PROCEDURE — 2500000003 HC RX 250 WO HCPCS: Performed by: STUDENT IN AN ORGANIZED HEALTH CARE EDUCATION/TRAINING PROGRAM

## 2025-08-07 RX ORDER — POLYETHYLENE GLYCOL 3350 17 G/17G
17 POWDER, FOR SOLUTION ORAL DAILY
Status: DISCONTINUED | OUTPATIENT
Start: 2025-08-07 | End: 2025-08-14

## 2025-08-07 RX ORDER — METHOCARBAMOL 500 MG/1
1000 TABLET, FILM COATED ORAL 3 TIMES DAILY
Status: DISCONTINUED | OUTPATIENT
Start: 2025-08-07 | End: 2025-08-12

## 2025-08-07 RX ORDER — TORSEMIDE 20 MG/1
20 TABLET ORAL DAILY
Status: DISCONTINUED | OUTPATIENT
Start: 2025-08-08 | End: 2025-08-09

## 2025-08-07 RX ORDER — DOCUSATE SODIUM 100 MG/1
100 CAPSULE, LIQUID FILLED ORAL DAILY
Status: DISCONTINUED | OUTPATIENT
Start: 2025-08-07 | End: 2025-08-14

## 2025-08-07 RX ADMIN — INSULIN LISPRO 2 UNITS: 100 INJECTION, SOLUTION INTRAVENOUS; SUBCUTANEOUS at 09:06

## 2025-08-07 RX ADMIN — LEVOTHYROXINE SODIUM 25 MCG: 0.03 TABLET ORAL at 06:36

## 2025-08-07 RX ADMIN — Medication 10 MG: at 22:12

## 2025-08-07 RX ADMIN — FERROUS SULFATE TAB 325 MG (65 MG ELEMENTAL FE) 325 MG: 325 (65 FE) TAB at 09:07

## 2025-08-07 RX ADMIN — GABAPENTIN 300 MG: 300 CAPSULE ORAL at 09:07

## 2025-08-07 RX ADMIN — GABAPENTIN 300 MG: 300 CAPSULE ORAL at 22:13

## 2025-08-07 RX ADMIN — METHOCARBAMOL 1000 MG: 500 TABLET ORAL at 22:13

## 2025-08-07 RX ADMIN — ATORVASTATIN CALCIUM 20 MG: 10 TABLET, FILM COATED ORAL at 09:07

## 2025-08-07 RX ADMIN — PANTOPRAZOLE SODIUM 40 MG: 40 TABLET, DELAYED RELEASE ORAL at 06:36

## 2025-08-07 RX ADMIN — METHOCARBAMOL 1000 MG: 500 TABLET ORAL at 13:30

## 2025-08-07 RX ADMIN — DOCUSATE SODIUM 100 MG: 100 CAPSULE, LIQUID FILLED ORAL at 22:18

## 2025-08-07 RX ADMIN — TAMSULOSIN HYDROCHLORIDE 0.4 MG: 0.4 CAPSULE ORAL at 09:07

## 2025-08-07 RX ADMIN — POLYETHYLENE GLYCOL 3350 17 G: 17 POWDER, FOR SOLUTION ORAL at 17:36

## 2025-08-07 RX ADMIN — HYDROCODONE BITARTRATE AND ACETAMINOPHEN 1 TABLET: 10; 325 TABLET ORAL at 09:15

## 2025-08-07 RX ADMIN — SODIUM ZIRCONIUM CYCLOSILICATE 10 G: 10 POWDER, FOR SUSPENSION ORAL at 09:06

## 2025-08-07 RX ADMIN — HYDROCODONE BITARTRATE AND ACETAMINOPHEN 1 TABLET: 10; 325 TABLET ORAL at 22:12

## 2025-08-07 RX ADMIN — INSULIN LISPRO 2 UNITS: 100 INJECTION, SOLUTION INTRAVENOUS; SUBCUTANEOUS at 13:31

## 2025-08-07 RX ADMIN — FOLIC ACID 1 MG: 1 TABLET ORAL at 09:07

## 2025-08-07 RX ADMIN — FINASTERIDE 5 MG: 5 TABLET, FILM COATED ORAL at 09:07

## 2025-08-07 RX ADMIN — GABAPENTIN 300 MG: 300 CAPSULE ORAL at 14:33

## 2025-08-07 RX ADMIN — Medication 3 MG: at 22:13

## 2025-08-07 RX ADMIN — TROSPIUM CHLORIDE 20 MG: 20 TABLET, FILM COATED ORAL at 16:44

## 2025-08-07 RX ADMIN — TRAMADOL HYDROCHLORIDE 50 MG: 50 TABLET, COATED ORAL at 11:00

## 2025-08-07 RX ADMIN — OXYBUTYNIN CHLORIDE 15 MG: 5 TABLET, EXTENDED RELEASE ORAL at 09:07

## 2025-08-07 RX ADMIN — CEFAZOLIN 3000 MG: 3 INJECTION, POWDER, FOR SOLUTION INTRAVENOUS at 01:29

## 2025-08-07 RX ADMIN — TORSEMIDE 40 MG: 20 TABLET ORAL at 09:07

## 2025-08-07 RX ADMIN — TROSPIUM CHLORIDE 20 MG: 20 TABLET, FILM COATED ORAL at 06:36

## 2025-08-07 RX ADMIN — CEFAZOLIN 3000 MG: 3 INJECTION, POWDER, FOR SOLUTION INTRAVENOUS at 14:30

## 2025-08-07 RX ADMIN — AMITRIPTYLINE HYDROCHLORIDE 25 MG: 25 TABLET ORAL at 22:13

## 2025-08-07 RX ADMIN — INSULIN LISPRO 2 UNITS: 100 INJECTION, SOLUTION INTRAVENOUS; SUBCUTANEOUS at 16:44

## 2025-08-07 RX ADMIN — INSULIN GLARGINE 25 UNITS: 100 INJECTION, SOLUTION SUBCUTANEOUS at 22:11

## 2025-08-07 RX ADMIN — SODIUM CHLORIDE, PRESERVATIVE FREE 10 ML: 5 INJECTION INTRAVENOUS at 13:32

## 2025-08-07 RX ADMIN — POLYETHYLENE GLYCOL 3350 17 G: 17 POWDER, FOR SOLUTION ORAL at 22:19

## 2025-08-07 ASSESSMENT — PAIN SCALES - GENERAL
PAINLEVEL_OUTOF10: 5
PAINLEVEL_OUTOF10: 8
PAINLEVEL_OUTOF10: 9
PAINLEVEL_OUTOF10: 8
PAINLEVEL_OUTOF10: 10

## 2025-08-07 ASSESSMENT — PAIN DESCRIPTION - LOCATION
LOCATION: BACK;LEG
LOCATION: BACK
LOCATION: BACK

## 2025-08-07 ASSESSMENT — PAIN DESCRIPTION - ORIENTATION
ORIENTATION: LOWER
ORIENTATION: LOWER
ORIENTATION: RIGHT

## 2025-08-07 ASSESSMENT — PAIN DESCRIPTION - DESCRIPTORS
DESCRIPTORS: ACHING;DISCOMFORT
DESCRIPTORS: SHARP;SORE
DESCRIPTORS: DISCOMFORT

## 2025-08-07 ASSESSMENT — PAIN - FUNCTIONAL ASSESSMENT: PAIN_FUNCTIONAL_ASSESSMENT: PREVENTS OR INTERFERES SOME ACTIVE ACTIVITIES AND ADLS

## 2025-08-08 LAB
ANION GAP SERPL CALCULATED.3IONS-SCNC: 10 MMOL/L (ref 3–16)
BASOPHILS # BLD: 0 K/UL (ref 0–0.2)
BASOPHILS NFR BLD: 0.4 %
BUN SERPL-MCNC: 43 MG/DL (ref 7–20)
CALCIUM SERPL-MCNC: 8.4 MG/DL (ref 8.3–10.6)
CHLORIDE SERPL-SCNC: 97 MMOL/L (ref 99–110)
CO2 SERPL-SCNC: 27 MMOL/L (ref 21–32)
CREAT SERPL-MCNC: 2.5 MG/DL (ref 0.8–1.3)
DEPRECATED RDW RBC AUTO: 17.9 % (ref 12.4–15.4)
EOSINOPHIL # BLD: 0.8 K/UL (ref 0–0.6)
EOSINOPHIL NFR BLD: 7.6 %
GFR SERPLBLD CREATININE-BSD FMLA CKD-EPI: 28 ML/MIN/{1.73_M2}
GLUCOSE BLD-MCNC: 117 MG/DL (ref 70–99)
GLUCOSE BLD-MCNC: 124 MG/DL (ref 70–99)
GLUCOSE BLD-MCNC: 126 MG/DL (ref 70–99)
GLUCOSE BLD-MCNC: 83 MG/DL (ref 70–99)
GLUCOSE BLD-MCNC: 93 MG/DL (ref 70–99)
GLUCOSE SERPL-MCNC: 124 MG/DL (ref 70–99)
HCT VFR BLD AUTO: 23.7 % (ref 40.5–52.5)
HGB BLD-MCNC: 7.8 G/DL (ref 13.5–17.5)
LYMPHOCYTES # BLD: 1.2 K/UL (ref 1–5.1)
LYMPHOCYTES NFR BLD: 11.7 %
MCH RBC QN AUTO: 29.6 PG (ref 26–34)
MCHC RBC AUTO-ENTMCNC: 32.8 G/DL (ref 31–36)
MCV RBC AUTO: 90.2 FL (ref 80–100)
MONOCYTES # BLD: 1.1 K/UL (ref 0–1.3)
MONOCYTES NFR BLD: 10.6 %
NEUTROPHILS # BLD: 7.1 K/UL (ref 1.7–7.7)
NEUTROPHILS NFR BLD: 69.7 %
PERFORMED ON: ABNORMAL
PERFORMED ON: NORMAL
PERFORMED ON: NORMAL
PLATELET # BLD AUTO: 373 K/UL (ref 135–450)
PMV BLD AUTO: 6.9 FL (ref 5–10.5)
POTASSIUM SERPL-SCNC: 3.8 MMOL/L (ref 3.5–5.1)
RBC # BLD AUTO: 2.63 M/UL (ref 4.2–5.9)
SODIUM SERPL-SCNC: 134 MMOL/L (ref 136–145)
WBC # BLD AUTO: 10.1 K/UL (ref 4–11)

## 2025-08-08 PROCEDURE — 97535 SELF CARE MNGMENT TRAINING: CPT

## 2025-08-08 PROCEDURE — 99232 SBSQ HOSP IP/OBS MODERATE 35: CPT | Performed by: INTERNAL MEDICINE

## 2025-08-08 PROCEDURE — 97110 THERAPEUTIC EXERCISES: CPT

## 2025-08-08 PROCEDURE — 80048 BASIC METABOLIC PNL TOTAL CA: CPT

## 2025-08-08 PROCEDURE — 6370000000 HC RX 637 (ALT 250 FOR IP): Performed by: STUDENT IN AN ORGANIZED HEALTH CARE EDUCATION/TRAINING PROGRAM

## 2025-08-08 PROCEDURE — 2580000003 HC RX 258: Performed by: STUDENT IN AN ORGANIZED HEALTH CARE EDUCATION/TRAINING PROGRAM

## 2025-08-08 PROCEDURE — 85025 COMPLETE CBC W/AUTO DIFF WBC: CPT

## 2025-08-08 PROCEDURE — 97530 THERAPEUTIC ACTIVITIES: CPT

## 2025-08-08 PROCEDURE — 1280000000 HC REHAB R&B

## 2025-08-08 PROCEDURE — 6360000002 HC RX W HCPCS: Performed by: STUDENT IN AN ORGANIZED HEALTH CARE EDUCATION/TRAINING PROGRAM

## 2025-08-08 PROCEDURE — 2500000003 HC RX 250 WO HCPCS: Performed by: STUDENT IN AN ORGANIZED HEALTH CARE EDUCATION/TRAINING PROGRAM

## 2025-08-08 PROCEDURE — 6370000000 HC RX 637 (ALT 250 FOR IP): Performed by: INTERNAL MEDICINE

## 2025-08-08 PROCEDURE — 97542 WHEELCHAIR MNGMENT TRAINING: CPT

## 2025-08-08 RX ADMIN — Medication 3 MG: at 20:31

## 2025-08-08 RX ADMIN — SODIUM CHLORIDE, PRESERVATIVE FREE 10 ML: 5 INJECTION INTRAVENOUS at 02:26

## 2025-08-08 RX ADMIN — INSULIN LISPRO 2 UNITS: 100 INJECTION, SOLUTION INTRAVENOUS; SUBCUTANEOUS at 17:00

## 2025-08-08 RX ADMIN — FINASTERIDE 5 MG: 5 TABLET, FILM COATED ORAL at 09:15

## 2025-08-08 RX ADMIN — LEVOTHYROXINE SODIUM 25 MCG: 0.03 TABLET ORAL at 06:29

## 2025-08-08 RX ADMIN — CEFAZOLIN 3000 MG: 3 INJECTION, POWDER, FOR SOLUTION INTRAVENOUS at 14:45

## 2025-08-08 RX ADMIN — TORSEMIDE 20 MG: 20 TABLET ORAL at 09:15

## 2025-08-08 RX ADMIN — SODIUM ZIRCONIUM CYCLOSILICATE 10 G: 10 POWDER, FOR SUSPENSION ORAL at 09:14

## 2025-08-08 RX ADMIN — INSULIN GLARGINE 25 UNITS: 100 INJECTION, SOLUTION SUBCUTANEOUS at 20:31

## 2025-08-08 RX ADMIN — METHOCARBAMOL 1000 MG: 500 TABLET ORAL at 20:31

## 2025-08-08 RX ADMIN — POLYETHYLENE GLYCOL 3350 17 G: 17 POWDER, FOR SOLUTION ORAL at 09:13

## 2025-08-08 RX ADMIN — SODIUM CHLORIDE, PRESERVATIVE FREE 10 ML: 5 INJECTION INTRAVENOUS at 20:32

## 2025-08-08 RX ADMIN — GABAPENTIN 300 MG: 300 CAPSULE ORAL at 09:15

## 2025-08-08 RX ADMIN — INSULIN LISPRO 2 UNITS: 100 INJECTION, SOLUTION INTRAVENOUS; SUBCUTANEOUS at 11:51

## 2025-08-08 RX ADMIN — HYDROCODONE BITARTRATE AND ACETAMINOPHEN 1 TABLET: 10; 325 TABLET ORAL at 18:56

## 2025-08-08 RX ADMIN — GABAPENTIN 300 MG: 300 CAPSULE ORAL at 14:41

## 2025-08-08 RX ADMIN — FOLIC ACID 1 MG: 1 TABLET ORAL at 09:15

## 2025-08-08 RX ADMIN — DOCUSATE SODIUM 100 MG: 100 CAPSULE, LIQUID FILLED ORAL at 10:50

## 2025-08-08 RX ADMIN — HYDROCODONE BITARTRATE AND ACETAMINOPHEN 1 TABLET: 10; 325 TABLET ORAL at 09:14

## 2025-08-08 RX ADMIN — OXYBUTYNIN CHLORIDE 15 MG: 5 TABLET, EXTENDED RELEASE ORAL at 09:15

## 2025-08-08 RX ADMIN — TROSPIUM CHLORIDE 20 MG: 20 TABLET, FILM COATED ORAL at 17:00

## 2025-08-08 RX ADMIN — METHOCARBAMOL 1000 MG: 500 TABLET ORAL at 14:41

## 2025-08-08 RX ADMIN — METHOCARBAMOL 1000 MG: 500 TABLET ORAL at 09:15

## 2025-08-08 RX ADMIN — INSULIN LISPRO 2 UNITS: 100 INJECTION, SOLUTION INTRAVENOUS; SUBCUTANEOUS at 09:14

## 2025-08-08 RX ADMIN — CEFAZOLIN 3000 MG: 3 INJECTION, POWDER, FOR SOLUTION INTRAVENOUS at 02:27

## 2025-08-08 RX ADMIN — TAMSULOSIN HYDROCHLORIDE 0.4 MG: 0.4 CAPSULE ORAL at 10:49

## 2025-08-08 RX ADMIN — SODIUM CHLORIDE, PRESERVATIVE FREE 10 ML: 5 INJECTION INTRAVENOUS at 10:51

## 2025-08-08 RX ADMIN — TRAMADOL HYDROCHLORIDE 50 MG: 50 TABLET, COATED ORAL at 20:32

## 2025-08-08 RX ADMIN — TROSPIUM CHLORIDE 20 MG: 20 TABLET, FILM COATED ORAL at 06:28

## 2025-08-08 RX ADMIN — FERROUS SULFATE TAB 325 MG (65 MG ELEMENTAL FE) 325 MG: 325 (65 FE) TAB at 10:49

## 2025-08-08 RX ADMIN — ATORVASTATIN CALCIUM 20 MG: 10 TABLET, FILM COATED ORAL at 09:15

## 2025-08-08 RX ADMIN — AMITRIPTYLINE HYDROCHLORIDE 25 MG: 25 TABLET ORAL at 20:31

## 2025-08-08 RX ADMIN — GABAPENTIN 300 MG: 300 CAPSULE ORAL at 20:31

## 2025-08-08 RX ADMIN — PANTOPRAZOLE SODIUM 40 MG: 40 TABLET, DELAYED RELEASE ORAL at 06:29

## 2025-08-08 ASSESSMENT — PAIN SCALES - GENERAL
PAINLEVEL_OUTOF10: 6
PAINLEVEL_OUTOF10: 8
PAINLEVEL_OUTOF10: 10
PAINLEVEL_OUTOF10: 8
PAINLEVEL_OUTOF10: 4

## 2025-08-08 ASSESSMENT — PAIN DESCRIPTION - DESCRIPTORS
DESCRIPTORS: ACHING
DESCRIPTORS: ACHING
DESCRIPTORS: ACHING;DISCOMFORT
DESCRIPTORS: ACHING;DISCOMFORT

## 2025-08-08 ASSESSMENT — PAIN DESCRIPTION - LOCATION
LOCATION: BACK;LEG
LOCATION: BACK
LOCATION: LEG
LOCATION: LEG

## 2025-08-08 ASSESSMENT — PAIN DESCRIPTION - ORIENTATION
ORIENTATION: RIGHT

## 2025-08-09 LAB
GLUCOSE BLD-MCNC: 104 MG/DL (ref 70–99)
GLUCOSE BLD-MCNC: 115 MG/DL (ref 70–99)
GLUCOSE BLD-MCNC: 70 MG/DL (ref 70–99)
GLUCOSE BLD-MCNC: 73 MG/DL (ref 70–99)
GLUCOSE BLD-MCNC: 74 MG/DL (ref 70–99)
GLUCOSE BLD-MCNC: 79 MG/DL (ref 70–99)
GLUCOSE BLD-MCNC: 84 MG/DL (ref 70–99)
GLUCOSE BLD-MCNC: 90 MG/DL (ref 70–99)
PERFORMED ON: ABNORMAL
PERFORMED ON: ABNORMAL
PERFORMED ON: NORMAL

## 2025-08-09 PROCEDURE — 2500000003 HC RX 250 WO HCPCS: Performed by: STUDENT IN AN ORGANIZED HEALTH CARE EDUCATION/TRAINING PROGRAM

## 2025-08-09 PROCEDURE — 99232 SBSQ HOSP IP/OBS MODERATE 35: CPT | Performed by: INTERNAL MEDICINE

## 2025-08-09 PROCEDURE — 6360000002 HC RX W HCPCS: Performed by: STUDENT IN AN ORGANIZED HEALTH CARE EDUCATION/TRAINING PROGRAM

## 2025-08-09 PROCEDURE — 97530 THERAPEUTIC ACTIVITIES: CPT

## 2025-08-09 PROCEDURE — 6370000000 HC RX 637 (ALT 250 FOR IP): Performed by: STUDENT IN AN ORGANIZED HEALTH CARE EDUCATION/TRAINING PROGRAM

## 2025-08-09 PROCEDURE — 6370000000 HC RX 637 (ALT 250 FOR IP): Performed by: INTERNAL MEDICINE

## 2025-08-09 PROCEDURE — 97542 WHEELCHAIR MNGMENT TRAINING: CPT

## 2025-08-09 PROCEDURE — 97535 SELF CARE MNGMENT TRAINING: CPT

## 2025-08-09 PROCEDURE — 1280000000 HC REHAB R&B

## 2025-08-09 PROCEDURE — 2580000003 HC RX 258: Performed by: STUDENT IN AN ORGANIZED HEALTH CARE EDUCATION/TRAINING PROGRAM

## 2025-08-09 RX ORDER — TORSEMIDE 20 MG/1
40 TABLET ORAL DAILY
Status: DISCONTINUED | OUTPATIENT
Start: 2025-08-10 | End: 2025-08-13

## 2025-08-09 RX ORDER — INSULIN GLARGINE-YFGN 100 [IU]/ML
25 INJECTION, SOLUTION SUBCUTANEOUS NIGHTLY
Status: DISCONTINUED | OUTPATIENT
Start: 2025-08-09 | End: 2025-08-12

## 2025-08-09 RX ADMIN — METHOCARBAMOL 1000 MG: 500 TABLET ORAL at 14:55

## 2025-08-09 RX ADMIN — SODIUM ZIRCONIUM CYCLOSILICATE 10 G: 10 POWDER, FOR SUSPENSION ORAL at 08:07

## 2025-08-09 RX ADMIN — HYDROCODONE BITARTRATE AND ACETAMINOPHEN 1 TABLET: 10; 325 TABLET ORAL at 12:34

## 2025-08-09 RX ADMIN — HYDROCODONE BITARTRATE AND ACETAMINOPHEN 1 TABLET: 10; 325 TABLET ORAL at 22:26

## 2025-08-09 RX ADMIN — Medication 3 MG: at 22:26

## 2025-08-09 RX ADMIN — HYDROCODONE BITARTRATE AND ACETAMINOPHEN 1 TABLET: 10; 325 TABLET ORAL at 08:06

## 2025-08-09 RX ADMIN — SODIUM CHLORIDE, PRESERVATIVE FREE 10 ML: 5 INJECTION INTRAVENOUS at 22:41

## 2025-08-09 RX ADMIN — INSULIN LISPRO 2 UNITS: 100 INJECTION, SOLUTION INTRAVENOUS; SUBCUTANEOUS at 12:35

## 2025-08-09 RX ADMIN — FOLIC ACID 1 MG: 1 TABLET ORAL at 08:07

## 2025-08-09 RX ADMIN — GABAPENTIN 300 MG: 300 CAPSULE ORAL at 08:08

## 2025-08-09 RX ADMIN — SODIUM CHLORIDE, PRESERVATIVE FREE 10 ML: 5 INJECTION INTRAVENOUS at 08:08

## 2025-08-09 RX ADMIN — PANTOPRAZOLE SODIUM 40 MG: 40 TABLET, DELAYED RELEASE ORAL at 06:17

## 2025-08-09 RX ADMIN — AMITRIPTYLINE HYDROCHLORIDE 25 MG: 25 TABLET ORAL at 22:26

## 2025-08-09 RX ADMIN — DOCUSATE SODIUM 100 MG: 100 CAPSULE, LIQUID FILLED ORAL at 08:07

## 2025-08-09 RX ADMIN — GABAPENTIN 300 MG: 300 CAPSULE ORAL at 14:55

## 2025-08-09 RX ADMIN — GABAPENTIN 300 MG: 300 CAPSULE ORAL at 22:26

## 2025-08-09 RX ADMIN — ATORVASTATIN CALCIUM 20 MG: 10 TABLET, FILM COATED ORAL at 08:07

## 2025-08-09 RX ADMIN — FERROUS SULFATE TAB 325 MG (65 MG ELEMENTAL FE) 325 MG: 325 (65 FE) TAB at 08:07

## 2025-08-09 RX ADMIN — TAMSULOSIN HYDROCHLORIDE 0.4 MG: 0.4 CAPSULE ORAL at 08:07

## 2025-08-09 RX ADMIN — SODIUM CHLORIDE: 0.9 INJECTION, SOLUTION INTRAVENOUS at 14:53

## 2025-08-09 RX ADMIN — CEFAZOLIN 3000 MG: 3 INJECTION, POWDER, FOR SOLUTION INTRAVENOUS at 02:37

## 2025-08-09 RX ADMIN — METHOCARBAMOL 1000 MG: 500 TABLET ORAL at 22:26

## 2025-08-09 RX ADMIN — CEFAZOLIN 3000 MG: 3 INJECTION, POWDER, FOR SOLUTION INTRAVENOUS at 14:54

## 2025-08-09 RX ADMIN — METHOCARBAMOL 1000 MG: 500 TABLET ORAL at 08:07

## 2025-08-09 RX ADMIN — TROSPIUM CHLORIDE 20 MG: 20 TABLET, FILM COATED ORAL at 06:17

## 2025-08-09 RX ADMIN — TORSEMIDE 20 MG: 20 TABLET ORAL at 08:08

## 2025-08-09 RX ADMIN — POLYETHYLENE GLYCOL 3350 17 G: 17 POWDER, FOR SOLUTION ORAL at 08:07

## 2025-08-09 RX ADMIN — OXYBUTYNIN CHLORIDE 15 MG: 5 TABLET, EXTENDED RELEASE ORAL at 08:08

## 2025-08-09 RX ADMIN — LEVOTHYROXINE SODIUM 25 MCG: 0.03 TABLET ORAL at 06:17

## 2025-08-09 RX ADMIN — FINASTERIDE 5 MG: 5 TABLET, FILM COATED ORAL at 08:07

## 2025-08-09 RX ADMIN — TROSPIUM CHLORIDE 20 MG: 20 TABLET, FILM COATED ORAL at 14:55

## 2025-08-09 ASSESSMENT — PAIN SCALES - GENERAL
PAINLEVEL_OUTOF10: 8
PAINLEVEL_OUTOF10: 8
PAINLEVEL_OUTOF10: 9
PAINLEVEL_OUTOF10: 8

## 2025-08-09 ASSESSMENT — PAIN DESCRIPTION - LOCATION
LOCATION: BACK
LOCATION: BACK;BUTTOCKS
LOCATION: BACK
LOCATION: BACK

## 2025-08-09 ASSESSMENT — PAIN DESCRIPTION - DESCRIPTORS
DESCRIPTORS: ACHING;DISCOMFORT
DESCRIPTORS: ACHING;BURNING
DESCRIPTORS: ACHING
DESCRIPTORS: ACHING

## 2025-08-09 ASSESSMENT — PAIN DESCRIPTION - ORIENTATION
ORIENTATION: POSTERIOR
ORIENTATION: POSTERIOR
ORIENTATION: LOWER

## 2025-08-09 ASSESSMENT — PAIN - FUNCTIONAL ASSESSMENT
PAIN_FUNCTIONAL_ASSESSMENT: 0-10
PAIN_FUNCTIONAL_ASSESSMENT: 0-10
PAIN_FUNCTIONAL_ASSESSMENT: PREVENTS OR INTERFERES SOME ACTIVE ACTIVITIES AND ADLS

## 2025-08-10 VITALS
DIASTOLIC BLOOD PRESSURE: 64 MMHG | WEIGHT: 298.5 LBS | SYSTOLIC BLOOD PRESSURE: 111 MMHG | OXYGEN SATURATION: 99 % | BODY MASS INDEX: 38.31 KG/M2 | RESPIRATION RATE: 18 BRPM | TEMPERATURE: 100.6 F | HEIGHT: 74 IN | HEART RATE: 96 BPM

## 2025-08-10 LAB
GLUCOSE BLD-MCNC: 135 MG/DL (ref 70–99)
GLUCOSE BLD-MCNC: 163 MG/DL (ref 70–99)
GLUCOSE BLD-MCNC: 173 MG/DL (ref 70–99)
GLUCOSE BLD-MCNC: 173 MG/DL (ref 70–99)
PERFORMED ON: ABNORMAL

## 2025-08-10 PROCEDURE — 2500000003 HC RX 250 WO HCPCS: Performed by: STUDENT IN AN ORGANIZED HEALTH CARE EDUCATION/TRAINING PROGRAM

## 2025-08-10 PROCEDURE — 6370000000 HC RX 637 (ALT 250 FOR IP): Performed by: STUDENT IN AN ORGANIZED HEALTH CARE EDUCATION/TRAINING PROGRAM

## 2025-08-10 PROCEDURE — 6370000000 HC RX 637 (ALT 250 FOR IP): Performed by: INTERNAL MEDICINE

## 2025-08-10 PROCEDURE — 6360000002 HC RX W HCPCS: Performed by: STUDENT IN AN ORGANIZED HEALTH CARE EDUCATION/TRAINING PROGRAM

## 2025-08-10 PROCEDURE — 1280000000 HC REHAB R&B

## 2025-08-10 PROCEDURE — 2580000003 HC RX 258: Performed by: STUDENT IN AN ORGANIZED HEALTH CARE EDUCATION/TRAINING PROGRAM

## 2025-08-10 PROCEDURE — 99232 SBSQ HOSP IP/OBS MODERATE 35: CPT | Performed by: INTERNAL MEDICINE

## 2025-08-10 PROCEDURE — S5553 INSULIN LONG ACTING 5 U: HCPCS | Performed by: STUDENT IN AN ORGANIZED HEALTH CARE EDUCATION/TRAINING PROGRAM

## 2025-08-10 RX ADMIN — TROSPIUM CHLORIDE 20 MG: 20 TABLET, FILM COATED ORAL at 06:03

## 2025-08-10 RX ADMIN — INSULIN LISPRO 2 UNITS: 100 INJECTION, SOLUTION INTRAVENOUS; SUBCUTANEOUS at 12:23

## 2025-08-10 RX ADMIN — METHOCARBAMOL 1000 MG: 500 TABLET ORAL at 20:36

## 2025-08-10 RX ADMIN — FERROUS SULFATE TAB 325 MG (65 MG ELEMENTAL FE) 325 MG: 325 (65 FE) TAB at 09:05

## 2025-08-10 RX ADMIN — Medication 3 MG: at 20:36

## 2025-08-10 RX ADMIN — PANTOPRAZOLE SODIUM 40 MG: 40 TABLET, DELAYED RELEASE ORAL at 06:02

## 2025-08-10 RX ADMIN — ATORVASTATIN CALCIUM 20 MG: 10 TABLET, FILM COATED ORAL at 09:05

## 2025-08-10 RX ADMIN — TAMSULOSIN HYDROCHLORIDE 0.4 MG: 0.4 CAPSULE ORAL at 09:05

## 2025-08-10 RX ADMIN — AMITRIPTYLINE HYDROCHLORIDE 25 MG: 25 TABLET ORAL at 20:36

## 2025-08-10 RX ADMIN — INSULIN GLARGINE-YFGN 25 UNITS: 100 INJECTION, SOLUTION SUBCUTANEOUS at 20:36

## 2025-08-10 RX ADMIN — HYDROCODONE BITARTRATE AND ACETAMINOPHEN 1 TABLET: 10; 325 TABLET ORAL at 20:36

## 2025-08-10 RX ADMIN — METHOCARBAMOL 1000 MG: 500 TABLET ORAL at 09:05

## 2025-08-10 RX ADMIN — CEFAZOLIN 3000 MG: 3 INJECTION, POWDER, FOR SOLUTION INTRAVENOUS at 14:17

## 2025-08-10 RX ADMIN — TORSEMIDE 40 MG: 20 TABLET ORAL at 09:05

## 2025-08-10 RX ADMIN — FOLIC ACID 1 MG: 1 TABLET ORAL at 09:05

## 2025-08-10 RX ADMIN — DOCUSATE SODIUM 100 MG: 100 CAPSULE, LIQUID FILLED ORAL at 09:05

## 2025-08-10 RX ADMIN — GABAPENTIN 300 MG: 300 CAPSULE ORAL at 20:36

## 2025-08-10 RX ADMIN — SODIUM CHLORIDE, PRESERVATIVE FREE 10 ML: 5 INJECTION INTRAVENOUS at 09:06

## 2025-08-10 RX ADMIN — HYDROCODONE BITARTRATE AND ACETAMINOPHEN 1 TABLET: 10; 325 TABLET ORAL at 09:04

## 2025-08-10 RX ADMIN — INSULIN LISPRO 2 UNITS: 100 INJECTION, SOLUTION INTRAVENOUS; SUBCUTANEOUS at 09:04

## 2025-08-10 RX ADMIN — TROSPIUM CHLORIDE 20 MG: 20 TABLET, FILM COATED ORAL at 14:14

## 2025-08-10 RX ADMIN — FINASTERIDE 5 MG: 5 TABLET, FILM COATED ORAL at 09:05

## 2025-08-10 RX ADMIN — METHOCARBAMOL 1000 MG: 500 TABLET ORAL at 14:14

## 2025-08-10 RX ADMIN — POLYETHYLENE GLYCOL 3350 17 G: 17 POWDER, FOR SOLUTION ORAL at 09:04

## 2025-08-10 RX ADMIN — OXYBUTYNIN CHLORIDE 15 MG: 5 TABLET, EXTENDED RELEASE ORAL at 09:05

## 2025-08-10 RX ADMIN — GABAPENTIN 300 MG: 300 CAPSULE ORAL at 14:14

## 2025-08-10 RX ADMIN — SODIUM CHLORIDE, PRESERVATIVE FREE 10 ML: 5 INJECTION INTRAVENOUS at 21:36

## 2025-08-10 RX ADMIN — INSULIN LISPRO 2 UNITS: 100 INJECTION, SOLUTION INTRAVENOUS; SUBCUTANEOUS at 16:44

## 2025-08-10 RX ADMIN — LEVOTHYROXINE SODIUM 25 MCG: 0.03 TABLET ORAL at 06:02

## 2025-08-10 RX ADMIN — CEFAZOLIN 3000 MG: 3 INJECTION, POWDER, FOR SOLUTION INTRAVENOUS at 02:22

## 2025-08-10 RX ADMIN — SODIUM ZIRCONIUM CYCLOSILICATE 10 G: 10 POWDER, FOR SUSPENSION ORAL at 12:24

## 2025-08-10 RX ADMIN — GABAPENTIN 300 MG: 300 CAPSULE ORAL at 09:05

## 2025-08-10 ASSESSMENT — PAIN - FUNCTIONAL ASSESSMENT
PAIN_FUNCTIONAL_ASSESSMENT: PREVENTS OR INTERFERES SOME ACTIVE ACTIVITIES AND ADLS
PAIN_FUNCTIONAL_ASSESSMENT: 0-10
PAIN_FUNCTIONAL_ASSESSMENT: PREVENTS OR INTERFERES SOME ACTIVE ACTIVITIES AND ADLS

## 2025-08-10 ASSESSMENT — PAIN DESCRIPTION - LOCATION
LOCATION: BACK;LEG
LOCATION: BACK;LEG

## 2025-08-10 ASSESSMENT — PAIN DESCRIPTION - FREQUENCY
FREQUENCY: CONTINUOUS
FREQUENCY: CONTINUOUS

## 2025-08-10 ASSESSMENT — PAIN DESCRIPTION - DESCRIPTORS
DESCRIPTORS: ACHING
DESCRIPTORS: ACHING

## 2025-08-10 ASSESSMENT — PAIN DESCRIPTION - ONSET
ONSET: ON-GOING
ONSET: ON-GOING

## 2025-08-10 ASSESSMENT — PAIN SCALES - GENERAL
PAINLEVEL_OUTOF10: 8
PAINLEVEL_OUTOF10: 9

## 2025-08-10 ASSESSMENT — PAIN DESCRIPTION - ORIENTATION
ORIENTATION: LOWER
ORIENTATION: LOWER

## 2025-08-10 ASSESSMENT — PAIN DESCRIPTION - PAIN TYPE
TYPE: CHRONIC PAIN;SURGICAL PAIN
TYPE: CHRONIC PAIN;SURGICAL PAIN

## 2025-08-11 LAB
ALBUMIN SERPL-MCNC: 2.5 G/DL (ref 3.4–5)
ALP SERPL-CCNC: 165 U/L (ref 40–129)
ALT SERPL-CCNC: <5 U/L (ref 10–40)
ANION GAP SERPL CALCULATED.3IONS-SCNC: 10 MMOL/L (ref 3–16)
AST SERPL-CCNC: 14 U/L (ref 15–37)
BASOPHILS # BLD: 0.1 K/UL (ref 0–0.2)
BASOPHILS NFR BLD: 0.5 %
BILIRUB DIRECT SERPL-MCNC: <0.1 MG/DL (ref 0–0.3)
BILIRUB INDIRECT SERPL-MCNC: ABNORMAL MG/DL (ref 0–1)
BILIRUB SERPL-MCNC: <0.2 MG/DL (ref 0–1)
BUN SERPL-MCNC: 45 MG/DL (ref 7–20)
CALCIUM SERPL-MCNC: 8.1 MG/DL (ref 8.3–10.6)
CHLORIDE SERPL-SCNC: 96 MMOL/L (ref 99–110)
CO2 SERPL-SCNC: 28 MMOL/L (ref 21–32)
CREAT SERPL-MCNC: 2.9 MG/DL (ref 0.8–1.3)
DEPRECATED RDW RBC AUTO: 17.6 % (ref 12.4–15.4)
EOSINOPHIL # BLD: 0.8 K/UL (ref 0–0.6)
EOSINOPHIL NFR BLD: 7.6 %
GFR SERPLBLD CREATININE-BSD FMLA CKD-EPI: 23 ML/MIN/{1.73_M2}
GLUCOSE BLD-MCNC: 137 MG/DL (ref 70–99)
GLUCOSE BLD-MCNC: 138 MG/DL (ref 70–99)
GLUCOSE BLD-MCNC: 154 MG/DL (ref 70–99)
GLUCOSE BLD-MCNC: 157 MG/DL (ref 70–99)
GLUCOSE BLD-MCNC: 168 MG/DL (ref 70–99)
GLUCOSE SERPL-MCNC: 152 MG/DL (ref 70–99)
HCT VFR BLD AUTO: 23 % (ref 40.5–52.5)
HGB BLD-MCNC: 7.7 G/DL (ref 13.5–17.5)
LYMPHOCYTES # BLD: 1 K/UL (ref 1–5.1)
LYMPHOCYTES NFR BLD: 9.2 %
MCH RBC QN AUTO: 29.9 PG (ref 26–34)
MCHC RBC AUTO-ENTMCNC: 33.2 G/DL (ref 31–36)
MCV RBC AUTO: 89.9 FL (ref 80–100)
MONOCYTES # BLD: 1.3 K/UL (ref 0–1.3)
MONOCYTES NFR BLD: 12.8 %
NEUTROPHILS # BLD: 7.3 K/UL (ref 1.7–7.7)
NEUTROPHILS NFR BLD: 69.9 %
PERFORMED ON: ABNORMAL
PLATELET # BLD AUTO: 292 K/UL (ref 135–450)
PMV BLD AUTO: 7 FL (ref 5–10.5)
POTASSIUM SERPL-SCNC: 3.6 MMOL/L (ref 3.5–5.1)
PROT SERPL-MCNC: 6.2 G/DL (ref 6.4–8.2)
RBC # BLD AUTO: 2.56 M/UL (ref 4.2–5.9)
SODIUM SERPL-SCNC: 134 MMOL/L (ref 136–145)
WBC # BLD AUTO: 10.5 K/UL (ref 4–11)

## 2025-08-11 PROCEDURE — 80048 BASIC METABOLIC PNL TOTAL CA: CPT

## 2025-08-11 PROCEDURE — 97530 THERAPEUTIC ACTIVITIES: CPT

## 2025-08-11 PROCEDURE — 99232 SBSQ HOSP IP/OBS MODERATE 35: CPT | Performed by: INTERNAL MEDICINE

## 2025-08-11 PROCEDURE — 80076 HEPATIC FUNCTION PANEL: CPT

## 2025-08-11 PROCEDURE — 1280000000 HC REHAB R&B

## 2025-08-11 PROCEDURE — 85025 COMPLETE CBC W/AUTO DIFF WBC: CPT

## 2025-08-11 PROCEDURE — 2500000003 HC RX 250 WO HCPCS: Performed by: STUDENT IN AN ORGANIZED HEALTH CARE EDUCATION/TRAINING PROGRAM

## 2025-08-11 PROCEDURE — 6360000002 HC RX W HCPCS: Performed by: STUDENT IN AN ORGANIZED HEALTH CARE EDUCATION/TRAINING PROGRAM

## 2025-08-11 PROCEDURE — 6360000002 HC RX W HCPCS: Performed by: INTERNAL MEDICINE

## 2025-08-11 PROCEDURE — S5553 INSULIN LONG ACTING 5 U: HCPCS | Performed by: STUDENT IN AN ORGANIZED HEALTH CARE EDUCATION/TRAINING PROGRAM

## 2025-08-11 PROCEDURE — 97110 THERAPEUTIC EXERCISES: CPT

## 2025-08-11 PROCEDURE — 2580000003 HC RX 258: Performed by: INTERNAL MEDICINE

## 2025-08-11 PROCEDURE — 2580000003 HC RX 258: Performed by: STUDENT IN AN ORGANIZED HEALTH CARE EDUCATION/TRAINING PROGRAM

## 2025-08-11 PROCEDURE — 6370000000 HC RX 637 (ALT 250 FOR IP): Performed by: STUDENT IN AN ORGANIZED HEALTH CARE EDUCATION/TRAINING PROGRAM

## 2025-08-11 PROCEDURE — 6370000000 HC RX 637 (ALT 250 FOR IP): Performed by: INTERNAL MEDICINE

## 2025-08-11 RX ADMIN — FERROUS SULFATE TAB 325 MG (65 MG ELEMENTAL FE) 325 MG: 325 (65 FE) TAB at 09:29

## 2025-08-11 RX ADMIN — TAMSULOSIN HYDROCHLORIDE 0.4 MG: 0.4 CAPSULE ORAL at 09:29

## 2025-08-11 RX ADMIN — TROSPIUM CHLORIDE 20 MG: 20 TABLET, FILM COATED ORAL at 06:29

## 2025-08-11 RX ADMIN — ATORVASTATIN CALCIUM 20 MG: 10 TABLET, FILM COATED ORAL at 09:29

## 2025-08-11 RX ADMIN — POLYETHYLENE GLYCOL 3350 17 G: 17 POWDER, FOR SOLUTION ORAL at 09:29

## 2025-08-11 RX ADMIN — DAPTOMYCIN 850 MG: 500 INJECTION, POWDER, LYOPHILIZED, FOR SOLUTION INTRAVENOUS at 15:41

## 2025-08-11 RX ADMIN — DOCUSATE SODIUM 100 MG: 100 CAPSULE, LIQUID FILLED ORAL at 09:29

## 2025-08-11 RX ADMIN — SODIUM CHLORIDE, PRESERVATIVE FREE 10 ML: 5 INJECTION INTRAVENOUS at 09:45

## 2025-08-11 RX ADMIN — TROSPIUM CHLORIDE 20 MG: 20 TABLET, FILM COATED ORAL at 14:37

## 2025-08-11 RX ADMIN — HYDROCODONE BITARTRATE AND ACETAMINOPHEN 1 TABLET: 10; 325 TABLET ORAL at 20:20

## 2025-08-11 RX ADMIN — METHOCARBAMOL 1000 MG: 500 TABLET ORAL at 20:19

## 2025-08-11 RX ADMIN — GABAPENTIN 300 MG: 300 CAPSULE ORAL at 09:29

## 2025-08-11 RX ADMIN — TORSEMIDE 40 MG: 20 TABLET ORAL at 09:29

## 2025-08-11 RX ADMIN — HYDROCODONE BITARTRATE AND ACETAMINOPHEN 1 TABLET: 10; 325 TABLET ORAL at 09:30

## 2025-08-11 RX ADMIN — HYDROCODONE BITARTRATE AND ACETAMINOPHEN 1 TABLET: 10; 325 TABLET ORAL at 15:46

## 2025-08-11 RX ADMIN — PANTOPRAZOLE SODIUM 40 MG: 40 TABLET, DELAYED RELEASE ORAL at 06:29

## 2025-08-11 RX ADMIN — GABAPENTIN 300 MG: 300 CAPSULE ORAL at 14:37

## 2025-08-11 RX ADMIN — CEFAZOLIN 3000 MG: 3 INJECTION, POWDER, FOR SOLUTION INTRAVENOUS at 02:23

## 2025-08-11 RX ADMIN — METHOCARBAMOL 1000 MG: 500 TABLET ORAL at 09:29

## 2025-08-11 RX ADMIN — INSULIN GLARGINE-YFGN 25 UNITS: 100 INJECTION, SOLUTION SUBCUTANEOUS at 20:22

## 2025-08-11 RX ADMIN — Medication 3 MG: at 20:20

## 2025-08-11 RX ADMIN — FOLIC ACID 1 MG: 1 TABLET ORAL at 09:29

## 2025-08-11 RX ADMIN — SODIUM ZIRCONIUM CYCLOSILICATE 10 G: 10 POWDER, FOR SUSPENSION ORAL at 09:29

## 2025-08-11 RX ADMIN — METHOCARBAMOL 1000 MG: 500 TABLET ORAL at 14:37

## 2025-08-11 RX ADMIN — INSULIN LISPRO 2 UNITS: 100 INJECTION, SOLUTION INTRAVENOUS; SUBCUTANEOUS at 09:30

## 2025-08-11 RX ADMIN — LEVOTHYROXINE SODIUM 25 MCG: 0.03 TABLET ORAL at 06:29

## 2025-08-11 RX ADMIN — SODIUM CHLORIDE, PRESERVATIVE FREE 10 ML: 5 INJECTION INTRAVENOUS at 20:19

## 2025-08-11 RX ADMIN — INSULIN LISPRO 2 UNITS: 100 INJECTION, SOLUTION INTRAVENOUS; SUBCUTANEOUS at 16:19

## 2025-08-11 RX ADMIN — AMITRIPTYLINE HYDROCHLORIDE 25 MG: 25 TABLET ORAL at 20:19

## 2025-08-11 RX ADMIN — GABAPENTIN 300 MG: 300 CAPSULE ORAL at 20:19

## 2025-08-11 RX ADMIN — HYDROCODONE BITARTRATE AND ACETAMINOPHEN 1 TABLET: 10; 325 TABLET ORAL at 02:21

## 2025-08-11 RX ADMIN — OXYBUTYNIN CHLORIDE 15 MG: 5 TABLET, EXTENDED RELEASE ORAL at 09:29

## 2025-08-11 RX ADMIN — FINASTERIDE 5 MG: 5 TABLET, FILM COATED ORAL at 09:29

## 2025-08-11 ASSESSMENT — PAIN DESCRIPTION - DESCRIPTORS
DESCRIPTORS: PENETRATING
DESCRIPTORS: ACHING
DESCRIPTORS: ACHING;PENETRATING
DESCRIPTORS: ACHING;BURNING

## 2025-08-11 ASSESSMENT — PAIN DESCRIPTION - ORIENTATION
ORIENTATION: RIGHT
ORIENTATION: LOWER

## 2025-08-11 ASSESSMENT — PAIN SCALES - GENERAL
PAINLEVEL_OUTOF10: 9

## 2025-08-11 ASSESSMENT — PAIN - FUNCTIONAL ASSESSMENT
PAIN_FUNCTIONAL_ASSESSMENT: ACTIVITIES ARE NOT PREVENTED
PAIN_FUNCTIONAL_ASSESSMENT: ACTIVITIES ARE NOT PREVENTED
PAIN_FUNCTIONAL_ASSESSMENT: 0-10
PAIN_FUNCTIONAL_ASSESSMENT: ACTIVITIES ARE NOT PREVENTED
PAIN_FUNCTIONAL_ASSESSMENT: 0-10
PAIN_FUNCTIONAL_ASSESSMENT: 0-10
PAIN_FUNCTIONAL_ASSESSMENT: ACTIVITIES ARE NOT PREVENTED

## 2025-08-11 ASSESSMENT — PAIN DESCRIPTION - LOCATION
LOCATION: LEG
LOCATION: BACK

## 2025-08-11 ASSESSMENT — PAIN DESCRIPTION - PAIN TYPE: TYPE: CHRONIC PAIN

## 2025-08-11 ASSESSMENT — PAIN DESCRIPTION - ONSET: ONSET: ON-GOING

## 2025-08-11 ASSESSMENT — PAIN DESCRIPTION - FREQUENCY: FREQUENCY: CONTINUOUS

## 2025-08-11 NOTE — DISCHARGE SUMMARY
V2.0  Discharge Summary    Name:  Ty Meyer /Age/Sex: 1960 (64 y.o. male)   Admit Date: 2025  Discharge Date: 25    MRN & CSN:  [id number] & [id number] Encounter Date and Time 25 9:16 AM EDT    Attending:  No att. providers found Discharging Provider: Savana Gallego MD       Hospital Course:     Brief HPI: 64 y.o. male with PMHx significant for left BKA, CKD stage III, chronic anemia, chronic diastolic CHF, coronary artery disease, diabetes mellitus, hypertension, DVT, who presented to ED with a complaint of right ankle wound infection.  Patient came to the ER yesterday for evaluation of bleeding from the right ankle open wound.  Patient was noted to have serosanguineous discharge with no clinical evidence of infection.  Patient was discharged home.  Patient started having fever at home with temperature spike of 105 °F according to the patient which prompted him to come back to the ED for further evaluation.  Patient follows with orthopedic surgery for history of right ankle pillion fracture that he suffered about 2 months ago.  Labs in ED were remarkable for WBC 13.7, lactic acid 2.1, potassium 5.9, procalcitonin 0.067.  Patient given IV fluids per sepsis protocol.  Patient was given 1 dose of IV vancomycin and IV Zosyn.  Patient is currently admitted under inpatient status for further management and evaluation     Brief Problem Based Course:   Patient was admitted for a right ankle open wound with infection and possible osteomyelitis, orthopedic surgery was consulted who recommended transfer to [facility] as patient's orthopedic physician Dr. Wilder is at [facility], Dr. Caputo discussed case with his primary orthopedic who recommended transfer to [facility], patient was transferred to [facility] for further management.      The patient expressed appropriate understanding of, and agreement with the discharge recommendations, medications, and plan.

## 2025-08-12 LAB
CK SERPL-CCNC: 36 U/L (ref 39–308)
GLUCOSE BLD-MCNC: 100 MG/DL (ref 70–99)
GLUCOSE BLD-MCNC: 115 MG/DL (ref 70–99)
GLUCOSE BLD-MCNC: 141 MG/DL (ref 70–99)
GLUCOSE BLD-MCNC: 64 MG/DL (ref 70–99)
GLUCOSE BLD-MCNC: 86 MG/DL (ref 70–99)
PERFORMED ON: ABNORMAL
PERFORMED ON: NORMAL

## 2025-08-12 PROCEDURE — 82550 ASSAY OF CK (CPK): CPT

## 2025-08-12 PROCEDURE — 99232 SBSQ HOSP IP/OBS MODERATE 35: CPT | Performed by: INTERNAL MEDICINE

## 2025-08-12 PROCEDURE — S5553 INSULIN LONG ACTING 5 U: HCPCS | Performed by: STUDENT IN AN ORGANIZED HEALTH CARE EDUCATION/TRAINING PROGRAM

## 2025-08-12 PROCEDURE — 97530 THERAPEUTIC ACTIVITIES: CPT

## 2025-08-12 PROCEDURE — 6360000002 HC RX W HCPCS: Performed by: INTERNAL MEDICINE

## 2025-08-12 PROCEDURE — 97535 SELF CARE MNGMENT TRAINING: CPT

## 2025-08-12 PROCEDURE — 6370000000 HC RX 637 (ALT 250 FOR IP): Performed by: INTERNAL MEDICINE

## 2025-08-12 PROCEDURE — 1280000000 HC REHAB R&B

## 2025-08-12 PROCEDURE — 2580000003 HC RX 258: Performed by: INTERNAL MEDICINE

## 2025-08-12 PROCEDURE — 6370000000 HC RX 637 (ALT 250 FOR IP): Performed by: STUDENT IN AN ORGANIZED HEALTH CARE EDUCATION/TRAINING PROGRAM

## 2025-08-12 PROCEDURE — 2500000003 HC RX 250 WO HCPCS: Performed by: STUDENT IN AN ORGANIZED HEALTH CARE EDUCATION/TRAINING PROGRAM

## 2025-08-12 RX ORDER — INSULIN GLARGINE-YFGN 100 [IU]/ML
18 INJECTION, SOLUTION SUBCUTANEOUS NIGHTLY
Status: DISCONTINUED | OUTPATIENT
Start: 2025-08-12 | End: 2025-08-13

## 2025-08-12 RX ORDER — METHOCARBAMOL 500 MG/1
500 TABLET, FILM COATED ORAL 3 TIMES DAILY PRN
Status: DISCONTINUED | OUTPATIENT
Start: 2025-08-12 | End: 2025-08-15 | Stop reason: HOSPADM

## 2025-08-12 RX ORDER — HONEY 100 %
PASTE (ML) TOPICAL DAILY
Status: DISCONTINUED | OUTPATIENT
Start: 2025-08-13 | End: 2025-08-15 | Stop reason: HOSPADM

## 2025-08-12 RX ADMIN — INSULIN LISPRO 2 UNITS: 100 INJECTION, SOLUTION INTRAVENOUS; SUBCUTANEOUS at 13:08

## 2025-08-12 RX ADMIN — SODIUM CHLORIDE, PRESERVATIVE FREE 10 ML: 5 INJECTION INTRAVENOUS at 08:43

## 2025-08-12 RX ADMIN — INSULIN LISPRO 2 UNITS: 100 INJECTION, SOLUTION INTRAVENOUS; SUBCUTANEOUS at 08:35

## 2025-08-12 RX ADMIN — TROSPIUM CHLORIDE 20 MG: 20 TABLET, FILM COATED ORAL at 06:08

## 2025-08-12 RX ADMIN — INSULIN GLARGINE-YFGN 18 UNITS: 100 INJECTION, SOLUTION SUBCUTANEOUS at 19:59

## 2025-08-12 RX ADMIN — GABAPENTIN 300 MG: 300 CAPSULE ORAL at 19:43

## 2025-08-12 RX ADMIN — OXYBUTYNIN CHLORIDE 15 MG: 5 TABLET, EXTENDED RELEASE ORAL at 08:36

## 2025-08-12 RX ADMIN — TRAMADOL HYDROCHLORIDE 50 MG: 50 TABLET, COATED ORAL at 19:56

## 2025-08-12 RX ADMIN — FERROUS SULFATE TAB 325 MG (65 MG ELEMENTAL FE) 325 MG: 325 (65 FE) TAB at 08:36

## 2025-08-12 RX ADMIN — AMITRIPTYLINE HYDROCHLORIDE 25 MG: 25 TABLET ORAL at 19:45

## 2025-08-12 RX ADMIN — FINASTERIDE 5 MG: 5 TABLET, FILM COATED ORAL at 08:36

## 2025-08-12 RX ADMIN — SODIUM CHLORIDE, PRESERVATIVE FREE 10 ML: 5 INJECTION INTRAVENOUS at 22:32

## 2025-08-12 RX ADMIN — DOCUSATE SODIUM 100 MG: 100 CAPSULE, LIQUID FILLED ORAL at 08:36

## 2025-08-12 RX ADMIN — TAMSULOSIN HYDROCHLORIDE 0.4 MG: 0.4 CAPSULE ORAL at 08:37

## 2025-08-12 RX ADMIN — TROSPIUM CHLORIDE 20 MG: 20 TABLET, FILM COATED ORAL at 17:04

## 2025-08-12 RX ADMIN — FOLIC ACID 1 MG: 1 TABLET ORAL at 08:37

## 2025-08-12 RX ADMIN — POLYETHYLENE GLYCOL 3350 17 G: 17 POWDER, FOR SOLUTION ORAL at 08:35

## 2025-08-12 RX ADMIN — GABAPENTIN 300 MG: 300 CAPSULE ORAL at 08:36

## 2025-08-12 RX ADMIN — METHOCARBAMOL 500 MG: 500 TABLET ORAL at 19:56

## 2025-08-12 RX ADMIN — PANTOPRAZOLE SODIUM 40 MG: 40 TABLET, DELAYED RELEASE ORAL at 06:09

## 2025-08-12 RX ADMIN — LEVOTHYROXINE SODIUM 25 MCG: 0.03 TABLET ORAL at 06:10

## 2025-08-12 RX ADMIN — TORSEMIDE 40 MG: 20 TABLET ORAL at 08:36

## 2025-08-12 RX ADMIN — SODIUM ZIRCONIUM CYCLOSILICATE 10 G: 10 POWDER, FOR SUSPENSION ORAL at 08:43

## 2025-08-12 RX ADMIN — METHOCARBAMOL 1000 MG: 500 TABLET ORAL at 08:36

## 2025-08-12 RX ADMIN — GABAPENTIN 300 MG: 300 CAPSULE ORAL at 14:30

## 2025-08-12 RX ADMIN — INSULIN LISPRO 2 UNITS: 100 INJECTION, SOLUTION INTRAVENOUS; SUBCUTANEOUS at 17:04

## 2025-08-12 RX ADMIN — DAPTOMYCIN 850 MG: 500 INJECTION, POWDER, LYOPHILIZED, FOR SOLUTION INTRAVENOUS at 15:06

## 2025-08-12 ASSESSMENT — PAIN DESCRIPTION - ORIENTATION: ORIENTATION: MID

## 2025-08-12 ASSESSMENT — PAIN SCALES - GENERAL
PAINLEVEL_OUTOF10: 9
PAINLEVEL_OUTOF10: 0

## 2025-08-12 ASSESSMENT — PAIN DESCRIPTION - LOCATION: LOCATION: BACK

## 2025-08-12 ASSESSMENT — PAIN - FUNCTIONAL ASSESSMENT: PAIN_FUNCTIONAL_ASSESSMENT: 0-10

## 2025-08-12 ASSESSMENT — PAIN DESCRIPTION - DESCRIPTORS: DESCRIPTORS: ACHING

## 2025-08-13 ENCOUNTER — APPOINTMENT (OUTPATIENT)
Dept: CT IMAGING | Age: 65
DRG: 560 | End: 2025-08-13
Attending: STUDENT IN AN ORGANIZED HEALTH CARE EDUCATION/TRAINING PROGRAM
Payer: MEDICARE

## 2025-08-13 PROBLEM — E87.6 HYPOKALEMIA: Status: ACTIVE | Noted: 2025-08-13

## 2025-08-13 PROBLEM — E83.42 HYPOMAGNESEMIA: Status: ACTIVE | Noted: 2025-08-13

## 2025-08-13 LAB
ANION GAP SERPL CALCULATED.3IONS-SCNC: 12 MMOL/L (ref 3–16)
ANION GAP SERPL CALCULATED.3IONS-SCNC: 13 MMOL/L (ref 3–16)
ANION GAP SERPL CALCULATED.3IONS-SCNC: 13 MMOL/L (ref 3–16)
BASOPHILS # BLD: 0.1 K/UL (ref 0–0.2)
BASOPHILS NFR BLD: 0.9 %
BUN SERPL-MCNC: 52 MG/DL (ref 7–20)
BUN SERPL-MCNC: 55 MG/DL (ref 7–20)
BUN SERPL-MCNC: 56 MG/DL (ref 7–20)
CALCIUM SERPL-MCNC: 7.8 MG/DL (ref 8.3–10.6)
CALCIUM SERPL-MCNC: 8 MG/DL (ref 8.3–10.6)
CALCIUM SERPL-MCNC: 8.1 MG/DL (ref 8.3–10.6)
CHLORIDE SERPL-SCNC: 94 MMOL/L (ref 99–110)
CO2 SERPL-SCNC: 27 MMOL/L (ref 21–32)
CO2 SERPL-SCNC: 27 MMOL/L (ref 21–32)
CO2 SERPL-SCNC: 28 MMOL/L (ref 21–32)
CREAT SERPL-MCNC: 3.6 MG/DL (ref 0.8–1.3)
CREAT SERPL-MCNC: 3.8 MG/DL (ref 0.8–1.3)
CREAT SERPL-MCNC: 4 MG/DL (ref 0.8–1.3)
DEPRECATED RDW RBC AUTO: 17.2 % (ref 12.4–15.4)
EOSINOPHIL # BLD: 0.8 K/UL (ref 0–0.6)
EOSINOPHIL NFR BLD: 8.9 %
GFR SERPLBLD CREATININE-BSD FMLA CKD-EPI: 16 ML/MIN/{1.73_M2}
GFR SERPLBLD CREATININE-BSD FMLA CKD-EPI: 17 ML/MIN/{1.73_M2}
GFR SERPLBLD CREATININE-BSD FMLA CKD-EPI: 18 ML/MIN/{1.73_M2}
GLUCOSE BLD-MCNC: 102 MG/DL (ref 70–99)
GLUCOSE BLD-MCNC: 111 MG/DL (ref 70–99)
GLUCOSE BLD-MCNC: 118 MG/DL (ref 70–99)
GLUCOSE BLD-MCNC: 129 MG/DL (ref 70–99)
GLUCOSE BLD-MCNC: 54 MG/DL (ref 70–99)
GLUCOSE BLD-MCNC: 85 MG/DL (ref 70–99)
GLUCOSE SERPL-MCNC: 107 MG/DL (ref 70–99)
GLUCOSE SERPL-MCNC: 116 MG/DL (ref 70–99)
GLUCOSE SERPL-MCNC: 64 MG/DL (ref 70–99)
HCT VFR BLD AUTO: 24 % (ref 40.5–52.5)
HGB BLD-MCNC: 7.9 G/DL (ref 13.5–17.5)
LYMPHOCYTES # BLD: 0.9 K/UL (ref 1–5.1)
LYMPHOCYTES NFR BLD: 10.1 %
MAGNESIUM SERPL-MCNC: 1.54 MG/DL (ref 1.8–2.4)
MCH RBC QN AUTO: 29.5 PG (ref 26–34)
MCHC RBC AUTO-ENTMCNC: 32.8 G/DL (ref 31–36)
MCV RBC AUTO: 90 FL (ref 80–100)
MONOCYTES # BLD: 1 K/UL (ref 0–1.3)
MONOCYTES NFR BLD: 11.6 %
NEUTROPHILS # BLD: 6 K/UL (ref 1.7–7.7)
NEUTROPHILS NFR BLD: 68.5 %
PERFORMED ON: ABNORMAL
PERFORMED ON: NORMAL
PLATELET # BLD AUTO: 295 K/UL (ref 135–450)
PMV BLD AUTO: 6.8 FL (ref 5–10.5)
POTASSIUM SERPL-SCNC: 3.1 MMOL/L (ref 3.5–5.1)
POTASSIUM SERPL-SCNC: 3.6 MMOL/L (ref 3.5–5.1)
POTASSIUM SERPL-SCNC: 3.6 MMOL/L (ref 3.5–5.1)
RBC # BLD AUTO: 2.67 M/UL (ref 4.2–5.9)
SODIUM SERPL-SCNC: 133 MMOL/L (ref 136–145)
SODIUM SERPL-SCNC: 134 MMOL/L (ref 136–145)
SODIUM SERPL-SCNC: 135 MMOL/L (ref 136–145)
WBC # BLD AUTO: 8.8 K/UL (ref 4–11)

## 2025-08-13 PROCEDURE — 74176 CT ABD & PELVIS W/O CONTRAST: CPT

## 2025-08-13 PROCEDURE — 97530 THERAPEUTIC ACTIVITIES: CPT

## 2025-08-13 PROCEDURE — 99233 SBSQ HOSP IP/OBS HIGH 50: CPT | Performed by: INTERNAL MEDICINE

## 2025-08-13 PROCEDURE — 6370000000 HC RX 637 (ALT 250 FOR IP): Performed by: INTERNAL MEDICINE

## 2025-08-13 PROCEDURE — 2580000003 HC RX 258: Performed by: INTERNAL MEDICINE

## 2025-08-13 PROCEDURE — 2500000003 HC RX 250 WO HCPCS: Performed by: STUDENT IN AN ORGANIZED HEALTH CARE EDUCATION/TRAINING PROGRAM

## 2025-08-13 PROCEDURE — 1280000000 HC REHAB R&B

## 2025-08-13 PROCEDURE — 97112 NEUROMUSCULAR REEDUCATION: CPT

## 2025-08-13 PROCEDURE — 6360000002 HC RX W HCPCS: Performed by: INTERNAL MEDICINE

## 2025-08-13 PROCEDURE — 97110 THERAPEUTIC EXERCISES: CPT

## 2025-08-13 PROCEDURE — 51702 INSERT TEMP BLADDER CATH: CPT

## 2025-08-13 PROCEDURE — 80048 BASIC METABOLIC PNL TOTAL CA: CPT

## 2025-08-13 PROCEDURE — 85025 COMPLETE CBC W/AUTO DIFF WBC: CPT

## 2025-08-13 PROCEDURE — 6370000000 HC RX 637 (ALT 250 FOR IP): Performed by: STUDENT IN AN ORGANIZED HEALTH CARE EDUCATION/TRAINING PROGRAM

## 2025-08-13 PROCEDURE — 83735 ASSAY OF MAGNESIUM: CPT

## 2025-08-13 RX ORDER — SODIUM CHLORIDE, SODIUM LACTATE, POTASSIUM CHLORIDE, CALCIUM CHLORIDE 600; 310; 30; 20 MG/100ML; MG/100ML; MG/100ML; MG/100ML
INJECTION, SOLUTION INTRAVENOUS CONTINUOUS
Status: ACTIVE | OUTPATIENT
Start: 2025-08-13 | End: 2025-08-13

## 2025-08-13 RX ORDER — INSULIN GLARGINE-YFGN 100 [IU]/ML
10 INJECTION, SOLUTION SUBCUTANEOUS NIGHTLY
Status: DISCONTINUED | OUTPATIENT
Start: 2025-08-13 | End: 2025-08-14

## 2025-08-13 RX ORDER — MAGNESIUM SULFATE IN WATER 40 MG/ML
2000 INJECTION, SOLUTION INTRAVENOUS ONCE
Status: COMPLETED | OUTPATIENT
Start: 2025-08-13 | End: 2025-08-13

## 2025-08-13 RX ORDER — POTASSIUM CHLORIDE 1500 MG/1
40 TABLET, EXTENDED RELEASE ORAL ONCE
Status: COMPLETED | OUTPATIENT
Start: 2025-08-13 | End: 2025-08-13

## 2025-08-13 RX ORDER — LANOLIN ALCOHOL/MO/W.PET/CERES
400 CREAM (GRAM) TOPICAL DAILY
Status: DISCONTINUED | OUTPATIENT
Start: 2025-08-13 | End: 2025-08-15 | Stop reason: HOSPADM

## 2025-08-13 RX ADMIN — TAMSULOSIN HYDROCHLORIDE 0.4 MG: 0.4 CAPSULE ORAL at 08:45

## 2025-08-13 RX ADMIN — GABAPENTIN 300 MG: 300 CAPSULE ORAL at 21:05

## 2025-08-13 RX ADMIN — SODIUM CHLORIDE, PRESERVATIVE FREE 10 ML: 5 INJECTION INTRAVENOUS at 09:10

## 2025-08-13 RX ADMIN — FINASTERIDE 5 MG: 5 TABLET, FILM COATED ORAL at 08:46

## 2025-08-13 RX ADMIN — HYDROCODONE BITARTRATE AND ACETAMINOPHEN 1 TABLET: 10; 325 TABLET ORAL at 17:43

## 2025-08-13 RX ADMIN — ATORVASTATIN CALCIUM 20 MG: 10 TABLET, FILM COATED ORAL at 08:45

## 2025-08-13 RX ADMIN — SODIUM CHLORIDE, PRESERVATIVE FREE 10 ML: 5 INJECTION INTRAVENOUS at 21:05

## 2025-08-13 RX ADMIN — PANTOPRAZOLE SODIUM 40 MG: 40 TABLET, DELAYED RELEASE ORAL at 05:34

## 2025-08-13 RX ADMIN — TROSPIUM CHLORIDE 20 MG: 20 TABLET, FILM COATED ORAL at 05:31

## 2025-08-13 RX ADMIN — SODIUM CHLORIDE, SODIUM LACTATE, POTASSIUM CHLORIDE, AND CALCIUM CHLORIDE: .6; .31; .03; .02 INJECTION, SOLUTION INTRAVENOUS at 09:08

## 2025-08-13 RX ADMIN — Medication: at 08:55

## 2025-08-13 RX ADMIN — METHOCARBAMOL 500 MG: 500 TABLET ORAL at 09:02

## 2025-08-13 RX ADMIN — TRAMADOL HYDROCHLORIDE 50 MG: 50 TABLET, COATED ORAL at 09:02

## 2025-08-13 RX ADMIN — POLYETHYLENE GLYCOL 3350 17 G: 17 POWDER, FOR SOLUTION ORAL at 08:46

## 2025-08-13 RX ADMIN — PROCHLORPERAZINE MALEATE 10 MG: 5 TABLET ORAL at 17:15

## 2025-08-13 RX ADMIN — AMITRIPTYLINE HYDROCHLORIDE 25 MG: 25 TABLET ORAL at 21:05

## 2025-08-13 RX ADMIN — MAGNESIUM SULFATE HEPTAHYDRATE 2000 MG: 40 INJECTION, SOLUTION INTRAVENOUS at 11:12

## 2025-08-13 RX ADMIN — Medication 3 MG: at 21:05

## 2025-08-13 RX ADMIN — Medication 400 MG: at 08:45

## 2025-08-13 RX ADMIN — GABAPENTIN 300 MG: 300 CAPSULE ORAL at 14:49

## 2025-08-13 RX ADMIN — TROSPIUM CHLORIDE 20 MG: 20 TABLET, FILM COATED ORAL at 15:12

## 2025-08-13 RX ADMIN — FERROUS SULFATE TAB 325 MG (65 MG ELEMENTAL FE) 325 MG: 325 (65 FE) TAB at 08:46

## 2025-08-13 RX ADMIN — DOCUSATE SODIUM 100 MG: 100 CAPSULE, LIQUID FILLED ORAL at 08:45

## 2025-08-13 RX ADMIN — OXYBUTYNIN CHLORIDE 15 MG: 5 TABLET, EXTENDED RELEASE ORAL at 08:46

## 2025-08-13 RX ADMIN — FOLIC ACID 1 MG: 1 TABLET ORAL at 08:45

## 2025-08-13 RX ADMIN — LEVOTHYROXINE SODIUM 25 MCG: 0.03 TABLET ORAL at 05:32

## 2025-08-13 RX ADMIN — GABAPENTIN 300 MG: 300 CAPSULE ORAL at 08:46

## 2025-08-13 RX ADMIN — POTASSIUM CHLORIDE 40 MEQ: 1500 TABLET, EXTENDED RELEASE ORAL at 08:46

## 2025-08-13 ASSESSMENT — PAIN DESCRIPTION - DESCRIPTORS
DESCRIPTORS: ACHING
DESCRIPTORS: ACHING;THROBBING

## 2025-08-13 ASSESSMENT — PAIN DESCRIPTION - PAIN TYPE: TYPE: CHRONIC PAIN

## 2025-08-13 ASSESSMENT — PAIN - FUNCTIONAL ASSESSMENT
PAIN_FUNCTIONAL_ASSESSMENT: 0-10
PAIN_FUNCTIONAL_ASSESSMENT: PREVENTS OR INTERFERES SOME ACTIVE ACTIVITIES AND ADLS
PAIN_FUNCTIONAL_ASSESSMENT: 0-10
PAIN_FUNCTIONAL_ASSESSMENT: PREVENTS OR INTERFERES SOME ACTIVE ACTIVITIES AND ADLS

## 2025-08-13 ASSESSMENT — PAIN SCALES - GENERAL
PAINLEVEL_OUTOF10: 9
PAINLEVEL_OUTOF10: 8

## 2025-08-13 ASSESSMENT — PAIN DESCRIPTION - LOCATION
LOCATION: BACK
LOCATION: BACK;LEG

## 2025-08-13 ASSESSMENT — PAIN DESCRIPTION - ORIENTATION
ORIENTATION: LOWER;RIGHT;LEFT
ORIENTATION: LOWER

## 2025-08-13 ASSESSMENT — PAIN DESCRIPTION - FREQUENCY: FREQUENCY: CONTINUOUS

## 2025-08-13 ASSESSMENT — PAIN DESCRIPTION - ONSET: ONSET: ON-GOING

## 2025-08-14 PROBLEM — K40.30: Status: ACTIVE | Noted: 2025-08-14

## 2025-08-14 LAB
ANION GAP SERPL CALCULATED.3IONS-SCNC: 12 MMOL/L (ref 3–16)
ANION GAP SERPL CALCULATED.3IONS-SCNC: 13 MMOL/L (ref 3–16)
BUN SERPL-MCNC: 54 MG/DL (ref 7–20)
BUN SERPL-MCNC: 57 MG/DL (ref 7–20)
CALCIUM SERPL-MCNC: 7.8 MG/DL (ref 8.3–10.6)
CALCIUM SERPL-MCNC: 8 MG/DL (ref 8.3–10.6)
CHLORIDE SERPL-SCNC: 96 MMOL/L (ref 99–110)
CHLORIDE SERPL-SCNC: 96 MMOL/L (ref 99–110)
CO2 SERPL-SCNC: 26 MMOL/L (ref 21–32)
CO2 SERPL-SCNC: 26 MMOL/L (ref 21–32)
CREAT SERPL-MCNC: 3.5 MG/DL (ref 0.8–1.3)
CREAT SERPL-MCNC: 3.7 MG/DL (ref 0.8–1.3)
GFR SERPLBLD CREATININE-BSD FMLA CKD-EPI: 17 ML/MIN/{1.73_M2}
GFR SERPLBLD CREATININE-BSD FMLA CKD-EPI: 19 ML/MIN/{1.73_M2}
GLUCOSE BLD-MCNC: 149 MG/DL (ref 70–99)
GLUCOSE BLD-MCNC: 239 MG/DL (ref 70–99)
GLUCOSE BLD-MCNC: 339 MG/DL (ref 70–99)
GLUCOSE BLD-MCNC: 373 MG/DL (ref 70–99)
GLUCOSE SERPL-MCNC: 210 MG/DL (ref 70–99)
GLUCOSE SERPL-MCNC: 338 MG/DL (ref 70–99)
MAGNESIUM SERPL-MCNC: 1.83 MG/DL (ref 1.8–2.4)
PERFORMED ON: ABNORMAL
POTASSIUM SERPL-SCNC: 3.7 MMOL/L (ref 3.5–5.1)
POTASSIUM SERPL-SCNC: 3.8 MMOL/L (ref 3.5–5.1)
SODIUM SERPL-SCNC: 134 MMOL/L (ref 136–145)
SODIUM SERPL-SCNC: 135 MMOL/L (ref 136–145)

## 2025-08-14 PROCEDURE — S5553 INSULIN LONG ACTING 5 U: HCPCS | Performed by: STUDENT IN AN ORGANIZED HEALTH CARE EDUCATION/TRAINING PROGRAM

## 2025-08-14 PROCEDURE — 6370000000 HC RX 637 (ALT 250 FOR IP): Performed by: STUDENT IN AN ORGANIZED HEALTH CARE EDUCATION/TRAINING PROGRAM

## 2025-08-14 PROCEDURE — 2500000003 HC RX 250 WO HCPCS: Performed by: STUDENT IN AN ORGANIZED HEALTH CARE EDUCATION/TRAINING PROGRAM

## 2025-08-14 PROCEDURE — 97530 THERAPEUTIC ACTIVITIES: CPT

## 2025-08-14 PROCEDURE — 97110 THERAPEUTIC EXERCISES: CPT

## 2025-08-14 PROCEDURE — 6370000000 HC RX 637 (ALT 250 FOR IP): Performed by: NURSE PRACTITIONER

## 2025-08-14 PROCEDURE — 1280000000 HC REHAB R&B

## 2025-08-14 PROCEDURE — 80048 BASIC METABOLIC PNL TOTAL CA: CPT

## 2025-08-14 PROCEDURE — 99233 SBSQ HOSP IP/OBS HIGH 50: CPT | Performed by: INTERNAL MEDICINE

## 2025-08-14 PROCEDURE — 99222 1ST HOSP IP/OBS MODERATE 55: CPT | Performed by: SURGERY

## 2025-08-14 PROCEDURE — 6370000000 HC RX 637 (ALT 250 FOR IP): Performed by: INTERNAL MEDICINE

## 2025-08-14 PROCEDURE — 83735 ASSAY OF MAGNESIUM: CPT

## 2025-08-14 RX ORDER — SENNOSIDES 8.6 MG/1
1 TABLET ORAL DAILY
Status: DISCONTINUED | OUTPATIENT
Start: 2025-08-14 | End: 2025-08-15 | Stop reason: HOSPADM

## 2025-08-14 RX ORDER — INSULIN GLARGINE-YFGN 100 [IU]/ML
15 INJECTION, SOLUTION SUBCUTANEOUS NIGHTLY
Status: DISCONTINUED | OUTPATIENT
Start: 2025-08-14 | End: 2025-08-15

## 2025-08-14 RX ORDER — POLYETHYLENE GLYCOL 3350 17 G/17G
17 POWDER, FOR SOLUTION ORAL 2 TIMES DAILY
Status: DISCONTINUED | OUTPATIENT
Start: 2025-08-14 | End: 2025-08-15 | Stop reason: HOSPADM

## 2025-08-14 RX ORDER — DOCUSATE SODIUM 100 MG/1
100 CAPSULE, LIQUID FILLED ORAL 2 TIMES DAILY
Status: DISCONTINUED | OUTPATIENT
Start: 2025-08-14 | End: 2025-08-15 | Stop reason: HOSPADM

## 2025-08-14 RX ORDER — SODIUM PHOSPHATE,MONO-DIBASIC 19G-7G/118
1 ENEMA (ML) RECTAL ONCE
Status: DISCONTINUED | OUTPATIENT
Start: 2025-08-14 | End: 2025-08-14

## 2025-08-14 RX ORDER — BISACODYL 10 MG
10 SUPPOSITORY, RECTAL RECTAL ONCE
Status: DISCONTINUED | OUTPATIENT
Start: 2025-08-14 | End: 2025-08-14

## 2025-08-14 RX ADMIN — BISACODYL 10 MG: 10 SUPPOSITORY RECTAL at 15:22

## 2025-08-14 RX ADMIN — SODIUM CHLORIDE, PRESERVATIVE FREE 10 ML: 5 INJECTION INTRAVENOUS at 21:07

## 2025-08-14 RX ADMIN — HYDROCODONE BITARTRATE AND ACETAMINOPHEN 1 TABLET: 10; 325 TABLET ORAL at 15:06

## 2025-08-14 RX ADMIN — INSULIN GLARGINE-YFGN 15 UNITS: 100 INJECTION, SOLUTION SUBCUTANEOUS at 22:06

## 2025-08-14 RX ADMIN — SODIUM CHLORIDE, PRESERVATIVE FREE 10 ML: 5 INJECTION INTRAVENOUS at 10:23

## 2025-08-14 RX ADMIN — ATORVASTATIN CALCIUM 20 MG: 10 TABLET, FILM COATED ORAL at 10:17

## 2025-08-14 RX ADMIN — TROSPIUM CHLORIDE 20 MG: 20 TABLET, FILM COATED ORAL at 06:02

## 2025-08-14 RX ADMIN — POLYETHYLENE GLYCOL 3350 17 G: 17 POWDER, FOR SOLUTION ORAL at 21:06

## 2025-08-14 RX ADMIN — INSULIN LISPRO 1 UNITS: 100 INJECTION, SOLUTION INTRAVENOUS; SUBCUTANEOUS at 11:46

## 2025-08-14 RX ADMIN — POLYETHYLENE GLYCOL 3350 17 G: 17 POWDER, FOR SOLUTION ORAL at 10:19

## 2025-08-14 RX ADMIN — LEVOTHYROXINE SODIUM 25 MCG: 0.03 TABLET ORAL at 06:02

## 2025-08-14 RX ADMIN — PANTOPRAZOLE SODIUM 40 MG: 40 TABLET, DELAYED RELEASE ORAL at 06:02

## 2025-08-14 RX ADMIN — Medication 3 MG: at 21:06

## 2025-08-14 RX ADMIN — Medication 400 MG: at 10:18

## 2025-08-14 RX ADMIN — DOCUSATE SODIUM 100 MG: 100 CAPSULE, LIQUID FILLED ORAL at 21:06

## 2025-08-14 RX ADMIN — METHOCARBAMOL 500 MG: 500 TABLET ORAL at 21:06

## 2025-08-14 RX ADMIN — SENNOSIDES 8.6 MG: 8.6 TABLET, FILM COATED ORAL at 15:06

## 2025-08-14 RX ADMIN — FINASTERIDE 5 MG: 5 TABLET, FILM COATED ORAL at 10:18

## 2025-08-14 RX ADMIN — FOLIC ACID 1 MG: 1 TABLET ORAL at 10:18

## 2025-08-14 RX ADMIN — Medication 10 MG: at 21:05

## 2025-08-14 RX ADMIN — Medication: at 10:18

## 2025-08-14 RX ADMIN — MINERAL OIL 1 ENEMA: 100 ENEMA RECTAL at 15:09

## 2025-08-14 RX ADMIN — GABAPENTIN 300 MG: 300 CAPSULE ORAL at 10:18

## 2025-08-14 RX ADMIN — HYDROCODONE BITARTRATE AND ACETAMINOPHEN 1 TABLET: 10; 325 TABLET ORAL at 21:05

## 2025-08-14 RX ADMIN — TRAMADOL HYDROCHLORIDE 50 MG: 50 TABLET, COATED ORAL at 21:06

## 2025-08-14 RX ADMIN — AMITRIPTYLINE HYDROCHLORIDE 25 MG: 25 TABLET ORAL at 21:06

## 2025-08-14 RX ADMIN — FERROUS SULFATE TAB 325 MG (65 MG ELEMENTAL FE) 325 MG: 325 (65 FE) TAB at 10:18

## 2025-08-14 RX ADMIN — INSULIN LISPRO 4 UNITS: 100 INJECTION, SOLUTION INTRAVENOUS; SUBCUTANEOUS at 21:05

## 2025-08-14 RX ADMIN — TAMSULOSIN HYDROCHLORIDE 0.4 MG: 0.4 CAPSULE ORAL at 10:17

## 2025-08-14 RX ADMIN — GABAPENTIN 300 MG: 300 CAPSULE ORAL at 21:06

## 2025-08-14 RX ADMIN — OXYBUTYNIN CHLORIDE 15 MG: 5 TABLET, EXTENDED RELEASE ORAL at 10:18

## 2025-08-14 RX ADMIN — INSULIN LISPRO 1 UNITS: 100 INJECTION, SOLUTION INTRAVENOUS; SUBCUTANEOUS at 17:13

## 2025-08-14 RX ADMIN — HYDROCODONE BITARTRATE AND ACETAMINOPHEN 1 TABLET: 10; 325 TABLET ORAL at 09:12

## 2025-08-14 RX ADMIN — DOCUSATE SODIUM 100 MG: 100 CAPSULE, LIQUID FILLED ORAL at 10:18

## 2025-08-14 RX ADMIN — GABAPENTIN 300 MG: 300 CAPSULE ORAL at 15:06

## 2025-08-14 ASSESSMENT — PAIN DESCRIPTION - DESCRIPTORS
DESCRIPTORS: ACHING;SHARP
DESCRIPTORS: ACHING;DISCOMFORT

## 2025-08-14 ASSESSMENT — PAIN DESCRIPTION - LOCATION
LOCATION: BACK
LOCATION: ABDOMEN;BACK
LOCATION: BACK

## 2025-08-14 ASSESSMENT — PAIN SCALES - GENERAL
PAINLEVEL_OUTOF10: 0
PAINLEVEL_OUTOF10: 7
PAINLEVEL_OUTOF10: 9
PAINLEVEL_OUTOF10: 0
PAINLEVEL_OUTOF10: 3
PAINLEVEL_OUTOF10: 8

## 2025-08-14 ASSESSMENT — PAIN - FUNCTIONAL ASSESSMENT
PAIN_FUNCTIONAL_ASSESSMENT: 0-10
PAIN_FUNCTIONAL_ASSESSMENT: PREVENTS OR INTERFERES SOME ACTIVE ACTIVITIES AND ADLS
PAIN_FUNCTIONAL_ASSESSMENT: 0-10

## 2025-08-14 ASSESSMENT — PAIN DESCRIPTION - ORIENTATION
ORIENTATION: MID
ORIENTATION: POSTERIOR

## 2025-08-15 ENCOUNTER — HOSPITAL ENCOUNTER (INPATIENT)
Age: 65
LOS: 6 days | Discharge: NURSING, REHAB OR LONG-TERM CARE | End: 2025-08-21
Attending: INTERNAL MEDICINE | Admitting: INTERNAL MEDICINE
Payer: MEDICARE

## 2025-08-15 ENCOUNTER — APPOINTMENT (OUTPATIENT)
Dept: CT IMAGING | Age: 65
End: 2025-08-15
Attending: INTERNAL MEDICINE
Payer: MEDICARE

## 2025-08-15 ENCOUNTER — APPOINTMENT (OUTPATIENT)
Dept: GENERAL RADIOLOGY | Age: 65
DRG: 560 | End: 2025-08-15
Attending: STUDENT IN AN ORGANIZED HEALTH CARE EDUCATION/TRAINING PROGRAM
Payer: MEDICARE

## 2025-08-15 VITALS
SYSTOLIC BLOOD PRESSURE: 117 MMHG | RESPIRATION RATE: 33 BRPM | HEIGHT: 74 IN | WEIGHT: 298.5 LBS | BODY MASS INDEX: 38.31 KG/M2 | TEMPERATURE: 101.5 F | HEART RATE: 116 BPM | DIASTOLIC BLOOD PRESSURE: 60 MMHG | OXYGEN SATURATION: 95 %

## 2025-08-15 DIAGNOSIS — M86.671 OTHER CHRONIC OSTEOMYELITIS OF RIGHT ANKLE (HCC): Primary | ICD-10-CM

## 2025-08-15 PROBLEM — N13.39 OTHER HYDRONEPHROSIS: Status: ACTIVE | Noted: 2025-08-15

## 2025-08-15 PROBLEM — R53.83 LETHARGY: Status: ACTIVE | Noted: 2025-08-15

## 2025-08-15 LAB
ALBUMIN SERPL-MCNC: 2.5 G/DL (ref 3.4–5)
ALP SERPL-CCNC: 178 U/L (ref 40–129)
ALT SERPL-CCNC: <5 U/L (ref 10–40)
AMMONIA PLAS-SCNC: 17 UMOL/L (ref 16–60)
ANION GAP SERPL CALCULATED.3IONS-SCNC: 12 MMOL/L (ref 3–16)
ANION GAP SERPL CALCULATED.3IONS-SCNC: 18 MMOL/L (ref 3–16)
AST SERPL-CCNC: 18 U/L (ref 15–37)
BASE EXCESS BLDA CALC-SCNC: -1 MMOL/L (ref -3–3)
BASOPHILS # BLD: 0.1 K/UL (ref 0–0.2)
BASOPHILS NFR BLD: 0.8 %
BILIRUB DIRECT SERPL-MCNC: 0.2 MG/DL (ref 0–0.3)
BILIRUB INDIRECT SERPL-MCNC: 0.2 MG/DL (ref 0–1)
BILIRUB SERPL-MCNC: 0.4 MG/DL (ref 0–1)
BILIRUB UR QL STRIP.AUTO: NEGATIVE
BUN SERPL-MCNC: 58 MG/DL (ref 7–20)
BUN SERPL-MCNC: 62 MG/DL (ref 7–20)
CA-I BLD-SCNC: 1.15 MMOL/L (ref 1.12–1.32)
CALCIUM SERPL-MCNC: 8.1 MG/DL (ref 8.3–10.6)
CALCIUM SERPL-MCNC: 8.3 MG/DL (ref 8.3–10.6)
CHLORIDE SERPL-SCNC: 95 MMOL/L (ref 99–110)
CHLORIDE SERPL-SCNC: 99 MMOL/L (ref 99–110)
CLARITY UR: CLEAR
CO2 BLDA-SCNC: 25 MMOL/L
CO2 SERPL-SCNC: 22 MMOL/L (ref 21–32)
CO2 SERPL-SCNC: 25 MMOL/L (ref 21–32)
COLOR UR: YELLOW
CREAT SERPL-MCNC: 3.6 MG/DL (ref 0.8–1.3)
CREAT SERPL-MCNC: 3.8 MG/DL (ref 0.8–1.3)
CRP SERPL-MCNC: 189 MG/L (ref 0–5.1)
DEPRECATED RDW RBC AUTO: 16.9 % (ref 12.4–15.4)
EOSINOPHIL # BLD: 0.2 K/UL (ref 0–0.6)
EOSINOPHIL NFR BLD: 1.6 %
GFR SERPLBLD CREATININE-BSD FMLA CKD-EPI: 17 ML/MIN/{1.73_M2}
GFR SERPLBLD CREATININE-BSD FMLA CKD-EPI: 18 ML/MIN/{1.73_M2}
GLUCOSE BLD-MCNC: 212 MG/DL (ref 70–99)
GLUCOSE BLD-MCNC: 280 MG/DL (ref 70–99)
GLUCOSE BLD-MCNC: 407 MG/DL (ref 70–99)
GLUCOSE BLD-MCNC: 418 MG/DL (ref 70–99)
GLUCOSE BLD-MCNC: 444 MG/DL (ref 70–99)
GLUCOSE BLD-MCNC: 465 MG/DL (ref 70–99)
GLUCOSE BLD-MCNC: 487 MG/DL (ref 70–99)
GLUCOSE SERPL-MCNC: 213 MG/DL (ref 70–99)
GLUCOSE SERPL-MCNC: 432 MG/DL (ref 70–99)
GLUCOSE UR STRIP.AUTO-MCNC: >=1000 MG/DL
HCO3 BLDA-SCNC: 23.6 MMOL/L (ref 21–29)
HCT VFR BLD AUTO: 23 % (ref 40.5–52.5)
HCT VFR BLD AUTO: 27 % (ref 40.5–52.5)
HGB BLD CALC-MCNC: 9.1 GM/DL (ref 13.5–17.5)
HGB BLD-MCNC: 7.4 G/DL (ref 13.5–17.5)
HGB UR QL STRIP.AUTO: ABNORMAL
KETONES UR STRIP.AUTO-MCNC: 15 MG/DL
LACTATE BLD-SCNC: 1.28 MMOL/L (ref 0.4–2)
LEUKOCYTE ESTERASE UR QL STRIP.AUTO: NEGATIVE
LYMPHOCYTES # BLD: 0.8 K/UL (ref 1–5.1)
LYMPHOCYTES NFR BLD: 6.8 %
MCH RBC QN AUTO: 29.4 PG (ref 26–34)
MCHC RBC AUTO-ENTMCNC: 32 G/DL (ref 31–36)
MCV RBC AUTO: 91.9 FL (ref 80–100)
MONOCYTES # BLD: 0.8 K/UL (ref 0–1.3)
MONOCYTES NFR BLD: 6.8 %
NEUTROPHILS # BLD: 10.3 K/UL (ref 1.7–7.7)
NEUTROPHILS NFR BLD: 84 %
NITRITE UR QL STRIP.AUTO: NEGATIVE
PCO2 BLDA: 34.6 MM HG (ref 35–45)
PERFORMED ON: ABNORMAL
PH BLDA: 7.44 [PH] (ref 7.35–7.45)
PH UR STRIP.AUTO: 5.5 [PH] (ref 5–8)
PLATELET # BLD AUTO: 287 K/UL (ref 135–450)
PMV BLD AUTO: 7.2 FL (ref 5–10.5)
PO2 BLDA: 62.9 MM HG (ref 75–108)
POC SAMPLE TYPE: ABNORMAL
POTASSIUM BLD-SCNC: 4.1 MMOL/L (ref 3.5–5.1)
POTASSIUM SERPL-SCNC: 4.1 MMOL/L (ref 3.5–5.1)
POTASSIUM SERPL-SCNC: 4.5 MMOL/L (ref 3.5–5.1)
PROT SERPL-MCNC: 6.4 G/DL (ref 6.4–8.2)
PROT UR STRIP.AUTO-MCNC: 100 MG/DL
RBC # BLD AUTO: 2.5 M/UL (ref 4.2–5.9)
RBC #/AREA URNS HPF: ABNORMAL /HPF (ref 0–4)
SAO2 % BLDA: 93 % (ref 93–100)
SODIUM BLD-SCNC: 136 MMOL/L (ref 136–145)
SODIUM SERPL-SCNC: 135 MMOL/L (ref 136–145)
SODIUM SERPL-SCNC: 136 MMOL/L (ref 136–145)
SP GR UR STRIP.AUTO: 1.02 (ref 1–1.03)
UA COMPLETE W REFLEX CULTURE PNL UR: ABNORMAL
UA DIPSTICK W REFLEX MICRO PNL UR: YES
URN SPEC COLLECT METH UR: ABNORMAL
UROBILINOGEN UR STRIP-ACNC: 0.2 E.U./DL
WBC # BLD AUTO: 12.2 K/UL (ref 4–11)
WBC #/AREA URNS HPF: ABNORMAL /HPF (ref 0–5)

## 2025-08-15 PROCEDURE — 82803 BLOOD GASES ANY COMBINATION: CPT

## 2025-08-15 PROCEDURE — 1200000000 HC SEMI PRIVATE

## 2025-08-15 PROCEDURE — 82140 ASSAY OF AMMONIA: CPT

## 2025-08-15 PROCEDURE — 36415 COLL VENOUS BLD VENIPUNCTURE: CPT

## 2025-08-15 PROCEDURE — 99232 SBSQ HOSP IP/OBS MODERATE 35: CPT | Performed by: INTERNAL MEDICINE

## 2025-08-15 PROCEDURE — 80048 BASIC METABOLIC PNL TOTAL CA: CPT

## 2025-08-15 PROCEDURE — 85014 HEMATOCRIT: CPT

## 2025-08-15 PROCEDURE — 83605 ASSAY OF LACTIC ACID: CPT

## 2025-08-15 PROCEDURE — 6370000000 HC RX 637 (ALT 250 FOR IP): Performed by: INTERNAL MEDICINE

## 2025-08-15 PROCEDURE — 84132 ASSAY OF SERUM POTASSIUM: CPT

## 2025-08-15 PROCEDURE — 87040 BLOOD CULTURE FOR BACTERIA: CPT

## 2025-08-15 PROCEDURE — 86140 C-REACTIVE PROTEIN: CPT

## 2025-08-15 PROCEDURE — 84295 ASSAY OF SERUM SODIUM: CPT

## 2025-08-15 PROCEDURE — 2580000003 HC RX 258: Performed by: INTERNAL MEDICINE

## 2025-08-15 PROCEDURE — 82330 ASSAY OF CALCIUM: CPT

## 2025-08-15 PROCEDURE — 99233 SBSQ HOSP IP/OBS HIGH 50: CPT | Performed by: INTERNAL MEDICINE

## 2025-08-15 PROCEDURE — 70450 CT HEAD/BRAIN W/O DYE: CPT

## 2025-08-15 PROCEDURE — 6360000002 HC RX W HCPCS: Performed by: INTERNAL MEDICINE

## 2025-08-15 PROCEDURE — 81001 URINALYSIS AUTO W/SCOPE: CPT

## 2025-08-15 PROCEDURE — 51702 INSERT TEMP BLADDER CATH: CPT

## 2025-08-15 PROCEDURE — 6370000000 HC RX 637 (ALT 250 FOR IP): Performed by: STUDENT IN AN ORGANIZED HEALTH CARE EDUCATION/TRAINING PROGRAM

## 2025-08-15 PROCEDURE — 80076 HEPATIC FUNCTION PANEL: CPT

## 2025-08-15 PROCEDURE — 85025 COMPLETE CBC W/AUTO DIFF WBC: CPT

## 2025-08-15 PROCEDURE — 82947 ASSAY GLUCOSE BLOOD QUANT: CPT

## 2025-08-15 PROCEDURE — 71045 X-RAY EXAM CHEST 1 VIEW: CPT

## 2025-08-15 PROCEDURE — 6360000002 HC RX W HCPCS: Performed by: NURSE PRACTITIONER

## 2025-08-15 RX ORDER — POLYETHYLENE GLYCOL 3350 17 G/17G
17 POWDER, FOR SOLUTION ORAL 2 TIMES DAILY
Status: DISCONTINUED | OUTPATIENT
Start: 2025-08-15 | End: 2025-08-21 | Stop reason: HOSPADM

## 2025-08-15 RX ORDER — SODIUM CHLORIDE 0.9 % (FLUSH) 0.9 %
5-40 SYRINGE (ML) INJECTION EVERY 12 HOURS SCHEDULED
Status: DISCONTINUED | OUTPATIENT
Start: 2025-08-15 | End: 2025-08-21 | Stop reason: HOSPADM

## 2025-08-15 RX ORDER — OXYCODONE HYDROCHLORIDE 5 MG/1
10 TABLET ORAL EVERY 4 HOURS PRN
Refills: 0 | Status: DISCONTINUED | OUTPATIENT
Start: 2025-08-15 | End: 2025-08-21 | Stop reason: HOSPADM

## 2025-08-15 RX ORDER — ACETAMINOPHEN 325 MG/1
650 TABLET ORAL EVERY 6 HOURS PRN
Status: DISCONTINUED | OUTPATIENT
Start: 2025-08-15 | End: 2025-08-15 | Stop reason: SDUPTHER

## 2025-08-15 RX ORDER — INSULIN LISPRO 100 [IU]/ML
10 INJECTION, SOLUTION INTRAVENOUS; SUBCUTANEOUS ONCE
Status: COMPLETED | OUTPATIENT
Start: 2025-08-15 | End: 2025-08-15

## 2025-08-15 RX ORDER — DOCUSATE SODIUM 100 MG/1
100 CAPSULE, LIQUID FILLED ORAL 2 TIMES DAILY
Status: DISCONTINUED | OUTPATIENT
Start: 2025-08-15 | End: 2025-08-21 | Stop reason: HOSPADM

## 2025-08-15 RX ORDER — POLYETHYLENE GLYCOL 3350 17 G/17G
17 POWDER, FOR SOLUTION ORAL DAILY PRN
Status: DISCONTINUED | OUTPATIENT
Start: 2025-08-15 | End: 2025-08-21 | Stop reason: HOSPADM

## 2025-08-15 RX ORDER — DEXTROSE MONOHYDRATE 100 MG/ML
INJECTION, SOLUTION INTRAVENOUS CONTINUOUS PRN
Status: DISCONTINUED | OUTPATIENT
Start: 2025-08-15 | End: 2025-08-21 | Stop reason: HOSPADM

## 2025-08-15 RX ORDER — INSULIN LISPRO 100 [IU]/ML
0-16 INJECTION, SOLUTION INTRAVENOUS; SUBCUTANEOUS
Status: DISCONTINUED | OUTPATIENT
Start: 2025-08-15 | End: 2025-08-21 | Stop reason: HOSPADM

## 2025-08-15 RX ORDER — OXYCODONE HYDROCHLORIDE 5 MG/1
5 TABLET ORAL EVERY 4 HOURS PRN
Refills: 0 | Status: DISCONTINUED | OUTPATIENT
Start: 2025-08-15 | End: 2025-08-21 | Stop reason: HOSPADM

## 2025-08-15 RX ORDER — ATORVASTATIN CALCIUM 10 MG/1
20 TABLET, FILM COATED ORAL DAILY
Status: DISCONTINUED | OUTPATIENT
Start: 2025-08-16 | End: 2025-08-21 | Stop reason: HOSPADM

## 2025-08-15 RX ORDER — PROCHLORPERAZINE EDISYLATE 5 MG/ML
10 INJECTION INTRAMUSCULAR; INTRAVENOUS EVERY 6 HOURS PRN
Status: DISCONTINUED | OUTPATIENT
Start: 2025-08-15 | End: 2025-08-21 | Stop reason: HOSPADM

## 2025-08-15 RX ORDER — ACETAMINOPHEN 650 MG/1
650 SUPPOSITORY RECTAL EVERY 6 HOURS PRN
Status: DISCONTINUED | OUTPATIENT
Start: 2025-08-15 | End: 2025-08-15 | Stop reason: SDUPTHER

## 2025-08-15 RX ORDER — HEPARIN SODIUM 5000 [USP'U]/ML
5000 INJECTION, SOLUTION INTRAVENOUS; SUBCUTANEOUS EVERY 8 HOURS SCHEDULED
Status: DISCONTINUED | OUTPATIENT
Start: 2025-08-15 | End: 2025-08-21 | Stop reason: HOSPADM

## 2025-08-15 RX ORDER — GLUCAGON 1 MG/ML
1 KIT INJECTION PRN
Status: DISCONTINUED | OUTPATIENT
Start: 2025-08-15 | End: 2025-08-21 | Stop reason: HOSPADM

## 2025-08-15 RX ORDER — SODIUM CHLORIDE 9 MG/ML
INJECTION, SOLUTION INTRAVENOUS PRN
Status: DISCONTINUED | OUTPATIENT
Start: 2025-08-15 | End: 2025-08-21 | Stop reason: SDUPTHER

## 2025-08-15 RX ORDER — FOLIC ACID 1 MG/1
1 TABLET ORAL DAILY
Status: DISCONTINUED | OUTPATIENT
Start: 2025-08-16 | End: 2025-08-21 | Stop reason: HOSPADM

## 2025-08-15 RX ORDER — ONDANSETRON 2 MG/ML
4 INJECTION INTRAMUSCULAR; INTRAVENOUS EVERY 6 HOURS PRN
Status: DISCONTINUED | OUTPATIENT
Start: 2025-08-15 | End: 2025-08-15

## 2025-08-15 RX ORDER — ACETAMINOPHEN 325 MG/1
650 TABLET ORAL EVERY 6 HOURS PRN
Status: DISCONTINUED | OUTPATIENT
Start: 2025-08-15 | End: 2025-08-21 | Stop reason: HOSPADM

## 2025-08-15 RX ORDER — SODIUM CHLORIDE 0.9 % (FLUSH) 0.9 %
5-40 SYRINGE (ML) INJECTION EVERY 12 HOURS SCHEDULED
Status: DISCONTINUED | OUTPATIENT
Start: 2025-08-15 | End: 2025-08-16 | Stop reason: SDUPTHER

## 2025-08-15 RX ORDER — HONEY 100 %
PASTE (ML) TOPICAL DAILY
Status: DISCONTINUED | OUTPATIENT
Start: 2025-08-16 | End: 2025-08-21 | Stop reason: HOSPADM

## 2025-08-15 RX ORDER — ENOXAPARIN SODIUM 100 MG/ML
30 INJECTION SUBCUTANEOUS 2 TIMES DAILY
Status: DISCONTINUED | OUTPATIENT
Start: 2025-08-15 | End: 2025-08-16

## 2025-08-15 RX ORDER — SODIUM CHLORIDE 9 MG/ML
INJECTION, SOLUTION INTRAVENOUS PRN
Status: DISCONTINUED | OUTPATIENT
Start: 2025-08-15 | End: 2025-08-21 | Stop reason: HOSPADM

## 2025-08-15 RX ORDER — NALOXONE HYDROCHLORIDE 1 MG/ML
1 INJECTION INTRAMUSCULAR; INTRAVENOUS; SUBCUTANEOUS ONCE
Status: COMPLETED | OUTPATIENT
Start: 2025-08-15 | End: 2025-08-15

## 2025-08-15 RX ORDER — PROCHLORPERAZINE MALEATE 5 MG/1
10 TABLET ORAL EVERY 8 HOURS PRN
Status: DISCONTINUED | OUTPATIENT
Start: 2025-08-15 | End: 2025-08-21 | Stop reason: HOSPADM

## 2025-08-15 RX ORDER — LEVOTHYROXINE SODIUM 25 UG/1
25 TABLET ORAL DAILY
Status: DISCONTINUED | OUTPATIENT
Start: 2025-08-16 | End: 2025-08-21 | Stop reason: HOSPADM

## 2025-08-15 RX ORDER — BISACODYL 10 MG
10 SUPPOSITORY, RECTAL RECTAL DAILY PRN
Status: DISCONTINUED | OUTPATIENT
Start: 2025-08-15 | End: 2025-08-21 | Stop reason: HOSPADM

## 2025-08-15 RX ORDER — NALOXONE HYDROCHLORIDE 0.4 MG/ML
0.4 INJECTION, SOLUTION INTRAMUSCULAR; INTRAVENOUS; SUBCUTANEOUS ONCE
Status: COMPLETED | OUTPATIENT
Start: 2025-08-15 | End: 2025-08-15

## 2025-08-15 RX ORDER — ONDANSETRON 4 MG/1
4 TABLET, ORALLY DISINTEGRATING ORAL EVERY 8 HOURS PRN
Status: DISCONTINUED | OUTPATIENT
Start: 2025-08-15 | End: 2025-08-15

## 2025-08-15 RX ORDER — SENNOSIDES 8.6 MG/1
1 TABLET ORAL DAILY
Status: DISCONTINUED | OUTPATIENT
Start: 2025-08-16 | End: 2025-08-21 | Stop reason: HOSPADM

## 2025-08-15 RX ORDER — SODIUM CHLORIDE 0.9 % (FLUSH) 0.9 %
5-40 SYRINGE (ML) INJECTION PRN
Status: DISCONTINUED | OUTPATIENT
Start: 2025-08-15 | End: 2025-08-16 | Stop reason: SDUPTHER

## 2025-08-15 RX ORDER — SODIUM CHLORIDE 0.9 % (FLUSH) 0.9 %
5-40 SYRINGE (ML) INJECTION PRN
Status: DISCONTINUED | OUTPATIENT
Start: 2025-08-15 | End: 2025-08-21 | Stop reason: HOSPADM

## 2025-08-15 RX ORDER — METHOCARBAMOL 500 MG/1
500 TABLET, FILM COATED ORAL 3 TIMES DAILY PRN
Status: DISCONTINUED | OUTPATIENT
Start: 2025-08-15 | End: 2025-08-21 | Stop reason: HOSPADM

## 2025-08-15 RX ORDER — TAMSULOSIN HYDROCHLORIDE 0.4 MG/1
0.4 CAPSULE ORAL DAILY
Status: DISCONTINUED | OUTPATIENT
Start: 2025-08-16 | End: 2025-08-21 | Stop reason: HOSPADM

## 2025-08-15 RX ORDER — ACETAMINOPHEN 650 MG/1
650 SUPPOSITORY RECTAL EVERY 6 HOURS PRN
Status: DISCONTINUED | OUTPATIENT
Start: 2025-08-15 | End: 2025-08-21 | Stop reason: HOSPADM

## 2025-08-15 RX ORDER — GABAPENTIN 300 MG/1
300 CAPSULE ORAL 3 TIMES DAILY
Status: DISCONTINUED | OUTPATIENT
Start: 2025-08-15 | End: 2025-08-21

## 2025-08-15 RX ORDER — PANTOPRAZOLE SODIUM 40 MG/1
40 TABLET, DELAYED RELEASE ORAL
Status: DISCONTINUED | OUTPATIENT
Start: 2025-08-16 | End: 2025-08-21 | Stop reason: HOSPADM

## 2025-08-15 RX ORDER — FINASTERIDE 5 MG/1
5 TABLET, FILM COATED ORAL DAILY
Status: DISCONTINUED | OUTPATIENT
Start: 2025-08-16 | End: 2025-08-21 | Stop reason: HOSPADM

## 2025-08-15 RX ORDER — INSULIN GLARGINE-YFGN 100 [IU]/ML
25 INJECTION, SOLUTION SUBCUTANEOUS NIGHTLY
Status: DISCONTINUED | OUTPATIENT
Start: 2025-08-15 | End: 2025-08-15 | Stop reason: HOSPADM

## 2025-08-15 RX ADMIN — LEVOTHYROXINE SODIUM 25 MCG: 0.03 TABLET ORAL at 06:51

## 2025-08-15 RX ADMIN — MEROPENEM 1000 MG: 1 INJECTION INTRAVENOUS at 16:07

## 2025-08-15 RX ADMIN — NALOXONE HYDROCHLORIDE 1 MG: 1 INJECTION INTRAMUSCULAR; INTRAVENOUS; SUBCUTANEOUS at 22:43

## 2025-08-15 RX ADMIN — ENOXAPARIN SODIUM 30 MG: 100 INJECTION SUBCUTANEOUS at 12:41

## 2025-08-15 RX ADMIN — INSULIN LISPRO 4 UNITS: 100 INJECTION, SOLUTION INTRAVENOUS; SUBCUTANEOUS at 10:09

## 2025-08-15 RX ADMIN — INSULIN LISPRO 2 UNITS: 100 INJECTION, SOLUTION INTRAVENOUS; SUBCUTANEOUS at 09:20

## 2025-08-15 RX ADMIN — ENOXAPARIN SODIUM 30 MG: 100 INJECTION SUBCUTANEOUS at 20:16

## 2025-08-15 RX ADMIN — NALXONE HYDROCHLORIDE 0.4 MG: 0.4 INJECTION INTRAMUSCULAR; INTRAVENOUS; SUBCUTANEOUS at 21:51

## 2025-08-15 RX ADMIN — INSULIN LISPRO 8 UNITS: 100 INJECTION, SOLUTION INTRAVENOUS; SUBCUTANEOUS at 16:47

## 2025-08-15 RX ADMIN — PANTOPRAZOLE SODIUM 40 MG: 40 TABLET, DELAYED RELEASE ORAL at 07:00

## 2025-08-15 RX ADMIN — INSULIN LISPRO 4 UNITS: 100 INJECTION, SOLUTION INTRAVENOUS; SUBCUTANEOUS at 20:20

## 2025-08-15 RX ADMIN — FERROUS SULFATE TAB 325 MG (65 MG ELEMENTAL FE) 325 MG: 325 (65 FE) TAB at 06:51

## 2025-08-15 RX ADMIN — VANCOMYCIN HYDROCHLORIDE 2500 MG: 5 INJECTION, POWDER, LYOPHILIZED, FOR SOLUTION INTRAVENOUS at 16:49

## 2025-08-15 RX ADMIN — INSULIN LISPRO 4 UNITS: 100 INJECTION, SOLUTION INTRAVENOUS; SUBCUTANEOUS at 09:19

## 2025-08-15 RX ADMIN — ACETAMINOPHEN 650 MG: 650 SUPPOSITORY RECTAL at 10:27

## 2025-08-15 RX ADMIN — INSULIN LISPRO 10 UNITS: 100 INJECTION, SOLUTION INTRAVENOUS; SUBCUTANEOUS at 12:41

## 2025-08-15 ASSESSMENT — PAIN DESCRIPTION - LOCATION: LOCATION: BACK;LEG

## 2025-08-15 ASSESSMENT — PAIN SCALES - GENERAL
PAINLEVEL_OUTOF10: 0
PAINLEVEL_OUTOF10: 10

## 2025-08-15 ASSESSMENT — PAIN SCALES - WONG BAKER: WONGBAKER_NUMERICALRESPONSE: NO HURT

## 2025-08-16 PROBLEM — I50.32 CHRONIC HEART FAILURE WITH PRESERVED EJECTION FRACTION (HCC): Status: ACTIVE | Noted: 2025-08-16

## 2025-08-16 LAB
ANION GAP SERPL CALCULATED.3IONS-SCNC: 13 MMOL/L (ref 3–16)
BUN SERPL-MCNC: 64 MG/DL (ref 7–20)
CALCIUM SERPL-MCNC: 8.4 MG/DL (ref 8.3–10.6)
CHLORIDE SERPL-SCNC: 98 MMOL/L (ref 99–110)
CO2 SERPL-SCNC: 25 MMOL/L (ref 21–32)
CREAT SERPL-MCNC: 3.8 MG/DL (ref 0.8–1.3)
GFR SERPLBLD CREATININE-BSD FMLA CKD-EPI: 17 ML/MIN/{1.73_M2}
GLUCOSE BLD-MCNC: 177 MG/DL (ref 70–99)
GLUCOSE BLD-MCNC: 289 MG/DL (ref 70–99)
GLUCOSE BLD-MCNC: 346 MG/DL (ref 70–99)
GLUCOSE BLD-MCNC: 356 MG/DL (ref 70–99)
GLUCOSE SERPL-MCNC: 256 MG/DL (ref 70–99)
PERFORMED ON: ABNORMAL
POTASSIUM SERPL-SCNC: 3.9 MMOL/L (ref 3.5–5.1)
SODIUM SERPL-SCNC: 136 MMOL/L (ref 136–145)
VANCOMYCIN SERPL-MCNC: 36.2 UG/ML
VANCOMYCIN SERPL-MCNC: <4 UG/ML

## 2025-08-16 PROCEDURE — 6360000002 HC RX W HCPCS: Performed by: INTERNAL MEDICINE

## 2025-08-16 PROCEDURE — 36415 COLL VENOUS BLD VENIPUNCTURE: CPT

## 2025-08-16 PROCEDURE — 2580000003 HC RX 258: Performed by: INTERNAL MEDICINE

## 2025-08-16 PROCEDURE — 6370000000 HC RX 637 (ALT 250 FOR IP): Performed by: INTERNAL MEDICINE

## 2025-08-16 PROCEDURE — 80048 BASIC METABOLIC PNL TOTAL CA: CPT

## 2025-08-16 PROCEDURE — 51702 INSERT TEMP BLADDER CATH: CPT

## 2025-08-16 PROCEDURE — 80202 ASSAY OF VANCOMYCIN: CPT

## 2025-08-16 PROCEDURE — 1200000000 HC SEMI PRIVATE

## 2025-08-16 PROCEDURE — 99233 SBSQ HOSP IP/OBS HIGH 50: CPT | Performed by: INTERNAL MEDICINE

## 2025-08-16 PROCEDURE — 2500000003 HC RX 250 WO HCPCS: Performed by: INTERNAL MEDICINE

## 2025-08-16 PROCEDURE — S5553 INSULIN LONG ACTING 5 U: HCPCS | Performed by: INTERNAL MEDICINE

## 2025-08-16 RX ORDER — INSULIN GLARGINE-YFGN 100 [IU]/ML
25 INJECTION, SOLUTION SUBCUTANEOUS NIGHTLY
Status: DISCONTINUED | OUTPATIENT
Start: 2025-08-16 | End: 2025-08-17

## 2025-08-16 RX ORDER — SODIUM CHLORIDE, SODIUM LACTATE, POTASSIUM CHLORIDE, CALCIUM CHLORIDE 600; 310; 30; 20 MG/100ML; MG/100ML; MG/100ML; MG/100ML
INJECTION, SOLUTION INTRAVENOUS CONTINUOUS
Status: ACTIVE | OUTPATIENT
Start: 2025-08-16 | End: 2025-08-16

## 2025-08-16 RX ORDER — ENOXAPARIN SODIUM 100 MG/ML
30 INJECTION SUBCUTANEOUS DAILY
Status: DISCONTINUED | OUTPATIENT
Start: 2025-08-16 | End: 2025-08-18

## 2025-08-16 RX ADMIN — VANCOMYCIN HYDROCHLORIDE 2000 MG: 10 INJECTION, POWDER, LYOPHILIZED, FOR SOLUTION INTRAVENOUS at 06:20

## 2025-08-16 RX ADMIN — TAMSULOSIN HYDROCHLORIDE 0.4 MG: 0.4 CAPSULE ORAL at 11:11

## 2025-08-16 RX ADMIN — MEROPENEM 1000 MG: 1 INJECTION INTRAVENOUS at 12:01

## 2025-08-16 RX ADMIN — SODIUM CHLORIDE, PRESERVATIVE FREE 10 ML: 5 INJECTION INTRAVENOUS at 11:15

## 2025-08-16 RX ADMIN — ATORVASTATIN CALCIUM 20 MG: 10 TABLET, FILM COATED ORAL at 11:12

## 2025-08-16 RX ADMIN — Medication: at 11:14

## 2025-08-16 RX ADMIN — MEROPENEM 1000 MG: 1 INJECTION INTRAVENOUS at 23:57

## 2025-08-16 RX ADMIN — SODIUM CHLORIDE, PRESERVATIVE FREE 10 ML: 5 INJECTION INTRAVENOUS at 20:27

## 2025-08-16 RX ADMIN — MEROPENEM 1000 MG: 1 INJECTION INTRAVENOUS at 00:01

## 2025-08-16 RX ADMIN — SODIUM CHLORIDE, SODIUM LACTATE, POTASSIUM CHLORIDE, AND CALCIUM CHLORIDE: .6; .31; .03; .02 INJECTION, SOLUTION INTRAVENOUS at 15:01

## 2025-08-16 RX ADMIN — INSULIN LISPRO 8 UNITS: 100 INJECTION, SOLUTION INTRAVENOUS; SUBCUTANEOUS at 12:01

## 2025-08-16 RX ADMIN — PANTOPRAZOLE SODIUM 40 MG: 40 TABLET, DELAYED RELEASE ORAL at 05:09

## 2025-08-16 RX ADMIN — FINASTERIDE 5 MG: 5 TABLET, FILM COATED ORAL at 11:12

## 2025-08-16 RX ADMIN — AMITRIPTYLINE HYDROCHLORIDE 25 MG: 25 TABLET, FILM COATED ORAL at 20:27

## 2025-08-16 RX ADMIN — INSULIN LISPRO 16 UNITS: 100 INJECTION, SOLUTION INTRAVENOUS; SUBCUTANEOUS at 20:27

## 2025-08-16 RX ADMIN — GABAPENTIN 300 MG: 300 CAPSULE ORAL at 20:26

## 2025-08-16 RX ADMIN — ENOXAPARIN SODIUM 30 MG: 100 INJECTION SUBCUTANEOUS at 11:12

## 2025-08-16 RX ADMIN — INSULIN LISPRO 12 UNITS: 100 INJECTION, SOLUTION INTRAVENOUS; SUBCUTANEOUS at 07:58

## 2025-08-16 RX ADMIN — INSULIN GLARGINE-YFGN 25 UNITS: 100 INJECTION, SOLUTION SUBCUTANEOUS at 20:27

## 2025-08-16 RX ADMIN — FOLIC ACID 1 MG: 1 TABLET ORAL at 11:11

## 2025-08-16 RX ADMIN — LEVOTHYROXINE SODIUM 25 MCG: 0.03 TABLET ORAL at 05:09

## 2025-08-16 ASSESSMENT — PAIN SCALES - WONG BAKER: WONGBAKER_NUMERICALRESPONSE: NO HURT

## 2025-08-16 ASSESSMENT — PAIN DESCRIPTION - ORIENTATION: ORIENTATION: LOWER

## 2025-08-16 ASSESSMENT — PAIN SCALES - GENERAL
PAINLEVEL_OUTOF10: 0
PAINLEVEL_OUTOF10: 8

## 2025-08-16 ASSESSMENT — PAIN DESCRIPTION - LOCATION: LOCATION: BACK

## 2025-08-16 ASSESSMENT — PAIN DESCRIPTION - DESCRIPTORS: DESCRIPTORS: DISCOMFORT

## 2025-08-17 LAB
ALBUMIN SERPL-MCNC: 2.4 G/DL (ref 3.4–5)
ALBUMIN/GLOB SERPL: 0.7 {RATIO} (ref 1.1–2.2)
ALP SERPL-CCNC: 152 U/L (ref 40–129)
ALT SERPL-CCNC: <5 U/L (ref 10–40)
ANION GAP SERPL CALCULATED.3IONS-SCNC: 10 MMOL/L (ref 3–16)
AST SERPL-CCNC: 17 U/L (ref 15–37)
BILIRUB SERPL-MCNC: 0.3 MG/DL (ref 0–1)
BUN SERPL-MCNC: 63 MG/DL (ref 7–20)
CALCIUM SERPL-MCNC: 8.4 MG/DL (ref 8.3–10.6)
CHLORIDE SERPL-SCNC: 101 MMOL/L (ref 99–110)
CO2 SERPL-SCNC: 28 MMOL/L (ref 21–32)
CREAT SERPL-MCNC: 3.4 MG/DL (ref 0.8–1.3)
GFR SERPLBLD CREATININE-BSD FMLA CKD-EPI: 19 ML/MIN/{1.73_M2}
GLUCOSE BLD-MCNC: 127 MG/DL (ref 70–99)
GLUCOSE BLD-MCNC: 153 MG/DL (ref 70–99)
GLUCOSE BLD-MCNC: 207 MG/DL (ref 70–99)
GLUCOSE BLD-MCNC: 293 MG/DL (ref 70–99)
GLUCOSE SERPL-MCNC: 169 MG/DL (ref 70–99)
PERFORMED ON: ABNORMAL
POTASSIUM SERPL-SCNC: 3.6 MMOL/L (ref 3.5–5.1)
POTASSIUM SERPL-SCNC: 3.6 MMOL/L (ref 3.5–5.1)
PROT SERPL-MCNC: 5.9 G/DL (ref 6.4–8.2)
SODIUM SERPL-SCNC: 139 MMOL/L (ref 136–145)
VANCOMYCIN SERPL-MCNC: 32.5 UG/ML

## 2025-08-17 PROCEDURE — 2580000003 HC RX 258: Performed by: INTERNAL MEDICINE

## 2025-08-17 PROCEDURE — 1200000000 HC SEMI PRIVATE

## 2025-08-17 PROCEDURE — S5553 INSULIN LONG ACTING 5 U: HCPCS | Performed by: INTERNAL MEDICINE

## 2025-08-17 PROCEDURE — 6360000002 HC RX W HCPCS: Performed by: INTERNAL MEDICINE

## 2025-08-17 PROCEDURE — 6370000000 HC RX 637 (ALT 250 FOR IP): Performed by: INTERNAL MEDICINE

## 2025-08-17 PROCEDURE — 2500000003 HC RX 250 WO HCPCS: Performed by: INTERNAL MEDICINE

## 2025-08-17 PROCEDURE — 99233 SBSQ HOSP IP/OBS HIGH 50: CPT | Performed by: INTERNAL MEDICINE

## 2025-08-17 PROCEDURE — 80202 ASSAY OF VANCOMYCIN: CPT

## 2025-08-17 PROCEDURE — 80053 COMPREHEN METABOLIC PANEL: CPT

## 2025-08-17 RX ORDER — SODIUM CHLORIDE, SODIUM LACTATE, POTASSIUM CHLORIDE, CALCIUM CHLORIDE 600; 310; 30; 20 MG/100ML; MG/100ML; MG/100ML; MG/100ML
INJECTION, SOLUTION INTRAVENOUS CONTINUOUS
Status: ACTIVE | OUTPATIENT
Start: 2025-08-17 | End: 2025-08-17

## 2025-08-17 RX ORDER — INSULIN GLARGINE-YFGN 100 [IU]/ML
35 INJECTION, SOLUTION SUBCUTANEOUS NIGHTLY
Status: DISCONTINUED | OUTPATIENT
Start: 2025-08-17 | End: 2025-08-21 | Stop reason: HOSPADM

## 2025-08-17 RX ORDER — INSULIN LISPRO 100 [IU]/ML
5 INJECTION, SOLUTION INTRAVENOUS; SUBCUTANEOUS
Status: DISCONTINUED | OUTPATIENT
Start: 2025-08-17 | End: 2025-08-21 | Stop reason: HOSPADM

## 2025-08-17 RX ORDER — SODIUM CHLORIDE, SODIUM LACTATE, POTASSIUM CHLORIDE, CALCIUM CHLORIDE 600; 310; 30; 20 MG/100ML; MG/100ML; MG/100ML; MG/100ML
INJECTION, SOLUTION INTRAVENOUS CONTINUOUS
Status: DISCONTINUED | OUTPATIENT
Start: 2025-08-17 | End: 2025-08-17

## 2025-08-17 RX ADMIN — AMITRIPTYLINE HYDROCHLORIDE 25 MG: 25 TABLET, FILM COATED ORAL at 20:44

## 2025-08-17 RX ADMIN — FINASTERIDE 5 MG: 5 TABLET, FILM COATED ORAL at 08:46

## 2025-08-17 RX ADMIN — FOLIC ACID 1 MG: 1 TABLET ORAL at 08:46

## 2025-08-17 RX ADMIN — Medication: at 08:46

## 2025-08-17 RX ADMIN — TAMSULOSIN HYDROCHLORIDE 0.4 MG: 0.4 CAPSULE ORAL at 08:46

## 2025-08-17 RX ADMIN — SODIUM CHLORIDE, PRESERVATIVE FREE 10 ML: 5 INJECTION INTRAVENOUS at 08:51

## 2025-08-17 RX ADMIN — SODIUM CHLORIDE, SODIUM LACTATE, POTASSIUM CHLORIDE, AND CALCIUM CHLORIDE: .6; .31; .03; .02 INJECTION, SOLUTION INTRAVENOUS at 14:27

## 2025-08-17 RX ADMIN — INSULIN LISPRO 4 UNITS: 100 INJECTION, SOLUTION INTRAVENOUS; SUBCUTANEOUS at 11:45

## 2025-08-17 RX ADMIN — INSULIN LISPRO 5 UNITS: 100 INJECTION, SOLUTION INTRAVENOUS; SUBCUTANEOUS at 16:41

## 2025-08-17 RX ADMIN — LEVOTHYROXINE SODIUM 25 MCG: 0.03 TABLET ORAL at 06:51

## 2025-08-17 RX ADMIN — ATORVASTATIN CALCIUM 20 MG: 10 TABLET, FILM COATED ORAL at 08:46

## 2025-08-17 RX ADMIN — MEROPENEM 1000 MG: 1 INJECTION INTRAVENOUS at 23:41

## 2025-08-17 RX ADMIN — OXYCODONE HYDROCHLORIDE 5 MG: 5 TABLET ORAL at 20:44

## 2025-08-17 RX ADMIN — INSULIN GLARGINE-YFGN 35 UNITS: 100 INJECTION, SOLUTION SUBCUTANEOUS at 20:45

## 2025-08-17 RX ADMIN — GABAPENTIN 300 MG: 300 CAPSULE ORAL at 08:51

## 2025-08-17 RX ADMIN — MEROPENEM 1000 MG: 1 INJECTION INTRAVENOUS at 11:45

## 2025-08-17 RX ADMIN — ENOXAPARIN SODIUM 30 MG: 100 INJECTION SUBCUTANEOUS at 08:46

## 2025-08-17 RX ADMIN — INSULIN LISPRO 5 UNITS: 100 INJECTION, SOLUTION INTRAVENOUS; SUBCUTANEOUS at 11:45

## 2025-08-17 RX ADMIN — DOCUSATE SODIUM 100 MG: 100 CAPSULE, LIQUID FILLED ORAL at 20:44

## 2025-08-17 RX ADMIN — INSULIN LISPRO 8 UNITS: 100 INJECTION, SOLUTION INTRAVENOUS; SUBCUTANEOUS at 08:46

## 2025-08-17 RX ADMIN — PANTOPRAZOLE SODIUM 40 MG: 40 TABLET, DELAYED RELEASE ORAL at 06:51

## 2025-08-17 ASSESSMENT — PAIN DESCRIPTION - PAIN TYPE: TYPE: ACUTE PAIN

## 2025-08-17 ASSESSMENT — PAIN DESCRIPTION - LOCATION: LOCATION: BACK

## 2025-08-17 ASSESSMENT — PAIN SCALES - GENERAL
PAINLEVEL_OUTOF10: 0
PAINLEVEL_OUTOF10: 9

## 2025-08-17 ASSESSMENT — PAIN - FUNCTIONAL ASSESSMENT
PAIN_FUNCTIONAL_ASSESSMENT: PREVENTS OR INTERFERES SOME ACTIVE ACTIVITIES AND ADLS
PAIN_FUNCTIONAL_ASSESSMENT: 0-10

## 2025-08-17 ASSESSMENT — PAIN DESCRIPTION - ORIENTATION: ORIENTATION: LOWER

## 2025-08-17 ASSESSMENT — PAIN DESCRIPTION - DESCRIPTORS: DESCRIPTORS: DISCOMFORT;ACHING

## 2025-08-18 LAB
ALBUMIN SERPL-MCNC: 2.3 G/DL (ref 3.4–5)
ALP SERPL-CCNC: 154 U/L (ref 40–129)
ALT SERPL-CCNC: <5 U/L (ref 10–40)
ANION GAP SERPL CALCULATED.3IONS-SCNC: 10 MMOL/L (ref 3–16)
AST SERPL-CCNC: 18 U/L (ref 15–37)
BASOPHILS # BLD: 0.1 K/UL (ref 0–0.2)
BASOPHILS NFR BLD: 0.8 %
BILIRUB DIRECT SERPL-MCNC: 0.2 MG/DL (ref 0–0.3)
BILIRUB INDIRECT SERPL-MCNC: 0.1 MG/DL (ref 0–1)
BILIRUB SERPL-MCNC: 0.3 MG/DL (ref 0–1)
BUN SERPL-MCNC: 61 MG/DL (ref 7–20)
CALCIUM SERPL-MCNC: 8.2 MG/DL (ref 8.3–10.6)
CHLORIDE SERPL-SCNC: 101 MMOL/L (ref 99–110)
CO2 SERPL-SCNC: 28 MMOL/L (ref 21–32)
CREAT SERPL-MCNC: 3.2 MG/DL (ref 0.8–1.3)
DEPRECATED RDW RBC AUTO: 17.1 % (ref 12.4–15.4)
EOSINOPHIL # BLD: 0.9 K/UL (ref 0–0.6)
EOSINOPHIL NFR BLD: 13.6 %
GFR SERPLBLD CREATININE-BSD FMLA CKD-EPI: 21 ML/MIN/{1.73_M2}
GLUCOSE BLD-MCNC: 100 MG/DL (ref 70–99)
GLUCOSE BLD-MCNC: 121 MG/DL (ref 70–99)
GLUCOSE BLD-MCNC: 150 MG/DL (ref 70–99)
GLUCOSE BLD-MCNC: 181 MG/DL (ref 70–99)
GLUCOSE SERPL-MCNC: 162 MG/DL (ref 70–99)
HCT VFR BLD AUTO: 21.6 % (ref 40.5–52.5)
HGB BLD-MCNC: 7.1 G/DL (ref 13.5–17.5)
LYMPHOCYTES # BLD: 1.1 K/UL (ref 1–5.1)
LYMPHOCYTES NFR BLD: 15.1 %
MCH RBC QN AUTO: 29.9 PG (ref 26–34)
MCHC RBC AUTO-ENTMCNC: 32.7 G/DL (ref 31–36)
MCV RBC AUTO: 91.7 FL (ref 80–100)
MONOCYTES # BLD: 0.7 K/UL (ref 0–1.3)
MONOCYTES NFR BLD: 10.2 %
NEUTROPHILS # BLD: 4.2 K/UL (ref 1.7–7.7)
NEUTROPHILS NFR BLD: 60.3 %
PERFORMED ON: ABNORMAL
PLATELET # BLD AUTO: 330 K/UL (ref 135–450)
PMV BLD AUTO: 6.9 FL (ref 5–10.5)
POTASSIUM SERPL-SCNC: 3.8 MMOL/L (ref 3.5–5.1)
PROT SERPL-MCNC: 5.8 G/DL (ref 6.4–8.2)
RBC # BLD AUTO: 2.36 M/UL (ref 4.2–5.9)
SODIUM SERPL-SCNC: 139 MMOL/L (ref 136–145)
VANCOMYCIN SERPL-MCNC: 27 UG/ML
WBC # BLD AUTO: 6.9 K/UL (ref 4–11)

## 2025-08-18 PROCEDURE — 6370000000 HC RX 637 (ALT 250 FOR IP): Performed by: INTERNAL MEDICINE

## 2025-08-18 PROCEDURE — 6360000002 HC RX W HCPCS: Performed by: INTERNAL MEDICINE

## 2025-08-18 PROCEDURE — 99232 SBSQ HOSP IP/OBS MODERATE 35: CPT | Performed by: INTERNAL MEDICINE

## 2025-08-18 PROCEDURE — 80076 HEPATIC FUNCTION PANEL: CPT

## 2025-08-18 PROCEDURE — S5553 INSULIN LONG ACTING 5 U: HCPCS | Performed by: INTERNAL MEDICINE

## 2025-08-18 PROCEDURE — 2580000003 HC RX 258: Performed by: INTERNAL MEDICINE

## 2025-08-18 PROCEDURE — 85025 COMPLETE CBC W/AUTO DIFF WBC: CPT

## 2025-08-18 PROCEDURE — 80048 BASIC METABOLIC PNL TOTAL CA: CPT

## 2025-08-18 PROCEDURE — 80202 ASSAY OF VANCOMYCIN: CPT

## 2025-08-18 PROCEDURE — 1200000000 HC SEMI PRIVATE

## 2025-08-18 PROCEDURE — 2500000003 HC RX 250 WO HCPCS: Performed by: INTERNAL MEDICINE

## 2025-08-18 RX ORDER — ENOXAPARIN SODIUM 100 MG/ML
30 INJECTION SUBCUTANEOUS 2 TIMES DAILY
Status: DISCONTINUED | OUTPATIENT
Start: 2025-08-18 | End: 2025-08-21 | Stop reason: HOSPADM

## 2025-08-18 RX ORDER — TORSEMIDE 20 MG/1
20 TABLET ORAL DAILY
Status: DISCONTINUED | OUTPATIENT
Start: 2025-08-18 | End: 2025-08-21 | Stop reason: HOSPADM

## 2025-08-18 RX ADMIN — LEVOTHYROXINE SODIUM 25 MCG: 0.03 TABLET ORAL at 05:55

## 2025-08-18 RX ADMIN — MEROPENEM 1000 MG: 1 INJECTION INTRAVENOUS at 12:13

## 2025-08-18 RX ADMIN — POLYETHYLENE GLYCOL 3350 17 G: 17 POWDER, FOR SOLUTION ORAL at 20:14

## 2025-08-18 RX ADMIN — MEROPENEM 1000 MG: 1 INJECTION INTRAVENOUS at 23:16

## 2025-08-18 RX ADMIN — INSULIN LISPRO 5 UNITS: 100 INJECTION, SOLUTION INTRAVENOUS; SUBCUTANEOUS at 12:14

## 2025-08-18 RX ADMIN — INSULIN LISPRO 4 UNITS: 100 INJECTION, SOLUTION INTRAVENOUS; SUBCUTANEOUS at 09:53

## 2025-08-18 RX ADMIN — PANTOPRAZOLE SODIUM 40 MG: 40 TABLET, DELAYED RELEASE ORAL at 05:55

## 2025-08-18 RX ADMIN — ENOXAPARIN SODIUM 30 MG: 100 INJECTION SUBCUTANEOUS at 20:14

## 2025-08-18 RX ADMIN — TORSEMIDE 20 MG: 20 TABLET ORAL at 09:54

## 2025-08-18 RX ADMIN — ENOXAPARIN SODIUM 30 MG: 100 INJECTION SUBCUTANEOUS at 09:54

## 2025-08-18 RX ADMIN — Medication: at 09:55

## 2025-08-18 RX ADMIN — AMITRIPTYLINE HYDROCHLORIDE 25 MG: 25 TABLET, FILM COATED ORAL at 20:14

## 2025-08-18 RX ADMIN — DOCUSATE SODIUM 100 MG: 100 CAPSULE, LIQUID FILLED ORAL at 20:15

## 2025-08-18 RX ADMIN — INSULIN LISPRO 5 UNITS: 100 INJECTION, SOLUTION INTRAVENOUS; SUBCUTANEOUS at 09:53

## 2025-08-18 RX ADMIN — FINASTERIDE 5 MG: 5 TABLET, FILM COATED ORAL at 09:54

## 2025-08-18 RX ADMIN — DOCUSATE SODIUM 100 MG: 100 CAPSULE, LIQUID FILLED ORAL at 09:54

## 2025-08-18 RX ADMIN — INSULIN LISPRO 5 UNITS: 100 INJECTION, SOLUTION INTRAVENOUS; SUBCUTANEOUS at 16:46

## 2025-08-18 RX ADMIN — FOLIC ACID 1 MG: 1 TABLET ORAL at 09:54

## 2025-08-18 RX ADMIN — SENNOSIDES 8.6 MG: 8.6 TABLET, COATED ORAL at 09:54

## 2025-08-18 RX ADMIN — ATORVASTATIN CALCIUM 20 MG: 10 TABLET, FILM COATED ORAL at 09:54

## 2025-08-18 RX ADMIN — TAMSULOSIN HYDROCHLORIDE 0.4 MG: 0.4 CAPSULE ORAL at 09:54

## 2025-08-18 RX ADMIN — INSULIN GLARGINE-YFGN 35 UNITS: 100 INJECTION, SOLUTION SUBCUTANEOUS at 20:14

## 2025-08-18 RX ADMIN — POLYETHYLENE GLYCOL 3350 17 G: 17 POWDER, FOR SOLUTION ORAL at 09:51

## 2025-08-18 RX ADMIN — SODIUM CHLORIDE, PRESERVATIVE FREE 10 ML: 5 INJECTION INTRAVENOUS at 09:54

## 2025-08-18 ASSESSMENT — PAIN SCALES - GENERAL
PAINLEVEL_OUTOF10: 0

## 2025-08-18 ASSESSMENT — PAIN SCALES - WONG BAKER: WONGBAKER_NUMERICALRESPONSE: NO HURT

## 2025-08-19 LAB
ANION GAP SERPL CALCULATED.3IONS-SCNC: 9 MMOL/L (ref 3–16)
BACTERIA BLD CULT ORG #2: NORMAL
BACTERIA BLD CULT: NORMAL
BUN SERPL-MCNC: 55 MG/DL (ref 7–20)
CALCIUM SERPL-MCNC: 8.1 MG/DL (ref 8.3–10.6)
CHLORIDE SERPL-SCNC: 102 MMOL/L (ref 99–110)
CO2 SERPL-SCNC: 28 MMOL/L (ref 21–32)
CREAT SERPL-MCNC: 2.9 MG/DL (ref 0.8–1.3)
GFR SERPLBLD CREATININE-BSD FMLA CKD-EPI: 23 ML/MIN/{1.73_M2}
GLUCOSE BLD-MCNC: 118 MG/DL (ref 70–99)
GLUCOSE BLD-MCNC: 147 MG/DL (ref 70–99)
GLUCOSE BLD-MCNC: 201 MG/DL (ref 70–99)
GLUCOSE BLD-MCNC: 216 MG/DL (ref 70–99)
GLUCOSE BLD-MCNC: 74 MG/DL (ref 70–99)
GLUCOSE SERPL-MCNC: 79 MG/DL (ref 70–99)
PERFORMED ON: ABNORMAL
PERFORMED ON: NORMAL
POTASSIUM SERPL-SCNC: 4.1 MMOL/L (ref 3.5–5.1)
SODIUM SERPL-SCNC: 139 MMOL/L (ref 136–145)

## 2025-08-19 PROCEDURE — 6370000000 HC RX 637 (ALT 250 FOR IP): Performed by: INTERNAL MEDICINE

## 2025-08-19 PROCEDURE — 2500000003 HC RX 250 WO HCPCS: Performed by: INTERNAL MEDICINE

## 2025-08-19 PROCEDURE — 2580000003 HC RX 258: Performed by: INTERNAL MEDICINE

## 2025-08-19 PROCEDURE — 6360000002 HC RX W HCPCS: Performed by: INTERNAL MEDICINE

## 2025-08-19 PROCEDURE — 1200000000 HC SEMI PRIVATE

## 2025-08-19 PROCEDURE — 99232 SBSQ HOSP IP/OBS MODERATE 35: CPT | Performed by: INTERNAL MEDICINE

## 2025-08-19 PROCEDURE — 80048 BASIC METABOLIC PNL TOTAL CA: CPT

## 2025-08-19 PROCEDURE — S5553 INSULIN LONG ACTING 5 U: HCPCS | Performed by: INTERNAL MEDICINE

## 2025-08-19 PROCEDURE — 36415 COLL VENOUS BLD VENIPUNCTURE: CPT

## 2025-08-19 RX ORDER — GUAIFENESIN/DEXTROMETHORPHAN 100-10MG/5
5 SYRUP ORAL EVERY 4 HOURS PRN
Status: DISCONTINUED | OUTPATIENT
Start: 2025-08-19 | End: 2025-08-21 | Stop reason: HOSPADM

## 2025-08-19 RX ADMIN — ATORVASTATIN CALCIUM 20 MG: 10 TABLET, FILM COATED ORAL at 10:33

## 2025-08-19 RX ADMIN — INSULIN LISPRO 5 UNITS: 100 INJECTION, SOLUTION INTRAVENOUS; SUBCUTANEOUS at 18:00

## 2025-08-19 RX ADMIN — ENOXAPARIN SODIUM 30 MG: 100 INJECTION SUBCUTANEOUS at 10:34

## 2025-08-19 RX ADMIN — MEROPENEM 1000 MG: 1 INJECTION INTRAVENOUS at 12:18

## 2025-08-19 RX ADMIN — TAMSULOSIN HYDROCHLORIDE 0.4 MG: 0.4 CAPSULE ORAL at 10:33

## 2025-08-19 RX ADMIN — LEVOTHYROXINE SODIUM 25 MCG: 0.03 TABLET ORAL at 06:26

## 2025-08-19 RX ADMIN — POLYETHYLENE GLYCOL 3350 17 G: 17 POWDER, FOR SOLUTION ORAL at 10:34

## 2025-08-19 RX ADMIN — Medication: at 10:35

## 2025-08-19 RX ADMIN — SODIUM CHLORIDE, PRESERVATIVE FREE 10 ML: 5 INJECTION INTRAVENOUS at 20:14

## 2025-08-19 RX ADMIN — TORSEMIDE 20 MG: 20 TABLET ORAL at 10:33

## 2025-08-19 RX ADMIN — INSULIN LISPRO 4 UNITS: 100 INJECTION, SOLUTION INTRAVENOUS; SUBCUTANEOUS at 18:01

## 2025-08-19 RX ADMIN — SENNOSIDES 8.6 MG: 8.6 TABLET, COATED ORAL at 10:33

## 2025-08-19 RX ADMIN — INSULIN GLARGINE-YFGN 35 UNITS: 100 INJECTION, SOLUTION SUBCUTANEOUS at 20:04

## 2025-08-19 RX ADMIN — DOCUSATE SODIUM 100 MG: 100 CAPSULE, LIQUID FILLED ORAL at 10:33

## 2025-08-19 RX ADMIN — AMITRIPTYLINE HYDROCHLORIDE 25 MG: 25 TABLET, FILM COATED ORAL at 20:04

## 2025-08-19 RX ADMIN — DOCUSATE SODIUM 100 MG: 100 CAPSULE, LIQUID FILLED ORAL at 20:04

## 2025-08-19 RX ADMIN — POLYETHYLENE GLYCOL 3350 17 G: 17 POWDER, FOR SOLUTION ORAL at 20:13

## 2025-08-19 RX ADMIN — SODIUM CHLORIDE, PRESERVATIVE FREE 10 ML: 5 INJECTION INTRAVENOUS at 10:34

## 2025-08-19 RX ADMIN — PANTOPRAZOLE SODIUM 40 MG: 40 TABLET, DELAYED RELEASE ORAL at 06:26

## 2025-08-19 RX ADMIN — INSULIN LISPRO 5 UNITS: 100 INJECTION, SOLUTION INTRAVENOUS; SUBCUTANEOUS at 12:21

## 2025-08-19 RX ADMIN — OXYCODONE HYDROCHLORIDE 5 MG: 5 TABLET ORAL at 20:13

## 2025-08-19 RX ADMIN — FINASTERIDE 5 MG: 5 TABLET, FILM COATED ORAL at 10:34

## 2025-08-19 RX ADMIN — ENOXAPARIN SODIUM 30 MG: 100 INJECTION SUBCUTANEOUS at 20:04

## 2025-08-19 RX ADMIN — FOLIC ACID 1 MG: 1 TABLET ORAL at 10:34

## 2025-08-19 RX ADMIN — INSULIN LISPRO 4 UNITS: 100 INJECTION, SOLUTION INTRAVENOUS; SUBCUTANEOUS at 20:05

## 2025-08-19 ASSESSMENT — PAIN - FUNCTIONAL ASSESSMENT: PAIN_FUNCTIONAL_ASSESSMENT: 0-10

## 2025-08-19 ASSESSMENT — PAIN DESCRIPTION - ORIENTATION: ORIENTATION: LOWER

## 2025-08-19 ASSESSMENT — PAIN SCALES - GENERAL: PAINLEVEL_OUTOF10: 9

## 2025-08-19 ASSESSMENT — PAIN DESCRIPTION - DESCRIPTORS: DESCRIPTORS: SHARP

## 2025-08-19 ASSESSMENT — PAIN DESCRIPTION - LOCATION: LOCATION: BACK;LEG

## 2025-08-20 LAB
ANION GAP SERPL CALCULATED.3IONS-SCNC: 8 MMOL/L (ref 3–16)
BASOPHILS # BLD: 0 K/UL (ref 0–0.2)
BASOPHILS NFR BLD: 0.7 %
BUN SERPL-MCNC: 51 MG/DL (ref 7–20)
CALCIUM SERPL-MCNC: 8 MG/DL (ref 8.3–10.6)
CHLORIDE SERPL-SCNC: 101 MMOL/L (ref 99–110)
CO2 SERPL-SCNC: 28 MMOL/L (ref 21–32)
CREAT SERPL-MCNC: 2.7 MG/DL (ref 0.8–1.3)
DEPRECATED RDW RBC AUTO: 17 % (ref 12.4–15.4)
EOSINOPHIL # BLD: 0.8 K/UL (ref 0–0.6)
EOSINOPHIL NFR BLD: 13.1 %
GFR SERPLBLD CREATININE-BSD FMLA CKD-EPI: 25 ML/MIN/{1.73_M2}
GLUCOSE BLD-MCNC: 111 MG/DL (ref 70–99)
GLUCOSE BLD-MCNC: 128 MG/DL (ref 70–99)
GLUCOSE BLD-MCNC: 135 MG/DL (ref 70–99)
GLUCOSE BLD-MCNC: 76 MG/DL (ref 70–99)
GLUCOSE BLD-MCNC: 89 MG/DL (ref 70–99)
GLUCOSE SERPL-MCNC: 78 MG/DL (ref 70–99)
HCT VFR BLD AUTO: 22.1 % (ref 40.5–52.5)
HGB BLD-MCNC: 7.4 G/DL (ref 13.5–17.5)
LYMPHOCYTES # BLD: 1.1 K/UL (ref 1–5.1)
LYMPHOCYTES NFR BLD: 18.3 %
MCH RBC QN AUTO: 30.4 PG (ref 26–34)
MCHC RBC AUTO-ENTMCNC: 33.4 G/DL (ref 31–36)
MCV RBC AUTO: 91.2 FL (ref 80–100)
MONOCYTES # BLD: 0.6 K/UL (ref 0–1.3)
MONOCYTES NFR BLD: 10.3 %
NEUTROPHILS # BLD: 3.5 K/UL (ref 1.7–7.7)
NEUTROPHILS NFR BLD: 57.6 %
PERFORMED ON: ABNORMAL
PERFORMED ON: NORMAL
PERFORMED ON: NORMAL
PLATELET # BLD AUTO: 352 K/UL (ref 135–450)
PMV BLD AUTO: 6.9 FL (ref 5–10.5)
POTASSIUM SERPL-SCNC: 4.1 MMOL/L (ref 3.5–5.1)
RBC # BLD AUTO: 2.42 M/UL (ref 4.2–5.9)
SODIUM SERPL-SCNC: 137 MMOL/L (ref 136–145)
WBC # BLD AUTO: 6.1 K/UL (ref 4–11)

## 2025-08-20 PROCEDURE — 99232 SBSQ HOSP IP/OBS MODERATE 35: CPT | Performed by: INTERNAL MEDICINE

## 2025-08-20 PROCEDURE — 6370000000 HC RX 637 (ALT 250 FOR IP): Performed by: INTERNAL MEDICINE

## 2025-08-20 PROCEDURE — 2500000003 HC RX 250 WO HCPCS: Performed by: INTERNAL MEDICINE

## 2025-08-20 PROCEDURE — S5553 INSULIN LONG ACTING 5 U: HCPCS | Performed by: INTERNAL MEDICINE

## 2025-08-20 PROCEDURE — 99231 SBSQ HOSP IP/OBS SF/LOW 25: CPT | Performed by: INTERNAL MEDICINE

## 2025-08-20 PROCEDURE — 1200000000 HC SEMI PRIVATE

## 2025-08-20 PROCEDURE — 85025 COMPLETE CBC W/AUTO DIFF WBC: CPT

## 2025-08-20 PROCEDURE — 6360000002 HC RX W HCPCS: Performed by: INTERNAL MEDICINE

## 2025-08-20 PROCEDURE — 80048 BASIC METABOLIC PNL TOTAL CA: CPT

## 2025-08-20 PROCEDURE — 36415 COLL VENOUS BLD VENIPUNCTURE: CPT

## 2025-08-20 RX ADMIN — ENOXAPARIN SODIUM 30 MG: 100 INJECTION SUBCUTANEOUS at 21:24

## 2025-08-20 RX ADMIN — FOLIC ACID 1 MG: 1 TABLET ORAL at 09:39

## 2025-08-20 RX ADMIN — Medication: at 14:39

## 2025-08-20 RX ADMIN — DOCUSATE SODIUM 100 MG: 100 CAPSULE, LIQUID FILLED ORAL at 09:39

## 2025-08-20 RX ADMIN — OXYCODONE 10 MG: 5 TABLET ORAL at 09:41

## 2025-08-20 RX ADMIN — INSULIN LISPRO 5 UNITS: 100 INJECTION, SOLUTION INTRAVENOUS; SUBCUTANEOUS at 16:29

## 2025-08-20 RX ADMIN — SODIUM CHLORIDE, PRESERVATIVE FREE 10 ML: 5 INJECTION INTRAVENOUS at 14:40

## 2025-08-20 RX ADMIN — DOCUSATE SODIUM 100 MG: 100 CAPSULE, LIQUID FILLED ORAL at 21:24

## 2025-08-20 RX ADMIN — ENOXAPARIN SODIUM 30 MG: 100 INJECTION SUBCUTANEOUS at 09:41

## 2025-08-20 RX ADMIN — SENNOSIDES 8.6 MG: 8.6 TABLET, COATED ORAL at 09:40

## 2025-08-20 RX ADMIN — TAMSULOSIN HYDROCHLORIDE 0.4 MG: 0.4 CAPSULE ORAL at 09:39

## 2025-08-20 RX ADMIN — AMITRIPTYLINE HYDROCHLORIDE 25 MG: 25 TABLET, FILM COATED ORAL at 21:24

## 2025-08-20 RX ADMIN — OXYCODONE 10 MG: 5 TABLET ORAL at 15:26

## 2025-08-20 RX ADMIN — ATORVASTATIN CALCIUM 20 MG: 10 TABLET, FILM COATED ORAL at 09:40

## 2025-08-20 RX ADMIN — TORSEMIDE 20 MG: 20 TABLET ORAL at 09:40

## 2025-08-20 RX ADMIN — PANTOPRAZOLE SODIUM 40 MG: 40 TABLET, DELAYED RELEASE ORAL at 05:24

## 2025-08-20 RX ADMIN — INSULIN GLARGINE-YFGN 35 UNITS: 100 INJECTION, SOLUTION SUBCUTANEOUS at 21:24

## 2025-08-20 RX ADMIN — FINASTERIDE 5 MG: 5 TABLET, FILM COATED ORAL at 09:39

## 2025-08-20 RX ADMIN — INSULIN LISPRO 5 UNITS: 100 INJECTION, SOLUTION INTRAVENOUS; SUBCUTANEOUS at 12:36

## 2025-08-20 RX ADMIN — LEVOTHYROXINE SODIUM 25 MCG: 0.03 TABLET ORAL at 05:24

## 2025-08-20 ASSESSMENT — PAIN - FUNCTIONAL ASSESSMENT
PAIN_FUNCTIONAL_ASSESSMENT: 0-10

## 2025-08-20 ASSESSMENT — ENCOUNTER SYMPTOMS
CONSTIPATION: 0
ABDOMINAL PAIN: 0
COUGH: 0
DIARRHEA: 0
SINUS PRESSURE: 0
SHORTNESS OF BREATH: 0
VOICE CHANGE: 0
VOMITING: 0
TROUBLE SWALLOWING: 0
SORE THROAT: 0
COLOR CHANGE: 0
CHEST TIGHTNESS: 0
WHEEZING: 0
BACK PAIN: 0
NAUSEA: 0
SINUS PAIN: 0

## 2025-08-20 ASSESSMENT — PAIN SCALES - GENERAL
PAINLEVEL_OUTOF10: 7
PAINLEVEL_OUTOF10: 4
PAINLEVEL_OUTOF10: 6
PAINLEVEL_OUTOF10: 8

## 2025-08-21 VITALS
TEMPERATURE: 98.2 F | HEIGHT: 74 IN | WEIGHT: 312.1 LBS | OXYGEN SATURATION: 98 % | DIASTOLIC BLOOD PRESSURE: 75 MMHG | RESPIRATION RATE: 18 BRPM | BODY MASS INDEX: 40.05 KG/M2 | SYSTOLIC BLOOD PRESSURE: 114 MMHG | HEART RATE: 94 BPM

## 2025-08-21 LAB
ANION GAP SERPL CALCULATED.3IONS-SCNC: 9 MMOL/L (ref 3–16)
BUN SERPL-MCNC: 44 MG/DL (ref 7–20)
CALCIUM SERPL-MCNC: 8.1 MG/DL (ref 8.3–10.6)
CHLORIDE SERPL-SCNC: 100 MMOL/L (ref 99–110)
CO2 SERPL-SCNC: 27 MMOL/L (ref 21–32)
CREAT SERPL-MCNC: 2.4 MG/DL (ref 0.8–1.3)
GFR SERPLBLD CREATININE-BSD FMLA CKD-EPI: 29 ML/MIN/{1.73_M2}
GLUCOSE BLD-MCNC: 152 MG/DL (ref 70–99)
GLUCOSE BLD-MCNC: 90 MG/DL (ref 70–99)
GLUCOSE BLD-MCNC: 95 MG/DL (ref 70–99)
GLUCOSE SERPL-MCNC: 128 MG/DL (ref 70–99)
PERFORMED ON: ABNORMAL
PERFORMED ON: NORMAL
PERFORMED ON: NORMAL
POTASSIUM SERPL-SCNC: 4.9 MMOL/L (ref 3.5–5.1)
SODIUM SERPL-SCNC: 136 MMOL/L (ref 136–145)

## 2025-08-21 PROCEDURE — 97162 PT EVAL MOD COMPLEX 30 MIN: CPT

## 2025-08-21 PROCEDURE — 97166 OT EVAL MOD COMPLEX 45 MIN: CPT

## 2025-08-21 PROCEDURE — 6360000002 HC RX W HCPCS: Performed by: INTERNAL MEDICINE

## 2025-08-21 PROCEDURE — 99232 SBSQ HOSP IP/OBS MODERATE 35: CPT | Performed by: INTERNAL MEDICINE

## 2025-08-21 PROCEDURE — 97530 THERAPEUTIC ACTIVITIES: CPT

## 2025-08-21 PROCEDURE — 2500000003 HC RX 250 WO HCPCS: Performed by: INTERNAL MEDICINE

## 2025-08-21 PROCEDURE — 36415 COLL VENOUS BLD VENIPUNCTURE: CPT

## 2025-08-21 PROCEDURE — 80048 BASIC METABOLIC PNL TOTAL CA: CPT

## 2025-08-21 PROCEDURE — 6370000000 HC RX 637 (ALT 250 FOR IP): Performed by: INTERNAL MEDICINE

## 2025-08-21 PROCEDURE — 6370000000 HC RX 637 (ALT 250 FOR IP): Performed by: STUDENT IN AN ORGANIZED HEALTH CARE EDUCATION/TRAINING PROGRAM

## 2025-08-21 RX ORDER — GABAPENTIN 100 MG/1
100 CAPSULE ORAL 3 TIMES DAILY
Qty: 90 CAPSULE | Refills: 0 | Status: SHIPPED | OUTPATIENT
Start: 2025-08-21 | End: 2025-09-20

## 2025-08-21 RX ORDER — TORSEMIDE 20 MG/1
20 TABLET ORAL DAILY
Qty: 30 TABLET | Refills: 3 | Status: SHIPPED | OUTPATIENT
Start: 2025-08-22

## 2025-08-21 RX ORDER — OXYCODONE HYDROCHLORIDE 5 MG/1
5 TABLET ORAL EVERY 6 HOURS PRN
Qty: 12 TABLET | Refills: 0 | Status: SHIPPED | OUTPATIENT
Start: 2025-08-21 | End: 2025-08-24

## 2025-08-21 RX ORDER — GABAPENTIN 100 MG/1
100 CAPSULE ORAL 3 TIMES DAILY
Status: DISCONTINUED | OUTPATIENT
Start: 2025-08-21 | End: 2025-08-21 | Stop reason: HOSPADM

## 2025-08-21 RX ADMIN — TORSEMIDE 20 MG: 20 TABLET ORAL at 08:44

## 2025-08-21 RX ADMIN — ENOXAPARIN SODIUM 30 MG: 100 INJECTION SUBCUTANEOUS at 08:43

## 2025-08-21 RX ADMIN — DOCUSATE SODIUM 100 MG: 100 CAPSULE, LIQUID FILLED ORAL at 08:44

## 2025-08-21 RX ADMIN — PANTOPRAZOLE SODIUM 40 MG: 40 TABLET, DELAYED RELEASE ORAL at 06:42

## 2025-08-21 RX ADMIN — TAMSULOSIN HYDROCHLORIDE 0.4 MG: 0.4 CAPSULE ORAL at 08:44

## 2025-08-21 RX ADMIN — ATORVASTATIN CALCIUM 20 MG: 10 TABLET, FILM COATED ORAL at 08:44

## 2025-08-21 RX ADMIN — GABAPENTIN 100 MG: 100 CAPSULE ORAL at 14:19

## 2025-08-21 RX ADMIN — POLYETHYLENE GLYCOL 3350 17 G: 17 POWDER, FOR SOLUTION ORAL at 08:44

## 2025-08-21 RX ADMIN — FOLIC ACID 1 MG: 1 TABLET ORAL at 08:44

## 2025-08-21 RX ADMIN — LEVOTHYROXINE SODIUM 25 MCG: 0.03 TABLET ORAL at 06:42

## 2025-08-21 RX ADMIN — INSULIN LISPRO 5 UNITS: 100 INJECTION, SOLUTION INTRAVENOUS; SUBCUTANEOUS at 12:15

## 2025-08-21 RX ADMIN — SENNOSIDES 8.6 MG: 8.6 TABLET, COATED ORAL at 08:44

## 2025-08-21 RX ADMIN — FINASTERIDE 5 MG: 5 TABLET, FILM COATED ORAL at 08:44

## 2025-08-21 RX ADMIN — Medication: at 08:44

## 2025-08-21 RX ADMIN — SODIUM CHLORIDE, PRESERVATIVE FREE 10 ML: 5 INJECTION INTRAVENOUS at 08:44

## 2025-08-21 RX ADMIN — INSULIN LISPRO 5 UNITS: 100 INJECTION, SOLUTION INTRAVENOUS; SUBCUTANEOUS at 08:43

## 2025-08-21 ASSESSMENT — ENCOUNTER SYMPTOMS
NAUSEA: 0
WHEEZING: 0
CHEST TIGHTNESS: 0
BACK PAIN: 0
SINUS PAIN: 0
VOMITING: 0
SORE THROAT: 0
COUGH: 0
SHORTNESS OF BREATH: 0
VOICE CHANGE: 0
CONSTIPATION: 0
ABDOMINAL PAIN: 0
COLOR CHANGE: 0
DIARRHEA: 0
TROUBLE SWALLOWING: 0
SINUS PRESSURE: 0

## (undated) DEVICE — PAD CARING ABDN COMB STR 8X10 (16/BX 20BX/C

## (undated) DEVICE — GLOVE SURG SZ 8 L12IN FNGR THK79MIL GRN LTX FREE

## (undated) DEVICE — PADDING CAST W4INXL4YD ST COT RAYON MICROPLEATED HIGHLY

## (undated) DEVICE — SUTURE VICRYL + SZ 3-0 L18IN ABSRB UD SH 1/2 CIR TAPERCUT NDL VCP864D

## (undated) DEVICE — SUTURE PERMAHAND SZ 2-0 L30IN NONABSORBABLE BLK SH L26MM C016D

## (undated) DEVICE — PADDING CAST W6INXL4YD ST COT RAYON MICROPLEATED HIGHLY

## (undated) DEVICE — SUTURE ETHILON SZ 3-0 L30IN NONABSORBABLE BLK L30MM PSLX 3/8 1691H

## (undated) DEVICE — GLOVE ORANGE PI 8   MSG9080

## (undated) DEVICE — SPONGE GZ W4XL4IN 8 PLY 100% COTTON

## (undated) DEVICE — SUTURE VICRYL SZ 2-0 L18IN ABSRB UD CT-1 L36MM 1/2 CIR J839D

## (undated) DEVICE — Device

## (undated) DEVICE — DRESSING PETRO W5XL9IN 3% BISMUTH TRIBROMOPHENATE XRFRM

## (undated) DEVICE — BANDAGE COMPR M W6INXL10YD WHT BGE VELC E MTRX HK AND LOOP

## (undated) DEVICE — SUTURE VICRYL SZ 3-0 L18IN ABSRB UD L26MM SH 1/2 CIR J864D

## (undated) DEVICE — SUTURE PERMAHAND SZ 2-0 L18IN NONABSORBABLE BLK L26MM SH C012D

## (undated) DEVICE — STAPLER EXT SKIN 35 WIDE S STL STPL SQUEEZE HNDL VISISTAT

## (undated) DEVICE — GLOVE ORANGE PI 7 1/2   MSG9075

## (undated) DEVICE — SPLINT KNEE UNIV FOR LESS THAN 36IN L20IN FOAM LAM E CNTCT

## (undated) DEVICE — SUTURE VICRYL + SZ 3 0 L54IN ABSRB UD POLYGLACTIN 910 W VCP285G

## (undated) DEVICE — COVER XR CASS W20XL41IN UNIV ADH STRP

## (undated) DEVICE — GLOVE SURG SZ 75 L12IN FNGR THK79MIL GRN LTX FREE

## (undated) DEVICE — DRESSING GAUZE XEROFORM PETROLATM LF 5X9

## (undated) DEVICE — GOWN SURG 2XL L43IN POLYETH REINF AURORA BRTH HK AND LOOP CLSR

## (undated) DEVICE — HANDPIECE IRRIG BTTRY PWR W/ SUCT HI FLO TIP INTERPULSE

## (undated) DEVICE — BLADE SAW W25XL90MM THK1.27MM S STL SAG 2 CUT

## (undated) DEVICE — SOLUTION WND IRRIGATION 450 ML 0.5 PVP-I 0.9 NACL